# Patient Record
Sex: FEMALE | Race: WHITE | NOT HISPANIC OR LATINO | Employment: OTHER | URBAN - METROPOLITAN AREA
[De-identification: names, ages, dates, MRNs, and addresses within clinical notes are randomized per-mention and may not be internally consistent; named-entity substitution may affect disease eponyms.]

---

## 2017-05-03 ENCOUNTER — RECORDS - HEALTHEAST (OUTPATIENT)
Dept: LAB | Facility: CLINIC | Age: 72
End: 2017-05-03

## 2017-05-03 ENCOUNTER — RECORDS - HEALTHEAST (OUTPATIENT)
Dept: ADMINISTRATIVE | Facility: OTHER | Age: 72
End: 2017-05-03

## 2017-05-03 LAB
CHOLEST SERPL-MCNC: 283 MG/DL
FASTING STATUS PATIENT QL REPORTED: YES
HDLC SERPL-MCNC: 53 MG/DL
LDLC SERPL CALC-MCNC: 201 MG/DL
TRIGL SERPL-MCNC: 146 MG/DL

## 2017-05-30 ENCOUNTER — RECORDS - HEALTHEAST (OUTPATIENT)
Dept: ADMINISTRATIVE | Facility: OTHER | Age: 72
End: 2017-05-30

## 2017-06-01 ENCOUNTER — OFFICE VISIT - HEALTHEAST (OUTPATIENT)
Dept: ONCOLOGY | Facility: HOSPITAL | Age: 72
End: 2017-06-01

## 2017-06-01 ENCOUNTER — AMBULATORY - HEALTHEAST (OUTPATIENT)
Dept: INFUSION THERAPY | Facility: HOSPITAL | Age: 72
End: 2017-06-01

## 2017-06-01 DIAGNOSIS — M81.0 AGE-RELATED OSTEOPOROSIS WITHOUT CURRENT PATHOLOGICAL FRACTURE: ICD-10-CM

## 2017-06-01 DIAGNOSIS — C50.911 BREAST CANCER, STAGE 1, RIGHT (H): ICD-10-CM

## 2017-10-16 ENCOUNTER — RECORDS - HEALTHEAST (OUTPATIENT)
Dept: ADMINISTRATIVE | Facility: OTHER | Age: 72
End: 2017-10-16

## 2017-10-16 ENCOUNTER — RECORDS - HEALTHEAST (OUTPATIENT)
Dept: LAB | Facility: CLINIC | Age: 72
End: 2017-10-16

## 2017-10-16 LAB
CHOLEST SERPL-MCNC: 281 MG/DL
FASTING STATUS PATIENT QL REPORTED: ABNORMAL
HDLC SERPL-MCNC: 49 MG/DL
LDLC SERPL CALC-MCNC: 183 MG/DL
TRIGL SERPL-MCNC: 246 MG/DL

## 2017-11-13 ENCOUNTER — RECORDS - HEALTHEAST (OUTPATIENT)
Dept: ADMINISTRATIVE | Facility: OTHER | Age: 72
End: 2017-11-13

## 2017-11-16 ENCOUNTER — HOSPITAL ENCOUNTER (OUTPATIENT)
Dept: MAMMOGRAPHY | Facility: HOSPITAL | Age: 72
Discharge: HOME | End: 2017-11-16
Attending: FAMILY MEDICINE

## 2017-11-16 DIAGNOSIS — Z12.31 VISIT FOR SCREENING MAMMOGRAM: ICD-10-CM

## 2017-12-14 ENCOUNTER — AMBULATORY - HEALTHEAST (OUTPATIENT)
Dept: SCHEDULING | Facility: CLINIC | Age: 72
End: 2017-12-14

## 2017-12-14 DIAGNOSIS — C50.911 BREAST CANCER, STAGE 1, RIGHT (H): ICD-10-CM

## 2017-12-14 DIAGNOSIS — M81.0 AGE-RELATED OSTEOPOROSIS WITHOUT CURRENT PATHOLOGICAL FRACTURE: ICD-10-CM

## 2018-06-04 ENCOUNTER — AMBULATORY - HEALTHEAST (OUTPATIENT)
Dept: INFUSION THERAPY | Facility: HOSPITAL | Age: 73
End: 2018-06-04

## 2018-06-04 ENCOUNTER — AMBULATORY - HEALTHEAST (OUTPATIENT)
Dept: ONCOLOGY | Facility: HOSPITAL | Age: 73
End: 2018-06-04

## 2018-06-04 ENCOUNTER — OFFICE VISIT - HEALTHEAST (OUTPATIENT)
Dept: ONCOLOGY | Facility: HOSPITAL | Age: 73
End: 2018-06-04

## 2018-06-04 DIAGNOSIS — C50.911 BREAST CANCER, STAGE 1, RIGHT (H): ICD-10-CM

## 2018-06-04 DIAGNOSIS — M85.80 OSTEOPENIA DETERMINED BY X-RAY: ICD-10-CM

## 2018-06-04 LAB
ALBUMIN SERPL-MCNC: 3.6 G/DL (ref 3.5–5)
ALP SERPL-CCNC: 48 U/L (ref 45–120)
ALT SERPL W P-5'-P-CCNC: 16 U/L (ref 0–45)
ANION GAP SERPL CALCULATED.3IONS-SCNC: 9 MMOL/L (ref 5–18)
AST SERPL W P-5'-P-CCNC: 19 U/L (ref 0–40)
BILIRUB SERPL-MCNC: 0.4 MG/DL (ref 0–1)
BUN SERPL-MCNC: 16 MG/DL (ref 8–28)
CALCIUM SERPL-MCNC: 9.7 MG/DL (ref 8.5–10.5)
CHLORIDE BLD-SCNC: 106 MMOL/L (ref 98–107)
CO2 SERPL-SCNC: 24 MMOL/L (ref 22–31)
CREAT SERPL-MCNC: 0.94 MG/DL (ref 0.6–1.1)
GFR SERPL CREATININE-BSD FRML MDRD: 58 ML/MIN/1.73M2
GLUCOSE BLD-MCNC: 97 MG/DL (ref 70–125)
POTASSIUM BLD-SCNC: 4.2 MMOL/L (ref 3.5–5)
PROT SERPL-MCNC: 7.2 G/DL (ref 6–8)
SODIUM SERPL-SCNC: 139 MMOL/L (ref 136–145)

## 2018-06-04 RX ORDER — VALACYCLOVIR HYDROCHLORIDE 1 G/1
1000 TABLET, FILM COATED ORAL AT BEDTIME
Status: SHIPPED | COMMUNITY
Start: 2018-06-04

## 2018-06-06 ENCOUNTER — RECORDS - HEALTHEAST (OUTPATIENT)
Dept: ADMINISTRATIVE | Facility: OTHER | Age: 73
End: 2018-06-06

## 2018-06-06 ENCOUNTER — RECORDS - HEALTHEAST (OUTPATIENT)
Dept: LAB | Facility: CLINIC | Age: 73
End: 2018-06-06

## 2018-06-06 LAB
CHOLEST SERPL-MCNC: 306 MG/DL
FASTING STATUS PATIENT QL REPORTED: YES
FASTING STATUS PATIENT QL REPORTED: YES
GLUCOSE BLD-MCNC: 91 MG/DL (ref 70–125)
HDLC SERPL-MCNC: 55 MG/DL
LDLC SERPL CALC-MCNC: 217 MG/DL
TRIGL SERPL-MCNC: 169 MG/DL
VIT B12 SERPL-MCNC: 1769 PG/ML (ref 213–816)

## 2018-07-10 ENCOUNTER — RECORDS - HEALTHEAST (OUTPATIENT)
Dept: ADMINISTRATIVE | Facility: OTHER | Age: 73
End: 2018-07-10

## 2018-07-11 ENCOUNTER — RECORDS - HEALTHEAST (OUTPATIENT)
Dept: ADMINISTRATIVE | Facility: OTHER | Age: 73
End: 2018-07-11

## 2018-07-12 ENCOUNTER — COMMUNICATION - HEALTHEAST (OUTPATIENT)
Dept: ONCOLOGY | Facility: CLINIC | Age: 73
End: 2018-07-12

## 2018-07-18 ENCOUNTER — RECORDS - HEALTHEAST (OUTPATIENT)
Dept: ADMINISTRATIVE | Facility: OTHER | Age: 73
End: 2018-07-18

## 2018-07-31 ENCOUNTER — HOSPITAL ENCOUNTER (OUTPATIENT)
Dept: MAMMOGRAPHY | Facility: CLINIC | Age: 73
Discharge: HOME | End: 2018-07-31
Attending: FAMILY MEDICINE

## 2018-07-31 ENCOUNTER — HOSPITAL ENCOUNTER (OUTPATIENT)
Dept: MAMMOGRAPHY | Facility: CLINIC | Age: 73
Discharge: HOME | End: 2018-07-31
Attending: SPECIALIST

## 2018-07-31 ENCOUNTER — HOSPITAL ENCOUNTER (OUTPATIENT)
Dept: MAMMOGRAPHY | Facility: CLINIC | Age: 73
Discharge: HOME | End: 2018-07-31
Attending: FAMILY MEDICINE | Admitting: RADIOLOGY

## 2018-07-31 DIAGNOSIS — Z85.3 HX OF BREAST CANCER: ICD-10-CM

## 2018-07-31 DIAGNOSIS — R93.5 ABNORMAL FINDINGS ON DIAGNOSTIC IMAGING OF OTHER ABDOMINAL REGIONS, INCLUDING RETROPERITONEUM: ICD-10-CM

## 2018-07-31 DIAGNOSIS — R92.8 OTHER ABNORMAL AND INCONCLUSIVE FINDINGS ON DIAGNOSTIC IMAGING OF BREAST: ICD-10-CM

## 2018-07-31 DIAGNOSIS — N63.10 BREAST MASS, RIGHT: ICD-10-CM

## 2018-08-02 ENCOUNTER — COMMUNICATION - HEALTHEAST (OUTPATIENT)
Dept: MAMMOGRAPHY | Facility: CLINIC | Age: 73
End: 2018-08-02

## 2018-08-02 LAB
LAB AP CHARGES (HE HISTORICAL CONVERSION): NORMAL
PATH REPORT.COMMENTS IMP SPEC: NORMAL
PATH REPORT.COMMENTS IMP SPEC: NORMAL
PATH REPORT.FINAL DX SPEC: NORMAL
PATH REPORT.GROSS SPEC: NORMAL
PATH REPORT.MICROSCOPIC SPEC OTHER STN: NORMAL
PATH REPORT.RELEVANT HX SPEC: NORMAL
RESULT FLAG (HE HISTORICAL CONVERSION): NORMAL

## 2018-11-23 ENCOUNTER — HOSPITAL ENCOUNTER (OUTPATIENT)
Dept: MAMMOGRAPHY | Facility: CLINIC | Age: 73
Discharge: HOME | End: 2018-11-23
Attending: INTERNAL MEDICINE

## 2018-11-23 DIAGNOSIS — Z12.31 VISIT FOR SCREENING MAMMOGRAM: ICD-10-CM

## 2018-11-30 ENCOUNTER — HOSPITAL ENCOUNTER (OUTPATIENT)
Dept: MAMMOGRAPHY | Facility: CLINIC | Age: 73
Discharge: HOME | End: 2018-11-30
Attending: INTERNAL MEDICINE

## 2018-11-30 DIAGNOSIS — N64.89 BREAST ASYMMETRY: ICD-10-CM

## 2019-06-10 ENCOUNTER — AMBULATORY - HEALTHEAST (OUTPATIENT)
Dept: INFUSION THERAPY | Facility: HOSPITAL | Age: 74
End: 2019-06-10

## 2019-06-10 ENCOUNTER — RECORDS - HEALTHEAST (OUTPATIENT)
Dept: LAB | Facility: CLINIC | Age: 74
End: 2019-06-10

## 2019-06-10 ENCOUNTER — OFFICE VISIT - HEALTHEAST (OUTPATIENT)
Dept: ONCOLOGY | Facility: HOSPITAL | Age: 74
End: 2019-06-10

## 2019-06-10 DIAGNOSIS — M85.80 OSTEOPENIA DETERMINED BY X-RAY: ICD-10-CM

## 2019-06-10 DIAGNOSIS — C50.911 BREAST CANCER, STAGE 1, RIGHT (H): ICD-10-CM

## 2019-06-10 LAB
ALBUMIN SERPL-MCNC: 3.8 G/DL (ref 3.5–5)
ALP SERPL-CCNC: 56 U/L (ref 45–120)
ALT SERPL W P-5'-P-CCNC: 19 U/L (ref 0–45)
ANION GAP SERPL CALCULATED.3IONS-SCNC: 11 MMOL/L (ref 5–18)
ANION GAP SERPL CALCULATED.3IONS-SCNC: 6 MMOL/L (ref 5–18)
AST SERPL W P-5'-P-CCNC: 22 U/L (ref 0–40)
BASOPHILS # BLD AUTO: 0 THOU/UL (ref 0–0.2)
BASOPHILS NFR BLD AUTO: 1 % (ref 0–2)
BILIRUB SERPL-MCNC: 0.3 MG/DL (ref 0–1)
BUN SERPL-MCNC: 12 MG/DL (ref 8–28)
BUN SERPL-MCNC: 12 MG/DL (ref 8–28)
CALCIUM SERPL-MCNC: 10.2 MG/DL (ref 8.5–10.5)
CALCIUM SERPL-MCNC: 9.8 MG/DL (ref 8.5–10.5)
CHLORIDE BLD-SCNC: 106 MMOL/L (ref 98–107)
CHLORIDE BLD-SCNC: 106 MMOL/L (ref 98–107)
CHOLEST SERPL-MCNC: 293 MG/DL
CO2 SERPL-SCNC: 24 MMOL/L (ref 22–31)
CO2 SERPL-SCNC: 28 MMOL/L (ref 22–31)
CREAT SERPL-MCNC: 0.75 MG/DL (ref 0.6–1.1)
CREAT SERPL-MCNC: 0.78 MG/DL (ref 0.6–1.1)
EOSINOPHIL # BLD AUTO: 0.2 THOU/UL (ref 0–0.4)
EOSINOPHIL NFR BLD AUTO: 3 % (ref 0–6)
ERYTHROCYTE [DISTWIDTH] IN BLOOD BY AUTOMATED COUNT: 13.1 % (ref 11–14.5)
FASTING STATUS PATIENT QL REPORTED: ABNORMAL
GFR SERPL CREATININE-BSD FRML MDRD: >60 ML/MIN/1.73M2
GFR SERPL CREATININE-BSD FRML MDRD: >60 ML/MIN/1.73M2
GLUCOSE BLD-MCNC: 104 MG/DL (ref 70–125)
GLUCOSE BLD-MCNC: 86 MG/DL (ref 70–125)
HCT VFR BLD AUTO: 38.9 % (ref 35–47)
HDLC SERPL-MCNC: 46 MG/DL
HGB BLD-MCNC: 13.4 G/DL (ref 12–16)
LDLC SERPL CALC-MCNC: 180 MG/DL
LYMPHOCYTES # BLD AUTO: 1.2 THOU/UL (ref 0.8–4.4)
LYMPHOCYTES NFR BLD AUTO: 18 % (ref 20–40)
MCH RBC QN AUTO: 34.1 PG (ref 27–34)
MCHC RBC AUTO-ENTMCNC: 34.4 G/DL (ref 32–36)
MCV RBC AUTO: 99 FL (ref 80–100)
MONOCYTES # BLD AUTO: 0.6 THOU/UL (ref 0–0.9)
MONOCYTES NFR BLD AUTO: 9 % (ref 2–10)
NEUTROPHILS # BLD AUTO: 4.6 THOU/UL (ref 2–7.7)
NEUTROPHILS NFR BLD AUTO: 69 % (ref 50–70)
PLATELET # BLD AUTO: 238 THOU/UL (ref 140–440)
PMV BLD AUTO: 8.6 FL (ref 8.5–12.5)
POTASSIUM BLD-SCNC: 4.3 MMOL/L (ref 3.5–5)
POTASSIUM BLD-SCNC: 4.6 MMOL/L (ref 3.5–5)
PROT SERPL-MCNC: 7 G/DL (ref 6–8)
RBC # BLD AUTO: 3.93 MILL/UL (ref 3.8–5.4)
SODIUM SERPL-SCNC: 140 MMOL/L (ref 136–145)
SODIUM SERPL-SCNC: 141 MMOL/L (ref 136–145)
TRIGL SERPL-MCNC: 337 MG/DL
TSH SERPL DL<=0.005 MIU/L-ACNC: 3.25 UIU/ML (ref 0.3–5)
VIT B12 SERPL-MCNC: 1631 PG/ML (ref 213–816)
WBC: 6.6 THOU/UL (ref 4–11)

## 2019-07-24 ENCOUNTER — RECORDS - HEALTHEAST (OUTPATIENT)
Dept: LAB | Facility: CLINIC | Age: 74
End: 2019-07-24

## 2019-07-24 LAB — VIT B12 SERPL-MCNC: 1077 PG/ML (ref 213–816)

## 2019-12-23 ENCOUNTER — HOSPITAL ENCOUNTER (OUTPATIENT)
Dept: MAMMOGRAPHY | Facility: CLINIC | Age: 74
Discharge: HOME | End: 2019-12-23

## 2019-12-23 DIAGNOSIS — Z12.31 VISIT FOR SCREENING MAMMOGRAM: ICD-10-CM

## 2020-04-24 ENCOUNTER — RECORDS - HEALTHEAST (OUTPATIENT)
Dept: LAB | Facility: CLINIC | Age: 75
End: 2020-04-24

## 2020-04-24 LAB
AST SERPL W P-5'-P-CCNC: 19 U/L (ref 0–40)
CHOLEST SERPL-MCNC: 269 MG/DL
FASTING STATUS PATIENT QL REPORTED: ABNORMAL
FASTING STATUS PATIENT QL REPORTED: ABNORMAL
GLUCOSE BLD-MCNC: 66 MG/DL (ref 70–125)
HDLC SERPL-MCNC: 49 MG/DL
LDLC SERPL CALC-MCNC: 173 MG/DL
TRIGL SERPL-MCNC: 234 MG/DL

## 2020-06-08 ENCOUNTER — RECORDS - HEALTHEAST (OUTPATIENT)
Dept: LAB | Facility: CLINIC | Age: 75
End: 2020-06-08

## 2020-06-08 LAB
AST SERPL W P-5'-P-CCNC: 24 U/L (ref 0–40)
CHOLEST SERPL-MCNC: 283 MG/DL
FASTING STATUS PATIENT QL REPORTED: ABNORMAL
HDLC SERPL-MCNC: 46 MG/DL
LDLC SERPL CALC-MCNC: 174 MG/DL
TRIGL SERPL-MCNC: 314 MG/DL

## 2020-06-22 ENCOUNTER — OFFICE VISIT - HEALTHEAST (OUTPATIENT)
Dept: ONCOLOGY | Facility: HOSPITAL | Age: 75
End: 2020-06-22

## 2020-06-22 ENCOUNTER — AMBULATORY - HEALTHEAST (OUTPATIENT)
Dept: ONCOLOGY | Facility: HOSPITAL | Age: 75
End: 2020-06-22

## 2020-06-22 ENCOUNTER — AMBULATORY - HEALTHEAST (OUTPATIENT)
Dept: INFUSION THERAPY | Facility: HOSPITAL | Age: 75
End: 2020-06-22

## 2020-06-22 DIAGNOSIS — C50.911 BREAST CANCER, STAGE 1, RIGHT (H): ICD-10-CM

## 2020-06-22 DIAGNOSIS — Z12.31 ENCOUNTER FOR SCREENING MAMMOGRAM FOR MALIGNANT NEOPLASM OF BREAST: ICD-10-CM

## 2020-06-22 DIAGNOSIS — M85.89 OSTEOPENIA OF MULTIPLE SITES: ICD-10-CM

## 2020-06-22 LAB
ALBUMIN SERPL-MCNC: 3.8 G/DL (ref 3.5–5)
ALP SERPL-CCNC: 51 U/L (ref 45–120)
ALT SERPL W P-5'-P-CCNC: 27 U/L (ref 0–45)
ANION GAP SERPL CALCULATED.3IONS-SCNC: 8 MMOL/L (ref 5–18)
AST SERPL W P-5'-P-CCNC: 25 U/L (ref 0–40)
BILIRUB SERPL-MCNC: 0.4 MG/DL (ref 0–1)
BUN SERPL-MCNC: 11 MG/DL (ref 8–28)
CALCIUM SERPL-MCNC: 9 MG/DL (ref 8.5–10.5)
CHLORIDE BLD-SCNC: 109 MMOL/L (ref 98–107)
CO2 SERPL-SCNC: 25 MMOL/L (ref 22–31)
CREAT SERPL-MCNC: 0.82 MG/DL (ref 0.6–1.1)
ERYTHROCYTE [DISTWIDTH] IN BLOOD BY AUTOMATED COUNT: 12.3 % (ref 11–14.5)
GFR SERPL CREATININE-BSD FRML MDRD: >60 ML/MIN/1.73M2
GLUCOSE BLD-MCNC: 87 MG/DL (ref 70–125)
HCT VFR BLD AUTO: 39.6 % (ref 35–47)
HGB BLD-MCNC: 13.2 G/DL (ref 12–16)
MCH RBC QN AUTO: 33.5 PG (ref 27–34)
MCHC RBC AUTO-ENTMCNC: 33.3 G/DL (ref 32–36)
MCV RBC AUTO: 101 FL (ref 80–100)
PLATELET # BLD AUTO: 194 THOU/UL (ref 140–440)
PMV BLD AUTO: 9.2 FL (ref 8.5–12.5)
POTASSIUM BLD-SCNC: 4.1 MMOL/L (ref 3.5–5)
PROT SERPL-MCNC: 6.7 G/DL (ref 6–8)
RBC # BLD AUTO: 3.94 MILL/UL (ref 3.8–5.4)
SODIUM SERPL-SCNC: 142 MMOL/L (ref 136–145)
WBC: 4.9 THOU/UL (ref 4–11)

## 2020-07-08 ENCOUNTER — COMMUNICATION - HEALTHEAST (OUTPATIENT)
Dept: ONCOLOGY | Facility: HOSPITAL | Age: 75
End: 2020-07-08

## 2020-12-09 ENCOUNTER — RECORDS - HEALTHEAST (OUTPATIENT)
Dept: LAB | Facility: CLINIC | Age: 75
End: 2020-12-09

## 2020-12-09 LAB
CHOLEST SERPL-MCNC: 255 MG/DL
FASTING STATUS PATIENT QL REPORTED: ABNORMAL
HDLC SERPL-MCNC: 49 MG/DL
LDLC SERPL CALC-MCNC: 164 MG/DL
MAGNESIUM SERPL-MCNC: 1.8 MG/DL (ref 1.8–2.6)
TRIGL SERPL-MCNC: 211 MG/DL

## 2021-01-18 ENCOUNTER — HOSPITAL ENCOUNTER (OUTPATIENT)
Dept: MAMMOGRAPHY | Facility: CLINIC | Age: 76
Discharge: HOME | End: 2021-01-18

## 2021-01-18 DIAGNOSIS — Z12.31 ENCOUNTER FOR SCREENING MAMMOGRAM FOR MALIGNANT NEOPLASM OF BREAST: ICD-10-CM

## 2021-01-18 DIAGNOSIS — C50.911 BREAST CANCER, STAGE 1, RIGHT (H): ICD-10-CM

## 2021-05-04 ENCOUNTER — RECORDS - HEALTHEAST (OUTPATIENT)
Dept: LAB | Facility: CLINIC | Age: 76
End: 2021-05-04

## 2021-05-04 LAB
ALBUMIN SERPL-MCNC: 3.7 G/DL (ref 3.5–5)
ALP SERPL-CCNC: 52 U/L (ref 45–120)
ALT SERPL W P-5'-P-CCNC: 19 U/L (ref 0–45)
ANION GAP SERPL CALCULATED.3IONS-SCNC: 11 MMOL/L (ref 5–18)
AST SERPL W P-5'-P-CCNC: 18 U/L (ref 0–40)
BILIRUB SERPL-MCNC: 0.4 MG/DL (ref 0–1)
BUN SERPL-MCNC: 19 MG/DL (ref 8–28)
CALCIUM SERPL-MCNC: 9.3 MG/DL (ref 8.5–10.5)
CHLORIDE BLD-SCNC: 103 MMOL/L (ref 98–107)
CO2 SERPL-SCNC: 25 MMOL/L (ref 22–31)
CREAT SERPL-MCNC: 0.8 MG/DL (ref 0.6–1.1)
GFR SERPL CREATININE-BSD FRML MDRD: >60 ML/MIN/1.73M2
GLUCOSE BLD-MCNC: 104 MG/DL (ref 70–125)
POTASSIUM BLD-SCNC: 4.3 MMOL/L (ref 3.5–5)
PROT SERPL-MCNC: 6.9 G/DL (ref 6–8)
SODIUM SERPL-SCNC: 139 MMOL/L (ref 136–145)
T4 FREE SERPL-MCNC: 0.8 NG/DL (ref 0.7–1.8)
TSH SERPL DL<=0.005 MIU/L-ACNC: 1.38 UIU/ML (ref 0.3–5)
VIT B12 SERPL-MCNC: 964 PG/ML (ref 213–816)

## 2021-05-05 LAB — 25(OH)D3 SERPL-MCNC: 57.1 NG/ML (ref 30–80)

## 2021-05-29 NOTE — PROGRESS NOTES
[facility] Cancer Care Progress Note    Patient: Salome Maravilla  MRN: [id number]  Date of Service: 6/10/2019        Reason for visit      1. Breast cancer, stage 1, right (H)        Clinical stage      T1 N0 M0 ER/TN positive HER-2/sd negative    Assessment     1.  A 73 y.o. woman postmenopausal with CA breast right side, T1c N0 M0, ER/TN positive, HER-2 negative, grade 1, status post lumpectomy, radiation and Arimidex for 2 months and tamoxifen since 03/2011.  She stopped her tamoxifen in January 2015 because of questionable double vision.  Now it is clear that that is from ocular muscle problem and not a problem from tamoxifen.  At any rate she has not resumed tamoxifen since.  Overall feels well. Recent biopsy of right breast in July 2018 was negative for malignancy.  2.  Mild hot flashes improved after Effexor and magnesium.  3.  Vaginal dryness.  4.  Minimal arthritis.    Plan     1.  At this time patient will continue to be on observation.  Yearly mammograms.  2.  Followup with me in a years' time.  3.  Continue Effexor 75 mg b.i.d.  4.  She needs to be on high calcium and Vit D diet.  Next DEXA scan in 2020.  5.  Follow up with Dr. Sanderson for other medical issues.  6.  Regular exercise.    History     Salome Maravilla is a very pleasant 73 y.o. old female postmenopausal with a history of CA breast diagnosed in 06/2010 located on the right side measuring 1 cm in size, presenting on a mammogram, completely asymptomatic and was in the background of prior Premarin use.  Subsequent to that, she had lumpectomy and sentinel lymph node biopsy and was a grade 1, ER/TN positive, HER-2 negative type of tumor.  Subsequent to that she got radiation therapy in [place] and started on Arimidex which she only took for a few weeks and noted that she was not tolerant of it and was put on tamoxifen.  she took tamoxifen up until I believe January 2015.  She had some trouble with her vision and this was while she was out of  [place].  She actually stopped taking tamoxifen.        Today she feels well.  Denies any other new problems. Was found to have osteopenia on her Dexa scan. Was told she had a lump in her right breast when she had CT done for abdominal pain.  Underwent ultrasound and mammogram.  Eventually had a biopsy done the biopsy showed dense fibrosis.  No evidence of any malignancy.    On the positive side Felicity has decreased the amount of omeprazole that she is taking and discontinued Celebrex.  As result the aches and pains a little bit more noticeable.    Past Medical History     Past Medical History:   Diagnosis Date     Breast cancer (H) 2010    hx of right lumpectomy for breast      Depression      GERD (gastroesophageal reflux disease)      Hx of radiation therapy right 2010    hx of right lumpectomy for breast cancer     Osteoarthritis        Review of Systems   Constitutional  Constitutional (WDL): Exceptions to WDL  Weight Loss: to <10% from baseline, intervention not indicated(down 6 lbs)  Neurosensory  Neurosensory (WDL): All neurosensory elements are within defined limits  Cardiovascular  Cardiovascular (WDL): All cardiovascular elements are within defined limits  Pulmonary  Respiratory (WDL): Within Defined Limits  Gastrointestinal  Gastrointestinal (WDL): Exceptions to WDL  Anorexia: Loss of appetite without alteration in eating habits  Genitourinary  Genitourinary (WDL): All genitourinary elements are within defined limits(some incontinence)  Integumentary  Integumentary (WDL): All integumentary elements are within defined limits  Patient Coping  Patient Coping: Accepting  ECOG Performance   1  Pain Status  Currently in Pain: Yes  Accompanied by  Accompanied by: Alone      Vital Signs     Vitals:    06/10/19 1432   BP: 119/56   Pulse: 68   Temp: 98  F (36.7  C)   SpO2: 99%       Physical Exam     GENERAL: No acute distress. Cooperative in conversation.   HEENT: Pupils are equal, round and reactive. Oral mucosa is  clean and intact. No ulcerations or mucositis noted. No bleeding noted.  RESP:Chest symmetric lungs are clear bilaterally per auscultation. Regular respiratory rate. No wheezes or rhonchi.  CV: Normal S1 S2 Regular, rate and rhythm. No murmurs.  ABD: Nondistended, soft, nontender. Positive bowel sounds. No organomegaly.   EXTREMITIES: No lower extremity edema.   NEURO: Non- focal. Alert and oriented x3.  Cranial nerves appear intact.  PSYCH: Within normal limits. No depression or anxiety.  SKIN: Warm dry intact.    LYMPH NODES: Bilateral cervical, supraclavicular, axillary lymph node examination was done.  Negative for any palpable adenopathy.  BREAST: S/P right lumpectomy. Barely visible scar. Slightly prominent nipple and areola. Minimal skin changes. No lump. No nipple discharge. Left breast normal. Axilla normal b/l.      Lab Results     Results for orders placed or performed in visit on 06/10/19   Comprehensive Metabolic Panel   Result Value Ref Range    Sodium 140 136 - 145 mmol/L    Potassium 4.3 3.5 - 5.0 mmol/L    Chloride 106 98 - 107 mmol/L    CO2 28 22 - 31 mmol/L    Anion Gap, Calculation 6 5 - 18 mmol/L    Glucose 104 70 - 125 mg/dL    BUN 12 8 - 28 mg/dL    Creatinine 0.78 0.60 - 1.10 mg/dL    GFR MDRD Af Amer >60 >60 mL/min/1.73m2    GFR MDRD Non Af Amer >60 >60 mL/min/1.73m2    Bilirubin, Total 0.3 0.0 - 1.0 mg/dL    Calcium 9.8 8.5 - 10.5 mg/dL    Protein, Total 7.0 6.0 - 8.0 g/dL    Albumin 3.8 3.5 - 5.0 g/dL    Alkaline Phosphatase 56 45 - 120 U/L    AST 22 0 - 40 U/L    ALT 19 0 - 45 U/L   HM1 (CBC with Diff)   Result Value Ref Range    WBC 6.6 4.0 - 11.0 thou/uL    RBC 3.93 3.80 - 5.40 mill/uL    Hemoglobin 13.4 12.0 - 16.0 g/dL    Hematocrit 38.9 35.0 - 47.0 %    MCV 99 80 - 100 fL    MCH 34.1 (H) 27.0 - 34.0 pg    MCHC 34.4 32.0 - 36.0 g/dL    RDW 13.1 11.0 - 14.5 %    Platelets 238 140 - 440 thou/uL    MPV 8.6 8.5 - 12.5 fL    Neutrophils % 69 50 - 70 %    Lymphocytes % 18 (L) 20 - 40 %     Monocytes % 9 2 - 10 %    Eosinophils % 3 0 - 6 %    Basophils % 1 0 - 2 %    Neutrophils Absolute 4.6 2.0 - 7.7 thou/uL    Lymphocytes Absolute 1.2 0.8 - 4.4 thou/uL    Monocytes Absolute 0.6 0.0 - 0.9 thou/uL    Eosinophils Absolute 0.2 0.0 - 0.4 thou/uL    Basophils Absolute 0.0 0.0 - 0.2 thou/uL         Imaging Results     No results found.      Tl Robertson MD

## 2021-05-31 VITALS — WEIGHT: 147.9 LBS | BODY MASS INDEX: 24.8 KG/M2

## 2021-06-01 VITALS — WEIGHT: 145.9 LBS | BODY MASS INDEX: 24.47 KG/M2

## 2021-06-02 ENCOUNTER — RECORDS - HEALTHEAST (OUTPATIENT)
Dept: ADMINISTRATIVE | Facility: CLINIC | Age: 76
End: 2021-06-02

## 2021-06-03 VITALS — WEIGHT: 139.2 LBS | BODY MASS INDEX: 23.34 KG/M2

## 2021-06-09 NOTE — TELEPHONE ENCOUNTER
Called patient and left message for return call to discuss bone density results. Requested patient call back. Mandy Tripathi, CMA

## 2021-06-09 NOTE — TELEPHONE ENCOUNTER
----- Message from Jenny Velázquez CNP sent at 7/8/2020  7:59 AM CDT -----  DEXA scan shows worsening osteopenia. Please encourage pt is take Calcium/Vitamin D, at least 600mg/400IU, twice a day. She is currently not taking it. Repeat in 2 years.

## 2021-06-09 NOTE — PROGRESS NOTES
[facility] Hematology and Oncology Progress Note    Patient: Salome Maravilla  MRN: [id number]  Date of Service: 06/22/2020        Reason for Visit    Chief Complaint   Patient presents with     HE Cancer     Breast cancer, stage 1, right       Assessment and Plan  Cancer Staging  Breast cancer, stage 1, right (H)  Staging form: Breast, AJCC 7th Edition  - Clinical: ER+, FL+, Her2 sd -ve (T1c, N0, cM0) - Unsigned  - Pathologic: No stage assigned - Unsigned    1. Breast cancer, right side, T1c N0 M0, ER/FL positive, HER-2 negative, grade 1, status post lumpectomy, radiation and Arimidex for 2 months and tamoxifen from 03/2011 until January 2015 when she stopped because of questionable double vision.  Now it is clear that that is from ocular muscle problem and not a problem from tamoxifen.  She did not resume. Has been fine since. She will continue to come annually. Encourage her to call with any changes to her health.     2. Osteopenia: She is not taking calcium, but gets a lot in her diet. She is taking vitamin D daily. We will repeat her DEXA scan this year.  Encourage also to participate in weightbearing exercises.         ECOG Performance   ECOG Performance Status: 0     Distress Assessment  Distress Assessment Score: 5('s health- dementia, stroke, COPD): no further intervention by us. Has support.     Pain  Currently in Pain: Yes  Pain Score (Initial OR Reassessment): 3  Location: toes        Problem List    1. Osteopenia of multiple sites  DXA Bone Density Scan    DXA Bone Density Scan   2. Breast cancer, stage 1, right (H)  Mammo Screening Bilateral   3. Encounter for screening mammogram for malignant neoplasm of breast   Mammo Screening Bilateral      ______________________________________________________________________________    History of Present Illness    Salome Maravilla is a very pleasant 74 y.o.female who is postmenopausal with a history of CA breast diagnosed in 06/2010 located on the right  side measuring 1 cm in size, presenting on a mammogram, completely asymptomatic and was in the background of prior Premarin use.  Subsequent to that, she had lumpectomy and sentinel lymph node biopsy and was a grade 1, ER/PA positive, HER-2 negative type of tumor.  Subsequent to that she got radiation therapy in [place] and started on Arimidex which she only took for a few weeks and noted that she was not tolerant of it and was put on tamoxifen.  she took tamoxifen up until I believe January 2015.  She had some trouble with her vision and this was while she was out of state.  She actually stopped taking tamoxifen.  Never resumed. She did have osteopenia on her Dexa scan.   Was told she had a lump in her right breast when she had CT done for abdominal pain in 2018.  Underwent ultrasound and mammogram.  Eventually had a biopsy done the biopsy showed dense fibrosis.  No evidence of any malignancy.  Pt has been doing fine without major complaints    Pain Status  Currently in Pain: Yes    Review of Systems    Oncology Nurse Assessment/CMA Intake: Constitutional  Constitutional (WDL): Exceptions to WDL  Fatigue: Concerns(always feel tired)  Hot flashes/Night Sweats: Concerns  Neurosensory  Neurosensory (WDL): All neurosensory elements are within defined limits  Eye   Eye Disorder (WDL): Exceptions to WDL  Blurred Vision: Concerns(double vision)  Ear  Ear Disorder (WDL): All ear disorder elements are within defined limits  Cardiovascular  Cardiovascular (WDL): All cardiovascular elements are within defined limits  Pulmonary  Respiratory (WDL): Within Defined Limits  Gastrointestinal  Gastrointestinal (WDL): All gastrointestinal elements are within defined limits  Genitourinary  Genitourinary (WDL): All genitourinary elements are within defined limits  Lymphatic  Lymph (WDL): All lymph disorder elements are within defined limits  Musculoskeletal and Connective Tissue  Musculoskeletal and Connetive Tissue Disorders (WDL):  Exceptions to WDL  Arthralgia: Concerns(osteoarthritis)  Integumentary  Integumentary (WDL): All integumentary elements are within defined limits  Patient Coping  Patient Coping: Accepting;Open/discussion  Accompanied by  Accompanied by: Alone  Oral Chemo Adherence         Past History  Past Medical History:   Diagnosis Date     Breast cancer (H) 2010    hx of right lumpectomy for breast      Depression      GERD (gastroesophageal reflux disease)      Hx of radiation therapy right 2010    hx of right lumpectomy for breast cancer     Osteoarthritis        PHYSICAL EXAM  There were no vitals taken for this visit.    GENERAL: no acute distress. Cooperative in conversation. Alone on vieo  RESP: Regular respiratory rate. No expiratory wheezes   NEURO: non focal. Alert and oriented x3.   PSYCH: within normal limits. No depression or anxiety.  SKIN: exposed skin is dry intact.     Lab Results    Recent Results (from the past 168 hour(s))   HM2 (CBC W/O DIFF)   Result Value Ref Range    WBC 4.9 4.0 - 11.0 thou/uL    RBC 3.94 3.80 - 5.40 mill/uL    Hemoglobin 13.2 12.0 - 16.0 g/dL    Hematocrit 39.6 35.0 - 47.0 %     (H) 80 - 100 fL    MCH 33.5 27.0 - 34.0 pg    MCHC 33.3 32.0 - 36.0 g/dL    RDW 12.3 11.0 - 14.5 %    Platelets 194 140 - 440 thou/uL    MPV 9.2 8.5 - 12.5 fL   Comprehensive Metabolic Panel   Result Value Ref Range    Sodium 142 136 - 145 mmol/L    Potassium 4.1 3.5 - 5.0 mmol/L    Chloride 109 (H) 98 - 107 mmol/L    CO2 25 22 - 31 mmol/L    Anion Gap, Calculation 8 5 - 18 mmol/L    Glucose 87 70 - 125 mg/dL    BUN 11 8 - 28 mg/dL    Creatinine 0.82 0.60 - 1.10 mg/dL    GFR MDRD Af Amer >60 >60 mL/min/1.73m2    GFR MDRD Non Af Amer >60 >60 mL/min/1.73m2    Bilirubin, Total 0.4 0.0 - 1.0 mg/dL    Calcium 9.0 8.5 - 10.5 mg/dL    Protein, Total 6.7 6.0 - 8.0 g/dL    Albumin 3.8 3.5 - 5.0 g/dL    Alkaline Phosphatase 51 45 - 120 U/L    AST 25 0 - 40 U/L    ALT 27 0 - 45 U/L       Imaging    No results  "found.    mammo from December was reviewed and normal    Salome Maravilla is a 74 y.o. female who is being evaluated via a billable video visit.      The patient has been notified of following:     \"This video visit will be conducted via a call between you and your physician/provider. We have found that certain health care needs can be provided without the need for an in-person physical exam.  This service lets us provide the care you need with a video conversation.  If a prescription is necessary we can send it directly to your pharmacy.  If lab work is needed we can place an order for that and you can then stop by our lab to have the test done at a later time.    Video visits are billed at different rates depending on your insurance coverage. Please reach out to your insurance provider with any questions.    If during the course of the call the physician/provider feels a video visit is not appropriate, you will not be charged for this service.\"    Patient has given verbal consent to a Video visit? Yes    Will anyone else be joining your video visit? No        Video Start Time: 1:51 PM    Additional provider notes: see abovev      Video-Visit Details    Type of service:  Video Visit    Video End Time (time video stopped): 1:57 PM  Originating Location (pt. Location): Home    Distant Location (provider location):  [facility] CANCER CARE AND H[facility]ATOLOGY     Platform used for Video Visit: Alex Velázquez CNP      Signed by: Jenny Velázquez CNP  "

## 2021-06-09 NOTE — PROGRESS NOTES
"Salome Maravilla is a 74 y.o. female who is being evaluated via a billable video visit regarding Breast cancer, stage 1, right.     The patient has been notified of following:     \"This video visit will be conducted via a call between you and your physician/provider. We have found that certain health care needs can be provided without the need for an in-person physical exam.  This service lets us provide the care you need with a video conversation.  If a prescription is necessary we can send it directly to your pharmacy.  If lab work is needed we can place an order for that and you can then stop by our lab to have the test done at a later time.    Video visits are billed at different rates depending on your insurance coverage. Please reach out to your insurance provider with any questions.    If during the course of the call the physician/provider feels a video visit is not appropriate, you will not be charged for this service.\"    Patient has given verbal consent to a Video visit? Yes    Will anyone else be joining your video visit? No            Mandy Tripathi CMA  "

## 2021-06-09 NOTE — TELEPHONE ENCOUNTER
Patient returned call to discuss bone density results. Encouraged patient to start taking Calcium/Vit D 600mg/400IU twice a day. Will plan to repeat scan in 2 years. Understanding and appreciation of call was verbalized.  Mandy Tripathi, CMA

## 2021-06-11 NOTE — PROGRESS NOTES
[facility] Cancer Care Progress Note    Patient: Salome Maravilla  MRN: [id number]  Date of Service: 6/1/2017        Reason for visit      1. Breast cancer, stage 1, right    2. Age-related osteoporosis without current pathological fracture         Clinical stage      T1 N0 M0 ER/ND positive HER-2/sd negative    Assessment     1.  A 71 y.o. woman postmenopausal with CA breast right side, T1c N0 M0, ER/ND positive, HER-2 negative, grade 1, status post lumpectomy, radiation and Arimidex for 2 months and tamoxifen since 03/2011.  She stopped her tamoxifen in January 2015 because of questionable double vision.  Now it is clear that that is from ocular muscle problem and not a problem from tamoxifen.  At any rate she has not resumed tamoxifen since.  Overall feels well.  2.  Mild hot flashes improved after Effexor and magnesium.  3.  Vaginal dryness.  4.  minimal arthritis.      Plan     1.  At this time patient will continue to be on observation.  Yearly mammograms.  2.  Followup with me in a years' time.  3.  Continue Effexor 75 mg b.i.d.  4.  She needs a DEXA scan.  5.  Follow up with Dr. Sanderson for other medical issues.      History     Salome Maravilla is a very pleasant 71 y.o. old female postmenopausal with a history of CA breast diagnosed in 06/2010 located on the right side measuring 1 cm in size, presenting on a mammogram, completely asymptomatic and was in the background of prior Premarin use.  Subsequent to that, she had lumpectomy and sentinel lymph node biopsy and was a grade 1, ER/ND positive, HER-2 negative type of tumor.  Subsequent to that she got radiation therapy in [place] and started on Arimidex which she only took for a few weeks and noted that she was not tolerant of it and was put on tamoxifen.  she took tamoxifen up until I believe January 2015.  She had some trouble with her vision and this was while she was out of state.  She actually stopped taking tamoxifen.      After that she was seen by  her eye doctor and was told that the problem lies in her lenses.  He did not think that this was a drug effect.  On the positive side her hot flashes have gotten better since she has stopped taking tamoxifen.  They were quite frequent and quite annoying back when she stopped it.  Today she feels well.  Denies any other new problems.  She had seen her primary care doctor and had a breast exam done earlier this month.    Past Medical History     Past Medical History:   Diagnosis Date     Breast cancer 2010    hx of right lumpectomy for breast      Depression      GERD (gastroesophageal reflux disease)      Hx of radiation therapy right 2010    hx of right lumpectomy for breast cancer     Osteoarthritis        Review of Systems   Constitutional  Constitutional (WDL): Exceptions to WDL  Neurosensory  Neurosensory (WDL): All neurosensory elements are within defined limits  Cardiovascular  Cardiovascular (WDL): All cardiovascular elements are within defined limits  Pulmonary  Respiratory (WDL): Within Defined Limits  Gastrointestinal  Gastrointestinal (WDL): Exceptions to WDL  Anorexia: Loss of appetite without alteration in eating habits  Genitourinary  Genitourinary (WDL): All genitourinary elements are within defined limits  Integumentary  Integumentary (WDL): All integumentary elements are within defined limits  Patient Coping  Patient Coping: Accepting  ECOG Performance   1  Pain Status  Currently in Pain: No/denies  Accompanied by  Accompanied by: Alone      Vital Signs     Vitals:    06/01/17 1404   BP: 152/66   Pulse: 77   Temp: 98.2  F (36.8  C)   SpO2: 99%       Physical Exam     GENERAL: No acute distress. Cooperative in conversation.   HEENT: Pupils are equal, round and reactive. Oral mucosa is clean and intact. No ulcerations or mucositis noted. No bleeding noted.  RESP:Chest symmetric lungs are clear bilaterally per auscultation. Regular respiratory rate. No wheezes or rhonchi.  CV: Normal S1 S2 Regular,  rate and rhythm. No murmurs.  ABD: Nondistended, soft, nontender. Positive bowel sounds. No organomegaly.   EXTREMITIES: No lower extremity edema.   NEURO: Non- focal. Alert and oriented x3.  Cranial nerves appear intact.  PSYCH: Within normal limits. No depression or anxiety.  SKIN: Warm dry intact.    LYMPH NODES: Bilateral cervical, supraclavicular, axillary lymph node examination was done.  Negative for any palpable adenopathy.      Lab Results     Results for orders placed or performed in visit on 06/01/17   Comprehensive Metabolic Panel   Result Value Ref Range    Sodium 142 136 - 145 mmol/L    Potassium 4.3 3.5 - 5.0 mmol/L    Chloride 106 98 - 107 mmol/L    CO2 27 22 - 31 mmol/L    Anion Gap, Calculation 9 5 - 18 mmol/L    Glucose 94 70 - 125 mg/dL    BUN 16 8 - 28 mg/dL    Creatinine 0.91 0.60 - 1.10 mg/dL    GFR MDRD Af Amer >60 >60 mL/min/1.73m2    GFR MDRD Non Af Amer >60 >60 mL/min/1.73m2    Bilirubin, Total 0.4 0.0 - 1.0 mg/dL    Calcium 9.9 8.5 - 10.5 mg/dL    Protein, Total 7.8 6.0 - 8.0 g/dL    Albumin 3.9 3.5 - 5.0 g/dL    Alkaline Phosphatase 55 45 - 120 U/L    AST 18 0 - 40 U/L    ALT 18 0 - 45 U/L         Imaging Results     No results found.      Tl Robertson MD

## 2021-06-16 PROBLEM — M85.80 OSTEOPENIA DETERMINED BY X-RAY: Status: ACTIVE | Noted: 2018-06-04

## 2021-06-18 NOTE — PROGRESS NOTES
[facility] Progress Note    Patient: Salome Maravilla  MRN: [id number]  Date of Service: 6/4/2018        Reason for visit      1. Breast cancer, stage 1, right    2. Osteopenia determined by x-ray        Clinical stage      T1 N0 M0 ER/MT positive HER-2/sd negative    Assessment     1.  A 72 y.o. woman postmenopausal with CA breast right side, T1c N0 M0, ER/MT positive, HER-2 negative, grade 1, status post lumpectomy, radiation and Arimidex for 2 months and tamoxifen since 03/2011.  She stopped her tamoxifen in January 2015 because of questionable double vision.  Now it is clear that that is from ocular muscle problem and not a problem from tamoxifen.  At any rate she has not resumed tamoxifen since.  Overall feels well.  2.  Mild hot flashes improved after Effexor and magnesium.  3.  Vaginal dryness.  4.  Minimal arthritis.    Plan     1.  At this time patient will continue to be on observation.  Yearly mammograms.  2.  Followup with me in a years' time.  3.  Continue Effexor 75 mg b.i.d.  4.  She needs to be on high calcium and Vit D diet.  5.  Follow up with Dr. Sanderson for other medical issues.  6.  Regular exercise.    History     Salome Maravilla is a very pleasant 72 y.o. old female postmenopausal with a history of CA breast diagnosed in 06/2010 located on the right side measuring 1 cm in size, presenting on a mammogram, completely asymptomatic and was in the background of prior Premarin use.  Subsequent to that, she had lumpectomy and sentinel lymph node biopsy and was a grade 1, ER/MT positive, HER-2 negative type of tumor.  Subsequent to that she got radiation therapy in [place] and started on Arimidex which she only took for a few weeks and noted that she was not tolerant of it and was put on tamoxifen.  she took tamoxifen up until I believe January 2015.  She had some trouble with her vision and this was while [place] was out of [place].  She actually stopped taking tamoxifen.        Today she  feels well.  Denies any other new problems. Was found to have osteopenia on her Dexa scan.    Past Medical History     Past Medical History:   Diagnosis Date     Breast cancer 2010    hx of right lumpectomy for breast      Depression      GERD (gastroesophageal reflux disease)      Hx of radiation therapy right 2010    hx of right lumpectomy for breast cancer     Osteoarthritis        Review of Systems   Constitutional  Constitutional (WDL): Exceptions to WDL  Fatigue: Fatigue not relieved by rest - Limiting instrumental ADL  Neurosensory  Neurosensory (WDL): All neurosensory elements are within defined limits  Cardiovascular  Cardiovascular (WDL): All cardiovascular elements are within defined limits  Pulmonary  Respiratory (WDL): Within Defined Limits  Gastrointestinal  Gastrointestinal (WDL): Exceptions to WDL  Esophagitis: Asymptomatic, clinical or diagnostic observations only, intervention not indicated (acid reflex)  Genitourinary  Genitourinary (WDL): Exceptions to WDL  Urinary Retention: Urinary, suprapubic or intermittent catheter placement not indicated, able to void with some residual  Integumentary  Integumentary (WDL): All integumentary elements are within defined limits  Patient Coping  Patient Coping: Accepting  ECOG Performance   1  Pain Status  Currently in Pain: Yes  Accompanied by  Accompanied by: Alone      Vital Signs     Vitals:    06/04/18 1439   BP: 129/59   Pulse: 77   Temp: 98.6  F (37  C)   SpO2: 97%       Physical Exam     GENERAL: No acute distress. Cooperative in conversation.   HEENT: Pupils are equal, round and reactive. Oral mucosa is clean and intact. No ulcerations or mucositis noted. No bleeding noted.  RESP:Chest symmetric lungs are clear bilaterally per auscultation. Regular respiratory rate. No wheezes or rhonchi.  CV: Normal S1 S2 Regular, rate and rhythm. No murmurs.  ABD: Nondistended, soft, nontender. Positive bowel sounds. No organomegaly.   EXTREMITIES: No lower extremity  edema.   NEURO: Non- focal. Alert and oriented x3.  Cranial nerves appear intact.  PSYCH: Within normal limits. No depression or anxiety.  SKIN: Warm dry intact.    LYMPH NODES: Bilateral cervical, supraclavicular, axillary lymph node examination was done.  Negative for any palpable adenopathy.  BREAST: S/P right lumpectomy. Barely visible scar. Minimal skin changes. No lump. No nipple discharge. Left breast normal. Axilla normal b/l.      Lab Results     Results for orders placed or performed in visit on 06/04/18   Comprehensive Metabolic Panel   Result Value Ref Range    Sodium 139 136 - 145 mmol/L    Potassium 4.2 3.5 - 5.0 mmol/L    Chloride 106 98 - 107 mmol/L    CO2 24 22 - 31 mmol/L    Anion Gap, Calculation 9 5 - 18 mmol/L    Glucose 97 70 - 125 mg/dL    BUN 16 8 - 28 mg/dL    Creatinine 0.94 0.60 - 1.10 mg/dL    GFR MDRD Af Amer >60 >60 mL/min/1.73m2    GFR MDRD Non Af Amer 58 (L) >60 mL/min/1.73m2    Bilirubin, Total 0.4 0.0 - 1.0 mg/dL    Calcium 9.7 8.5 - 10.5 mg/dL    Protein, Total 7.2 6.0 - 8.0 g/dL    Albumin 3.6 3.5 - 5.0 g/dL    Alkaline Phosphatase 48 45 - 120 U/L    AST 19 0 - 40 U/L    ALT 16 0 - 45 U/L         Imaging Results     Dxa Bone Density Scan    Result Date: 5/30/2018 5/14/2018 RE: Salome Maravilla YOB: 1945 Dear Tl Robertson, Patient Profile: 72 y.o. female, postmenopausal, is here for the follow up bone density test. History of fractures - None. Family history of osteoporosis - None.  Family history of hip fracture: None. Smoking history - Past. Osteoporosis treatment past -  Yes;  HRT. Osteoporosis treatment current - No.  Chronic medical problems - Breast cancer, Hysterectomy, Oophorectomy, Premature menopause, Radiation treatment, Scoliosis and Spine surgery. High risk medications -  Aromatase Inhibitor;  Yes, in the Past. Assessment: 1. The spine bone density L1-L4(L2,L3) with T-score -0.4. 2. Femoral bone densities show left total hip T- score -1.7 and right  femoral neck T-score -1.7. 3. Trabecular bone score indicates good trabecular bone architecture. 4.  Left one third radius T score -1.0. 72 y.o. female with LOW BONE DENSITY (OSTEOPENIA) and MODERATE fracture risk, adjusted for the TBS, with major osteoporotic fracture risk 11.4 % and hip fracture risk 2.7 %. Since the previous bone density dated  April 18, 2014, there has been no statistically significant  change in the bone density of the spine.  Additionally there has been a 4.7 % decline in the left total hip and a 4.8 % decline in the right total hip. Recommendations: Appropriate calcium, vitamin D supplements, along with balance and weight bearing exercise is recommended with follow up bone density scan in 2 years. Bone densitometry was performed on your patient using our TaxJar densitometer. The results are summarized and a copy of the actual scans are included for your review. In conformity with the International Society of Clinical Densitometry's most recent position statement for DXA interpretation (2015), the diagnosis will be made on the lowest measured T-score of the lumbar spine, femoral neck, total proximal femur or 33% radius. Note the change in terminology for diagnostic classification from OSTEOPENIA to LOW BONE MASS. All trending for sequential exams will be done using multiple vertebrae or the total proximal femur. Fracture risk is based on the WHO Fracture Risk Assessment Tool (FRAX). If additional information is needed or if you would like to discuss the results, please do not hesitate to call me. Thank you for referring this patient to [facility]. We are happy to be of service in support of you and your practice. If you have any questions or suggestions to improve our service, please call me at [phone]. Sincerely, Ashley Felton M.D. C.C.D. Osteoporosis Services, [facility]         Tl Robertson MD

## 2021-06-21 ENCOUNTER — RECORDS - HEALTHEAST (OUTPATIENT)
Dept: LAB | Facility: CLINIC | Age: 76
End: 2021-06-21

## 2021-06-21 LAB
CHOLEST SERPL-MCNC: 284 MG/DL
FASTING STATUS PATIENT QL REPORTED: ABNORMAL
HDLC SERPL-MCNC: 51 MG/DL
LDLC SERPL CALC-MCNC: 194 MG/DL
MAGNESIUM SERPL-MCNC: 2 MG/DL (ref 1.8–2.6)
TRIGL SERPL-MCNC: 197 MG/DL

## 2021-07-04 ENCOUNTER — HEALTH MAINTENANCE LETTER (OUTPATIENT)
Age: 76
End: 2021-07-04

## 2021-07-12 ENCOUNTER — ONCOLOGY VISIT (OUTPATIENT)
Dept: ONCOLOGY | Facility: HOSPITAL | Age: 76
End: 2021-07-12
Attending: NURSE PRACTITIONER
Payer: COMMERCIAL

## 2021-07-12 VITALS
BODY MASS INDEX: 23.01 KG/M2 | SYSTOLIC BLOOD PRESSURE: 114 MMHG | HEART RATE: 74 BPM | TEMPERATURE: 98.7 F | WEIGHT: 138.1 LBS | DIASTOLIC BLOOD PRESSURE: 61 MMHG | HEIGHT: 65 IN | OXYGEN SATURATION: 99 %

## 2021-07-12 DIAGNOSIS — M85.89 OSTEOPENIA OF MULTIPLE SITES: ICD-10-CM

## 2021-07-12 DIAGNOSIS — Z12.31 ENCOUNTER FOR SCREENING MAMMOGRAM FOR MALIGNANT NEOPLASM OF BREAST: ICD-10-CM

## 2021-07-12 DIAGNOSIS — C50.911 BREAST CANCER, STAGE 1, RIGHT (H): Primary | ICD-10-CM

## 2021-07-12 PROCEDURE — G0463 HOSPITAL OUTPT CLINIC VISIT: HCPCS

## 2021-07-12 PROCEDURE — 99213 OFFICE O/P EST LOW 20 MIN: CPT | Performed by: NURSE PRACTITIONER

## 2021-07-12 RX ORDER — GEMFIBROZIL 600 MG/1
600 TABLET, FILM COATED ORAL 2 TIMES DAILY
COMMUNITY
Start: 2021-07-07 | End: 2021-07-12

## 2021-07-12 ASSESSMENT — MIFFLIN-ST. JEOR: SCORE: 1112.3

## 2021-07-12 ASSESSMENT — PAIN SCALES - GENERAL: PAINLEVEL: NO PAIN (0)

## 2021-07-12 NOTE — LETTER
"    7/12/2021         RE: Salome Maravilla  [address]  [place] [facility] [address]        Dear Colleague,    Thank you for referring your patient, Salome Maravilla, to the [facility] [place]. Please see a copy of my visit note below.    ..  Oncology Rooming Note    July 12, 2021 11:02 AM   Salome Maravilla is a 75 year old female who presents for:    Chief Complaint   Patient presents with     Oncology Clinic Visit     Breast cancer, stage 1, right (H)     Initial Vitals: /61 (BP Location: Right arm, Patient Position: Sitting, Cuff Size: Adult Regular)   Pulse 74   Temp 98.7  F (37.1  C) (Oral)   Ht 1.651 m (5' 5\")   Wt 62.6 kg (138 lb 1.6 oz)   SpO2 99%   BMI 22.98 kg/m   Estimated body mass index is 22.98 kg/m  as calculated from the following:    Height as of this encounter: 1.651 m (5' 5\").    Weight as of this encounter: 62.6 kg (138 lb 1.6 oz). Body surface area is 1.69 meters squared.  No Pain (0) Comment: Data Unavailable   No LMP recorded.  Allergies reviewed: Yes  Medications reviewed: Yes    Medications: Medication refills not needed today.  Pharmacy name entered into EPIC: [facility].    Clinical concerns:  none was notified.     RN provided patient with information on [facility] resource for support. Pt's  has early Alzheimer's, possibly Lewy Body, and she has significant care giving stress, contributing to her chronic depression. [email or web address]      Leena Saavedra RN                [facility] Hematology and Oncology Progress Note    Patient: Salome Maravilla  MRN: [id number]  Date of Service: 07/12/2021        Reason for Visit    Chief Complaint   Patient presents with     Oncology Clinic Visit     Breast cancer, stage 1, right (H)       Assessment and Plan    Cancer Staging  Breast cancer, stage 1, right (H)  Staging form: Breast, AJCC 7th Edition  - Pathologic: Stage IA (T1c, N0, cM0) " - Signed by Jenny Velázquez APRN CNP on 7/12/2021  ER Status: Positive  IL Status: Positive  HER2 Status: Negative    1. Breast cancer, right side, T1c N0 M0, ER/IL positive, HER-2 negative, grade 1, status post lumpectomy, radiation and Arimidex for 2 months and tamoxifen from 03/2011 until January 2015 when she stopped because of questionable double vision.  Now it is clear that that is from ocular muscle problem and not a problem from tamoxifen.  She did not resume. Has been fine since. She will continue to come annually. Due for mammogram in January. Encourage her to call with any changes to her health.      2. Osteopenia: She is now on calcium and vit d. We will repeat DEXA scan in July 2022. Encourage weight bearing exercises.       ECOG Performance    0 - Independent    Distress Screening (within last 30 days)    1. How concerned are you about your ability to eat? : 0  2. How concerned are you about unintended weight loss or your current weight? : 0  3. How concerned are you about feeling depressed or very sad? : (!) 8 (chronic depression. takes meds. Combo of Covid and 's health condition.)  4. How concerned are you about feeling anxious or very scared? : 3  5. Do you struggle with the loss of meaning and magalie in your life? : Somewhat  6. How concerned are you about work and home life issues that may be affected by your cancer? : 0  7. How concerned are you about knowing what resources are available to help you? : 0  8. Do you currently have what you would describe as Islam or spiritual struggles?            : Somewhat       Pain  Pain Score: No Pain (0)    Problem List    Patient Active Problem List   Diagnosis     Breast cancer, stage 1, right (H)     Osteopenia determined by x-ray        ______________________________________________________________________________    History of Present Illness    Salome Maravilla is a very pleasant 75 year old female who is postmenopausal with a history  "of CA breast diagnosed in 06/2010 located on the right side measuring 1 cm in size, presenting on a mammogram, completely asymptomatic and was in the background of prior Premarin use.  Subsequent to that, she had lumpectomy and sentinel lymph node biopsy and was a grade 1, ER/MI positive, HER-2 negative type of tumor.  Subsequent to that she got radiation therapy in [place] and started on Arimidex which she only took for a few weeks and noted that she was not tolerant of it and was put on tamoxifen.  she took tamoxifen up until I believe January 2015.  She had some trouble with her vision and this was while she was out of state.  She actually stopped taking tamoxifen.  Never resumed. She did have osteopenia on her Dexa scan.   Was told she had a lump in her right breast when she had CT done for abdominal pain in 2018.  Underwent ultrasound and mammogram.  Eventually had a biopsy done the biopsy showed dense fibrosis.  No evidence of any malignancy.  Pt has been doing fine without major complaints    Review of Systems      ROS: As above in the history, otherwise the remainder of the 10point ROS is negative and not contributory to current reason for visit.       Past History    Past Medical History:   Diagnosis Date     Breast cancer (H) 2010    hx of right lumpectomy for breast      Depression      GERD (gastroesophageal reflux disease)      Hx of radiation therapy right 2010    hx of right lumpectomy for breast cancer     Osteoarthritis        PHYSICAL EXAM:  /61 (BP Location: Right arm, Patient Position: Sitting, Cuff Size: Adult Regular)   Pulse 74   Temp 98.7  F (37.1  C) (Oral)   Ht 1.651 m (5' 5\")   Wt 62.6 kg (138 lb 1.6 oz)   SpO2 99%   BMI 22.98 kg/m    GENERAL: no acute distress. Cooperative in conversation. Here alone. Mask on  RESP: lungs are clear bilaterally per auscultation. Regular respiratory rate. No wheezes or rhonchi.  CV: Regular, rate and rhythm. No murmurs.  MUSCULOSKELETAL: No " lower extremity swelling.   NEURO: non focal. Alert and oriented x3.   PSYCH: within normal limits. No depression or anxiety.  SKIN: warm dry intact   LYMPH: no cervical, supraclavicular or axillary lymphadenopathy  BREAST: Bilateral exam done.  Lumpectomy incision is well-healed.  Very slight scarring.  No abnormal lesions or rashes noted.  No evidence for local recurrence bilaterally.          Lab Results    No results found for this or any previous visit (from the past 168 hour(s)).    Imaging    No results found.      Signed by: DONOVAN Saavedra CNP      Again, thank you for allowing me to participate in the care of your patient.        Sincerely,        DONOVAN Saavedra CNP

## 2021-07-12 NOTE — PROGRESS NOTES
[facility] Hematology and Oncology Progress Note    Patient: Salome Maravilla  MRN: [id number]  Date of Service: 07/12/2021        Reason for Visit    Chief Complaint   Patient presents with     Oncology Clinic Visit     Breast cancer, stage 1, right (H)       Assessment and Plan    Cancer Staging  Breast cancer, stage 1, right (H)  Staging form: Breast, AJCC 7th Edition  - Pathologic: Stage IA (T1c, N0, cM0) - Signed by Jenny Velázquez APRN CNP on 7/12/2021  ER Status: Positive  VA Status: Positive  HER2 Status: Negative    1. Breast cancer, right side, T1c N0 M0, ER/VA positive, HER-2 negative, grade 1, status post lumpectomy, radiation and Arimidex for 2 months and tamoxifen from 03/2011 until January 2015 when she stopped because of questionable double vision.  Now it is clear that that is from ocular muscle problem and not a problem from tamoxifen.  She did not resume. Has been fine since. She will continue to come annually. Due for mammogram in January. Encourage her to call with any changes to her health.      2. Osteopenia: She is now on calcium and vit d. We will repeat DEXA scan in July 2022. Encourage weight bearing exercises.       ECOG Performance    0 - Independent    Distress Screening (within last 30 days)    1. How concerned are you about your ability to eat? : 0  2. How concerned are you about unintended weight loss or your current weight? : 0  3. How concerned are you about feeling depressed or very sad? : (!) 8 (chronic depression. takes meds. Combo of Covid and 's health condition.)  4. How concerned are you about feeling anxious or very scared? : 3  5. Do you struggle with the loss of meaning and magalie in your life? : Somewhat  6. How concerned are you about work and home life issues that may be affected by your cancer? : 0  7. How concerned are you about knowing what resources are available to help you? : 0  8. Do you currently have what you would describe as Caodaism or  spiritual struggles?            : Somewhat       Pain  Pain Score: No Pain (0)    Problem List    Patient Active Problem List   Diagnosis     Breast cancer, stage 1, right (H)     Osteopenia determined by x-ray        ______________________________________________________________________________    History of Present Illness    Salome Maravilla is a very pleasant 75 year old female who is postmenopausal with a history of CA breast diagnosed in 06/2010 located on the right side measuring 1 cm in size, presenting on a mammogram, completely asymptomatic and was in the background of prior Premarin use.  Subsequent to that, she had lumpectomy and sentinel lymph node biopsy and was a grade 1, ER/OR positive, HER-2 negative type of tumor.  Subsequent to that she got radiation therapy in [place] and started on Arimidex which she only took for a few weeks and noted that she was not tolerant of it and was put on tamoxifen.  she took tamoxifen up until I believe January 2015.  She had some trouble with her vision and this was while she was out of state.  She actually stopped taking tamoxifen.  Never resumed. She did have osteopenia on her Dexa scan.   Was told she had a lump in her right breast when she had CT done for abdominal pain in 2018.  Underwent ultrasound and mammogram.  Eventually had a biopsy done the biopsy showed dense fibrosis.  No evidence of any malignancy.  Pt has been doing fine without major complaints    Review of Systems      ROS: As above in the history, otherwise the remainder of the 10point ROS is negative and not contributory to current reason for visit.       Past History    Past Medical History:   Diagnosis Date     Breast cancer (H) 2010    hx of right lumpectomy for breast      Depression      GERD (gastroesophageal reflux disease)      Hx of radiation therapy right 2010    hx of right lumpectomy for breast cancer     Osteoarthritis        PHYSICAL EXAM:  /61 (BP Location: Right arm, Patient  "Position: Sitting, Cuff Size: Adult Regular)   Pulse 74   Temp 98.7  F (37.1  C) (Oral)   Ht 1.651 m (5' 5\")   Wt 62.6 kg (138 lb 1.6 oz)   SpO2 99%   BMI 22.98 kg/m    GENERAL: no acute distress. Cooperative in conversation. Here alone. Mask on  RESP: lungs are clear bilaterally per auscultation. Regular respiratory rate. No wheezes or rhonchi.  CV: Regular, rate and rhythm. No murmurs.  MUSCULOSKELETAL: No lower extremity swelling.   NEURO: non focal. Alert and oriented x3.   PSYCH: within normal limits. No depression or anxiety.  SKIN: warm dry intact   LYMPH: no cervical, supraclavicular or axillary lymphadenopathy  BREAST: Bilateral exam done.  Lumpectomy incision is well-healed.  Very slight scarring.  No abnormal lesions or rashes noted.  No evidence for local recurrence bilaterally.          Lab Results    No results found for this or any previous visit (from the past 168 hour(s)).    Imaging    No results found.      Signed by: DONOVAN Saavedra CNP  "

## 2021-07-12 NOTE — PROGRESS NOTES
"..  Oncology Rooming Note    July 12, 2021 11:02 AM   Salome Maravilla is a 75 year old female who presents for:    Chief Complaint   Patient presents with     Oncology Clinic Visit     Breast cancer, stage 1, right (H)     Initial Vitals: /61 (BP Location: Right arm, Patient Position: Sitting, Cuff Size: Adult Regular)   Pulse 74   Temp 98.7  F (37.1  C) (Oral)   Ht 1.651 m (5' 5\")   Wt 62.6 kg (138 lb 1.6 oz)   SpO2 99%   BMI 22.98 kg/m   Estimated body mass index is 22.98 kg/m  as calculated from the following:    Height as of this encounter: 1.651 m (5' 5\").    Weight as of this encounter: 62.6 kg (138 lb 1.6 oz). Body surface area is 1.69 meters squared.  No Pain (0) Comment: Data Unavailable   No LMP recorded.  Allergies reviewed: Yes  Medications reviewed: Yes    Medications: Medication refills not needed today.  Pharmacy name entered into EPIC: [facility] Unavailable [facility].    Clinical concerns:  none was notified.     RN provided patient with information on [facility] resource for support. Pt's  has early Alzheimer's, possibly Lewy Body, and she has significant care giving stress, contributing to her chronic depression. [email or web address]      Leena Saavedra RN              "

## 2021-07-13 ENCOUNTER — RECORDS - HEALTHEAST (OUTPATIENT)
Dept: ADMINISTRATIVE | Facility: CLINIC | Age: 76
End: 2021-07-13

## 2021-07-13 ENCOUNTER — PATIENT OUTREACH (OUTPATIENT)
Dept: CARE COORDINATION | Facility: CLINIC | Age: 76
End: 2021-07-13

## 2021-07-13 NOTE — PROGRESS NOTES
Oncology Distress Screening Follow-up  Clinical Social Work  [facility]    Identified Concern and Score From Distress Screening:   3. How concerned are you about feeling depressed or very sad?   8Abnormal   chronic depression. takes meds. Combo of Covid and 's health condition.          Date of Distress Screenin21    Data: Felicity is a 75 year old woman diagnosed with breast cancer. Felicity had a provider visit on  and expressed concern around feeling sad/depressed.     Intervention: SW attempted to reach out to Felicity today but received no message. SW left a message with SW contact information and encouraged a call back.      Education Provided: SW contact information via voicemail    Follow-up Required: SW will await a call back. SW will remain available for ongoing resource/support needs.     NIKO Eisenberg, LGSW  [facility]  Adult Oncology Clinic  Phone: [phone]

## 2021-07-22 ENCOUNTER — RECORDS - HEALTHEAST (OUTPATIENT)
Dept: ONCOLOGY | Facility: HOSPITAL | Age: 76
End: 2021-07-22

## 2021-07-22 DIAGNOSIS — Z12.31 OTHER SCREENING MAMMOGRAM: ICD-10-CM

## 2021-10-24 ENCOUNTER — HEALTH MAINTENANCE LETTER (OUTPATIENT)
Age: 76
End: 2021-10-24

## 2021-10-28 ENCOUNTER — LAB REQUISITION (OUTPATIENT)
Dept: LAB | Facility: CLINIC | Age: 76
End: 2021-10-28
Payer: COMMERCIAL

## 2021-10-28 DIAGNOSIS — J20.9 ACUTE BRONCHITIS, UNSPECIFIED: ICD-10-CM

## 2021-10-28 PROCEDURE — U0005 INFEC AGEN DETEC AMPLI PROBE: HCPCS | Mod: ORL | Performed by: FAMILY MEDICINE

## 2021-10-29 LAB — SARS-COV-2 RNA RESP QL NAA+PROBE: POSITIVE

## 2021-11-10 ENCOUNTER — APPOINTMENT (OUTPATIENT)
Dept: CT IMAGING | Facility: HOSPITAL | Age: 76
DRG: 163 | End: 2021-11-10
Attending: EMERGENCY MEDICINE
Payer: COMMERCIAL

## 2021-11-10 ENCOUNTER — HOSPITAL ENCOUNTER (INPATIENT)
Facility: HOSPITAL | Age: 76
LOS: 22 days | Discharge: NURSING, REHAB OR LONG-TERM CARE | DRG: 163 | End: 2021-12-02
Attending: EMERGENCY MEDICINE | Admitting: HOSPITALIST
Payer: COMMERCIAL

## 2021-11-10 ENCOUNTER — APPOINTMENT (OUTPATIENT)
Dept: RADIOLOGY | Facility: HOSPITAL | Age: 76
DRG: 163 | End: 2021-11-10
Attending: INTERNAL MEDICINE
Payer: COMMERCIAL

## 2021-11-10 DIAGNOSIS — D72.829 LEUKOCYTOSIS, UNSPECIFIED TYPE: ICD-10-CM

## 2021-11-10 DIAGNOSIS — J85.1 ABSCESS OF MIDDLE LOBE OF RIGHT LUNG WITH PNEUMONIA (H): Primary | ICD-10-CM

## 2021-11-10 DIAGNOSIS — J18.9 PNEUMONIA DUE TO INFECTIOUS ORGANISM, UNSPECIFIED LATERALITY, UNSPECIFIED PART OF LUNG: ICD-10-CM

## 2021-11-10 DIAGNOSIS — J96.01 ACUTE RESPIRATORY FAILURE WITH HYPOXIA (H): ICD-10-CM

## 2021-11-10 PROBLEM — U07.1 INFECTION DUE TO 2019 NOVEL CORONAVIRUS: Status: ACTIVE | Noted: 2021-11-10

## 2021-11-10 LAB
ALBUMIN SERPL-MCNC: 2.5 G/DL (ref 3.5–5)
ALP SERPL-CCNC: 80 U/L (ref 45–120)
ALT SERPL W P-5'-P-CCNC: <9 U/L (ref 0–45)
ANION GAP SERPL CALCULATED.3IONS-SCNC: 9 MMOL/L (ref 5–18)
APPEARANCE FLD: ABNORMAL
APTT PPP: 42 SECONDS (ref 22–38)
AST SERPL W P-5'-P-CCNC: 13 U/L (ref 0–40)
ATRIAL RATE - MUSE: 91 BPM
BASOPHILS # BLD AUTO: 0 10E3/UL (ref 0–0.2)
BASOPHILS NFR BLD AUTO: 0 %
BILIRUB DIRECT SERPL-MCNC: 0.2 MG/DL
BILIRUB SERPL-MCNC: 0.4 MG/DL (ref 0–1)
BUN SERPL-MCNC: 20 MG/DL (ref 8–28)
C REACTIVE PROTEIN LHE: 15.9 MG/DL (ref 0–0.8)
CALCIUM SERPL-MCNC: 10 MG/DL (ref 8.5–10.5)
CHLORIDE BLD-SCNC: 104 MMOL/L (ref 98–107)
CO2 SERPL-SCNC: 23 MMOL/L (ref 22–31)
COLOR FLD: YELLOW
CREAT SERPL-MCNC: 0.8 MG/DL (ref 0.6–1.1)
D DIMER PPP FEU-MCNC: 1.29 UG/ML FEU (ref 0–0.5)
DIASTOLIC BLOOD PRESSURE - MUSE: NORMAL MMHG
EOSINOPHIL # BLD AUTO: 0 10E3/UL (ref 0–0.7)
EOSINOPHIL NFR BLD AUTO: 0 %
ERYTHROCYTE [DISTWIDTH] IN BLOOD BY AUTOMATED COUNT: 12 % (ref 10–15)
FIBRINOGEN PPP-MCNC: 736 MG/DL (ref 170–490)
GFR SERPL CREATININE-BSD FRML MDRD: 71 ML/MIN/1.73M2
GLUCOSE BLD-MCNC: 141 MG/DL (ref 70–125)
GLUCOSE FLD-MCNC: 48 MG/DL
HCT VFR BLD AUTO: 32.9 % (ref 35–47)
HGB BLD-MCNC: 11 G/DL (ref 11.7–15.7)
HOLD SPECIMEN: NORMAL
IMM GRANULOCYTES # BLD: 0.2 10E3/UL
IMM GRANULOCYTES NFR BLD: 1 %
INR PPP: 1.44 (ref 0.85–1.15)
INTERPRETATION ECG - MUSE: NORMAL
LACTATE SERPL-SCNC: 0.6 MMOL/L (ref 0.7–2)
LDH SERPL L TO P-CCNC: 226 U/L (ref 125–220)
LYMPHOCYTES # BLD AUTO: 0.4 10E3/UL (ref 0.8–5.3)
LYMPHOCYTES NFR BLD AUTO: 2 %
LYMPHOCYTES NFR FLD MANUAL: NORMAL %
MAGNESIUM SERPL-MCNC: 1.7 MG/DL (ref 1.8–2.6)
MCH RBC QN AUTO: 31.7 PG (ref 26.5–33)
MCHC RBC AUTO-ENTMCNC: 33.4 G/DL (ref 31.5–36.5)
MCV RBC AUTO: 95 FL (ref 78–100)
MONOCYTES # BLD AUTO: 0.7 10E3/UL (ref 0–1.3)
MONOCYTES NFR BLD AUTO: 3 %
MONOS+MACROS NFR FLD MANUAL: 3 %
NEUTROPHILS # BLD AUTO: 22.9 10E3/UL (ref 1.6–8.3)
NEUTROPHILS NFR BLD AUTO: 94 %
NEUTS BAND NFR FLD MANUAL: 97 %
NRBC # BLD AUTO: 0 10E3/UL
NRBC BLD AUTO-RTO: 0 /100
P AXIS - MUSE: 70 DEGREES
PH FLD: 7.5 PH
PLATELET # BLD AUTO: 343 10E3/UL (ref 150–450)
POTASSIUM BLD-SCNC: 4.1 MMOL/L (ref 3.5–5)
PR INTERVAL - MUSE: 154 MS
PROT SERPL-MCNC: 7.2 G/DL (ref 6–8)
PROT SERPL-MCNC: 7.2 G/DL (ref 6–8)
QRS DURATION - MUSE: 76 MS
QT - MUSE: 338 MS
QTC - MUSE: 415 MS
R AXIS - MUSE: 55 DEGREES
RBC # BLD AUTO: 3.47 10E6/UL (ref 3.8–5.2)
RBC # FLD: 4000 /UL
SODIUM SERPL-SCNC: 136 MMOL/L (ref 136–145)
SYSTOLIC BLOOD PRESSURE - MUSE: NORMAL MMHG
T AXIS - MUSE: 61 DEGREES
TROPONIN I SERPL-MCNC: 0.01 NG/ML (ref 0–0.29)
VENTRICULAR RATE- MUSE: 91 BPM
WBC # BLD AUTO: 24 10E3/UL (ref 4–11)
WBC # FLD AUTO: ABNORMAL /UL

## 2021-11-10 PROCEDURE — 87186 SC STD MICRODIL/AGAR DIL: CPT | Performed by: INTERNAL MEDICINE

## 2021-11-10 PROCEDURE — 96365 THER/PROPH/DIAG IV INF INIT: CPT

## 2021-11-10 PROCEDURE — 258N000003 HC RX IP 258 OP 636: Performed by: EMERGENCY MEDICINE

## 2021-11-10 PROCEDURE — 84484 ASSAY OF TROPONIN QUANT: CPT | Performed by: EMERGENCY MEDICINE

## 2021-11-10 PROCEDURE — 85610 PROTHROMBIN TIME: CPT | Performed by: HOSPITALIST

## 2021-11-10 PROCEDURE — 250N000013 HC RX MED GY IP 250 OP 250 PS 637: Performed by: HOSPITALIST

## 2021-11-10 PROCEDURE — 96375 TX/PRO/DX INJ NEW DRUG ADDON: CPT

## 2021-11-10 PROCEDURE — 80048 BASIC METABOLIC PNL TOTAL CA: CPT | Performed by: EMERGENCY MEDICINE

## 2021-11-10 PROCEDURE — 71275 CT ANGIOGRAPHY CHEST: CPT

## 2021-11-10 PROCEDURE — 82248 BILIRUBIN DIRECT: CPT | Performed by: HOSPITALIST

## 2021-11-10 PROCEDURE — 82465 ASSAY BLD/SERUM CHOLESTEROL: CPT | Performed by: INTERNAL MEDICINE

## 2021-11-10 PROCEDURE — 83605 ASSAY OF LACTIC ACID: CPT | Performed by: EMERGENCY MEDICINE

## 2021-11-10 PROCEDURE — 85730 THROMBOPLASTIN TIME PARTIAL: CPT | Performed by: HOSPITALIST

## 2021-11-10 PROCEDURE — 120N000001 HC R&B MED SURG/OB

## 2021-11-10 PROCEDURE — 250N000011 HC RX IP 250 OP 636: Performed by: EMERGENCY MEDICINE

## 2021-11-10 PROCEDURE — 32555 ASPIRATE PLEURA W/ IMAGING: CPT | Mod: RT | Performed by: INTERNAL MEDICINE

## 2021-11-10 PROCEDURE — 87102 FUNGUS ISOLATION CULTURE: CPT | Performed by: INTERNAL MEDICINE

## 2021-11-10 PROCEDURE — 87205 SMEAR GRAM STAIN: CPT | Performed by: INTERNAL MEDICINE

## 2021-11-10 PROCEDURE — 85379 FIBRIN DEGRADATION QUANT: CPT | Performed by: HOSPITALIST

## 2021-11-10 PROCEDURE — 258N000003 HC RX IP 258 OP 636: Performed by: HOSPITALIST

## 2021-11-10 PROCEDURE — 87149 DNA/RNA DIRECT PROBE: CPT | Performed by: EMERGENCY MEDICINE

## 2021-11-10 PROCEDURE — 93005 ELECTROCARDIOGRAM TRACING: CPT | Performed by: EMERGENCY MEDICINE

## 2021-11-10 PROCEDURE — 83986 ASSAY PH BODY FLUID NOS: CPT | Performed by: INTERNAL MEDICINE

## 2021-11-10 PROCEDURE — 999N000065 XR CHEST PORT 1 VIEW

## 2021-11-10 PROCEDURE — 87186 SC STD MICRODIL/AGAR DIL: CPT | Performed by: EMERGENCY MEDICINE

## 2021-11-10 PROCEDURE — 86141 C-REACTIVE PROTEIN HS: CPT | Performed by: HOSPITALIST

## 2021-11-10 PROCEDURE — 89050 BODY FLUID CELL COUNT: CPT | Performed by: INTERNAL MEDICINE

## 2021-11-10 PROCEDURE — 88305 TISSUE EXAM BY PATHOLOGIST: CPT | Mod: TC | Performed by: INTERNAL MEDICINE

## 2021-11-10 PROCEDURE — 0W993ZZ DRAINAGE OF RIGHT PLEURAL CAVITY, PERCUTANEOUS APPROACH: ICD-10-PCS | Performed by: INTERNAL MEDICINE

## 2021-11-10 PROCEDURE — 36415 COLL VENOUS BLD VENIPUNCTURE: CPT | Performed by: EMERGENCY MEDICINE

## 2021-11-10 PROCEDURE — 36415 COLL VENOUS BLD VENIPUNCTURE: CPT | Performed by: HOSPITALIST

## 2021-11-10 PROCEDURE — 89051 BODY FLUID CELL COUNT: CPT | Performed by: INTERNAL MEDICINE

## 2021-11-10 PROCEDURE — 85025 COMPLETE CBC W/AUTO DIFF WBC: CPT | Performed by: EMERGENCY MEDICINE

## 2021-11-10 PROCEDURE — 250N000011 HC RX IP 250 OP 636: Performed by: HOSPITALIST

## 2021-11-10 PROCEDURE — 87206 SMEAR FLUORESCENT/ACID STAI: CPT | Performed by: INTERNAL MEDICINE

## 2021-11-10 PROCEDURE — 250N000009 HC RX 250: Performed by: HOSPITALIST

## 2021-11-10 PROCEDURE — 82945 GLUCOSE OTHER FLUID: CPT | Performed by: INTERNAL MEDICINE

## 2021-11-10 PROCEDURE — 85384 FIBRINOGEN ACTIVITY: CPT | Performed by: HOSPITALIST

## 2021-11-10 PROCEDURE — 36415 COLL VENOUS BLD VENIPUNCTURE: CPT | Performed by: INTERNAL MEDICINE

## 2021-11-10 PROCEDURE — 83615 LACTATE (LD) (LDH) ENZYME: CPT | Performed by: INTERNAL MEDICINE

## 2021-11-10 PROCEDURE — 84478 ASSAY OF TRIGLYCERIDES: CPT | Performed by: INTERNAL MEDICINE

## 2021-11-10 PROCEDURE — 84157 ASSAY OF PROTEIN OTHER: CPT | Performed by: INTERNAL MEDICINE

## 2021-11-10 PROCEDURE — 99207 PR CONSULT E&M CHANGED TO INITIAL LEVEL: CPT | Performed by: INTERNAL MEDICINE

## 2021-11-10 PROCEDURE — 83735 ASSAY OF MAGNESIUM: CPT | Performed by: HOSPITALIST

## 2021-11-10 PROCEDURE — 99223 1ST HOSP IP/OBS HIGH 75: CPT | Mod: 25 | Performed by: INTERNAL MEDICINE

## 2021-11-10 PROCEDURE — 87075 CULTR BACTERIA EXCEPT BLOOD: CPT | Performed by: INTERNAL MEDICINE

## 2021-11-10 PROCEDURE — 87210 SMEAR WET MOUNT SALINE/INK: CPT | Performed by: INTERNAL MEDICINE

## 2021-11-10 PROCEDURE — 96376 TX/PRO/DX INJ SAME DRUG ADON: CPT

## 2021-11-10 PROCEDURE — 99223 1ST HOSP IP/OBS HIGH 75: CPT | Mod: AI | Performed by: HOSPITALIST

## 2021-11-10 PROCEDURE — XW033E5 INTRODUCTION OF REMDESIVIR ANTI-INFECTIVE INTO PERIPHERAL VEIN, PERCUTANEOUS APPROACH, NEW TECHNOLOGY GROUP 5: ICD-10-PCS | Performed by: HOSPITALIST

## 2021-11-10 PROCEDURE — 99285 EMERGENCY DEPT VISIT HI MDM: CPT | Mod: 25

## 2021-11-10 RX ORDER — NALOXONE HYDROCHLORIDE 0.4 MG/ML
0.4 INJECTION, SOLUTION INTRAMUSCULAR; INTRAVENOUS; SUBCUTANEOUS
Status: DISCONTINUED | OUTPATIENT
Start: 2021-11-10 | End: 2021-12-02 | Stop reason: HOSPADM

## 2021-11-10 RX ORDER — ACETAMINOPHEN 325 MG/1
650 TABLET ORAL EVERY 6 HOURS PRN
Status: DISCONTINUED | OUTPATIENT
Start: 2021-11-10 | End: 2021-11-16

## 2021-11-10 RX ORDER — ACETAMINOPHEN 650 MG/1
650 SUPPOSITORY RECTAL EVERY 6 HOURS PRN
Status: DISCONTINUED | OUTPATIENT
Start: 2021-11-10 | End: 2021-11-16

## 2021-11-10 RX ORDER — MEROPENEM 1 G/1
1 INJECTION, POWDER, FOR SOLUTION INTRAVENOUS ONCE
Status: COMPLETED | OUTPATIENT
Start: 2021-11-10 | End: 2021-11-10

## 2021-11-10 RX ORDER — PROCHLORPERAZINE MALEATE 5 MG
5 TABLET ORAL EVERY 6 HOURS PRN
Status: DISCONTINUED | OUTPATIENT
Start: 2021-11-10 | End: 2021-12-02 | Stop reason: HOSPADM

## 2021-11-10 RX ORDER — VANCOMYCIN HYDROCHLORIDE 1 G/200ML
1000 INJECTION, SOLUTION INTRAVENOUS ONCE
Status: COMPLETED | OUTPATIENT
Start: 2021-11-10 | End: 2021-11-10

## 2021-11-10 RX ORDER — HYDROMORPHONE HYDROCHLORIDE 1 MG/ML
0.5 INJECTION, SOLUTION INTRAMUSCULAR; INTRAVENOUS; SUBCUTANEOUS
Status: DISCONTINUED | OUTPATIENT
Start: 2021-11-10 | End: 2021-11-16

## 2021-11-10 RX ORDER — MORPHINE SULFATE 4 MG/ML
4 INJECTION, SOLUTION INTRAMUSCULAR; INTRAVENOUS ONCE
Status: COMPLETED | OUTPATIENT
Start: 2021-11-10 | End: 2021-11-10

## 2021-11-10 RX ORDER — TRAZODONE HYDROCHLORIDE 50 MG/1
150 TABLET, FILM COATED ORAL AT BEDTIME
Status: DISCONTINUED | OUTPATIENT
Start: 2021-11-10 | End: 2021-12-02 | Stop reason: HOSPADM

## 2021-11-10 RX ORDER — BISACODYL 10 MG
10 SUPPOSITORY, RECTAL RECTAL DAILY PRN
Status: DISCONTINUED | OUTPATIENT
Start: 2021-11-10 | End: 2021-11-19

## 2021-11-10 RX ORDER — AMOXICILLIN 250 MG
1 CAPSULE ORAL 2 TIMES DAILY PRN
Status: DISCONTINUED | OUTPATIENT
Start: 2021-11-10 | End: 2021-11-16

## 2021-11-10 RX ORDER — VALACYCLOVIR HYDROCHLORIDE 500 MG/1
1000 TABLET, FILM COATED ORAL AT BEDTIME
Status: DISCONTINUED | OUTPATIENT
Start: 2021-11-10 | End: 2021-12-02 | Stop reason: HOSPADM

## 2021-11-10 RX ORDER — MAGNESIUM HYDROXIDE/ALUMINUM HYDROXICE/SIMETHICONE 120; 1200; 1200 MG/30ML; MG/30ML; MG/30ML
30 SUSPENSION ORAL EVERY 4 HOURS PRN
Status: DISCONTINUED | OUTPATIENT
Start: 2021-11-10 | End: 2021-12-02 | Stop reason: HOSPADM

## 2021-11-10 RX ORDER — TRAZODONE HYDROCHLORIDE 150 MG/1
150 TABLET ORAL AT BEDTIME
COMMUNITY
Start: 2021-09-02

## 2021-11-10 RX ORDER — ACETAMINOPHEN 325 MG/1
975 TABLET ORAL AT BEDTIME
Status: DISCONTINUED | OUTPATIENT
Start: 2021-11-10 | End: 2021-11-16

## 2021-11-10 RX ORDER — MEROPENEM 1 G/1
1 INJECTION, POWDER, FOR SOLUTION INTRAVENOUS EVERY 8 HOURS
Status: DISCONTINUED | OUTPATIENT
Start: 2021-11-11 | End: 2021-11-11

## 2021-11-10 RX ORDER — ONDANSETRON 2 MG/ML
4 INJECTION INTRAMUSCULAR; INTRAVENOUS EVERY 6 HOURS PRN
Status: DISCONTINUED | OUTPATIENT
Start: 2021-11-10 | End: 2021-12-02 | Stop reason: HOSPADM

## 2021-11-10 RX ORDER — LACTOBACILLUS RHAMNOSUS GG 10B CELL
1 CAPSULE ORAL DAILY
Status: DISCONTINUED | OUTPATIENT
Start: 2021-11-10 | End: 2021-12-02 | Stop reason: HOSPADM

## 2021-11-10 RX ORDER — PROCHLORPERAZINE 25 MG
12.5 SUPPOSITORY, RECTAL RECTAL EVERY 12 HOURS PRN
Status: DISCONTINUED | OUTPATIENT
Start: 2021-11-10 | End: 2021-12-02 | Stop reason: HOSPADM

## 2021-11-10 RX ORDER — ONDANSETRON 4 MG/1
4 TABLET, ORALLY DISINTEGRATING ORAL EVERY 6 HOURS PRN
Status: DISCONTINUED | OUTPATIENT
Start: 2021-11-10 | End: 2021-12-02 | Stop reason: HOSPADM

## 2021-11-10 RX ORDER — NABUMETONE 500 MG/1
500 TABLET, FILM COATED ORAL AT BEDTIME
COMMUNITY
Start: 2021-10-02 | End: 2023-08-11

## 2021-11-10 RX ORDER — CEFAZOLIN SODIUM 1 G/50ML
1250 SOLUTION INTRAVENOUS EVERY 24 HOURS
Status: DISCONTINUED | OUTPATIENT
Start: 2021-11-10 | End: 2021-11-11

## 2021-11-10 RX ORDER — PANTOPRAZOLE SODIUM 20 MG/1
40 TABLET, DELAYED RELEASE ORAL DAILY
Status: DISCONTINUED | OUTPATIENT
Start: 2021-11-11 | End: 2021-11-18

## 2021-11-10 RX ORDER — NALOXONE HYDROCHLORIDE 0.4 MG/ML
0.2 INJECTION, SOLUTION INTRAMUSCULAR; INTRAVENOUS; SUBCUTANEOUS
Status: DISCONTINUED | OUTPATIENT
Start: 2021-11-10 | End: 2021-12-02 | Stop reason: HOSPADM

## 2021-11-10 RX ORDER — LIDOCAINE 40 MG/G
CREAM TOPICAL
Status: DISCONTINUED | OUTPATIENT
Start: 2021-11-10 | End: 2021-12-02 | Stop reason: HOSPADM

## 2021-11-10 RX ORDER — IOPAMIDOL 755 MG/ML
100 INJECTION, SOLUTION INTRAVASCULAR ONCE
Status: COMPLETED | OUTPATIENT
Start: 2021-11-10 | End: 2021-11-10

## 2021-11-10 RX ORDER — AMOXICILLIN 250 MG
2 CAPSULE ORAL 2 TIMES DAILY PRN
Status: DISCONTINUED | OUTPATIENT
Start: 2021-11-10 | End: 2021-11-16

## 2021-11-10 RX ORDER — OXYCODONE HYDROCHLORIDE 5 MG/1
5 TABLET ORAL
Status: DISCONTINUED | OUTPATIENT
Start: 2021-11-10 | End: 2021-11-23

## 2021-11-10 RX ORDER — ACETAMINOPHEN 500 MG
1000 TABLET ORAL AT BEDTIME
COMMUNITY

## 2021-11-10 RX ADMIN — AZITHROMYCIN MONOHYDRATE 500 MG: 500 INJECTION, POWDER, LYOPHILIZED, FOR SOLUTION INTRAVENOUS at 22:22

## 2021-11-10 RX ADMIN — Medication 1 CAPSULE: at 23:39

## 2021-11-10 RX ADMIN — IOPAMIDOL 100 ML: 755 INJECTION, SOLUTION INTRAVENOUS at 13:43

## 2021-11-10 RX ADMIN — ENOXAPARIN SODIUM 40 MG: 40 INJECTION SUBCUTANEOUS at 18:42

## 2021-11-10 RX ADMIN — MEROPENEM 1 G: 1 INJECTION, POWDER, FOR SOLUTION INTRAVENOUS at 16:07

## 2021-11-10 RX ADMIN — VALACYCLOVIR 1000 MG: 500 TABLET, FILM COATED ORAL at 23:40

## 2021-11-10 RX ADMIN — Medication 1 TABLET: at 22:20

## 2021-11-10 RX ADMIN — MORPHINE SULFATE 4 MG: 4 INJECTION INTRAVENOUS at 11:50

## 2021-11-10 RX ADMIN — MORPHINE SULFATE 4 MG: 4 INJECTION INTRAVENOUS at 13:49

## 2021-11-10 RX ADMIN — TRAZODONE HYDROCHLORIDE 150 MG: 50 TABLET ORAL at 22:20

## 2021-11-10 RX ADMIN — ACETAMINOPHEN 975 MG: 325 TABLET ORAL at 22:19

## 2021-11-10 RX ADMIN — ACETAMINOPHEN 650 MG: 325 TABLET ORAL at 18:55

## 2021-11-10 RX ADMIN — HYDROMORPHONE HYDROCHLORIDE 0.5 MG: 1 INJECTION, SOLUTION INTRAMUSCULAR; INTRAVENOUS; SUBCUTANEOUS at 18:18

## 2021-11-10 RX ADMIN — VANCOMYCIN HYDROCHLORIDE 1000 MG: 1 INJECTION, SOLUTION INTRAVENOUS at 22:23

## 2021-11-10 RX ADMIN — MAGNESIUM OXIDE TAB 400 MG (241.3 MG ELEMENTAL MG) 200 MG: 400 (241.3 MG) TAB at 23:39

## 2021-11-10 RX ADMIN — REMDESIVIR 200 MG: 100 INJECTION, POWDER, LYOPHILIZED, FOR SOLUTION INTRAVENOUS at 23:43

## 2021-11-10 RX ADMIN — SODIUM CHLORIDE 1000 ML: 9 INJECTION, SOLUTION INTRAVENOUS at 16:01

## 2021-11-10 ASSESSMENT — ACTIVITIES OF DAILY LIVING (ADL)
ADLS_ACUITY_SCORE: 12
DEPENDENT_IADLS:: INDEPENDENT

## 2021-11-10 ASSESSMENT — MIFFLIN-ST. JEOR: SCORE: 1078.49

## 2021-11-10 NOTE — ED TRIAGE NOTES
Patient with covid + as of Oct 28, 2021. Has been having pain at right lateral ches, cough, and SOB.

## 2021-11-10 NOTE — ED NOTES
"[facility] ED Handoff Report    ED Chief Complaint: sob    ED Diagnosis:  (J18.9) Pneumonia due to infectious organism, unspecified laterality, unspecified part of lung  Comment:   Plan: abx       PMH:    Past Medical History:   Diagnosis Date     Breast cancer (H) 2010    hx of right lumpectomy for breast      Depression      GERD (gastroesophageal reflux disease)      Hx of radiation therapy right 2010    hx of right lumpectomy for breast cancer     Osteoarthritis         Code Status:  No Order     Falls Risk: Yes Band: Applied    Current Living Situation/Residence: lives with a significant other     Elimination Status: Continent: Yes     Activity Level: Independent    Patients Preferred Language:  English     Needed: No    Vital Signs:  /55   Pulse 85   Temp 99.9  F (37.7  C)   Resp 21   Ht 1.664 m (5' 5.5\")   Wt 59 kg (130 lb)   SpO2 97%   BMI 21.30 kg/m       Cardiac Rhythm: NSR    Pain Score: 8/10    Is the Patient Confused:  No    Last Food or Drink: 11/10/21 at 1600    Focused Assessment:  Pt a/o with c/o sob, pt lives with her spouse who she is his caregiver for. Pt has right sided rib pain with coughing.  Previous fx on left side that is healed.       Tests Performed: Done: Labs and Imaging    Treatments Provided:  Abx, 02    Family Dynamics/Concerns: No    Family Updated On Visitor Policy: Yes    Plan of Care Communicated to Family: Yes    Who Was Updated about Plan of Care: sister and dtr Camille Rivera Checklist Done and Signed by Patient: Yes    Covid: symptomatic, positive    Additional Information:     RN: Rosetta DOBBINS 11/10/2021 4:21 PM     "

## 2021-11-10 NOTE — H&P
Admission History and Physical   Salome Maravilla,  1945, MRN [id number]    [facility]    PCP: Charity Mann, [phone]          Extended Emergency Contact Information  Primary Emergency Contact: MARKUS MARAVILLA  Home Phone: [phone]  Relation: Daughter-in-Law  Secondary Emergency Contact: Nicanor Maravilla  Address: [address] [place] [address]  Mobile Phone: [phone]  Relation: Son       Assessment and Plan     76F vaccinated with COVID diagnosed 10/28 who presents with pleuritic R sided chest pain and found to have RML PNA with microabscess and small pleural effusion most c/w secondary bacterial infection.  No typical radiographic COVID-19 findings (diffuse ground glass) to suggest active COVID PNA.  Small oxygen requirement likely due to bacterial PNA with abscess.  I don't think steroids will be particularly helpful at this time in this setting, and would prefer to have source control before giving steroids.  Reconsider steroids if progressive PNA c/w COVID-19.    #Lung abscess with small pleural effusion -   merrem, vanco, azithro for now.    Pulm consult and consideration for drainage of small effusion and possible need for chest tube.    Sputum culture and culture from any drainage.  Legionella and S.PNA Ag.  Pain control.    #COVID-19 - remdesivir.  Not sure will be tremendously helpful at this point but little risk (pending return of LFTS).  Hold steroids for now (see discussion above)    #mood disorder - sertraline.      Clinically Significant Risk Factors Present on Admission                     Checklist:  Code Status:  full  Diet:  regular  Chung Catheter: Not present  Central Lines: None  DVT px:  Enoxaparin (Lovenox) SQ        Advanced Care Planning  I anticipate the patient will be admitted to the hospital for at least 2 midnights for the evaluation and treatment of the conditions discussed above.     Chief Complaint: Chest pain  "    HPI:    Salome Maravilla is a 76 year old female hx stage 1 breast CA s/p lumpectomy and XRT in 2015 who presents with progressive cough and chest pain.      Ms. Maravilla was diagnosed with covid + on Oct 28, 2021 after about 2 days of body aches.  She was been diligently isolating and several days ago developed cough productive of brownish and now green sputum.  Also developed R lateral pleuritic chest pain with coughing which was much worse starting last night.  Daughter encouraged her to come for evaluation.     In the ER: found to have R lung abscess and started on vanco and merrem.    History is provided by patient       Physical Exam:  Temp:  [99.9  F (37.7  C)] 99.9  F (37.7  C)  Pulse:  [] 90  Resp:  [16-39] 16  BP: ()/(50-59) 99/52  SpO2:  [90 %-99 %] 93 %  BP 99/52   Pulse 90   Temp 99.9  F (37.7  C)   Resp 16   Ht 1.664 m (5' 5.5\")   Wt 59 kg (130 lb)   SpO2 93%   BMI 21.30 kg/m       General:  R sided chest pain with movement and inspiration.  Non toxic appearing but clearly in pian.  Alert, cooperative  Neurologic:  oriented, facialsymmetry preserved, fluent speech. Moves all 4 spontaneously  Psych: calm, mood and affect appropriate to situation  HEENT:  Anicteric, MMM, unremarkable dentition  CV:  Limited ability to auscultate due to need for PPE. No edema  Lungs:  Limited ability to auscultate due to need for PPE.  No audible wheezing or rhonchi.   Easyrespirations but shallow due to pain  Abd: soft, NT, normoactive BS  Skin: no rashes noted on exposed skin. Color and turgor normal  Central Lines and Tubes: None (no berman, CVC, feeding tubes)         Pertinent Test Findings  Radiology Results (results reviewed):   Results for orders placed or performed during the hospital encounter of 11/10/21   CT Chest Pulmonary Embolism w Contrast    Impression    IMPRESSION:  1.  Above findings most suggestive of moderately severe bacterial pneumonia right middle lobe with possible associated " tiny microabscess within it.    2.  Small right pleural effusion and some focal pleural reaction immediately adjacent to the pneumonia anteriorly.       EKG (personally reviewed): normal sinus rhythm and no acute ischemic changes      Medical History  Past Medical History:   Diagnosis Date     Breast cancer (H) 2010    hx of right lumpectomy for breast      Depression      GERD (gastroesophageal reflux disease)      Hx of radiation therapy right 2010    hx of right lumpectomy for breast cancer     Osteoarthritis         Surgical History  Past Surgical History:   Procedure Laterality Date     APPENDECTOMY       ARTHROSCOPY SHOULDER ROTATOR CUFF REPAIR       BIOPSY BREAST Right 2010    hx of right lumpectomy     COLONOSCOPY       HYSTERECTOMY  1982     LUMPECTOMY BREAST Right 2010    right lumpectomy     OOPHORECTOMY       RELEASE CARPAL TUNNEL Right           Social History  Social History     Tobacco Use     Smoking status: Former Smoker     Quit date: 1988     Years since quittin.5     Smokeless tobacco: Never Used   Substance Use Topics     Alcohol use: Yes     Comment: Alcoholic Drinks/day: social     Drug use: No          Allergies  Allergies   Allergen Reactions     Arimidex [Anastrozole] Unknown     Hot flashes     Penicillins Hives     Statins-Hmg-Coa Reductase Inhibitors [Hmg-Coa-R Inhibitors] Muscle Pain (Myalgia)     Fenofibrate Rash    Family History  I have reviewed this patient's family history and updated it with pertinent information if needed.  Family History   Problem Relation Age of Onset     Chronic Obstructive Pulmonary Disease Father      Breast Cancer Sister 71.00     Cancer Sister      Atrial fibrillation Sister      Cancer Brother 19.00     Testicular cancer Brother      No Known Problems Son      No Known Problems Son                Prior to Admission Medications   Prior to Admission Medications   Prescriptions Last Dose Informant Patient Reported? Taking?   BLACK COHOSH ORAL  11/10/2021 at AM  Yes Yes   Sig: [BLACK COHOSH ORAL] Take 1 capsule by mouth 2 (two) times a day.    Lactobacillus rhamnosus GG (CULTURELLE) 10-15 Billion cell capsule 11/9/2021 at PM  Yes Yes   Sig: Take 1 capsule by mouth At Bedtime    Magnesium Glycinate 665 MG CAPS 11/10/2021 at AM  Yes Yes   Sig: Take 1 capsule by mouth 2 times daily   acetaminophen (TYLENOL) 500 MG tablet 11/9/2021 at PM  Yes Yes   Sig: Take 1,000 mg by mouth At Bedtime   ascorbic acid (VITAMIN C) 250 MG tablet 11/9/2021 at PM  Yes Yes   Sig: Take 250 mg by mouth At Bedtime    calcium carbonate 600 mg-vitamin D 400 units (CALTRATE) 600-400 MG-UNIT per tablet 11/10/2021 at AM  Yes Yes   Sig: Take 1 tablet by mouth 2 times daily   nabumetone (RELAFEN) 500 MG tablet 11/9/2021 at PM  Yes Yes   Sig: Take 500 mg by mouth At Bedtime    omeprazole (PRILOSEC) 20 MG capsule 11/10/2021 at AM  Yes Yes   Sig: [OMEPRAZOLE (PRILOSEC) 20 MG CAPSULE] Take 20 mg by mouth daily.   sertraline (ZOLOFT) 100 MG tablet 11/10/2021 at AM  Yes Yes   Sig: [SERTRALINE (ZOLOFT) 100 MG TABLET] Take 200 mg by mouth daily.          traZODone (DESYREL) 150 MG tablet 11/9/2021 at PM  Yes Yes   Sig: Take 150 mg by mouth At Bedtime   valACYclovir (VALTREX) 1000 MG tablet 11/9/2021 at PM  Yes Yes   Sig: Take 1,000 mg by mouth At Bedtime    vitamin E 400 UNIT capsule 11/10/2021 at AM  Yes Yes   Sig: [VITAMIN E 400 UNIT CAPSULE] Take 400 Units by mouth daily.      Facility-Administered Medications: None          Review of Systems:    10point review of systems negative except as listed in HPI      Pertinent Labs  Lab Results: personally reviewed.     Most Recent 3 CBC's:  Recent Labs   Lab Test 11/10/21  1145 06/22/20  1011 06/10/19  1410   WBC 24.0* 4.9 6.6   HGB 11.0* 13.2 13.4   MCV 95 101* 99    194 238     Most Recent 3 BMP's:  Recent Labs   Lab Test 11/10/21  1145 05/04/21  1350 06/22/20  1026    139 142   POTASSIUM 4.1 4.3 4.1   CHLORIDE 104 103 109*   CO2 23 25  25   BUN 20 19 11   CR 0.80 0.80 0.82   ANIONGAP 9 11 8   EMILIANO 10.0 9.3 9.0   * 104 87     Most Recent 2 LFT's:  Recent Labs   Lab Test 05/04/21  1350 06/22/20  1026   AST 18 25   ALT 19 27   ALKPHOS 52 51   BILITOTAL 0.4 0.4     Most Recent 3 INR's:No lab results found.  Most Recent 3 Troponin's:No lab results found.    Mike Cardoza MD  Internal Medicine Hospitalist  11/10/2021  3:31 PM

## 2021-11-10 NOTE — PHARMACY-VANCOMYCIN DOSING SERVICE
Pharmacy Vancomycin Initial Note  Date of Service November 10, 2021  Patient's  1945  76 year old, female    Indication: Community Acquired Pneumonia    Current estimated CrCl = Estimated Creatinine Clearance: 54 mL/min (based on SCr of 0.8 mg/dL).    Creatinine for last 3 days  11/10/2021: 11:45 AM Creatinine 0.80 mg/dL    Recent Vancomycin Level(s) for last 3 days  No results found for requested labs within last 72 hours.      Vancomycin IV Administrations (past 72 hours)      No vancomycin orders with administrations in past 72 hours.                Nephrotoxins and other renal medications (From now, onward)    None          Contrast Orders - past 72 hours (72h ago, onward)            Start     Dose/Rate Route Frequency Ordered Stop    11/10/21 1400  iopamidol (ISOVUE-370) solution 100 mL         100 mL Intravenous ONCE 11/10/21 1342 11/10/21 1343                Plan:  1. Vancomycin 1000 mg IV once in the ED (17 mg/kg actual body weight).  2. If the patient is admitted to the hospital and vancomycin therapy should continue, please re-consult pharmacy.        Karen Martínez [facility]

## 2021-11-10 NOTE — ED PROVIDER NOTES
EMERGENCY DEPARTMENT ENCOUNTER      NAME: Salome Maravilla  AGE: 76 year old female  YOB: 1945  MRN: [id number]  EVALUATION DATE & TIME: 11/10/2021 11:04 AM    PCP: Charity Mann    ED PROVIDER: Mason Ray D.O.      Chief Complaint   Patient presents with     Shortness of Breath     Cough       FINAL IMPRESSION:  1. Pneumonia due to infectious organism, unspecified laterality, unspecified part of lung          ED COURSE & MEDICAL DECISION MAKIN:20 AM I met with the patient to gather history and to perform my initial exam. I discussed the plan for care while in the Emergency Department. I saw the patient wearing an eye shield, N95 and surgical mask, gown, and gloves.   11:34 AM Spoke to patient's daughter over the phone.   3:01 PM I spoke with Dr. Cardoza with the hospitalist service who agrees to admit the patient.         Pertinent Labs & Imaging studies reviewed. (See chart for details)  76 year old female presents to the Emergency Department for evaluation of shortness of breath and cough.  Patient reported right-sided chest pain which was reproducible on exam.  She does have a recent diagnosis of Covid, symptoms starting 2 weeks ago.  Because of the patient's symptoms I was initially concerned for potential for PE versus fracture of the rib cage from coughing.  Unfortunately for this patient although he the PE or rib fractures were present, it does appear that she has a severe secondary bacterial pneumonia with likely microabscesses.  Because of this, and the patient's current medical condition, patient will require admission for IV antibiotics and further management.  Blood cultures are pending.  Patient was given meropenem and vancomycin.  Discussed the hospitalist agreed to the admission.    At the conclusion of the encounter I discussed the results of all of the tests and the disposition. The questions were answered. The patient or family acknowledged understanding and was agreeable  with the care plan.    HPI    Patient information was obtained from: Patient    Use of : N/A        Salome Maravilla is a 76 year old female who presents for evaluation of chest pain.     Patient tested positive for Covid on 10/28/21. She is fully vaccinated against covid. Patient has been sick for about 2 weeks and seemed to be getting better until she had sudden onset right sided chest pain. Pain has been worsening and patient states it is worse with deep breathing, movement, or talking. Patient is still coughing, has a low grade fever, and endorses slight diarrhea.     She denies smoking or alcohol use.       REVIEW OF SYSTEMS  Constitutional: Endorses low grade fever. Denies chills, weight loss or weakness  Eyes:  No pain, discharge, redness  HENT:  Denies sore throat, ear pain, congestion  Respiratory: Endorses cough. No shortness of breath or wheeze   Cardiovascular: Endorses right sided chest pain. No palpitations  GI: Endorses diarrhea. Denies abdominal pain, nausea, vomiting.  : Denies dysuria, hematuria  Musculoskeletal:  Denies any new muscle/joint pain, swelling or loss of function.  Skin:  Denies rash, pallor  Neurologic:  Denies headache, focal weakness or sensory changes  Lymph: Denies swollen nodes    All other systems negative unless noted in HPI.    PAST MEDICAL HISTORY:  Past Medical History:   Diagnosis Date     Breast cancer (H) 2010    hx of right lumpectomy for breast      Depression      GERD (gastroesophageal reflux disease)      Hx of radiation therapy right 2010    hx of right lumpectomy for breast cancer     Osteoarthritis        PAST SURGICAL HISTORY:  Past Surgical History:   Procedure Laterality Date     APPENDECTOMY       ARTHROSCOPY SHOULDER ROTATOR CUFF REPAIR       BIOPSY BREAST Right 2010    hx of right lumpectomy     COLONOSCOPY       HYSTERECTOMY  1982     LUMPECTOMY BREAST Right 2010    right lumpectomy     OOPHORECTOMY       RELEASE CARPAL TUNNEL Right           CURRENT MEDICATIONS:    Current Facility-Administered Medications   Medication     0.9% sodium chloride BOLUS     meropenem (MERREM) 1 g vial to attach to  mL bag     vancomycin (VANCOCIN) 1000 mg in dextrose 5% 200 mL PREMIX     Current Outpatient Medications   Medication     acetaminophen (TYLENOL) 500 MG tablet     ascorbic acid (VITAMIN C) 250 MG tablet     BLACK COHOSH ORAL     calcium carbonate 600 mg-vitamin D 400 units (CALTRATE) 600-400 MG-UNIT per tablet     Lactobacillus rhamnosus GG (CULTURELLE) 10-15 Billion cell capsule     Magnesium Glycinate 665 MG CAPS     nabumetone (RELAFEN) 500 MG tablet     omeprazole (PRILOSEC) 20 MG capsule     sertraline (ZOLOFT) 100 MG tablet     traZODone (DESYREL) 150 MG tablet     valACYclovir (VALTREX) 1000 MG tablet     vitamin E 400 UNIT capsule         ALLERGIES:  Allergies   Allergen Reactions     Arimidex [Anastrozole] Unknown     Hot flashes     Penicillins Hives     Statins-Hmg-Coa Reductase Inhibitors [Hmg-Coa-R Inhibitors] Muscle Pain (Myalgia)     Fenofibrate Rash       FAMILY HISTORY:  Family History   Problem Relation Age of Onset     Chronic Obstructive Pulmonary Disease Father      Breast Cancer Sister 71.00     Cancer Sister      Atrial fibrillation Sister      Cancer Brother 19.00     Testicular cancer Brother      No Known Problems Son      No Known Problems Son        SOCIAL HISTORY:  Social History     Socioeconomic History     Marital status:      Spouse name: Not on file     Number of children: Not on file     Years of education: Not on file     Highest education level: Not on file   Occupational History     Not on file   Tobacco Use     Smoking status: Former Smoker     Quit date: 1988     Years since quittin.5     Smokeless tobacco: Never Used   Substance and Sexual Activity     Alcohol use: Yes     Comment: Alcoholic Drinks/day: social     Drug use: No     Sexual activity: Never   Other Topics Concern     Not on file  "  Social History Narrative     Not on file     Social Determinants of Health     Financial Resource Strain: Not on file   Food Insecurity: Not on file   Transportation Needs: Not on file   Physical Activity: Not on file   Stress: Not on file   Social Connections: Not on file   Intimate Partner Violence: Not on file   Housing Stability: Not on file       VITALS:  Patient Vitals for the past 24 hrs:   BP Temp Pulse Resp SpO2 Height Weight   11/10/21 1445 107/53 -- 101 -- 95 % -- --   11/10/21 1430 108/54 -- 92 21 96 % -- --   11/10/21 1415 108/53 -- 92 21 95 % -- --   11/10/21 1400 115/50 -- 92 23 95 % -- --   11/10/21 1345 121/57 -- -- -- -- -- --   11/10/21 1330 118/59 -- 91 27 99 % -- --   11/10/21 1315 118/59 -- 88 27 99 % -- --   11/10/21 1311 -- -- 91 30 93 % -- --   11/10/21 1300 110/51 -- 91 30 91 % -- --   11/10/21 1245 108/54 -- 89 (!) 39 90 % -- --   11/10/21 1230 113/52 -- 90 (!) 32 90 % -- --   11/10/21 1215 110/56 -- 90 28 90 % -- --   11/10/21 1200 107/55 -- 88 30 92 % -- --   11/10/21 1145 105/52 -- 90 (!) 32 95 % -- --   11/10/21 1130 -- -- 90 -- 94 % -- --   11/10/21 1101 107/55 99.9  F (37.7  C) 102 20 94 % 1.664 m (5' 5.5\") 59 kg (130 lb)       PHYSICAL EXAM    VITAL SIGNS: /53   Pulse 101   Temp 99.9  F (37.7  C)   Resp 21   Ht 1.664 m (5' 5.5\")   Wt 59 kg (130 lb)   SpO2 95%   BMI 21.30 kg/m      General Appearance: Well-appearing, well-nourished, no acute distress   Head:  Normocephalic, without obvious abnormality, atraumatic  Eyes:  PERRL, conjunctiva/corneas clear, EOM's intact,  ENT:  Lips, mucosa, and tongue normal, membranes are moist without pallor  Neck:  Normal ROM, symmetrical, trachea midline    Chest: Severe tenderness to palpation of right lower chest wall laterally. No deformity, no crepitus  Cardio:  Regular rate and rhythm, no murmur, rub or gallop, 2+ pulses symmetric in all extremities  Pulm:  Clear to auscultation bilaterally, respirations unlabored  Abdomen:  " Soft, non-tender, no rebound or guarding.  Musculoskeletal: Full ROM, no edema, no cyanosis, good ROM of major joints  Integument:  Warm, Dry, No erythema, No rash.    Neurologic:  Alert & oriented.  No focal deficits appreciated.  Ambulatory.  Psychiatric:  Affect normal, Judgment normal, Mood normal.      LABS  Results for orders placed or performed during the hospital encounter of 11/10/21 (from the past 24 hour(s))   CBC with platelets + differential    Narrative    The following orders were created for panel order CBC with platelets + differential.  Procedure                               Abnormality         Status                     ---------                               -----------         ------                     CBC with platelets and d...[[id number]]  Abnormal            Final result                 Please view results for these tests on the individual orders.   Basic metabolic panel   Result Value Ref Range    Sodium 136 136 - 145 mmol/L    Potassium 4.1 3.5 - 5.0 mmol/L    Chloride 104 98 - 107 mmol/L    Carbon Dioxide (CO2) 23 22 - 31 mmol/L    Anion Gap 9 5 - 18 mmol/L    Urea Nitrogen 20 8 - 28 mg/dL    Creatinine 0.80 0.60 - 1.10 mg/dL    Calcium 10.0 8.5 - 10.5 mg/dL    Glucose 141 (H) 70 - 125 mg/dL    GFR Estimate 71 >60 mL/min/1.73m2   Troponin I (now)   Result Value Ref Range    Troponin I 0.01 0.00 - 0.29 ng/mL   CBC with platelets and differential   Result Value Ref Range    WBC Count 24.0 (H) 4.0 - 11.0 10e3/uL    RBC Count 3.47 (L) 3.80 - 5.20 10e6/uL    Hemoglobin 11.0 (L) 11.7 - 15.7 g/dL    Hematocrit 32.9 (L) 35.0 - 47.0 %    MCV 95 78 - 100 fL    MCH 31.7 26.5 - 33.0 pg    MCHC 33.4 31.5 - 36.5 g/dL    RDW 12.0 10.0 - 15.0 %    Platelet Count 343 150 - 450 10e3/uL    % Neutrophils 94 %    % Lymphocytes 2 %    % Monocytes 3 %    % Eosinophils 0 %    % Basophils 0 %    % Immature Granulocytes 1 %    NRBCs per 100 WBC 0 <1 /100    Absolute Neutrophils 22.9 (H) 1.6 - 8.3 10e3/uL     Absolute Lymphocytes 0.4 (L) 0.8 - 5.3 10e3/uL    Absolute Monocytes 0.7 0.0 - 1.3 10e3/uL    Absolute Eosinophils 0.0 0.0 - 0.7 10e3/uL    Absolute Basophils 0.0 0.0 - 0.2 10e3/uL    Absolute Immature Granulocytes 0.2 (H) <=0.0 10e3/uL    Absolute NRBCs 0.0 10e3/uL   ECG 12-LEAD WITH MUSE (LHE)   Result Value Ref Range    Systolic Blood Pressure  mmHg    Diastolic Blood Pressure  mmHg    Ventricular Rate 91 BPM    Atrial Rate 91 BPM    TX Interval 154 ms    QRS Duration 76 ms     ms    QTc 415 ms    P Axis 70 degrees    R AXIS 55 degrees    T Axis 61 degrees    Interpretation ECG       Sinus rhythm  Normal ECG  When compared with ECG of 02-FEB-1999 07:15,  ST no longer elevated in Inferior leads  Nonspecific T wave abnormality now evident in Lateral leads  Confirmed by SEE ED PROVIDER NOTE FOR, ECG INTERPRETATION ([id number]),  MIGNON JIMENEZ ([id number]) on 11/10/2021 2:30:53 PM     CT Chest Pulmonary Embolism w Contrast    Narrative    EXAM: CT CHEST PULMONARY EMBOLISM W CONTRAST  LOCATION: [facility]  DATE/TIME: 11/10/2021 1:42 PM    INDICATION: Chest pain. Covid positive 10/28/2021. PE suspected, high prob  COMPARISON: 09/20/2021 chest x-ray. CT chest 03/26/2019  TECHNIQUE: CT chest pulmonary angiogram during arterial phase injection of IV contrast. Multiplanar reformats and MIP reconstructions were performed. Dose reduction techniques were used.   CONTRAST: IsoVue 370 100mL    FINDINGS:  ANGIOGRAM CHEST: Pulmonary arteries are normal caliber and negative for pulmonary emboli. Thoracic aorta is negative for dissection. No CT evidence of right heart strain.    LUNGS AND PLEURA: Dense focal infiltrate right middle lobe medial segment and some of the adjacent anterior segment right upper lobe. Tiny less than 1 cm microabscess within this infiltrate. Small right pleural effusion including some focal pleural   reaction immediately adjacent to the pneumonia anteriorly. No other  "infiltrates.    Would favor a bacterial pneumonia rather than COVID pneumonia.    MEDIASTINUM/AXILLAE: Small pericardial effusion.    UPPER ABDOMEN: Normal.    MUSCULOSKELETAL: Normal.      Impression    IMPRESSION:  1.  Above findings most suggestive of moderately severe bacterial pneumonia right middle lobe with possible associated tiny microabscess within it.    2.  Small right pleural effusion and some focal pleural reaction immediately adjacent to the pneumonia anteriorly.   Social Work/ Care Management IP Consult    Narrative    Latoya Knight RN     11/10/2021  1:49 PM  Care Management Initial Consult    General Information  Assessment completed with: Spouse or significant other, Shannan    via phone  Type of CM/SW Visit: Initial Assessment    Primary Care Provider verified and updated as needed: Yes   Readmission within the last 30 days: no previous admission in   last 30 days      Reason for Consult: discharge planning  Advance Care Planning: Advance Care Planning Reviewed: other   (comment) (\"does not have one\")          Communication Assessment  Patient's communication style: spoken language (English or   Bilingual)             Cognitive  Cognitive/Neuro/Behavioral: WDL                      Living Environment:   People in home: spouse  Shannan  Current living Arrangements: house (\"1 level town house\")      Able to return to prior arrangements: yes       Family/Social Support:  Care provided by: self  Provides care for: no one  Marital Status:     Shannan       Description of Support System: Supportive,Involved    Support Assessment: Adequate family and caregiver   support,Adequate social supports,Patient communicates needs well   met    Current Resources:   Patient receiving home care services: No     Community Resources: None  Equipment currently used at home: none  Supplies currently used at home: None    Employment/Financial:  Employment Status: retired     Employment/ " "Comments: \"no  benefits\"  Financial Concerns:     Referral to Financial Counselor: No       Lifestyle & Psychosocial Needs:  Social Determinants of Health     Tobacco Use: Medium Risk     Smoking Tobacco Use: Former Smoker     Smokeless Tobacco Use: Never Used   Alcohol Use: Not on file   Financial Resource Strain: Not on file   Food Insecurity: Not on file   Transportation Needs: Not on file   Physical Activity: Not on file   Stress: Not on file   Social Connections: Not on file   Intimate Partner Violence: Not on file   Depression: Not on file   Housing Stability: Not on file       Functional Status:  Prior to admission patient needed assistance:   Dependent ADLs:: Ambulation-no assistive device,Independent  Dependent IADLs:: Independent (\" does not do any driving\")  Assesssment of Functional Status: Not at baseline with ADL   Functioning,Not at  functional baseline,Not at baseline with   mobility    Mental Health Status:          Chemical Dependency Status:                Values/Beliefs:  Spiritual, Cultural Beliefs, Gnosticism Practices, Values that   affect care:                 Additional Information:  Felicity lives in a 1 level town house with her . She is   independent with ADLs at baseline and still drives. Per ,   \"she is a very independent person and even when she has been sick   she likes to do things on her own\".    May need Home O2.    Son or daughter in law to transport at discharge.    Latoya Knight, RN             RADIOLOGY  CT Chest Pulmonary Embolism w Contrast   Final Result   IMPRESSION:   1.  Above findings most suggestive of moderately severe bacterial pneumonia right middle lobe with possible associated tiny microabscess within it.      2.  Small right pleural effusion and some focal pleural reaction immediately adjacent to the pneumonia anteriorly.             EKG:    Rate: 91bpm  Rhythm: Normal Sinus Rhythm  Axis: Normal  Interval: Normal  Conduction: Normal  QRS: " Normal  ST: Normal  T-wave: Normal  QT: Not prolonged  Comparison EKG: no significant change compared to 2 Feb 1999  Impression:  No acute ischemic change   I have independently reviewed and interpreted today's EKG, pending Cardiologist read        MEDICATIONS GIVEN IN THE EMERGENCY:  Medications   meropenem (MERREM) 1 g vial to attach to  mL bag (has no administration in time range)   0.9% sodium chloride BOLUS (has no administration in time range)   vancomycin (VANCOCIN) 1000 mg in dextrose 5% 200 mL PREMIX (has no administration in time range)   morphine (PF) injection 4 mg (4 mg Intravenous Given 11/10/21 1150)   morphine (PF) injection 4 mg (4 mg Intravenous Given 11/10/21 1349)   iopamidol (ISOVUE-370) solution 100 mL (100 mLs Intravenous Given 11/10/21 1343)       NEW PRESCRIPTIONS STARTED AT TODAY'S ER VISIT  New Prescriptions    No medications on file        I, Samantha Anderson, am serving as a scribe to document services personally performed by Dr. Flor DO based on my observations and the provider's personal statements to me. I, Dr. Flor DO, attest that Samantha Anderson is acting in a scribe capacity, has observed my performance of the service and has documented them in accordance with my directions.      Mason Ray D.O.  Emergency Medicine  [facility] EMERGENCY DEPARTMENT  [address] [place] [id number]  [phone]  Dept: [phone]       Mason Ray DO  11/10/21 3950

## 2021-11-10 NOTE — PHARMACY-ADMISSION MEDICATION HISTORY
Pharmacy Note - Admission Medication History    Pertinent Provider Information: N/A     ______________________________________________________________________    Prior To Admission (PTA) med list completed and updated in EMR.       PTA Med List   Medication Sig Last Dose     acetaminophen (TYLENOL) 500 MG tablet Take 1,000 mg by mouth At Bedtime 11/9/2021 at PM     ascorbic acid (VITAMIN C) 250 MG tablet Take 250 mg by mouth At Bedtime  11/9/2021 at PM     BLACK COHOSH ORAL [BLACK COHOSH ORAL] Take 1 capsule by mouth 2 (two) times a day.  11/10/2021 at AM     calcium carbonate 600 mg-vitamin D 400 units (CALTRATE) 600-400 MG-UNIT per tablet Take 1 tablet by mouth 2 times daily 11/10/2021 at AM     Lactobacillus rhamnosus GG (CULTURELLE) 10-15 Billion cell capsule Take 1 capsule by mouth At Bedtime  11/9/2021 at PM     Magnesium Glycinate 665 MG CAPS Take 1 capsule by mouth 2 times daily 11/10/2021 at AM     nabumetone (RELAFEN) 500 MG tablet Take 500 mg by mouth At Bedtime  11/9/2021 at PM     omeprazole (PRILOSEC) 20 MG capsule [OMEPRAZOLE (PRILOSEC) 20 MG CAPSULE] Take 20 mg by mouth daily. 11/10/2021 at AM     sertraline (ZOLOFT) 100 MG tablet [SERTRALINE (ZOLOFT) 100 MG TABLET] Take 200 mg by mouth daily.        11/10/2021 at AM     traZODone (DESYREL) 150 MG tablet Take 150 mg by mouth At Bedtime 11/9/2021 at PM     valACYclovir (VALTREX) 1000 MG tablet Take 1,000 mg by mouth At Bedtime  11/9/2021 at PM     vitamin E 400 UNIT capsule [VITAMIN E 400 UNIT CAPSULE] Take 400 Units by mouth daily. 11/10/2021 at AM       Information source(s): Patient and CareEverywhere/SureScripts  Method of interview communication: phone    Summary of Changes to PTA Med List  New: magnesium, nabumetone  Discontinued: N/A  Changed: vit c freq, D3+calcium, trazodone dose    Patient was asked about OTC/herbal products specifically.  PTA med list reflects this.    In the past week, patient estimated taking medication this percent of  the time:  greater than 90%.    Allergies were reviewed, assessed, and updated with the patient.      Patient does not use any multi-dose medications prior to admission.    The information provided in this note is only as accurate as the sources available at the time of the update(s).    Thank you for the opportunity to participate in the care of this patient.    Terrance Lindsey [facility]  11/10/2021 2:24 PM

## 2021-11-11 ENCOUNTER — APPOINTMENT (OUTPATIENT)
Dept: OCCUPATIONAL THERAPY | Facility: HOSPITAL | Age: 76
DRG: 163 | End: 2021-11-11
Attending: HOSPITALIST
Payer: COMMERCIAL

## 2021-11-11 LAB
ANION GAP SERPL CALCULATED.3IONS-SCNC: 9 MMOL/L (ref 5–18)
BUN SERPL-MCNC: 17 MG/DL (ref 8–28)
C REACTIVE PROTEIN LHE: 23.5 MG/DL (ref 0–0.8)
CALCIUM SERPL-MCNC: 8.2 MG/DL (ref 8.5–10.5)
CHLORIDE BLD-SCNC: 109 MMOL/L (ref 98–107)
CHOLEST FLD-MCNC: 93 MG/DL
CO2 SERPL-SCNC: 18 MMOL/L (ref 22–31)
CREAT SERPL-MCNC: 0.67 MG/DL (ref 0.6–1.1)
D DIMER PPP FEU-MCNC: 1.27 UG/ML FEU (ref 0–0.5)
ENTEROCOCCUS FAECALIS: NOT DETECTED
ENTEROCOCCUS FAECIUM: NOT DETECTED
ERYTHROCYTE [DISTWIDTH] IN BLOOD BY AUTOMATED COUNT: 12.1 % (ref 10–15)
FIBRINOGEN PPP-MCNC: 671 MG/DL (ref 170–490)
GFR SERPL CREATININE-BSD FRML MDRD: 84 ML/MIN/1.73M2
GLUCOSE BLD-MCNC: 115 MG/DL (ref 70–125)
HCT VFR BLD AUTO: 30.2 % (ref 35–47)
HGB BLD-MCNC: 9.5 G/DL (ref 11.7–15.7)
KOH PREPARATION: NORMAL
KOH PREPARATION: NORMAL
L PNEUMO1 AG UR QL IA: NEGATIVE
LACTATE SERPL-SCNC: 1 MMOL/L (ref 0.7–2)
LDH FLD L TO P-CCNC: 2321 U/L
LISTERIA SPECIES (DETECTED/NOT DETECTED): NOT DETECTED
MAGNESIUM SERPL-MCNC: 1.6 MG/DL (ref 1.8–2.6)
MCH RBC QN AUTO: 31.5 PG (ref 26.5–33)
MCHC RBC AUTO-ENTMCNC: 31.5 G/DL (ref 31.5–36.5)
MCV RBC AUTO: 100 FL (ref 78–100)
PLATELET # BLD AUTO: 280 10E3/UL (ref 150–450)
POTASSIUM BLD-SCNC: 3.7 MMOL/L (ref 3.5–5)
PROT FLD-MCNC: 4.6 G/DL
RBC # BLD AUTO: 3.02 10E6/UL (ref 3.8–5.2)
S PNEUM AG SPEC QL: POSITIVE
SODIUM SERPL-SCNC: 136 MMOL/L (ref 136–145)
STAPHYLOCOCCUS AUREUS: NOT DETECTED
STAPHYLOCOCCUS EPIDERMIDIS: NOT DETECTED
STAPHYLOCOCCUS LUGDUNENSIS: NOT DETECTED
STAPHYLOCOCCUS SPECIES: DETECTED
STREPTOCOCCUS AGALACTIAE: NOT DETECTED
STREPTOCOCCUS ANGINOSUS GROUP: NOT DETECTED
STREPTOCOCCUS PNEUMONIAE: NOT DETECTED
STREPTOCOCCUS PYOGENES: NOT DETECTED
STREPTOCOCCUS SPECIES: NOT DETECTED
TRIGL FLD-MCNC: 83 MG/DL
WBC # BLD AUTO: 24.5 10E3/UL (ref 4–11)

## 2021-11-11 PROCEDURE — 85027 COMPLETE CBC AUTOMATED: CPT | Performed by: HOSPITALIST

## 2021-11-11 PROCEDURE — 97166 OT EVAL MOD COMPLEX 45 MIN: CPT | Mod: GO

## 2021-11-11 PROCEDURE — 250N000013 HC RX MED GY IP 250 OP 250 PS 637: Performed by: INTERNAL MEDICINE

## 2021-11-11 PROCEDURE — 250N000009 HC RX 250: Performed by: HOSPITALIST

## 2021-11-11 PROCEDURE — 85379 FIBRIN DEGRADATION QUANT: CPT | Performed by: HOSPITALIST

## 2021-11-11 PROCEDURE — 99232 SBSQ HOSP IP/OBS MODERATE 35: CPT | Performed by: INTERNAL MEDICINE

## 2021-11-11 PROCEDURE — 80048 BASIC METABOLIC PNL TOTAL CA: CPT | Performed by: HOSPITALIST

## 2021-11-11 PROCEDURE — 99233 SBSQ HOSP IP/OBS HIGH 50: CPT | Performed by: INTERNAL MEDICINE

## 2021-11-11 PROCEDURE — 87899 AGENT NOS ASSAY W/OPTIC: CPT | Performed by: HOSPITALIST

## 2021-11-11 PROCEDURE — 250N000013 HC RX MED GY IP 250 OP 250 PS 637: Performed by: HOSPITALIST

## 2021-11-11 PROCEDURE — 36415 COLL VENOUS BLD VENIPUNCTURE: CPT | Performed by: HOSPITALIST

## 2021-11-11 PROCEDURE — 83735 ASSAY OF MAGNESIUM: CPT | Performed by: HOSPITALIST

## 2021-11-11 PROCEDURE — 250N000011 HC RX IP 250 OP 636: Performed by: INTERNAL MEDICINE

## 2021-11-11 PROCEDURE — 86141 C-REACTIVE PROTEIN HS: CPT | Performed by: HOSPITALIST

## 2021-11-11 PROCEDURE — 258N000003 HC RX IP 258 OP 636: Performed by: HOSPITALIST

## 2021-11-11 PROCEDURE — 120N000001 HC R&B MED SURG/OB

## 2021-11-11 PROCEDURE — 85384 FIBRINOGEN ACTIVITY: CPT | Performed by: HOSPITALIST

## 2021-11-11 PROCEDURE — 258N000003 HC RX IP 258 OP 636: Performed by: STUDENT IN AN ORGANIZED HEALTH CARE EDUCATION/TRAINING PROGRAM

## 2021-11-11 PROCEDURE — 83605 ASSAY OF LACTIC ACID: CPT | Performed by: INTERNAL MEDICINE

## 2021-11-11 PROCEDURE — 250N000011 HC RX IP 250 OP 636: Performed by: HOSPITALIST

## 2021-11-11 PROCEDURE — 97535 SELF CARE MNGMENT TRAINING: CPT | Mod: GO

## 2021-11-11 RX ORDER — CEFTRIAXONE 1 G/1
1 INJECTION, POWDER, FOR SOLUTION INTRAMUSCULAR; INTRAVENOUS EVERY 24 HOURS
Status: DISCONTINUED | OUTPATIENT
Start: 2021-11-11 | End: 2021-11-21

## 2021-11-11 RX ORDER — MIDODRINE HYDROCHLORIDE 5 MG/1
5 TABLET ORAL ONCE
Status: COMPLETED | OUTPATIENT
Start: 2021-11-11 | End: 2021-11-11

## 2021-11-11 RX ADMIN — OXYCODONE HYDROCHLORIDE 5 MG: 5 TABLET ORAL at 10:36

## 2021-11-11 RX ADMIN — VALACYCLOVIR 1000 MG: 500 TABLET, FILM COATED ORAL at 20:01

## 2021-11-11 RX ADMIN — SODIUM CHLORIDE 50 ML: 9 INJECTION, SOLUTION INTRAVENOUS at 19:00

## 2021-11-11 RX ADMIN — MAGNESIUM OXIDE TAB 400 MG (241.3 MG ELEMENTAL MG) 200 MG: 400 (241.3 MG) TAB at 20:02

## 2021-11-11 RX ADMIN — MIDODRINE HYDROCHLORIDE 5 MG: 5 TABLET ORAL at 03:11

## 2021-11-11 RX ADMIN — PANTOPRAZOLE SODIUM 40 MG: 20 TABLET, DELAYED RELEASE ORAL at 08:34

## 2021-11-11 RX ADMIN — HYDROMORPHONE HYDROCHLORIDE 0.5 MG: 1 INJECTION, SOLUTION INTRAMUSCULAR; INTRAVENOUS; SUBCUTANEOUS at 15:24

## 2021-11-11 RX ADMIN — OXYCODONE HYDROCHLORIDE 5 MG: 5 TABLET ORAL at 18:59

## 2021-11-11 RX ADMIN — SERTRALINE HYDROCHLORIDE 200 MG: 50 TABLET ORAL at 08:34

## 2021-11-11 RX ADMIN — MAGNESIUM OXIDE TAB 400 MG (241.3 MG ELEMENTAL MG) 200 MG: 400 (241.3 MG) TAB at 08:36

## 2021-11-11 RX ADMIN — REMDESIVIR 100 MG: 100 INJECTION, POWDER, LYOPHILIZED, FOR SOLUTION INTRAVENOUS at 17:00

## 2021-11-11 RX ADMIN — TRAZODONE HYDROCHLORIDE 150 MG: 50 TABLET ORAL at 20:02

## 2021-11-11 RX ADMIN — MEROPENEM 1 G: 1 INJECTION, POWDER, FOR SOLUTION INTRAVENOUS at 01:39

## 2021-11-11 RX ADMIN — Medication 1 CAPSULE: at 20:02

## 2021-11-11 RX ADMIN — ACETAMINOPHEN 650 MG: 325 TABLET ORAL at 15:24

## 2021-11-11 RX ADMIN — ACETAMINOPHEN 975 MG: 325 TABLET ORAL at 20:02

## 2021-11-11 RX ADMIN — SODIUM CHLORIDE 500 ML: 9 INJECTION, SOLUTION INTRAVENOUS at 00:35

## 2021-11-11 RX ADMIN — CEFTRIAXONE SODIUM 1 G: 1 INJECTION, POWDER, FOR SOLUTION INTRAMUSCULAR; INTRAVENOUS at 15:27

## 2021-11-11 RX ADMIN — Medication 1 TABLET: at 08:34

## 2021-11-11 RX ADMIN — ENOXAPARIN SODIUM 40 MG: 40 INJECTION SUBCUTANEOUS at 17:00

## 2021-11-11 RX ADMIN — OXYCODONE HYDROCHLORIDE 5 MG: 5 TABLET ORAL at 12:46

## 2021-11-11 RX ADMIN — SODIUM CHLORIDE 800 ML: 9 INJECTION, SOLUTION INTRAVENOUS at 02:18

## 2021-11-11 RX ADMIN — MEROPENEM 1 G: 1 INJECTION, POWDER, FOR SOLUTION INTRAVENOUS at 08:35

## 2021-11-11 RX ADMIN — SODIUM CHLORIDE 500 ML: 9 INJECTION, SOLUTION INTRAVENOUS at 01:38

## 2021-11-11 RX ADMIN — ACETAMINOPHEN 650 MG: 325 TABLET ORAL at 08:34

## 2021-11-11 RX ADMIN — Medication 1 TABLET: at 20:02

## 2021-11-11 ASSESSMENT — ACTIVITIES OF DAILY LIVING (ADL)
ADLS_ACUITY_SCORE: 12
ADLS_ACUITY_SCORE: 12
ADLS_ACUITY_SCORE: 16
ADLS_ACUITY_SCORE: 10
ADLS_ACUITY_SCORE: 12
ADLS_ACUITY_SCORE: 14
ADLS_ACUITY_SCORE: 12
ADLS_ACUITY_SCORE: 10
ADLS_ACUITY_SCORE: 16
ADLS_ACUITY_SCORE: 10
ADLS_ACUITY_SCORE: 16
ADLS_ACUITY_SCORE: 10
ADLS_ACUITY_SCORE: 16
ADLS_ACUITY_SCORE: 10
ADLS_ACUITY_SCORE: 16
ADLS_ACUITY_SCORE: 12
ADLS_ACUITY_SCORE: 16
ADLS_ACUITY_SCORE: 10
ADLS_ACUITY_SCORE: 10
ADLS_ACUITY_SCORE: 12

## 2021-11-11 NOTE — PROGRESS NOTES
Progress Note    76 year old lady with history of stage 1 breast cancer s/p lumpectomy and radiation in 2015, depression, GERD, OA, presented with right sided chest pain and dry cough. She was diagnosed with breakthrough covid-19 infection on 10/28 and presented on 11/10 with several days of worsening right-sided pleuritic chest pain, cough, low grade fevers and chills at home.    Assessment/Plan  Right parapneumonic effusion   pulm consulted and following   The right pleural effusion was drained and appeared milky/turbid, could be consistent with parapneumonic effusion.  CT chest showed RML consolidation with possible microabscesses as well as right sided pleural effusion.  C/w broad spectrum abx per pulm   follow pending cx and pleural fluid studies results     Acute resp failure due to above   Remains on supp o2  Not in distress      COVID-19 - on remdesivir but no steroids per pulm for now   covid daily labs and anticoagulaiton ordered        DVT PPX lovenox     Barriers to discharge resp failure     Anticipated Discharge date  Few days         Subjective  Still sob and feels weak today, no fever but has some mild cp with cough ,     Objective  Vital signs in last 24 hours  Temp:  [97.9  F (36.6  C)-98.5  F (36.9  C)] 98.4  F (36.9  C)  Pulse:  [] 86  Resp:  [16-33] 16  BP: ()/(40-57) 124/49  SpO2:  [92 %-98 %] 94 %    Input and Output in 24 hrs     Intake/Output Summary (Last 24 hours) at 11/11/2021 1346  Last data filed at 11/11/2021 0453  Gross per 24 hour   Intake --   Output 200 ml   Net -200 ml       GEN: Alert and oriented. Not in acute distress  HEENT: Atraumatic    Pupils- round and reactive to light bilaterally   Neck- supple, no JVP elevation, no lymphadenopathy or thyromegaly   Sclera- anicteric   Mucous membrane- moist and pink  CHEST: Clear to auscultation bilaterally  HEART: S1S2 regular. No murmurs, rubs or gallops  ABDOMEN: Soft. Non-tender, non-distended. No organomegaly. No  guarding or rigidity. Bowel sounds- active  Extremities: No pedal oedema  CNS: No focal neurological deficit. No involuntary movements  SKIN: No skin rash, no cyanosis or clubbing      Pertinent Labs       Recent Results (from the past 24 hour(s))   Blood Culture Arm, Right    Collection Time: 11/10/21  3:26 PM    Specimen: Arm, Right; Blood   Result Value Ref Range    Culture No growth after 12 hours    Lactic acid whole blood    Collection Time: 11/10/21  3:26 PM   Result Value Ref Range    Lactic Acid 0.6 (L) 0.7 - 2.0 mmol/L   Blood Culture Peripheral Blood    Collection Time: 11/10/21  3:51 PM    Specimen: Peripheral Blood   Result Value Ref Range    Culture No growth after 12 hours    Magnesium    Collection Time: 11/10/21  5:33 PM   Result Value Ref Range    Magnesium 1.7 (L) 1.8 - 2.6 mg/dL   INR    Collection Time: 11/10/21  5:33 PM   Result Value Ref Range    INR 1.44 (H) 0.85 - 1.15   Partial thromboplastin time    Collection Time: 11/10/21  5:33 PM   Result Value Ref Range    aPTT 42 (H) 22 - 38 Seconds   Fibrinogen activity    Collection Time: 11/10/21  5:33 PM   Result Value Ref Range    Fibrinogen Activity 736 (H) 170 - 490 mg/dL   D dimer quantitative    Collection Time: 11/10/21  5:33 PM   Result Value Ref Range    D-Dimer Quantitative 1.29 (H) 0.00 - 0.50 ug/mL FEU   Hepatic panel    Collection Time: 11/10/21  5:33 PM   Result Value Ref Range    Bilirubin Total 0.4 0.0 - 1.0 mg/dL    Bilirubin Direct 0.2 <=0.5 mg/dL    Protein Total 7.2 6.0 - 8.0 g/dL    Albumin 2.5 (L) 3.5 - 5.0 g/dL    Alkaline Phosphatase 80 45 - 120 U/L    AST 13 0 - 40 U/L    ALT <9 0 - 45 U/L   CRP inflammation    Collection Time: 11/10/21  5:33 PM   Result Value Ref Range    CRP 15.9 (H) 0.0-<0.8 mg/dL   Extra Purple Top Tube    Collection Time: 11/10/21  5:33 PM   Result Value Ref Range    Hold Specimen JIC    Protein total    Collection Time: 11/10/21  5:33 PM   Result Value Ref Range    Protein Total 7.2 6.0 - 8.0 g/dL    Pleural fluid Aerobic Bacterial Culture Routine with Gram Stain    Collection Time: 11/10/21  6:30 PM    Specimen: Pleural Cavity; Pleural fluid   Result Value Ref Range    Gram Stain Result No organisms seen     Gram Stain Result 3+ WBC seen    Glucose fluid    Collection Time: 11/10/21  6:30 PM   Result Value Ref Range    Glucose fluid 48 mg/dL   Lactate dehydrogenase fluid    Collection Time: 11/10/21  6:30 PM   Result Value Ref Range    Lactate dehydrogenase fluid 2,321 U/L   Protein fluid    Collection Time: 11/10/21  6:30 PM   Result Value Ref Range    Protein Total Fluid 4.6 g/dL   Cholesterol fluid    Collection Time: 11/10/21  6:30 PM   Result Value Ref Range    Cholesterol fluid 93 mg/dL   pH fluid    Collection Time: 11/10/21  6:30 PM   Result Value Ref Range    pH Fluid 7.5 pH   Triglyceride Fluid    Collection Time: 11/10/21  6:30 PM   Result Value Ref Range    Triglyceride Fluid 83 mg/dL   KOH Preparation    Collection Time: 11/10/21  6:30 PM    Specimen: Pleural Cavity; Pleural fluid   Result Value Ref Range    KOH Preparation No fungal elements seen     KOH Preparation Reference Range: No fungal elements seen.    Cell Count Body Fluid    Collection Time: 11/10/21  6:30 PM   Result Value Ref Range    Color Yellow Colorless, Yellow    Clarity Cloudy (A) Clear, Bloody    Total Nucleated Cells 36,108 /uL    RBC Count 4,000 /uL   Differential Body Fluid    Collection Time: 11/10/21  6:30 PM   Result Value Ref Range    % Neutrophils 97 %    % Lymphocytes      % Monocyte/Macrophages 3 %   Lactate Dehydrogenase    Collection Time: 11/10/21  7:18 PM   Result Value Ref Range    Lactate Dehydrogenase 226 (H) 125 - 220 U/L   Legionella pneumophila antigen urine    Collection Time: 11/11/21  3:05 AM    Specimen: Urine, Midstream   Result Value Ref Range    Legionella pneumophila serogroup 1 urinary antigen Negative Negative   Streptococcus pneumoniae antigen    Collection Time: 11/11/21  3:05 AM    Specimen:  Urine, Midstream   Result Value Ref Range    Streptococcus pneumoniae antigen Positive (A) Negative   Basic metabolic panel    Collection Time: 11/11/21  3:20 AM   Result Value Ref Range    Sodium 136 136 - 145 mmol/L    Potassium 3.7 3.5 - 5.0 mmol/L    Chloride 109 (H) 98 - 107 mmol/L    Carbon Dioxide (CO2) 18 (L) 22 - 31 mmol/L    Anion Gap 9 5 - 18 mmol/L    Urea Nitrogen 17 8 - 28 mg/dL    Creatinine 0.67 0.60 - 1.10 mg/dL    Calcium 8.2 (L) 8.5 - 10.5 mg/dL    Glucose 115 70 - 125 mg/dL    GFR Estimate 84 >60 mL/min/1.73m2   CBC with platelets    Collection Time: 11/11/21  3:20 AM   Result Value Ref Range    WBC Count 24.5 (H) 4.0 - 11.0 10e3/uL    RBC Count 3.02 (L) 3.80 - 5.20 10e6/uL    Hemoglobin 9.5 (L) 11.7 - 15.7 g/dL    Hematocrit 30.2 (L) 35.0 - 47.0 %     78 - 100 fL    MCH 31.5 26.5 - 33.0 pg    MCHC 31.5 31.5 - 36.5 g/dL    RDW 12.1 10.0 - 15.0 %    Platelet Count 280 150 - 450 10e3/uL   Fibrinogen activity    Collection Time: 11/11/21  3:20 AM   Result Value Ref Range    Fibrinogen Activity 671 (H) 170 - 490 mg/dL   CRP inflammation    Collection Time: 11/11/21  3:20 AM   Result Value Ref Range    CRP 23.5 (H) 0.0-<0.8 mg/dL   D dimer quantitative    Collection Time: 11/11/21  3:20 AM   Result Value Ref Range    D-Dimer Quantitative 1.27 (H) 0.00 - 0.50 ug/mL FEU   Magnesium    Collection Time: 11/11/21  3:20 AM   Result Value Ref Range    Magnesium 1.6 (L) 1.8 - 2.6 mg/dL   Lactic acid whole blood    Collection Time: 11/11/21  3:20 AM   Result Value Ref Range    Lactic Acid 1.0 0.7 - 2.0 mmol/L       EKG Results reviewed       Advanced Care Planning      Papa Avitia MD M.D

## 2021-11-11 NOTE — PLAN OF CARE
Problem: Fluid Imbalance (Pneumonia)  Goal: Fluid Balance  Outcome: No Change     Problem: Respiratory Compromise (Pneumonia)  Goal: Effective Oxygenation and Ventilation  Outcome: No Change  Intervention: Promote Airway Secretion Clearance     Pt on 2L O2 with sat 94-96%.  Pt has occasional nonproductive cough, intermittent chest pain associated with coughing, denies feeling short of breath.       BP down to 78/44 overnight, pt denied feeling lightheaded or dizzy.  A total of 1.8L NS boluses given and one time dose of midodrine with improvement.      Urine sample sent to lab.

## 2021-11-11 NOTE — PROGRESS NOTES
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 w/ all Self cares/IADL tasks.    Instrumental Activities of Daily Living (IADL)   Previous Responsibilities meal prep;laundry;shopping;yardwork;medication management;driving;finances;housekeeping  ( 1 every 3 weeks)   IADL Comments Pt stated her  has Dementia and they are getting more assistance for him every day.    Disability/Function   Hearing Difficulty or Deaf no   Wear Glasses or Blind yes   Vision Management Glasses    Difficulty Communicating no   Difficulty Eating/Swallowing no   Walking or Climbing Stairs Difficulty yes   Walking or Climbing Stairs ambulation difficulty, requires equipment   Dressing/Bathing Difficulty no   Toileting issues no   Doing Errands Independently Difficulty (such as shopping) no   Fall history within last six months yes   Number of times patient has fallen within last six months 1   General Information   Onset of Illness/Injury or Date of Surgery 11/10/21   Referring Physician Charity Mann, DONOVAN CNP    Patient/Family Therapy Goal Statement (OT) Pt goal is to get back home    Additional Occupational Profile Info/Pertinent History of Current Problem Hx of Stage I breast cancer s/p lumpectomy and radiation in 2015, depression, GERD, OA, R. side chest   Existing Precautions/Restrictions oxygen therapy device and L/min  (Pt on 3L via nasal cannula )   Left Upper Extremity (Weight-bearing Status)  weight-bearing as tolerated (WBAT)   Right Upper Extremity (Weight-bearing Status) weight-bearing as tolerated (WBAT)   Cognitive Status Examination   Orientation Status orientation to person, place and time   Range of Motion Comprehensive   General Range of Motion no range of motion deficits identified   Strength Comprehensive (MMT)   General Manual Muscle Testing (MMT) Assessment no strength deficits identified   Bed Mobility   Bed Mobility supine-sit;sit-supine   Supine-Sit Cole (Bed Mobility) verbal cues;supervision   Sit-Supine Cole (Bed Mobility) supervision;verbal cues   Assistive Device (Bed Mobility) bed rails   Comment (Bed Mobility) increased time/effort    Balance   Balance Assessment sitting balance: static;sitting balance: dynamic   Sitting Balance: Static WFL   Sitting Balance: Dynamic WFL   Activities of Daily Living   BADL Assessment bathing;upper body dressing;lower body dressing   Bathing Assessment   Cole Level (Bathing) set up;supervision   Position (Bathing) unsupported sitting   Upper Body Dressing Assessment   Cole Level (Upper Body Dressing) minimum assist (75% patient effort)   Position (Upper Body Dressing) unsupported sitting   Lower Body Dressing Assessment   Cole Level (Lower Body Dressing) supervision;verbal cues   Position (Lower Body Dressing) unsupported sitting   Clinical Impression   Criteria for Skilled Therapeutic Interventions Met (OT) yes;skilled treatment is necessary   OT Diagnosis Increased weakness, decreased endurance/activity tolerance impacting independence/safety with functional transfers/self care tasks.    OT Problem List-Impairments impacting ADL problems related to;activity tolerance impaired;balance;mobility;strength   Assessment of Occupational Performance 3-5 Performance Deficits   Identified Performance Deficits Transfer/mobility, endurance/activity, toileting, dressing   Planned Therapy Interventions (OT) ADL  retraining;IADL retraining;balance training;strengthening   Clinical Decision Making Complexity (OT) moderate complexity   Therapy Frequency (OT) Daily   Predicted Duration of Therapy 5   Risk & Benefits of therapy have been explained evaluation/treatment results reviewed;patient   OT Discharge Planning    OT Discharge Recommendation (DC Rec) Home with assist;home with home care occupational therapy;Transitional Care Facility   OT Rationale for DC Rec Pt currently demonstrating decreased endurance/activity tolerance impacting safety/ind. w/ self cares/mobility. If to d/c home pt would require assistx1 for transfers/self care tasks.    Total Evaluation Time (Minutes)   Total Evaluation Time (Minutes) 8

## 2021-11-11 NOTE — CONSULTS
PULMONARY MEDICINE CONSULT  11/10/2021      Admit Date: 11/10/2021  CODE: Full Code    Reason for Consult: right pleural effusion, lung abscess, bacterial pneumonia      Assessment/Plan:   76 year old lady with history of stage 1 breast cancer s/p lumpectomy and radiation in 2015, depression, GERD, OA, presented with right sided chest pain and dry cough. She was diagnosed with breakthrough covid-19 infection on 10/28 and preseted on 11/10 with several days of worsening right-sided pleuritic chest pain, cough, low grade fevers and chills at home.  CT chest showed RML consolidation with possible microabscesses as well as right sided pleural effusion.  The right pleural effusion was drained and appeared milky/turbid, could be consistent with parapneumonic effusion.  Vitals stable and requiring minimal supplemental oxygen.  CT chest appearance is not really consistent with active covid-19 pneumonia or pneumonitis. Elevated wbc count supports bacterial process.     Plan:  - titrate FiO2 for goal SpO2 94-96%, avoid hyperoxia  - agree with broad-spec abx, vanc + meropenem; add azithromycin to cover atypicals.   - check sputum cx, legionella urine Ag, strep pneumo urine Ag  - follow up pleural fluid studies (see procedure note for all the studies that were ordered).    - follow up post-thora CXR  - agree with holding off on steroids   - covid treatment with remdesivir per [facility]  - encourage OOB, PT/OT, push IS  - pain control per [facility]    Will continue to follow.     Tha (Mesfin) MD Guido  [facility]  Pager [phone]  Clinic [phone]  Fax [phone]                                                                                                                                                         HPI:   CCx: right pleural effusion, lung abscess, bacterial pneumonia    HPI: 76 year old lady with history of stage 1 breast cancer s/p lumpectomy and radiation in 2015,  depression, GERD, OA, presented with right sided chest pain and dry cough.  She is fully vaccinated against covid, as of last January/February.  She tested + for covid on 10/28 after 2 days of symptoms. The last 2 days she developed some chills, cough, right sided pleuritic chest pain.  No hemoptysis.   In the ER, CT chest showed RML consolidation with microabscesses as well as right sided pleural effusion.    Bedside right thoracentesis with near complete resolution of the right pleural fluid and no discernible loculations or septations, appeared somewhat turbid/milky.     She is requiring 2Lpm o2 at the time of admission with stable BP and HR.                                                                                                                                                         Past Medical History:  Past Medical History:   Diagnosis Date     Breast cancer (H) 2010    hx of right lumpectomy for breast      Depression      GERD (gastroesophageal reflux disease)      Hx of radiation therapy right 2010    hx of right lumpectomy for breast cancer     Osteoarthritis        Past Surgical History  Past Surgical History:   Procedure Laterality Date     APPENDECTOMY       ARTHROSCOPY SHOULDER ROTATOR CUFF REPAIR       BIOPSY BREAST Right 2010    hx of right lumpectomy     COLONOSCOPY       HYSTERECTOMY  1982     LUMPECTOMY BREAST Right 2010    right lumpectomy     OOPHORECTOMY       RELEASE CARPAL TUNNEL Right        Allergies:  Allergies   Allergen Reactions     Arimidex [Anastrozole] Unknown     Hot flashes     Penicillins Hives     Statins-Hmg-Coa Reductase Inhibitors [Hmg-Coa-R Inhibitors] Muscle Pain (Myalgia)     Fenofibrate Rash       PTA medications:  Medications Prior to Admission   Medication Sig Dispense Refill Last Dose     acetaminophen (TYLENOL) 500 MG tablet Take 1,000 mg by mouth At Bedtime   11/9/2021 at PM     ascorbic acid (VITAMIN C) 250 MG tablet Take 250 mg by mouth At Bedtime     2021 at PM     BLACK COHOSH ORAL [BLACK COHOSH ORAL] Take 1 capsule by mouth 2 (two) times a day.    11/10/2021 at AM     calcium carbonate 600 mg-vitamin D 400 units (CALTRATE) 600-400 MG-UNIT per tablet Take 1 tablet by mouth 2 times daily   11/10/2021 at AM     Lactobacillus rhamnosus GG (CULTURELLE) 10-15 Billion cell capsule Take 1 capsule by mouth At Bedtime    2021 at PM     Magnesium Glycinate 665 MG CAPS Take 1 capsule by mouth 2 times daily   11/10/2021 at AM     nabumetone (RELAFEN) 500 MG tablet Take 500 mg by mouth At Bedtime    2021 at PM     omeprazole (PRILOSEC) 20 MG capsule [OMEPRAZOLE (PRILOSEC) 20 MG CAPSULE] Take 20 mg by mouth daily.  0 11/10/2021 at AM     sertraline (ZOLOFT) 100 MG tablet [SERTRALINE (ZOLOFT) 100 MG TABLET] Take 200 mg by mouth daily.          11/10/2021 at AM     traZODone (DESYREL) 150 MG tablet Take 150 mg by mouth At Bedtime   2021 at PM     valACYclovir (VALTREX) 1000 MG tablet Take 1,000 mg by mouth At Bedtime    2021 at PM     vitamin E 400 UNIT capsule [VITAMIN E 400 UNIT CAPSULE] Take 400 Units by mouth daily.   11/10/2021 at AM       Family Hx:  Family History   Problem Relation Age of Onset     Chronic Obstructive Pulmonary Disease Father      Breast Cancer Sister 71.00     Cancer Sister      Atrial fibrillation Sister      Cancer Brother 19.00     Testicular cancer Brother      No Known Problems Son      No Known Problems Son        Social Hx:  Social History     Socioeconomic History     Marital status:      Spouse name: Not on file     Number of children: Not on file     Years of education: Not on file     Highest education level: Not on file   Occupational History     Not on file   Tobacco Use     Smoking status: Former Smoker     Quit date: 1988     Years since quittin.5     Smokeless tobacco: Never Used   Substance and Sexual Activity     Alcohol use: Yes     Comment: Alcoholic Drinks/day: social     Drug use: No      "Sexual activity: Never   Other Topics Concern     Not on file   Social History Narrative     Not on file     Social Determinants of Health     Financial Resource Strain: Not on file   Food Insecurity: Not on file   Transportation Needs: Not on file   Physical Activity: Not on file   Stress: Not on file   Social Connections: Not on file   Intimate Partner Violence: Not on file   Housing Stability: Not on file       Exam/Data:     Vitals  /54 (BP Location: Left arm)   Pulse 92   Temp 98.4  F (36.9  C) (Oral)   Resp 20   Ht 1.664 m (5' 5.5\")   Wt 59 kg (130 lb)   SpO2 97%   BMI 21.30 kg/m    BP - Mean:  [59-85] 78  No intake/output data recorded.  Weight change:   [unfilled]  EXAM:  /54 (BP Location: Left arm)   Pulse 92   Temp 98.4  F (36.9  C) (Oral)   Resp 20   Ht 1.664 m (5' 5.5\")   Wt 59 kg (130 lb)   SpO2 97%   BMI 21.30 kg/m      Intake/Output last 3 shifts:  No intake/output data recorded.  Intake/Output this shift:  No intake/output data recorded.    Physical Exam  Gen: awake, alert, oriented, no distress  HEENT: NT, no PHILLIP  CV: RRR, no m/g/r  Resp: diminished at right base. No wheezing or rhonchi.   Abd: soft, nontender, BS+  Skin: no rashes or lesions  Ext: no edema  Neuro: PERRL, nonfocal exam    ROS: A complete 10-system review of systems was obtained and is negative with the exception of what is noted in the history of present illness.    Medications:       - MEDICATION INSTRUCTIONS -         remdesivir  200 mg Intravenous Once    Followed by     sodium chloride 0.9%  50 mL Intravenous Once     [START ON 11/11/2021] remdesivir  100 mg Intravenous Q24H    And     [START ON 11/11/2021] sodium chloride 0.9%  50 mL Intravenous Q24H     azithromycin  500 mg Intravenous Q24H     enoxaparin ANTICOAGULANT  40 mg Subcutaneous Q24H     [START ON 11/11/2021] meropenem  1 g Intravenous Q8H     sodium chloride (PF)  3 mL Intracatheter Q8H     vancomycin  1,000 mg Intravenous Once "         DATA  All laboratory and radiology has been personally reviewed by myself today.  Recent Labs   Lab 11/10/21  1145   WBC 24.0*   HGB 11.0*   HCT 32.9*        Recent Labs   Lab 11/10/21  1145      CO2 23   BUN 20         PFT DATA   N/a    Micro  Covid+ on 10/28  Blood pending      IMAGING:   Personally reviewed

## 2021-11-11 NOTE — PROGRESS NOTES
Pulmonary Progress Note  11/11/2021      Admit Date: 11/10/2021  CODE: No CPR- Do NOT Intubate    Reason for Consult: right pleural effusion, lung abscess, bacterial pneumonia    Assessment/Plan:   76 year old lady with history of stage 1 breast cancer s/p lumpectomy and radiation in 2015, depression, GERD, OA, presented with right sided chest pain and dry cough. She was diagnosed with breakthrough covid-19 infection on 10/28 and presented on 11/10 with several days of worsening right-sided pleuritic chest pain, cough, low grade fevers and chills at home. Urine strep pneumo antigen is positive which supports pneumococcal pneumonia as the diagnosis.   CT chest showed RML consolidation with possible microabscesses as well as right sided pleural effusion.  The right pleural effusion was drained yesterday and is consistent with an exudative process, likely parapneumonic effusion. Clinically she appears stable with minimal oxygen requirement and non-toxic appering  CT chest appearance is not really consistent with active covid-19 pneumonia or pneumonitis.     Plan:  - titrate FiO2 for goal SpO2 94-96%, avoid hyperoxia  - Change abx to IV ceftriaxone 1g daily for strep pneumo  - follow up pleural fluid cytology, cultures  - given non-toxic appearance I would hold off on IV steroids for now. Could be considered if she is not improving  - covid treatment with remdesivir per [facility]  - encourage OOB, PT/OT, push IS  - pain control per [facility]  - spoke with her about potential bronchoscopy with BAL but I think we have a pretty clear diagnosis at this point. Can reconsider if she is not improving with treatment for pneumococcal pneumonia.    Will continue to follow.        Tha Lora MD (Avi)  [facility]  Pager [phone]  Clinic [phone]  Fax [phone]     Subjective/Interim Events:   Feels a little better.  On 2Lpm with SpO2 mid to high 90s  Pain better controlled  Not  "coughing any sputum up.         Medications:       - MEDICATION INSTRUCTIONS -         sodium chloride 0.9%  50 mL Intravenous Once     remdesivir  100 mg Intravenous Q24H    And     sodium chloride 0.9%  50 mL Intravenous Q24H     acetaminophen  975 mg Oral At Bedtime     azithromycin  500 mg Intravenous Q24H     calcium carbonate-vitamin D  1 tablet Oral BID     enoxaparin ANTICOAGULANT  40 mg Subcutaneous Q24H     lactobacillus rhamnosus (GG)  1 capsule Oral Daily     magnesium oxide  200 mg Oral BID     meropenem  1 g Intravenous Q8H     pantoprazole  40 mg Oral Daily     sertraline  200 mg Oral Daily     sodium chloride (PF)  3 mL Intracatheter Q8H     traZODone  150 mg Oral At Bedtime     valACYclovir  1,000 mg Oral At Bedtime     vancomycin  1,250 mg Intravenous Q24H     vitamin C  250 mg Oral At Bedtime         Exam/Data:   Vitals  /49 (BP Location: Right arm)   Pulse 86   Temp 98.4  F (36.9  C) (Oral)   Resp 16   Ht 1.664 m (5' 5.5\")   Wt 59 kg (130 lb)   SpO2 94%   BMI 21.30 kg/m    BP - Mean:  [59-85] 78  I/O last 3 completed shifts:  In: -   Out: 200 [Urine:200]  Weight change:   [unfilled]  Resp: 16      EXAM:  Physical Exam  Gen: awake, alert, oriented, no distress  HEENT: NT, no PHILLIP  CV: RRR, no m/g/r  Resp: diminished at right lateral aspect; no wheezing. Good air movement.   Abd: soft, nontender, BS+  Skin: no rashes or lesions  Ext: no edema  Neuro: PERRL, nonfocal exam    ROS:  A 10-system review was obtained and is negative with the exception of the symptoms noted above.    DATA:    PFT DATA   N/a    Micro  Urine Strep pneumo antigen positive  Legionella urine Ag neg  Blood NGTD    Pleural fluid 11/10  200cc milky/turbid fluid removed from right pleural space  Wbc >36K  97% PMNs, 3% mono/mac  Cholesterol 93  Glucose 48  PH 7.5  LDH 2321  TG 83  Protein 4.6  Cytology pending  Cultures pending, gram stain neg    IMAGING:   XR Chest Port 1 View    Result Date: 11/10/2021  EXAM: XR " CHEST PORT 1 VIEW LOCATION: [facility] DATE/TIME: 11/10/2021 6:22 PM INDICATION: s/p right thoracentesis, eval for PTX, eval for reduction in effusion. COMPARISON: CT earlier today.     IMPRESSION: Infiltrate right lung base by CT mostly in the right middle lobe. Small right pleural effusion. No pneumothorax. Left lung is clear. Scoliosis. Healing left rib fractures.    CT Chest Pulmonary Embolism w Contrast    Result Date: 11/10/2021  EXAM: CT CHEST PULMONARY EMBOLISM W CONTRAST LOCATION: [facility] DATE/TIME: 11/10/2021 1:42 PM INDICATION: Chest pain. Covid positive 10/28/2021. PE suspected, high prob COMPARISON: 09/20/2021 chest x-ray. CT chest 03/26/2019 TECHNIQUE: CT chest pulmonary angiogram during arterial phase injection of IV contrast. Multiplanar reformats and MIP reconstructions were performed. Dose reduction techniques were used. CONTRAST: IsoVue 370 100mL FINDINGS: ANGIOGRAM CHEST: Pulmonary arteries are normal caliber and negative for pulmonary emboli. Thoracic aorta is negative for dissection. No CT evidence of right heart strain. LUNGS AND PLEURA: Dense focal infiltrate right middle lobe medial segment and some of the adjacent anterior segment right upper lobe. Tiny less than 1 cm microabscess within this infiltrate. Small right pleural effusion including some focal pleural reaction immediately adjacent to the pneumonia anteriorly. No other infiltrates. Would favor a bacterial pneumonia rather than COVID pneumonia. MEDIASTINUM/AXILLAE: Small pericardial effusion. UPPER ABDOMEN: Normal. MUSCULOSKELETAL: Normal.     IMPRESSION: 1.  Above findings most suggestive of moderately severe bacterial pneumonia right middle lobe with possible associated tiny microabscess within it. 2.  Small right pleural effusion and some focal pleural reaction immediately adjacent to the pneumonia anteriorly.

## 2021-11-11 NOTE — SIGNIFICANT EVENT
Significant Event Note    Time of event: 1:18 AM November 11, 2021    Description of event:  Notified of hypotension BP 78/44. Patient asymptomatic. Gave 500 mL bolus with little improvement, now 80/40. Will give another 500 mL, counseled to monitor BP at q 15-30 minute intervals. Will check cuff to ensure proper measurements as well as compare in b/l upper extremities.      2:16 AM  Continues to be hypotensive. Discussed case with Dr. Girard. Plan to give another 800 mL NS.    2:39 AM  Felicity is resting comfortably in bed and conversing appropriately.  No symptoms with hypotension.  She is interested in being transferred to the ICU for pressor support should she need it, but is not interested in intubation or chest compressions if her heart were to stop or she were to need intubation.  She is alert and oriented x4.  She is oriented to her situation.  Vital signs remained stable other than blood pressure which remains hypotensive.    Nicolasa Jo MD

## 2021-11-11 NOTE — PHARMACY-VANCOMYCIN DOSING SERVICE
Pharmacy Vancomycin Initial Note  Date of Service November 10, 2021  Patient's  1945  76 year old, female    Indication: Abscess    Current estimated CrCl = Estimated Creatinine Clearance: 54 mL/min (based on SCr of 0.8 mg/dL).    Creatinine for last 3 days  11/10/2021: 11:45 AM Creatinine 0.80 mg/dL    Recent Vancomycin Level(s) for last 3 days  No results found for requested labs within last 72 hours.      Vancomycin IV Administrations (past 72 hours)      No vancomycin orders with administrations in past 72 hours.                Nephrotoxins and other renal medications (From now, onward)    Start     Dose/Rate Route Frequency Ordered Stop    11/10/21 1530  vancomycin (VANCOCIN) 1000 mg in dextrose 5% 200 mL PREMIX         1,000 mg  200 mL/hr over 1 Hours Intravenous ONCE 11/10/21 1507            Contrast Orders - past 72 hours (72h ago, onward)            Start     Dose/Rate Route Frequency Ordered Stop    11/10/21 1400  iopamidol (ISOVUE-370) solution 100 mL         100 mL Intravenous ONCE 11/10/21 1342 11/10/21 1343          [employer] Prediction of Planned Initial Vancomycin Regimen  Regimen: 1250 mg IV every 24 hours.  Exposure target: AUC24 (range)400-600 mg/L.hr   AUC24,ss: 499 mg/L.hr  Probability of AUC24 > 400: 75 %  Ctrough,ss: 14.2 mg/L  Probability of Ctrough,ss > 20: 20 %  Probability of nephrotoxicity (Lodise ISABELLA ): 9 %        Plan:  1. Start vancomycin 1250 mg IV q24h.   2. Vancomycin monitoring method: AUC  3. Vancomycin therapeutic monitoring goal: 400-600 mg*h/L  4. Pharmacy will check vancomycin levels as appropriate in 3-5 Days.    5. Serum creatinine levels will be ordered daily for the first week of therapy and at least twice weekly for subsequent weeks.      Karen Martínez RP

## 2021-11-11 NOTE — PROGRESS NOTES
Patient alert, oriented. Up to bathroom with one, stand by assist. Taking fluids, small amount for supper. Medicated with tylenol for general comfort. O2 on per NC, 2-3 liters.

## 2021-11-11 NOTE — PROCEDURES
THORACENTESIS PROCEDURE NOTE  (NON-OR)    PATIENT NAME:    Salome Maravilla,  1945,  MRN# [id number]      Procedure Date: 11/10/2021   Performing Physician: Tha Lora MD    Procedure: ultrasound-guided RIGHT thoracentesis  Pre-Procedure Diagnosis: RIGHT pleural effusion  Post-Procedure Diagnosis: same as pre-procedure diagnosis    Indications: right pleural effusion    Estimated Blood Loss: minimal    Complications: none immediate  Specimen: 200 mL turbid yellow pleural fluid    Procedure Details/Findings:   The risks, benefits, potential complications, treatment options, and expected outcomes were discussed with the patient. Discussed that the risks and potential complications include but are not limited to infection, bleeding, pain, pneumothorax, and death.  The patient concurred with the proposed plan, giving informed consent. The site of the procedure was properly noted/marked. The patient was identified as Salome Maravilla with date of birth 1945 and the procedure verified as RIGHT thoracentesis. A time out was held and the above information confirmed.    The patient was properly positioned. The procedure was performed using sterile precautions, including chlorhexidine at the procedure site, cap, mask with face shield, sterile gown, and sterile gloves, a sterile drape, and a sterile ultrasound probe cover. Under ultrasound guidance, 10 mL of 1% lidocaine was infiltrated into the subcutaneous tissue and over the rib at the right posterior axillary line. A small skin nick was made with a scalpel. A thoracentesis catheter was introduced over a thoracentesis needle with slightly milky/turbid pleural fluid return. The needle was removed and manual suction device used to obtain 200 mL of above mentioned fluid. The catheter was then removed and a dressing was applied to the wound. The procedure then concluded.    Condition: stable    Note: pre-procedural US showed complex pleural fluid collection with  multiple septations and loculations. The fluid was free flowing in the largest pocket.     Post-procedure US showed near complete resolution of the fluid without any significant loculations or septations remaining.    The fluid will be sent for cell count/diff, gram stain, bacterial cultures, glucose, LDH, protein, TG, cholesterol, PH, cytology.    CXR ordered and is pending.  ________________________________________________________________________  Tha Lora MD  11/10/58779:11 PM

## 2021-11-11 NOTE — H&P (VIEW-ONLY)
PULMONARY MEDICINE CONSULT  11/10/2021      Admit Date: 11/10/2021  CODE: Full Code    Reason for Consult: right pleural effusion, lung abscess, bacterial pneumonia      Assessment/Plan:   76 year old lady with history of stage 1 breast cancer s/p lumpectomy and radiation in 2015, depression, GERD, OA, presented with right sided chest pain and dry cough. She was diagnosed with breakthrough covid-19 infection on 10/28 and preseted on 11/10 with several days of worsening right-sided pleuritic chest pain, cough, low grade fevers and chills at home.  CT chest showed RML consolidation with possible microabscesses as well as right sided pleural effusion.  The right pleural effusion was drained and appeared milky/turbid, could be consistent with parapneumonic effusion.  Vitals stable and requiring minimal supplemental oxygen.  CT chest appearance is not really consistent with active covid-19 pneumonia or pneumonitis. Elevated wbc count supports bacterial process.     Plan:  - titrate FiO2 for goal SpO2 94-96%, avoid hyperoxia  - agree with broad-spec abx, vanc + meropenem; add azithromycin to cover atypicals.   - check sputum cx, legionella urine Ag, strep pneumo urine Ag  - follow up pleural fluid studies (see procedure note for all the studies that were ordered).    - follow up post-thora CXR  - agree with holding off on steroids   - covid treatment with remdesivir per [facility]  - encourage OOB, PT/OT, push IS  - pain control per [facility]    Will continue to follow.     Tha (Mesfin) MD Guido  [facility] Pulmonary & Critical Care  Pager [phone]  Clinic [phone]  Fax [phone]                                                                                                                                                         HPI:   CCx: right pleural effusion, lung abscess, bacterial pneumonia    HPI: 76 year old lady with history of stage 1 breast cancer s/p lumpectomy and radiation in 2015,  depression, GERD, OA, presented with right sided chest pain and dry cough.  She is fully vaccinated against covid, as of last January/February.  She tested + for covid on 10/28 after 2 days of symptoms. The last 2 days she developed some chills, cough, right sided pleuritic chest pain.  No hemoptysis.   In the ER, CT chest showed RML consolidation with microabscesses as well as right sided pleural effusion.    Bedside right thoracentesis with near complete resolution of the right pleural fluid and no discernible loculations or septations, appeared somewhat turbid/milky.     She is requiring 2Lpm o2 at the time of admission with stable BP and HR.                                                                                                                                                         Past Medical History:  Past Medical History:   Diagnosis Date     Breast cancer (H) 2010    hx of right lumpectomy for breast      Depression      GERD (gastroesophageal reflux disease)      Hx of radiation therapy right 2010    hx of right lumpectomy for breast cancer     Osteoarthritis        Past Surgical History  Past Surgical History:   Procedure Laterality Date     APPENDECTOMY       ARTHROSCOPY SHOULDER ROTATOR CUFF REPAIR       BIOPSY BREAST Right 2010    hx of right lumpectomy     COLONOSCOPY       HYSTERECTOMY  1982     LUMPECTOMY BREAST Right 2010    right lumpectomy     OOPHORECTOMY       RELEASE CARPAL TUNNEL Right        Allergies:  Allergies   Allergen Reactions     Arimidex [Anastrozole] Unknown     Hot flashes     Penicillins Hives     Statins-Hmg-Coa Reductase Inhibitors [Hmg-Coa-R Inhibitors] Muscle Pain (Myalgia)     Fenofibrate Rash       PTA medications:  Medications Prior to Admission   Medication Sig Dispense Refill Last Dose     acetaminophen (TYLENOL) 500 MG tablet Take 1,000 mg by mouth At Bedtime   11/9/2021 at PM     ascorbic acid (VITAMIN C) 250 MG tablet Take 250 mg by mouth At Bedtime     2021 at PM     BLACK COHOSH ORAL [BLACK COHOSH ORAL] Take 1 capsule by mouth 2 (two) times a day.    11/10/2021 at AM     calcium carbonate 600 mg-vitamin D 400 units (CALTRATE) 600-400 MG-UNIT per tablet Take 1 tablet by mouth 2 times daily   11/10/2021 at AM     Lactobacillus rhamnosus GG (CULTURELLE) 10-15 Billion cell capsule Take 1 capsule by mouth At Bedtime    2021 at PM     Magnesium Glycinate 665 MG CAPS Take 1 capsule by mouth 2 times daily   11/10/2021 at AM     nabumetone (RELAFEN) 500 MG tablet Take 500 mg by mouth At Bedtime    2021 at PM     omeprazole (PRILOSEC) 20 MG capsule [OMEPRAZOLE (PRILOSEC) 20 MG CAPSULE] Take 20 mg by mouth daily.  0 11/10/2021 at AM     sertraline (ZOLOFT) 100 MG tablet [SERTRALINE (ZOLOFT) 100 MG TABLET] Take 200 mg by mouth daily.          11/10/2021 at AM     traZODone (DESYREL) 150 MG tablet Take 150 mg by mouth At Bedtime   2021 at PM     valACYclovir (VALTREX) 1000 MG tablet Take 1,000 mg by mouth At Bedtime    2021 at PM     vitamin E 400 UNIT capsule [VITAMIN E 400 UNIT CAPSULE] Take 400 Units by mouth daily.   11/10/2021 at AM       Family Hx:  Family History   Problem Relation Age of Onset     Chronic Obstructive Pulmonary Disease Father      Breast Cancer Sister 71.00     Cancer Sister      Atrial fibrillation Sister      Cancer Brother 19.00     Testicular cancer Brother      No Known Problems Son      No Known Problems Son        Social Hx:  Social History     Socioeconomic History     Marital status:      Spouse name: Not on file     Number of children: Not on file     Years of education: Not on file     Highest education level: Not on file   Occupational History     Not on file   Tobacco Use     Smoking status: Former Smoker     Quit date: 1988     Years since quittin.5     Smokeless tobacco: Never Used   Substance and Sexual Activity     Alcohol use: Yes     Comment: Alcoholic Drinks/day: social     Drug use: No      "Sexual activity: Never   Other Topics Concern     Not on file   Social History Narrative     Not on file     Social Determinants of Health     Financial Resource Strain: Not on file   Food Insecurity: Not on file   Transportation Needs: Not on file   Physical Activity: Not on file   Stress: Not on file   Social Connections: Not on file   Intimate Partner Violence: Not on file   Housing Stability: Not on file       Exam/Data:     Vitals  /54 (BP Location: Left arm)   Pulse 92   Temp 98.4  F (36.9  C) (Oral)   Resp 20   Ht 1.664 m (5' 5.5\")   Wt 59 kg (130 lb)   SpO2 97%   BMI 21.30 kg/m    BP - Mean:  [59-85] 78  No intake/output data recorded.  Weight change:   [unfilled]  EXAM:  /54 (BP Location: Left arm)   Pulse 92   Temp 98.4  F (36.9  C) (Oral)   Resp 20   Ht 1.664 m (5' 5.5\")   Wt 59 kg (130 lb)   SpO2 97%   BMI 21.30 kg/m      Intake/Output last 3 shifts:  No intake/output data recorded.  Intake/Output this shift:  No intake/output data recorded.    Physical Exam  Gen: awake, alert, oriented, no distress  HEENT: NT, no PHILLIP  CV: RRR, no m/g/r  Resp: diminished at right base. No wheezing or rhonchi.   Abd: soft, nontender, BS+  Skin: no rashes or lesions  Ext: no edema  Neuro: PERRL, nonfocal exam    ROS: A complete 10-system review of systems was obtained and is negative with the exception of what is noted in the history of present illness.    Medications:       - MEDICATION INSTRUCTIONS -         remdesivir  200 mg Intravenous Once    Followed by     sodium chloride 0.9%  50 mL Intravenous Once     [START ON 11/11/2021] remdesivir  100 mg Intravenous Q24H    And     [START ON 11/11/2021] sodium chloride 0.9%  50 mL Intravenous Q24H     azithromycin  500 mg Intravenous Q24H     enoxaparin ANTICOAGULANT  40 mg Subcutaneous Q24H     [START ON 11/11/2021] meropenem  1 g Intravenous Q8H     sodium chloride (PF)  3 mL Intracatheter Q8H     vancomycin  1,000 mg Intravenous Once "         DATA  All laboratory and radiology has been personally reviewed by myself today.  Recent Labs   Lab 11/10/21  1145   WBC 24.0*   HGB 11.0*   HCT 32.9*        Recent Labs   Lab 11/10/21  1145      CO2 23   BUN 20         PFT DATA   N/a    Micro  Covid+ on 10/28  Blood pending      IMAGING:   Personally reviewed

## 2021-11-11 NOTE — PROGRESS NOTES
Care Management Follow Up    Length of Stay (days): 1    Expected Discharge Date: 11/16/2021       Concerns to be Addressed:   Lung abscess with small pleural effusion requiring thoracentesis on 11/10/21: IV Azithromycin, IV Meropenem, IV Vancomycin. Low BP noted. Alteration in respiratory status, secondary to COVID-19, requiring IV Remdesivir and supplemental oxygen at 2 liters.     Patient plan of care discussed at interdisciplinary rounds: Yes    Anticipated Discharge Disposition:  Discharge goal is home pending response to treatment, medical needs and mobility closer to discharge.      Anticipated Discharge Services:  To be determined by destination, patient/family preferences, medical needs and mobility status closer to the time of discharge.  Anticipated Discharge DME:  To be determined.     Patient/family educated on Medicare website which has current facility and service quality ratings:  NA  Education Provided on the Discharge Plan:  Per team  Patient/Family in Agreement with the Plan:  Yes    Referrals Placed by CM/SW:  None at this time.   Private pay costs discussed: Not applicable     Additional Information:  Patient lives in a one level town house with her . She is independent with activities of daily living at baseline and still drives. Per , patient is a very independent person and even when she has been sick she likes to do things on her own. CM will continue to monitor progression of care, review team recommendations and provide discharge planning assist as needed.      Diana Monsivais RN

## 2021-11-11 NOTE — PROGRESS NOTES
0630:  Spoke with Dr. Jo regarding pt's BP trending down with last BP 96/49.  Dr. Jo stated it is ok for now, keep checking the pressures, and to let day team know when they get here.

## 2021-11-12 ENCOUNTER — APPOINTMENT (OUTPATIENT)
Dept: PHYSICAL THERAPY | Facility: HOSPITAL | Age: 76
DRG: 163 | End: 2021-11-12
Attending: HOSPITALIST
Payer: COMMERCIAL

## 2021-11-12 ENCOUNTER — APPOINTMENT (OUTPATIENT)
Dept: OCCUPATIONAL THERAPY | Facility: HOSPITAL | Age: 76
DRG: 163 | End: 2021-11-12
Payer: COMMERCIAL

## 2021-11-12 ENCOUNTER — APPOINTMENT (OUTPATIENT)
Dept: RADIOLOGY | Facility: HOSPITAL | Age: 76
DRG: 163 | End: 2021-11-12
Attending: INTERNAL MEDICINE
Payer: COMMERCIAL

## 2021-11-12 LAB
ANION GAP SERPL CALCULATED.3IONS-SCNC: 6 MMOL/L (ref 5–18)
BUN SERPL-MCNC: 15 MG/DL (ref 8–28)
C REACTIVE PROTEIN LHE: 29.5 MG/DL (ref 0–0.8)
CALCIUM SERPL-MCNC: 8.5 MG/DL (ref 8.5–10.5)
CHLORIDE BLD-SCNC: 109 MMOL/L (ref 98–107)
CO2 SERPL-SCNC: 21 MMOL/L (ref 22–31)
CREAT SERPL-MCNC: 0.61 MG/DL (ref 0.6–1.1)
D DIMER PPP FEU-MCNC: 1.73 UG/ML FEU (ref 0–0.5)
ERYTHROCYTE [DISTWIDTH] IN BLOOD BY AUTOMATED COUNT: 12.6 % (ref 10–15)
FIBRINOGEN PPP-MCNC: 658 MG/DL (ref 170–490)
GFR SERPL CREATININE-BSD FRML MDRD: 87 ML/MIN/1.73M2
GLUCOSE BLD-MCNC: 96 MG/DL (ref 70–125)
HCT VFR BLD AUTO: 29.4 % (ref 35–47)
HGB BLD-MCNC: 9.4 G/DL (ref 11.7–15.7)
LACTATE SERPL-SCNC: 0.5 MMOL/L (ref 0.7–2)
MAGNESIUM SERPL-MCNC: 1.7 MG/DL (ref 1.8–2.6)
MCH RBC QN AUTO: 31.3 PG (ref 26.5–33)
MCHC RBC AUTO-ENTMCNC: 32 G/DL (ref 31.5–36.5)
MCV RBC AUTO: 98 FL (ref 78–100)
PATH REPORT.COMMENTS IMP SPEC: NORMAL
PATH REPORT.FINAL DX SPEC: NORMAL
PATH REPORT.GROSS SPEC: NORMAL
PATH REPORT.MICROSCOPIC SPEC OTHER STN: NORMAL
PATH REPORT.RELEVANT HX SPEC: NORMAL
PLATELET # BLD AUTO: 303 10E3/UL (ref 150–450)
POTASSIUM BLD-SCNC: 3.6 MMOL/L (ref 3.5–5)
RBC # BLD AUTO: 3 10E6/UL (ref 3.8–5.2)
SODIUM SERPL-SCNC: 136 MMOL/L (ref 136–145)
WBC # BLD AUTO: 26.6 10E3/UL (ref 4–11)

## 2021-11-12 PROCEDURE — 250N000009 HC RX 250: Performed by: HOSPITALIST

## 2021-11-12 PROCEDURE — 71045 X-RAY EXAM CHEST 1 VIEW: CPT

## 2021-11-12 PROCEDURE — 88112 CYTOPATH CELL ENHANCE TECH: CPT | Mod: 26 | Performed by: PATHOLOGY

## 2021-11-12 PROCEDURE — 86141 C-REACTIVE PROTEIN HS: CPT | Performed by: HOSPITALIST

## 2021-11-12 PROCEDURE — 120N000001 HC R&B MED SURG/OB

## 2021-11-12 PROCEDURE — 99231 SBSQ HOSP IP/OBS SF/LOW 25: CPT | Performed by: INTERNAL MEDICINE

## 2021-11-12 PROCEDURE — 85384 FIBRINOGEN ACTIVITY: CPT | Performed by: HOSPITALIST

## 2021-11-12 PROCEDURE — 36415 COLL VENOUS BLD VENIPUNCTURE: CPT | Performed by: HOSPITALIST

## 2021-11-12 PROCEDURE — 87040 BLOOD CULTURE FOR BACTERIA: CPT | Performed by: INTERNAL MEDICINE

## 2021-11-12 PROCEDURE — 99233 SBSQ HOSP IP/OBS HIGH 50: CPT | Performed by: INTERNAL MEDICINE

## 2021-11-12 PROCEDURE — 85027 COMPLETE CBC AUTOMATED: CPT | Performed by: HOSPITALIST

## 2021-11-12 PROCEDURE — 250N000011 HC RX IP 250 OP 636: Performed by: HOSPITALIST

## 2021-11-12 PROCEDURE — 258N000003 HC RX IP 258 OP 636: Performed by: HOSPITALIST

## 2021-11-12 PROCEDURE — 83735 ASSAY OF MAGNESIUM: CPT | Performed by: INTERNAL MEDICINE

## 2021-11-12 PROCEDURE — 36415 COLL VENOUS BLD VENIPUNCTURE: CPT | Performed by: INTERNAL MEDICINE

## 2021-11-12 PROCEDURE — 85379 FIBRIN DEGRADATION QUANT: CPT | Performed by: HOSPITALIST

## 2021-11-12 PROCEDURE — 80048 BASIC METABOLIC PNL TOTAL CA: CPT | Performed by: HOSPITALIST

## 2021-11-12 PROCEDURE — 88305 TISSUE EXAM BY PATHOLOGIST: CPT | Mod: 26 | Performed by: PATHOLOGY

## 2021-11-12 PROCEDURE — 97530 THERAPEUTIC ACTIVITIES: CPT | Mod: GP | Performed by: PHYSICAL THERAPIST

## 2021-11-12 PROCEDURE — 250N000013 HC RX MED GY IP 250 OP 250 PS 637: Performed by: HOSPITALIST

## 2021-11-12 PROCEDURE — 250N000011 HC RX IP 250 OP 636: Performed by: INTERNAL MEDICINE

## 2021-11-12 PROCEDURE — 97535 SELF CARE MNGMENT TRAINING: CPT | Mod: GO

## 2021-11-12 PROCEDURE — 97162 PT EVAL MOD COMPLEX 30 MIN: CPT | Mod: GP | Performed by: PHYSICAL THERAPIST

## 2021-11-12 PROCEDURE — 83605 ASSAY OF LACTIC ACID: CPT | Performed by: INTERNAL MEDICINE

## 2021-11-12 RX ADMIN — TRAZODONE HYDROCHLORIDE 150 MG: 50 TABLET ORAL at 23:02

## 2021-11-12 RX ADMIN — OXYCODONE HYDROCHLORIDE 5 MG: 5 TABLET ORAL at 12:07

## 2021-11-12 RX ADMIN — OXYCODONE HYDROCHLORIDE 5 MG: 5 TABLET ORAL at 04:46

## 2021-11-12 RX ADMIN — ACETAMINOPHEN 975 MG: 325 TABLET ORAL at 23:02

## 2021-11-12 RX ADMIN — CEFTRIAXONE SODIUM 1 G: 1 INJECTION, POWDER, FOR SOLUTION INTRAMUSCULAR; INTRAVENOUS at 14:43

## 2021-11-12 RX ADMIN — VALACYCLOVIR 1000 MG: 500 TABLET, FILM COATED ORAL at 23:02

## 2021-11-12 RX ADMIN — PANTOPRAZOLE SODIUM 40 MG: 20 TABLET, DELAYED RELEASE ORAL at 09:01

## 2021-11-12 RX ADMIN — Medication 1 TABLET: at 23:03

## 2021-11-12 RX ADMIN — OXYCODONE HYDROCHLORIDE 5 MG: 5 TABLET ORAL at 17:32

## 2021-11-12 RX ADMIN — Medication 1 CAPSULE: at 23:03

## 2021-11-12 RX ADMIN — OXYCODONE HYDROCHLORIDE 5 MG: 5 TABLET ORAL at 23:03

## 2021-11-12 RX ADMIN — REMDESIVIR 100 MG: 100 INJECTION, POWDER, LYOPHILIZED, FOR SOLUTION INTRAVENOUS at 17:41

## 2021-11-12 RX ADMIN — Medication 1 TABLET: at 09:01

## 2021-11-12 RX ADMIN — ENOXAPARIN SODIUM 40 MG: 40 INJECTION SUBCUTANEOUS at 17:42

## 2021-11-12 RX ADMIN — SODIUM CHLORIDE 50 ML: 9 INJECTION, SOLUTION INTRAVENOUS at 17:41

## 2021-11-12 RX ADMIN — MAGNESIUM OXIDE TAB 400 MG (241.3 MG ELEMENTAL MG) 200 MG: 400 (241.3 MG) TAB at 23:03

## 2021-11-12 RX ADMIN — SERTRALINE HYDROCHLORIDE 200 MG: 50 TABLET ORAL at 09:02

## 2021-11-12 RX ADMIN — MAGNESIUM OXIDE TAB 400 MG (241.3 MG ELEMENTAL MG) 200 MG: 400 (241.3 MG) TAB at 09:01

## 2021-11-12 ASSESSMENT — ACTIVITIES OF DAILY LIVING (ADL)
ADLS_ACUITY_SCORE: 10
CONCENTRATING,_REMEMBERING_OR_MAKING_DECISIONS_DIFFICULTY: NO
ADLS_ACUITY_SCORE: 7
ADLS_ACUITY_SCORE: 7
ADLS_ACUITY_SCORE: 8
ADLS_ACUITY_SCORE: 7
ADLS_ACUITY_SCORE: 8
ADLS_ACUITY_SCORE: 10
ADLS_ACUITY_SCORE: 10
ADLS_ACUITY_SCORE: 6
ADLS_ACUITY_SCORE: 10
ADLS_ACUITY_SCORE: 8
ADLS_ACUITY_SCORE: 7
ADLS_ACUITY_SCORE: 7
ADLS_ACUITY_SCORE: 8
ADLS_ACUITY_SCORE: 6
ADLS_ACUITY_SCORE: 10
ADLS_ACUITY_SCORE: 8
ADLS_ACUITY_SCORE: 6
ADLS_ACUITY_SCORE: 8
ADLS_ACUITY_SCORE: 8

## 2021-11-12 NOTE — PROGRESS NOTES
Pulmonary Progress Note  11/11/2021      Admit Date: 11/10/2021  CODE: No CPR- Do NOT Intubate    Reason for Consult: right pleural effusion, lung abscess, bacterial pneumonia    Assessment/Plan:   76 year old lady with history of stage 1 breast cancer s/p lumpectomy and radiation in 2015, depression, GERD, OA, presented with right sided chest pain and dry cough. She was diagnosed with breakthrough covid-19 infection on 10/28 and presented on 11/10 with several days of worsening right-sided pleuritic chest pain, cough, low grade fevers and chills at home.  Overall looks like post viral Streptococcus pneumonia complicated by complex parapneumonic effusion.  Met some criteria for drainage but after thoracentesis had been drained dry, monitoring clinically.    Blood culture results seem more likely to be contaminant.  Would recommend considering repeating the blood cultures.     Plan:  - titrate FiO2 for goal SpO2 94-96%, avoid hyperoxia  -Continue ceftriaxone  - follow up pleural fluid cytology, culture  - covid treatment   - encourage OOB, PT/OT, push IS  - pain control per [facility]  -Chest x-ray    Will continue to follow.        Subjective/Interim Events:   Felicity is feeling a little bit better.  She continues to have right-sided pleuritic pain that is worse with coughing and deep breathing, better if these issues are avoided.  The pain does not refer.  Is not bringing up any sputum, blood or having any fevers.  She is looking forward to make progress towards going home but has concerns regarding to care of her  as well.  She is his primary caregiver.        Medications:       - MEDICATION INSTRUCTIONS -         sodium chloride 0.9%  50 mL Intravenous Once     remdesivir  100 mg Intravenous Q24H    And     sodium chloride 0.9%  50 mL Intravenous Q24H     acetaminophen  975 mg Oral At Bedtime     calcium carbonate-vitamin D  1 tablet Oral BID     cefTRIAXone  1 g Intravenous Q24H     enoxaparin ANTICOAGULANT  40 mg  "Subcutaneous Q24H     lactobacillus rhamnosus (GG)  1 capsule Oral Daily     magnesium oxide  200 mg Oral BID     pantoprazole  40 mg Oral Daily     sertraline  200 mg Oral Daily     sodium chloride (PF)  3 mL Intracatheter Q8H     traZODone  150 mg Oral At Bedtime     valACYclovir  1,000 mg Oral At Bedtime     vitamin C  250 mg Oral At Bedtime         Exam/Data:   Vitals  /43 (BP Location: Left arm, Patient Position: Supine)   Pulse 115   Temp 99.3  F (37.4  C) (Oral)   Resp 24   Ht 1.664 m (5' 5.5\")   Wt 59 kg (130 lb)   SpO2 95%   BMI 21.30 kg/m       No intake/output data recorded.  Weight change:   [unfilled]  Resp: 24      EXAM:  Physical Exam  Gen: awake, alert, oriented, no distress  HEENT: NT, no PHILLIP  CV: RRR, no m/g/r  Resp: diminished at right lateral aspect; no wheezing. Good air movement.   Abd: soft, nontender, BS+  Skin: no rashes or lesions  Ext: no edema  Neuro: PERRL, nonfocal exam    ROS:  A 10-system review was obtained and is negative with the exception of the symptoms noted above.    DATA:    PFT DATA   N/a    Micro  Urine Strep pneumo antigen positive  Legionella urine Ag neg  Blood with staph hominis    Pleural fluid 11/10  Studies reviewed.  Pleural culture negative.    IMAGING:   Wrist x-ray reviewed interpreted by me: Right middle lobe infiltrate  "

## 2021-11-12 NOTE — PLAN OF CARE
Problem: Adult Inpatient Plan of Care  Goal: Patient-Specific Goal (Individualized)  Outcome: Improving     Problem: Infection (Pneumonia)  Goal: Resolution of Infection Signs and Symptoms  Outcome: Improving     Problem: Respiratory Compromise (Pneumonia)  Goal: Effective Oxygenation and Ventilation  Outcome: Improving  Intervention: Promote Airway Secretion Clearance  Recent Flowsheet Documentation  Taken 11/12/2021 0059 by Sulma Tillman RN  Cough And Deep Breathing: done with encouragement   Pt. Alert and oriented x 3,  Able to make needs known. Give prn oxycodone for c/o right side pain, was effective. O2 3L/NC O2 sat >93%. Slept most of the night. SOB with activity. Was up to use bedside commode. Has continues pulse oxymetry.

## 2021-11-12 NOTE — PROGRESS NOTES
Progress Note    76 year old lady with history of stage 1 breast cancer s/p lumpectomy and radiation in 2015, depression, GERD, OA, presented with right sided chest pain and dry cough. She was diagnosed with breakthrough covid-19 infection on 10/28 and presented on 11/10 with several days of worsening right-sided pleuritic chest pain, cough, low grade fevers and chills at home.    Assessment/Plan  Post viral strep PNA with Right  parapneumonic effusion   The right pleural effusion was drained and appeared milky/turbid, could be consistent with parapneumonic effusion.  CT chest showed RML consolidation with possible microabscesses as well as right sided pleural effusion.  C/w broad spectrum abx per pulm   follow pending cx and pleural fluid studies results     Acute resp failure due to above   Remains on supp o2  Not in distress      COVID-19 - on remdesivir but no steroids per pulm for now   covid daily labs and anticoagulaiton ordered      DVT PPX lovenox     Barriers to discharge resp failure     Anticipated Discharge date  Few days     Subjective  Still sob and has a cough, no fever or chest pain no chills     Objective  Vital signs in last 24 hours  Temp:  [98.7  F (37.1  C)-99.3  F (37.4  C)] 99.3  F (37.4  C)  Pulse:  [] 115  Resp:  [16-24] 24  BP: (100-140)/(43-57) 116/43  SpO2:  [92 %-97 %] 95 %    Input and Output in 24 hrs     Intake/Output Summary (Last 24 hours) at 11/11/2021 1346  Last data filed at 11/11/2021 0453  Gross per 24 hour   Intake --   Output 200 ml   Net -200 ml       GEN: Alert and oriented. Not in acute distress  HEENT: Atraumatic    Pupils- round and reactive to light bilaterally   Neck- supple, no JVP elevation, no lymphadenopathy or thyromegaly   Sclera- anicteric   Mucous membrane- moist and pink  CHEST: Clear to auscultation bilaterally  HEART: S1S2 regular. No murmurs, rubs or gallops  ABDOMEN: Soft. Non-tender, non-distended. No organomegaly. No guarding or rigidity. Bowel  sounds- active  Extremities: No pedal oedema  CNS: No focal neurological deficit. No involuntary movements  SKIN: No skin rash, no cyanosis or clubbing      Pertinent Labs       Recent Results (from the past 24 hour(s))   Basic metabolic panel    Collection Time: 11/12/21  7:10 AM   Result Value Ref Range    Sodium 136 136 - 145 mmol/L    Potassium 3.6 3.5 - 5.0 mmol/L    Chloride 109 (H) 98 - 107 mmol/L    Carbon Dioxide (CO2) 21 (L) 22 - 31 mmol/L    Anion Gap 6 5 - 18 mmol/L    Urea Nitrogen 15 8 - 28 mg/dL    Creatinine 0.61 0.60 - 1.10 mg/dL    Calcium 8.5 8.5 - 10.5 mg/dL    Glucose 96 70 - 125 mg/dL    GFR Estimate 87 >60 mL/min/1.73m2   CBC with platelets    Collection Time: 11/12/21  7:10 AM   Result Value Ref Range    WBC Count 26.6 (H) 4.0 - 11.0 10e3/uL    RBC Count 3.00 (L) 3.80 - 5.20 10e6/uL    Hemoglobin 9.4 (L) 11.7 - 15.7 g/dL    Hematocrit 29.4 (L) 35.0 - 47.0 %    MCV 98 78 - 100 fL    MCH 31.3 26.5 - 33.0 pg    MCHC 32.0 31.5 - 36.5 g/dL    RDW 12.6 10.0 - 15.0 %    Platelet Count 303 150 - 450 10e3/uL   Fibrinogen activity    Collection Time: 11/12/21  7:10 AM   Result Value Ref Range    Fibrinogen Activity 658 (H) 170 - 490 mg/dL   CRP inflammation    Collection Time: 11/12/21  7:10 AM   Result Value Ref Range    CRP 29.5 (H) 0.0-<0.8 mg/dL   D dimer quantitative    Collection Time: 11/12/21  7:10 AM   Result Value Ref Range    D-Dimer Quantitative 1.73 (H) 0.00 - 0.50 ug/mL FEU   Magnesium    Collection Time: 11/12/21  7:10 AM   Result Value Ref Range    Magnesium 1.7 (L) 1.8 - 2.6 mg/dL   Lactic Acid STAT    Collection Time: 11/12/21  9:00 AM   Result Value Ref Range    Lactic Acid 0.5 (L) 0.7 - 2.0 mmol/L       EKG Results reviewed       Advanced Care Planning      MD TRUPTI CaD

## 2021-11-12 NOTE — PLAN OF CARE
Patient states she is feeling better today, up to bathroom with stand by assist. Medicated for pain x3 this shift. Taking fluids and eating fairly well. 02 on 2-3 liters depending on activity, sats 94%.

## 2021-11-12 NOTE — PROGRESS NOTES
Care Management Follow Up    Length of Stay (days): 2    Expected Discharge Date: 11/16/2021     Concerns to be Addressed:  COVID-19 positive, IV meds, respiratory status, supplemental oxygen, weakness     Patient plan of care discussed at interdisciplinary rounds: Yes    Anticipated Discharge Disposition:  TCU vs Home Care vs home with home oxygen     Anticipated Discharge Services:  To be determined  Anticipated Discharge DME:      Patient/family educated on Medicare website which has current facility and service quality ratings:    Education Provided on the Discharge Plan:    Patient/Family in Agreement with the Plan:      Referrals Placed by CM/SW:  None at this time  Private pay costs discussed: Not applicable    Additional Information:  Chart reviewed and updates with care team done.  Patient medically not ready for discharge, anticipate several more days pending care progression.  CM following and will assist with discharge planning per team recommendations.  If TCU needed and patient agreeable the only accepting facility for COVID-19 positive is [facility].  No beds currently available.      Lydia Pearl RN

## 2021-11-12 NOTE — PROGRESS NOTES
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   Pertinent History of Current Problem (include personal factors and/or comorbidities that impact the POC) Pt admitted with COVID pneumonia.   Existing Precautions/Restrictions fall  (special precautions)   Pain Assessment   Patient Currently in Pain Yes, see Vital Sign flowsheet   Range of Motion (ROM)   ROM Quick Adds ROM deficits secondary to pain   Strength   Manual Muscle Testing Quick Adds Deficits observed during functional mobility   Bed Mobility   Bed Mobility supine-sit;sit-supine   Supine-Sit [place] (Bed Mobility) supervision   Sit-Supine [place] (Bed Mobility) supervision   Bed Mobility Limitations decreased ability to use arms for pushing/pulling   Impairments Contributing to Impaired Bed Mobility pain;decreased strength   Assistive Device (Bed Mobility) bed rails   Transfers   Transfers sit-stand transfer   Impairments Contributing to Impaired Transfers pain;decreased strength   Sit-Stand Transfer   Sit-Stand [place] (Transfers) supervision;verbal cues   Clinical Impression   Criteria for Skilled Therapeutic Intervention yes, treatment indicated   PT Diagnosis (PT) impaired functional mobility   Influenced by  the following impairments decreased strength, impaired balance, decreased endurance   Functional limitations due to impairments difficulty with transfers, ambulation   Clinical Presentation Evolving/Changing   Clinical Presentation Rationale Pt presents as medically diagnosed.   Clinical Decision Making (Complexity) moderate complexity   Therapy Frequency (PT) Daily   Predicted Duration of Therapy Intervention (days/wks) 7 days   Planned Therapy Interventions (PT) balance training;bed mobility training;gait training;home exercise program;neuromuscular re-education;patient/family education;strengthening;transfer training   Risk & Benefits of therapy have been explained evaluation/treatment results reviewed;participants voiced agreement with care plan;participants included;patient   PT Discharge Planning    PT Discharge Recommendation (DC Rec) Transitional Care Facility;home with assist;home with home care physical therapy   PT Rationale for DC Rec Pt able to transfer with stand-by to contact guard assist of 1. Pt unable to ambulate this session due to oxygen desaturation and lightheadedness. Recommend TCU to improve endurance and strength. If pt discharges home, will need daily checks.   Total Evaluation Time   Total Evaluation Time (Minutes) 15     Karen Avila, PT  11/12/2021

## 2021-11-13 ENCOUNTER — APPOINTMENT (OUTPATIENT)
Dept: INTERVENTIONAL RADIOLOGY/VASCULAR | Facility: HOSPITAL | Age: 76
DRG: 163 | End: 2021-11-13
Attending: INTERNAL MEDICINE
Payer: COMMERCIAL

## 2021-11-13 LAB
ANION GAP SERPL CALCULATED.3IONS-SCNC: 6 MMOL/L (ref 5–18)
BACTERIA BLD CULT: ABNORMAL
BACTERIA BLD CULT: ABNORMAL
BUN SERPL-MCNC: 15 MG/DL (ref 8–28)
C REACTIVE PROTEIN LHE: 35.4 MG/DL (ref 0–0.8)
CALCIUM SERPL-MCNC: 9.4 MG/DL (ref 8.5–10.5)
CHLORIDE BLD-SCNC: 106 MMOL/L (ref 98–107)
CO2 SERPL-SCNC: 26 MMOL/L (ref 22–31)
CREAT SERPL-MCNC: 0.64 MG/DL (ref 0.6–1.1)
D DIMER PPP FEU-MCNC: 2 UG/ML FEU (ref 0–0.5)
ERYTHROCYTE [DISTWIDTH] IN BLOOD BY AUTOMATED COUNT: 13 % (ref 10–15)
FIBRINOGEN PPP-MCNC: 778 MG/DL (ref 170–490)
GFR SERPL CREATININE-BSD FRML MDRD: 86 ML/MIN/1.73M2
GLUCOSE BLD-MCNC: 94 MG/DL (ref 70–125)
HCT VFR BLD AUTO: 32.9 % (ref 35–47)
HGB BLD-MCNC: 10.7 G/DL (ref 11.7–15.7)
LACTATE SERPL-SCNC: 0.6 MMOL/L (ref 0.7–2)
MAGNESIUM SERPL-MCNC: 1.9 MG/DL (ref 1.8–2.6)
MCH RBC QN AUTO: 31.6 PG (ref 26.5–33)
MCHC RBC AUTO-ENTMCNC: 32.5 G/DL (ref 31.5–36.5)
MCV RBC AUTO: 97 FL (ref 78–100)
PLATELET # BLD AUTO: 415 10E3/UL (ref 150–450)
POTASSIUM BLD-SCNC: 3.4 MMOL/L (ref 3.5–5)
POTASSIUM BLD-SCNC: 3.5 MMOL/L (ref 3.5–5)
RBC # BLD AUTO: 3.39 10E6/UL (ref 3.8–5.2)
SODIUM SERPL-SCNC: 138 MMOL/L (ref 136–145)
WBC # BLD AUTO: 27.6 10E3/UL (ref 4–11)

## 2021-11-13 PROCEDURE — 86141 C-REACTIVE PROTEIN HS: CPT | Performed by: HOSPITALIST

## 2021-11-13 PROCEDURE — 83605 ASSAY OF LACTIC ACID: CPT | Performed by: INTERNAL MEDICINE

## 2021-11-13 PROCEDURE — 32557 INSERT CATH PLEURA W/ IMAGE: CPT

## 2021-11-13 PROCEDURE — 99231 SBSQ HOSP IP/OBS SF/LOW 25: CPT | Performed by: INTERNAL MEDICINE

## 2021-11-13 PROCEDURE — 85027 COMPLETE CBC AUTOMATED: CPT | Performed by: HOSPITALIST

## 2021-11-13 PROCEDURE — C1769 GUIDE WIRE: HCPCS

## 2021-11-13 PROCEDURE — C1729 CATH, DRAINAGE: HCPCS

## 2021-11-13 PROCEDURE — 84132 ASSAY OF SERUM POTASSIUM: CPT | Performed by: INTERNAL MEDICINE

## 2021-11-13 PROCEDURE — 258N000003 HC RX IP 258 OP 636: Performed by: RADIOLOGY

## 2021-11-13 PROCEDURE — 250N000013 HC RX MED GY IP 250 OP 250 PS 637: Performed by: RADIOLOGY

## 2021-11-13 PROCEDURE — 83735 ASSAY OF MAGNESIUM: CPT | Performed by: INTERNAL MEDICINE

## 2021-11-13 PROCEDURE — 36415 COLL VENOUS BLD VENIPUNCTURE: CPT | Performed by: INTERNAL MEDICINE

## 2021-11-13 PROCEDURE — 99233 SBSQ HOSP IP/OBS HIGH 50: CPT | Performed by: INTERNAL MEDICINE

## 2021-11-13 PROCEDURE — 85384 FIBRINOGEN ACTIVITY: CPT | Performed by: HOSPITALIST

## 2021-11-13 PROCEDURE — 85379 FIBRIN DEGRADATION QUANT: CPT | Performed by: HOSPITALIST

## 2021-11-13 PROCEDURE — 36415 COLL VENOUS BLD VENIPUNCTURE: CPT | Performed by: HOSPITALIST

## 2021-11-13 PROCEDURE — 250N000013 HC RX MED GY IP 250 OP 250 PS 637: Performed by: INTERNAL MEDICINE

## 2021-11-13 PROCEDURE — 250N000013 HC RX MED GY IP 250 OP 250 PS 637: Performed by: HOSPITALIST

## 2021-11-13 PROCEDURE — 250N000011 HC RX IP 250 OP 636: Performed by: RADIOLOGY

## 2021-11-13 PROCEDURE — 80048 BASIC METABOLIC PNL TOTAL CA: CPT | Performed by: HOSPITALIST

## 2021-11-13 PROCEDURE — 0W9930Z DRAINAGE OF RIGHT PLEURAL CAVITY WITH DRAINAGE DEVICE, PERCUTANEOUS APPROACH: ICD-10-PCS | Performed by: RADIOLOGY

## 2021-11-13 PROCEDURE — 250N000009 HC RX 250: Performed by: RADIOLOGY

## 2021-11-13 PROCEDURE — 120N000001 HC R&B MED SURG/OB

## 2021-11-13 PROCEDURE — 250N000011 HC RX IP 250 OP 636: Performed by: INTERNAL MEDICINE

## 2021-11-13 PROCEDURE — 272N000500 HC NEEDLE CR2

## 2021-11-13 RX ORDER — NALOXONE HYDROCHLORIDE 0.4 MG/ML
0.4 INJECTION, SOLUTION INTRAMUSCULAR; INTRAVENOUS; SUBCUTANEOUS
Status: DISCONTINUED | OUTPATIENT
Start: 2021-11-13 | End: 2021-11-13

## 2021-11-13 RX ORDER — NALOXONE HYDROCHLORIDE 0.4 MG/ML
0.2 INJECTION, SOLUTION INTRAMUSCULAR; INTRAVENOUS; SUBCUTANEOUS
Status: DISCONTINUED | OUTPATIENT
Start: 2021-11-13 | End: 2021-11-13

## 2021-11-13 RX ORDER — FENTANYL CITRATE 50 UG/ML
25 INJECTION, SOLUTION INTRAMUSCULAR; INTRAVENOUS ONCE
Status: COMPLETED | OUTPATIENT
Start: 2021-11-13 | End: 2021-11-13

## 2021-11-13 RX ORDER — POTASSIUM CHLORIDE 1500 MG/1
20 TABLET, EXTENDED RELEASE ORAL ONCE
Status: COMPLETED | OUTPATIENT
Start: 2021-11-13 | End: 2021-11-13

## 2021-11-13 RX ORDER — FENTANYL CITRATE 50 UG/ML
25-50 INJECTION, SOLUTION INTRAMUSCULAR; INTRAVENOUS EVERY 5 MIN PRN
Status: DISCONTINUED | OUTPATIENT
Start: 2021-11-13 | End: 2021-11-13

## 2021-11-13 RX ORDER — FLUMAZENIL 0.1 MG/ML
0.2 INJECTION, SOLUTION INTRAVENOUS
Status: DISCONTINUED | OUTPATIENT
Start: 2021-11-13 | End: 2021-11-13

## 2021-11-13 RX ORDER — HEPARIN SODIUM 200 [USP'U]/100ML
1 INJECTION, SOLUTION INTRAVENOUS CONTINUOUS PRN
Status: DISCONTINUED | OUTPATIENT
Start: 2021-11-13 | End: 2021-11-13

## 2021-11-13 RX ADMIN — MAGNESIUM OXIDE TAB 400 MG (241.3 MG ELEMENTAL MG) 200 MG: 400 (241.3 MG) TAB at 20:56

## 2021-11-13 RX ADMIN — FENTANYL CITRATE 25 MCG: 50 INJECTION, SOLUTION INTRAMUSCULAR; INTRAVENOUS at 16:21

## 2021-11-13 RX ADMIN — SERTRALINE HYDROCHLORIDE 200 MG: 50 TABLET ORAL at 08:19

## 2021-11-13 RX ADMIN — OXYCODONE HYDROCHLORIDE 5 MG: 5 TABLET ORAL at 20:59

## 2021-11-13 RX ADMIN — Medication 1 TABLET: at 20:58

## 2021-11-13 RX ADMIN — POTASSIUM CHLORIDE 20 MEQ: 1500 TABLET, EXTENDED RELEASE ORAL at 08:19

## 2021-11-13 RX ADMIN — ACETAMINOPHEN 650 MG: 325 TABLET ORAL at 17:41

## 2021-11-13 RX ADMIN — MAGNESIUM OXIDE TAB 400 MG (241.3 MG ELEMENTAL MG) 200 MG: 400 (241.3 MG) TAB at 08:19

## 2021-11-13 RX ADMIN — VALACYCLOVIR 1000 MG: 500 TABLET, FILM COATED ORAL at 20:57

## 2021-11-13 RX ADMIN — REMDESIVIR 100 MG: 100 INJECTION, POWDER, LYOPHILIZED, FOR SOLUTION INTRAVENOUS at 17:20

## 2021-11-13 RX ADMIN — SODIUM CHLORIDE 50 ML: 9 INJECTION, SOLUTION INTRAVENOUS at 17:32

## 2021-11-13 RX ADMIN — SODIUM CHLORIDE 50 ML: 9 INJECTION, SOLUTION INTRAVENOUS at 17:27

## 2021-11-13 RX ADMIN — Medication 1 CAPSULE: at 20:58

## 2021-11-13 RX ADMIN — Medication 1 TABLET: at 08:19

## 2021-11-13 RX ADMIN — CEFTRIAXONE SODIUM 1 G: 1 INJECTION, POWDER, FOR SOLUTION INTRAMUSCULAR; INTRAVENOUS at 14:12

## 2021-11-13 RX ADMIN — TRAZODONE HYDROCHLORIDE 150 MG: 50 TABLET ORAL at 20:58

## 2021-11-13 RX ADMIN — Medication 250 MG: at 20:57

## 2021-11-13 RX ADMIN — PANTOPRAZOLE SODIUM 40 MG: 20 TABLET, DELAYED RELEASE ORAL at 08:19

## 2021-11-13 ASSESSMENT — ACTIVITIES OF DAILY LIVING (ADL)
ADLS_ACUITY_SCORE: 6
ADLS_ACUITY_SCORE: 8
ADLS_ACUITY_SCORE: 6
ADLS_ACUITY_SCORE: 6
ADLS_ACUITY_SCORE: 8
ADLS_ACUITY_SCORE: 6
ADLS_ACUITY_SCORE: 8
ADLS_ACUITY_SCORE: 6
ADLS_ACUITY_SCORE: 6
ADLS_ACUITY_SCORE: 8
ADLS_ACUITY_SCORE: 6
ADLS_ACUITY_SCORE: 8
ADLS_ACUITY_SCORE: 8
ADLS_ACUITY_SCORE: 6
ADLS_ACUITY_SCORE: 6
ADLS_ACUITY_SCORE: 8

## 2021-11-13 NOTE — PRE-PROCEDURE
GENERAL PRE-PROCEDURE:   Procedure:  Chest tube placement  Date/Time:  11/13/2021 12:46 PM    Verbal consent obtained?: Yes    Written consent obtained?: Yes    Risks and benefits: Risks, benefits and alternatives were discussed    Consent given by:  Patient  Patient states understanding of procedure being performed: Yes    Patient's understanding of procedure matches consent: Yes    Procedure consent matches procedure scheduled: Yes    Expected level of sedation:  Moderate  Appropriately NPO:  Yes  ASA Class:  2  Mallampati  :  Grade 2- soft palate, base of uvula, tonsillar pillars, and portion of posterior pharyngeal wall visible  Lungs:  Crackles left base and crackles right base  Heart:  Normal heart sounds and rate  History & Physical reviewed:  History and physical reviewed and no updates needed  Statement of review:  I have reviewed the lab findings, diagnostic data, medications, and the plan for sedation

## 2021-11-13 NOTE — PLAN OF CARE
Problem: Respiratory Compromise (Pneumonia)  Goal: Effective Oxygenation and Ventilation  Outcome: No Change  Problem: Adult Inpatient Plan of Care  Goal: Plan of Care Review  Outcome: No Change     O2 stable on 3L via NC. Congested cough. No change in work of breathing.  Chest tube placement planned this afternoon, patient aware and agrees with plan at this time.    Poonam Goldberg RN

## 2021-11-13 NOTE — PROGRESS NOTES
Pulmonary Progress Note  11/11/2021      Admit Date: 11/10/2021  CODE: No CPR- Do NOT Intubate    Reason for Consult: right pleural effusion, lung abscess, bacterial pneumonia    Assessment/Plan:   76 year old lady with history of stage 1 breast cancer s/p lumpectomy and radiation in 2015.  Breakthrough Covid infection diagnosed 10/28.  Presented to the hospital on 11/10 and found to have pleural effusion.  Drained dry with thoracentesis.  Studies found to be consistent with complicated parapneumonic effusion, likely due to Streptococcus pneumonia.    Unfortunately pleural effusion has recurred.  This was suggested on chest x-ray and confirmed by bedside ultrasound today.  Strep pneumonia has strong correlation with empyema and complicated pleural effusion.  Discussed with patient.  I have requested radiology chest tube placement.     Plan:  - titrate FiO2 for goal SpO2 94-96%, avoid hyperoxia  -Continue ceftriaxone  -Continue to follow cultures  - covid treatment   - encourage OOB, PT/OT, push IS  - pain control per [facility]  -Chest x-ray daily  -Stop continuous pulse oximetry.         Subjective/Interim Events:   Felicity continues to feel little bit better.  Less phlegm, possibly less pain.  Breathing still feels very shallow and still requiring oxygen.  Pain continues to localize to the right lower chest and is worse with deep breath or cough.  No fevers overnight but still has a high white count.        Medications:       - MEDICATION INSTRUCTIONS -         sodium chloride 0.9%  50 mL Intravenous Once     remdesivir  100 mg Intravenous Q24H    And     sodium chloride 0.9%  50 mL Intravenous Q24H     acetaminophen  975 mg Oral At Bedtime     calcium carbonate-vitamin D  1 tablet Oral BID     cefTRIAXone  1 g Intravenous Q24H     enoxaparin ANTICOAGULANT  40 mg Subcutaneous Q24H     lactobacillus rhamnosus (GG)  1 capsule Oral Daily     magnesium oxide  200 mg Oral BID     pantoprazole  40 mg Oral Daily     potassium  "chloride  20 mEq Oral Once     sertraline  200 mg Oral Daily     sodium chloride (PF)  3 mL Intracatheter Q8H     traZODone  150 mg Oral At Bedtime     valACYclovir  1,000 mg Oral At Bedtime     vitamin C  250 mg Oral At Bedtime         Exam/Data:   Vitals  /62 (BP Location: Left arm)   Pulse 102   Temp 98.1  F (36.7  C) (Oral)   Resp 20   Ht 1.664 m (5' 5.5\")   Wt 59 kg (130 lb)   SpO2 91%   BMI 21.30 kg/m       I/O last 3 completed shifts:  In: 300 [P.O.:300]  Out: -   Weight change:   [unfilled]  Resp: 20      EXAM:  Physical Exam  Gen: awake, alert, oriented, no distress  HEENT: NT, no PHILLIP  CV: RRR, no m/g/r  Resp: diminished at right lateral aspect; no wheezing.  Ultrasound with moderate size effusion with some loculations.  Abd: soft, nontender, BS+  Skin: no rashes or lesions  Ext: no edema  Neuro: PERRL, nonfocal exam    ROS:  A 10-system review was obtained and is negative with the exception of the symptoms noted above.    DATA:    PFT DATA   N/a    Micro  Urine Strep pneumo antigen positive  Legionella urine Ag neg  Blood with staph hominis    Pleural fluid 11/10  Studies reviewed.  Pleural culture negative.    IMAGING:   Chest x-ray reviewed by me: Increased right-sided pleural effusion.  "

## 2021-11-13 NOTE — PROCEDURES
[facility]    Procedure: Right chest tube placement.     Date/Time: 11/13/2021 4:40 PM  Performed by: Citlaly Rosales DO  Authorized by: Citlaly Rosales DO     UNIVERSAL PROTOCOL   Site Marked: Yes  Prior Images Obtained and Reviewed:  Yes  Required items: Required blood products, implants, devices and special equipment available    Patient identity confirmed:  Verbally with patient, arm band, provided demographic data and hospital-assigned identification number  Patient was reevaluated immediately before administering moderate or deep sedation or anesthesia  Confirmation Checklist:  Patient's identity using two indicators, relevant allergies, procedure was appropriate and matched the consent or emergent situation and correct equipment/implants were available  Time out: Immediately prior to the procedure a time out was called    Universal Protocol: the Joint Commission Universal Protocol was followed    Preparation: Patient was prepped and draped in usual sterile fashion           ANESTHESIA    Anesthesia: Local infiltration  Local Anesthetic:  Lidocaine 1% without epinephrine      SEDATION    Patient Sedated: No    See dictated procedure note for full details.  Findings: Right [address] [place] chest tube placement.     Specimens: none    Complications: None    Condition: Stable    PROCEDURE   Patient Tolerance:  Patient tolerated the procedure well with no immediate complications    Length of time physician/provider present for 1:1 monitoring during sedation: 0

## 2021-11-13 NOTE — PLAN OF CARE
Cardiology Progress Note - Tor Willoughby  [de-identified] y o  male MRN: [id number]    Unit/Bed#: -01 Encounter: [id number]      Assessment/Recommendations:  1  Acute on chronic diastolic heart failure:  Continues on bumex gtt - with good response with boost of metolazone - now will assess with increased doses of both  May benefit from sildenafil once euvolemic with pulm HTN  2   Paroxysmal atrial fibrillation/flutter:  Currently maintaining AFib  Heart rates are well controlled  Family has refused anticoagulation  3   AK I would CKD 3:  Creatinine stagnant in high 2 range - with increasing BUN  4   CAD status post remote CABG:  Currently stable, continue on maintenance medications  5   Aortic valve disease:  Status post bioprosthetic AVR  Stable on echocardiogram   6   Hypertension:  Well controlled, continue current regimen  7   Hyperlipidemia:  Continued on statin  8   Non ST-elevation myocardial infarction, type 2:  Due to acute exacerbation of heart failure  9   Severe pulmonary hypertension:  Continue on diuresis  As above may recommend sildenafil once euvolemic with RHC  10   Severe TR and dilated right ventricle with RV dysfunction  Subjective:   Patient seen and examined  No significant events overnight  Improving dyspnea ; pertinent negatives - chest pain, chest pressure/discomfort, palpitations and syncope  Objective:     Vitals: Blood pressure 120/62, pulse 82, temperature 98 4 °F (36 9 °C), temperature source Oral, resp  rate 16, height 5' 7" (1 702 m), weight 96 5 kg (212 lb 11 9 oz), SpO2 98 %  , Body mass index is 33 32 kg/m² ,   Orthostatic Blood Pressures    Flowsheet Row Most Recent Value   Blood Pressure  120/62 filed at 04/18/2018 0759   Patient Position - Orthostatic VS  Lying filed at 04/18/2018 075            Intake/Output Summary (Last 24 hours) at 04/18/18 1203  Last data filed at 04/18/18 1001   Gross per 24 hour   Intake              715 ml   Output             2320 ml Problem: Gas Exchange Impaired (Pulmonary Impairment)  Goal: Optimal Gas Exchange  Outcome: No Change  SpO2 stable on 3L via nasal cannula. Patient's O2 does drop with any activity, but recovers quickly.  Reports work of breathing unchanged from previous day.  Repeat chest x-ray completed, blood cultures pending.  Pulmonology following.    Problem: Adult Inpatient Plan of Care  Goal: Optimal Comfort and Wellbeing  Outcome: No Change  Right-sided chest pain improved after PRN Oxycodone.    Poonam Goldberg RN     Net            -1605 ml       TELE: Afib rates controlled  Physical Exam:    GEN: Mihir Sober   appears well, alert and oriented x 3, pleasant and cooperative   HEENT: pupils equal, round, and reactive to light; extraocular muscles intact  NECK: supple, no carotid bruits   HEART: regular rhythm, normal S1 and S2, no murmurs, clicks, gallops or rubs   LUNGS: diminished at bases  ABDOMEN: normal bowel sounds, soft, no tenderness, no distention  EXTREMITIES: peripheral pulses normal; no clubbing, cyanosis, + edema  NEURO: no focal findings   SKIN: normal without suspicious lesions on exposed skin        Medications:      Current Facility-Administered Medications:     acetaminophen (TYLENOL) tablet 325 mg, 325 mg, Oral, Q8H PRN, Shelbie Ji MD    aspirin (ECOTRIN LOW STRENGTH) EC tablet 81 mg, 81 mg, Oral, Daily, Chase Alberto MD, 81 mg at 04/18/18 0841    atorvastatin (LIPITOR) tablet 40 mg, 40 mg, Oral, Daily With Jennie Alberto MD, 40 mg at 04/17/18 1736    bumetanide (BUMEX) 12 5 mg infusion 50 mL, 2 mg/hr, Intravenous, Continuous, Shelbie Ji MD, Last Rate: 8 mL/hr at 04/18/18 0950, 2 mg/hr at 04/18/18 0950    calcium carbonate (TUMS) chewable tablet 1,000 mg, 1,000 mg, Oral, Daily PRN, Chase Alberto MD    docusate sodium (COLACE) capsule 100 mg, 100 mg, Oral, BID PRN, Chase Alberto MD, 100 mg at 04/10/18 1005    heparin (porcine) subcutaneous injection 5,000 Units, 5,000 Units, Subcutaneous, Q8H [address], Lisseth Seals PA-C, 5,000 Units at 04/18/18 0441    insulin glargine (LANTUS) subcutaneous injection 25 Units, 25 Units, Subcutaneous, HS, Shashi Liao MD, 25 Units at 04/17/18 2125    insulin lispro (HumaLOG) 100 units/mL subcutaneous injection 1-5 Units, 1-5 Units, Subcutaneous, TID AC, 1 Units at 04/18/18 0846 **AND** [CANCELED] Fingerstick Glucose (POCT), , , TID AC, Chase Alberto MD    insulin lispro (HumaLOG) 100 units/mL subcutaneous injection 1-5 Units, 1-5 Units, Subcutaneous, HS, Chase Alberto MD, 2 Units at 04/17/18 2130    insulin lispro (HumaLOG) 100 units/mL subcutaneous injection 12 Units, 12 Units, Subcutaneous, TID With Meals, Sixto Guadarrama MD, 12 Units at 04/15/18 1739    melatonin tablet 3 mg, 3 mg, Oral, HS, Lisseth Seals PA-C, 3 mg at 04/17/18 2125    metolazone (ZAROXOLYN) tablet 10 mg, 10 mg, Oral, 4x Daily, Juaquin Macias MD    metoprolol succinate (TOPROL-XL) 24 hr tablet 50 mg, 50 mg, Oral, Q12H, Aranza Chakraborty MD, 50 mg at 04/18/18 0842    ondansetron (ZOFRAN) injection 4 mg, 4 mg, Intravenous, Q6H PRN, Chase Alberto MD    pantoprazole (PROTONIX) EC tablet 40 mg, 40 mg, Oral, Early Morning, Chase Alberto MD, 40 mg at 04/18/18 0441    potassium chloride (K-DUR,KLOR-CON) CR tablet 30 mEq, 30 mEq, Oral, Q6H, Juaquin Macias MD, 30 mEq at 04/18/18 1100    sevelamer (RENAGEL) tablet 800 mg, 800 mg, Oral, TID With Meals, Juaquin Macias MD    spironolactone (ALDACTONE) tablet 25 mg, 25 mg, Oral, Daily, Juaquin Macias MD, 25 mg at 04/18/18 1100    tamsulosin (FLOMAX) capsule 0 4 mg, 0 4 mg, Oral, Daily With Yvonne Seals PA-C, 0 4 mg at 04/17/18 1736     Labs & Results:          Results from last 7 days  Lab Units 04/18/18  0444 04/16/18  0501 04/12/18  0505   WBC Thousand/uL 6 63 7 44 7 13   HEMOGLOBIN g/dL 9 5* 9 9* 9 9*   HEMATOCRIT % 30 4* 32 5* 32 3*   PLATELETS Thousands/uL 170 170 173           Results from last 7 days  Lab Units 04/18/18  0444 04/17/18  0611 04/16/18  0501   SODIUM mmol/L 141 140 141   POTASSIUM mmol/L 3 1* 3 5 3 7   CHLORIDE mmol/L 94* 95* 96*   CO2 mmol/L 37* 34* 34*   BUN mg/dL 137* 136* 135*   CREATININE mg/dL 2 70* 2 72* 2 92*   CALCIUM mg/dL 9 9 9 5 10 0   TOTAL PROTEIN g/dL  --   --  6 9   BILIRUBIN TOTAL mg/dL  --   --  1 20*   ALK PHOS U/L  --   --  67   ALT U/L  --   --  20   AST U/L  --   --  18   GLUCOSE RANDOM mg/dL 137 140 103           Results from last 7 days  Lab Units 04/18/18  0444   MAGNESIUM mg/dL 2 7*     Echo:personally reviewed - EF 45%, diffuse HK with mod HK of apical septum, mild to mod RV dilation and reduced function, mod to sev MR, bio AVR    EKG personally reviewed by Justo Navarro MD

## 2021-11-13 NOTE — PROGRESS NOTES
Progress Note    76 year old lady with history of stage 1 breast cancer s/p lumpectomy and radiation in 2015, depression, GERD, OA, presented with right sided chest pain and dry cough. She was diagnosed with breakthrough covid-19 infection on 10/28 and presented on 11/10 with several days of worsening right-sided pleuritic chest pain, cough, low grade fevers and chills at home.    Assessment/Plan  Post viral strep PNA with Right  parapneumonic effusion   The right pleural effusion was drained but recurred today   Chest tube placement planned per pulm today    C/w broad spectrum abx per pulm   follow pending cx and pleural fluid studies results     Acute resp failure due to above   Remains on supp o2  Not in distress      COVID-19 - on remdesivir but no steroids per pulm for now   covid daily labs and anticoagulaiton ordered      DVT PPX lovenox     Barriers to discharge resp failure     Anticipated Discharge date  Few days     Subjective  Sob and cough persists, no fever or chest pain     Objective  Vital signs in last 24 hours  Temp:  [98  F (36.7  C)-99.4  F (37.4  C)] 98.1  F (36.7  C)  Pulse:  [] 102  Resp:  [18-20] 20  BP: (100-137)/(50-62) 137/62  SpO2:  [91 %-97 %] 91 %    Input and Output in 24 hrs     Intake/Output Summary (Last 24 hours) at 11/11/2021 1346  Last data filed at 11/11/2021 0453  Gross per 24 hour   Intake --   Output 200 ml   Net -200 ml       GEN: Alert and oriented. Not in acute distress  HEENT: Atraumatic    Pupils- round and reactive to light bilaterally   Neck- supple, no JVP elevation, no lymphadenopathy or thyromegaly   Sclera- anicteric   Mucous membrane- moist and pink  CHEST: Clear to auscultation bilaterally  HEART: S1S2 regular. No murmurs, rubs or gallops  ABDOMEN: Soft. Non-tender, non-distended. No organomegaly. No guarding or rigidity. Bowel sounds- active  Extremities: No pedal oedema  CNS: No focal neurological deficit. No involuntary movements  SKIN: No skin rash, no  cyanosis or clubbing      Pertinent Labs       Recent Results (from the past 24 hour(s))   Blood Culture Peripheral Blood    Collection Time: 11/12/21  2:37 PM    Specimen: Peripheral Blood   Result Value Ref Range    Culture No growth after 12 hours    Blood Culture Peripheral Blood    Collection Time: 11/12/21  2:37 PM    Specimen: Peripheral Blood   Result Value Ref Range    Culture No growth after 12 hours    Basic metabolic panel    Collection Time: 11/13/21  6:53 AM   Result Value Ref Range    Sodium 138 136 - 145 mmol/L    Potassium 3.4 (L) 3.5 - 5.0 mmol/L    Chloride 106 98 - 107 mmol/L    Carbon Dioxide (CO2) 26 22 - 31 mmol/L    Anion Gap 6 5 - 18 mmol/L    Urea Nitrogen 15 8 - 28 mg/dL    Creatinine 0.64 0.60 - 1.10 mg/dL    Calcium 9.4 8.5 - 10.5 mg/dL    Glucose 94 70 - 125 mg/dL    GFR Estimate 86 >60 mL/min/1.73m2   CBC with platelets    Collection Time: 11/13/21  6:53 AM   Result Value Ref Range    WBC Count 27.6 (H) 4.0 - 11.0 10e3/uL    RBC Count 3.39 (L) 3.80 - 5.20 10e6/uL    Hemoglobin 10.7 (L) 11.7 - 15.7 g/dL    Hematocrit 32.9 (L) 35.0 - 47.0 %    MCV 97 78 - 100 fL    MCH 31.6 26.5 - 33.0 pg    MCHC 32.5 31.5 - 36.5 g/dL    RDW 13.0 10.0 - 15.0 %    Platelet Count 415 150 - 450 10e3/uL   Fibrinogen activity    Collection Time: 11/13/21  6:53 AM   Result Value Ref Range    Fibrinogen Activity 778 (H) 170 - 490 mg/dL   CRP inflammation    Collection Time: 11/13/21  6:53 AM   Result Value Ref Range    CRP 35.4 (H) 0.0-<0.8 mg/dL   D dimer quantitative    Collection Time: 11/13/21  6:53 AM   Result Value Ref Range    D-Dimer Quantitative 2.00 (H) 0.00 - 0.50 ug/mL FEU   Magnesium    Collection Time: 11/13/21  6:53 AM   Result Value Ref Range    Magnesium 1.9 1.8 - 2.6 mg/dL       EKG Results reviewed       Advanced Care Planning      MD TRUPTI CaD

## 2021-11-14 ENCOUNTER — APPOINTMENT (OUTPATIENT)
Dept: RADIOLOGY | Facility: HOSPITAL | Age: 76
DRG: 163 | End: 2021-11-14
Attending: INTERNAL MEDICINE
Payer: COMMERCIAL

## 2021-11-14 ENCOUNTER — APPOINTMENT (OUTPATIENT)
Dept: PHYSICAL THERAPY | Facility: HOSPITAL | Age: 76
DRG: 163 | End: 2021-11-14
Payer: COMMERCIAL

## 2021-11-14 LAB
ANION GAP SERPL CALCULATED.3IONS-SCNC: 4 MMOL/L (ref 5–18)
BUN SERPL-MCNC: 13 MG/DL (ref 8–28)
C REACTIVE PROTEIN LHE: 27.1 MG/DL (ref 0–0.8)
CALCIUM SERPL-MCNC: 8.4 MG/DL (ref 8.5–10.5)
CHLORIDE BLD-SCNC: 107 MMOL/L (ref 98–107)
CO2 SERPL-SCNC: 26 MMOL/L (ref 22–31)
CREAT SERPL-MCNC: 0.52 MG/DL (ref 0.6–1.1)
D DIMER PPP FEU-MCNC: 2.39 UG/ML FEU (ref 0–0.5)
ERYTHROCYTE [DISTWIDTH] IN BLOOD BY AUTOMATED COUNT: 13.1 % (ref 10–15)
FIBRINOGEN PPP-MCNC: 655 MG/DL (ref 170–490)
GFR SERPL CREATININE-BSD FRML MDRD: >90 ML/MIN/1.73M2
GLUCOSE BLD-MCNC: 104 MG/DL (ref 70–125)
HCT VFR BLD AUTO: 27.8 % (ref 35–47)
HGB BLD-MCNC: 9 G/DL (ref 11.7–15.7)
MAGNESIUM SERPL-MCNC: 1.7 MG/DL (ref 1.8–2.6)
MCH RBC QN AUTO: 31 PG (ref 26.5–33)
MCHC RBC AUTO-ENTMCNC: 32.4 G/DL (ref 31.5–36.5)
MCV RBC AUTO: 96 FL (ref 78–100)
PLATELET # BLD AUTO: 327 10E3/UL (ref 150–450)
POTASSIUM BLD-SCNC: 3.4 MMOL/L (ref 3.5–5)
POTASSIUM BLD-SCNC: 3.6 MMOL/L (ref 3.5–5)
RBC # BLD AUTO: 2.9 10E6/UL (ref 3.8–5.2)
SODIUM SERPL-SCNC: 137 MMOL/L (ref 136–145)
WBC # BLD AUTO: 16.6 10E3/UL (ref 4–11)

## 2021-11-14 PROCEDURE — 258N000003 HC RX IP 258 OP 636: Performed by: INTERNAL MEDICINE

## 2021-11-14 PROCEDURE — 250N000009 HC RX 250: Performed by: RADIOLOGY

## 2021-11-14 PROCEDURE — 250N000013 HC RX MED GY IP 250 OP 250 PS 637: Performed by: INTERNAL MEDICINE

## 2021-11-14 PROCEDURE — 83735 ASSAY OF MAGNESIUM: CPT | Performed by: RADIOLOGY

## 2021-11-14 PROCEDURE — 120N000001 HC R&B MED SURG/OB

## 2021-11-14 PROCEDURE — 85027 COMPLETE CBC AUTOMATED: CPT | Performed by: RADIOLOGY

## 2021-11-14 PROCEDURE — 250N000013 HC RX MED GY IP 250 OP 250 PS 637: Performed by: RADIOLOGY

## 2021-11-14 PROCEDURE — 85384 FIBRINOGEN ACTIVITY: CPT | Performed by: RADIOLOGY

## 2021-11-14 PROCEDURE — 258N000003 HC RX IP 258 OP 636: Performed by: RADIOLOGY

## 2021-11-14 PROCEDURE — 71045 X-RAY EXAM CHEST 1 VIEW: CPT

## 2021-11-14 PROCEDURE — 80048 BASIC METABOLIC PNL TOTAL CA: CPT | Performed by: RADIOLOGY

## 2021-11-14 PROCEDURE — 250N000011 HC RX IP 250 OP 636: Performed by: RADIOLOGY

## 2021-11-14 PROCEDURE — 250N000011 HC RX IP 250 OP 636: Performed by: INTERNAL MEDICINE

## 2021-11-14 PROCEDURE — 99232 SBSQ HOSP IP/OBS MODERATE 35: CPT | Performed by: INTERNAL MEDICINE

## 2021-11-14 PROCEDURE — 36415 COLL VENOUS BLD VENIPUNCTURE: CPT | Performed by: RADIOLOGY

## 2021-11-14 PROCEDURE — 99233 SBSQ HOSP IP/OBS HIGH 50: CPT | Performed by: INTERNAL MEDICINE

## 2021-11-14 PROCEDURE — 86141 C-REACTIVE PROTEIN HS: CPT | Performed by: RADIOLOGY

## 2021-11-14 PROCEDURE — 250N000009 HC RX 250: Performed by: INTERNAL MEDICINE

## 2021-11-14 PROCEDURE — 85379 FIBRIN DEGRADATION QUANT: CPT | Performed by: RADIOLOGY

## 2021-11-14 PROCEDURE — 84132 ASSAY OF SERUM POTASSIUM: CPT | Performed by: INTERNAL MEDICINE

## 2021-11-14 PROCEDURE — 36415 COLL VENOUS BLD VENIPUNCTURE: CPT | Performed by: INTERNAL MEDICINE

## 2021-11-14 PROCEDURE — 97530 THERAPEUTIC ACTIVITIES: CPT | Mod: GP | Performed by: PHYSICAL THERAPIST

## 2021-11-14 RX ORDER — POTASSIUM CHLORIDE 1500 MG/1
20 TABLET, EXTENDED RELEASE ORAL ONCE
Status: COMPLETED | OUTPATIENT
Start: 2021-11-14 | End: 2021-11-14

## 2021-11-14 RX ADMIN — MAGNESIUM OXIDE TAB 400 MG (241.3 MG ELEMENTAL MG) 200 MG: 400 (241.3 MG) TAB at 21:52

## 2021-11-14 RX ADMIN — Medication 250 MG: at 21:53

## 2021-11-14 RX ADMIN — Medication 1 CAPSULE: at 21:52

## 2021-11-14 RX ADMIN — MAGNESIUM OXIDE TAB 400 MG (241.3 MG ELEMENTAL MG) 200 MG: 400 (241.3 MG) TAB at 08:27

## 2021-11-14 RX ADMIN — TRAZODONE HYDROCHLORIDE 150 MG: 50 TABLET ORAL at 23:35

## 2021-11-14 RX ADMIN — ACETAMINOPHEN 650 MG: 325 TABLET ORAL at 21:53

## 2021-11-14 RX ADMIN — OXYCODONE HYDROCHLORIDE 5 MG: 5 TABLET ORAL at 02:23

## 2021-11-14 RX ADMIN — VALACYCLOVIR 1000 MG: 500 TABLET, FILM COATED ORAL at 21:52

## 2021-11-14 RX ADMIN — OXYCODONE HYDROCHLORIDE 5 MG: 5 TABLET ORAL at 23:34

## 2021-11-14 RX ADMIN — Medication 1 TABLET: at 08:27

## 2021-11-14 RX ADMIN — POTASSIUM CHLORIDE 20 MEQ: 1500 TABLET, EXTENDED RELEASE ORAL at 08:28

## 2021-11-14 RX ADMIN — OXYCODONE HYDROCHLORIDE 5 MG: 5 TABLET ORAL at 10:28

## 2021-11-14 RX ADMIN — CEFTRIAXONE SODIUM 1 G: 1 INJECTION, POWDER, FOR SOLUTION INTRAMUSCULAR; INTRAVENOUS at 13:45

## 2021-11-14 RX ADMIN — Medication 1 TABLET: at 21:54

## 2021-11-14 RX ADMIN — SODIUM CHLORIDE 50 ML: 9 INJECTION, SOLUTION INTRAVENOUS at 21:50

## 2021-11-14 RX ADMIN — PANTOPRAZOLE SODIUM 40 MG: 20 TABLET, DELAYED RELEASE ORAL at 08:27

## 2021-11-14 RX ADMIN — DORNASE ALFA 50 ML: 1 SOLUTION RESPIRATORY (INHALATION) at 21:27

## 2021-11-14 RX ADMIN — ACETAMINOPHEN 650 MG: 325 TABLET ORAL at 16:50

## 2021-11-14 RX ADMIN — REMDESIVIR 100 MG: 100 INJECTION, POWDER, LYOPHILIZED, FOR SOLUTION INTRAVENOUS at 17:28

## 2021-11-14 RX ADMIN — SERTRALINE HYDROCHLORIDE 200 MG: 50 TABLET ORAL at 08:26

## 2021-11-14 ASSESSMENT — ACTIVITIES OF DAILY LIVING (ADL)
ADLS_ACUITY_SCORE: 6
ADLS_ACUITY_SCORE: 6
ADLS_ACUITY_SCORE: 8
ADLS_ACUITY_SCORE: 6
ADLS_ACUITY_SCORE: 8
ADLS_ACUITY_SCORE: 6
ADLS_ACUITY_SCORE: 8
ADLS_ACUITY_SCORE: 6
ADLS_ACUITY_SCORE: 6

## 2021-11-14 NOTE — PLAN OF CARE
Problem: Gas Exchange Impaired (Pulmonary Impairment)  Goal: Optimal Gas Exchange  Outcome: Improving  Problem: Airway Clearance Ineffective (Pulmonary Impairment)  Goal: Effective Airway Clearance  Outcome: No Change     Patient reports overall improvement in work of breathing since placement of chest tube yesterday. 40cc drainage total output during this shift, irrigated without difficulty this AM.    Remains on 3L, crackles to right lung. Encouraged use of IS. Congested cough - right chest pain exacerbated with coughing. Pain manageable with PRN Oxycodone.    Poonam Goldberg RN

## 2021-11-14 NOTE — PROGRESS NOTES
Progress Note    76 year old lady with history of stage 1 breast cancer s/p lumpectomy and radiation in 2015, depression, GERD, OA, presented with right sided chest pain and dry cough. She was diagnosed with breakthrough covid-19 infection on 10/28 and presented on 11/10 with several days of worsening right-sided pleuritic chest pain, cough, low grade fevers and chills at home.    Assessment/Plan  Post viral strep PNA with Right  parapneumonic effusion   The right pleural effusion was drained 11/10  but recurred   Chest tube placement 11/13 , pulm managing   C/w broad spectrum abx per pulm   follow pending cx and pleural fluid studies results     Acute resp failure due to above   Remains on supp o2  Not in distress      COVID-19 - since 10/28  on remdesivir but no steroids per pulm for now   covid daily labs and anticoagulaiton ordered      DVT PPX lovenox     Barriers to discharge resp failure     Anticipated Discharge date  Few days     Subjective  feels better and less sob , no fever but mild chest pain , feels weak      Objective  Vital signs in last 24 hours  Temp:  [98.1  F (36.7  C)-100.8  F (38.2  C)] 98.7  F (37.1  C)  Pulse:  [] 99  Resp:  [16-26] 22  BP: (104-151)/(53-67) 127/58  SpO2:  [93 %-98 %] 94 %    Input and Output in 24 hrs     Intake/Output Summary (Last 24 hours) at 11/11/2021 1346  Last data filed at 11/11/2021 0453  Gross per 24 hour   Intake --   Output 200 ml   Net -200 ml       GEN: Alert and oriented. Not in acute distress  HEENT: Atraumatic    Pupils- round and reactive to light bilaterally   Neck- supple, no JVP elevation, no lymphadenopathy or thyromegaly   Sclera- anicteric   Mucous membrane- moist and pink  CHEST: Clear to auscultation bilaterally, R tube noted   HEART: S1S2 regular. No murmurs, rubs or gallops  ABDOMEN: Soft. Non-tender, non-distended. No organomegaly. No guarding or rigidity. Bowel sounds- active  Extremities: No pedal oedema  CNS: No focal neurological  deficit. No involuntary movements  SKIN: No skin rash, no cyanosis or clubbing      Pertinent Labs       Recent Results (from the past 24 hour(s))   Potassium    Collection Time: 11/13/21 11:57 AM   Result Value Ref Range    Potassium 3.5 3.5 - 5.0 mmol/L   Lactic Acid STAT    Collection Time: 11/13/21  1:15 PM   Result Value Ref Range    Lactic Acid 0.6 (L) 0.7 - 2.0 mmol/L   CBC with platelets    Collection Time: 11/14/21  6:18 AM   Result Value Ref Range    WBC Count 16.6 (H) 4.0 - 11.0 10e3/uL    RBC Count 2.90 (L) 3.80 - 5.20 10e6/uL    Hemoglobin 9.0 (L) 11.7 - 15.7 g/dL    Hematocrit 27.8 (L) 35.0 - 47.0 %    MCV 96 78 - 100 fL    MCH 31.0 26.5 - 33.0 pg    MCHC 32.4 31.5 - 36.5 g/dL    RDW 13.1 10.0 - 15.0 %    Platelet Count 327 150 - 450 10e3/uL   Basic metabolic panel    Collection Time: 11/14/21  6:18 AM   Result Value Ref Range    Sodium 137 136 - 145 mmol/L    Potassium 3.4 (L) 3.5 - 5.0 mmol/L    Chloride 107 98 - 107 mmol/L    Carbon Dioxide (CO2) 26 22 - 31 mmol/L    Anion Gap 4 (L) 5 - 18 mmol/L    Urea Nitrogen 13 8 - 28 mg/dL    Creatinine 0.52 (L) 0.60 - 1.10 mg/dL    Calcium 8.4 (L) 8.5 - 10.5 mg/dL    Glucose 104 70 - 125 mg/dL    GFR Estimate >90 >60 mL/min/1.73m2   Fibrinogen activity    Collection Time: 11/14/21  6:18 AM   Result Value Ref Range    Fibrinogen Activity 655 (H) 170 - 490 mg/dL   CRP inflammation    Collection Time: 11/14/21  6:18 AM   Result Value Ref Range    CRP 27.1 (H) 0.0-<0.8 mg/dL   D dimer quantitative    Collection Time: 11/14/21  6:18 AM   Result Value Ref Range    D-Dimer Quantitative 2.39 (H) 0.00 - 0.50 ug/mL FEU   Magnesium    Collection Time: 11/14/21  6:18 AM   Result Value Ref Range    Magnesium 1.7 (L) 1.8 - 2.6 mg/dL       EKG Results reviewed       Advanced Care Planning      MD TRUPTI CaD

## 2021-11-14 NOTE — PROGRESS NOTES
Pulmonary Progress Note  11/11/2021      Admit Date: 11/10/2021  CODE: No CPR- Do NOT Intubate    Reason for Consult: right pleural effusion, lung abscess, bacterial pneumonia    Assessment/Plan:   76 year old lady with history of stage 1 breast cancer s/p lumpectomy and radiation in 2015.  Breakthrough Covid infection diagnosed 10/28.  Presented to the hospital on 11/10 and found to have pleural effusion.  Drained dry with thoracentesis.  Studies found to be consistent with complicated parapneumonic effusion, likely due to Streptococcus pneumonia.    Feeling much better after chest tube drainage.  Not a lot of output, likely still some fluid.  Will start lytic protocol.     Hopefully can get chest tube out in a day or 2.  Depending on how chest x-ray looks, may need CT scan.     Plan:  - titrate FiO2 for goal SpO2 92%, avoid hyperoxia  -Continue ceftriaxone  -Continue to follow cultures  - covid treatment   - encourage OOB, PT/OT, push IS  - pain control per [facility]  -Chest x-ray daily  -Stop continuous pulse oximetry.         Subjective/Interim Events:   Felicity feels much better today.  Felt improved quickly after chest tube placed.  Not just due to breathing but in general just felt less sick.  Doing okay today.  In good spirits all things considered.  Minimal amount of pain at chest tube site.  She is having some pain with coughing and has as needed's for that.  No other provocative, palliative or localizing factors.        Medications:       - MEDICATION INSTRUCTIONS -         remdesivir  100 mg Intravenous Q24H    And     sodium chloride 0.9%  50 mL Intravenous Q24H     acetaminophen  975 mg Oral At Bedtime     alteplase (ACTIVASE) 10 mg and dornase 5 mg in NS syringe for chest tube instillation  50 mL Chest Tube BID     calcium carbonate-vitamin D  1 tablet Oral BID     cefTRIAXone  1 g Intravenous Q24H     lactobacillus rhamnosus (GG)  1 capsule Oral Daily     magnesium oxide  200 mg Oral BID     pantoprazole  40  The patient is a 6y Female complaining of difficulty breathing. "mg Oral Daily     sertraline  200 mg Oral Daily     sodium chloride (PF)  3 mL Irrigation Q8H     sodium chloride (PF)  3 mL Intracatheter Q8H     traZODone  150 mg Oral At Bedtime     valACYclovir  1,000 mg Oral At Bedtime     vitamin C  250 mg Oral At Bedtime         Exam/Data:   Vitals  /54 (BP Location: Left arm)   Pulse 102   Temp 98.9  F (37.2  C) (Oral)   Resp 18   Ht 1.664 m (5' 5.5\")   Wt 59 kg (130 lb)   SpO2 93%   BMI 21.30 kg/m       I/O last 3 completed shifts:  In: 390 [P.O.:360; Other:30]  Out: 390 [Chest Tube:390]  Weight change:   [unfilled]  Resp: 18      EXAM:  Physical Exam  Gen: awake, alert, oriented, no distress  HEENT: NT, no PHILLIP  CV: RRR, no m/g/r  Resp: diminished at right lateral aspect; no wheezing.  Right chest tube patent, respiratory variation, no kink.  Flushed well.  Abd: soft, nontender, BS+  Skin: no rashes or lesions  Ext: no edema  Neuro: PERRL, nonfocal exam    ROS:  A 10-system review was obtained and is negative with the exception of the symptoms noted above.    DATA:    PFT DATA   N/a    Micro  Urine Strep pneumo antigen positive  Legionella urine Ag neg  Blood with staph hominis    Pleural fluid 11/10  Studies reviewed.  Pleural culture negative.    IMAGING:   Chest x-ray reviewed by me: Decreased right-sided effusion with chest tube in place  "

## 2021-11-14 NOTE — PLAN OF CARE
Problem: Respiratory Compromise (Pneumonia)  Goal: Effective Oxygenation and Ventilation  Outcome: No Change  Intervention: Promote Airway Secretion Clearance  Recent Flowsheet Documentation  Taken 11/14/2021 0130 by Angelica Raza RN  Cough And Deep Breathing: done independently per patient  Intervention: Optimize Oxygenation and Ventilation  Recent Flowsheet Documentation  Taken 11/14/2021 0130 by Angelica Raza RN  Head of Bed (HOB) Positioning: HOB at 20-30 degrees     Problem: Respiratory Compromise (Pneumonia)  Goal: Effective Oxygenation and Ventilation  Intervention: Promote Airway Secretion Clearance  Recent Flowsheet Documentation  Taken 11/14/2021 0130 by Angelica Raza RN  Cough And Deep Breathing: done independently per patient  Patient alert, oriented x 3. Complained of shortness of breath with movement. 3 Liters nasal canula, saturation 92-95%. Chest tube patent, drained 100 ml. Noted with a dry cough. Will continue to monitor the patient at this time.

## 2021-11-14 NOTE — PLAN OF CARE
Problem: Infection (Pneumonia)  Goal: Resolution of Infection Signs and Symptoms  11/13/2021 1910 by Poonam Goldberg, RN  Outcome: No Change    Patient returned to unit after right chest tube placement at approximately 1700. Tolerated procedure well, minimal pain at site of tube. Vital signs stable post-procedure. (Tylenol given for low-grade fever).    Chest tube to suction, no air leak identified. Chest tube irrigated with 10cc NS as ordered. Serosanguinous drainage in canister.    Poonam Goldberg, RN

## 2021-11-15 ENCOUNTER — APPOINTMENT (OUTPATIENT)
Dept: RADIOLOGY | Facility: HOSPITAL | Age: 76
DRG: 163 | End: 2021-11-15
Attending: INTERNAL MEDICINE
Payer: COMMERCIAL

## 2021-11-15 ENCOUNTER — APPOINTMENT (OUTPATIENT)
Dept: PHYSICAL THERAPY | Facility: HOSPITAL | Age: 76
DRG: 163 | End: 2021-11-15
Payer: COMMERCIAL

## 2021-11-15 LAB
ANION GAP SERPL CALCULATED.3IONS-SCNC: 4 MMOL/L (ref 5–18)
BACTERIA BLD CULT: NO GROWTH
BASOPHILS # BLD AUTO: 0 10E3/UL (ref 0–0.2)
BASOPHILS NFR BLD AUTO: 0 %
BUN SERPL-MCNC: 11 MG/DL (ref 8–28)
CALCIUM SERPL-MCNC: 8.5 MG/DL (ref 8.5–10.5)
CHLORIDE BLD-SCNC: 104 MMOL/L (ref 98–107)
CO2 SERPL-SCNC: 29 MMOL/L (ref 22–31)
CREAT SERPL-MCNC: 0.56 MG/DL (ref 0.6–1.1)
EOSINOPHIL # BLD AUTO: 0.2 10E3/UL (ref 0–0.7)
EOSINOPHIL NFR BLD AUTO: 1 %
ERYTHROCYTE [DISTWIDTH] IN BLOOD BY AUTOMATED COUNT: 13 % (ref 10–15)
GFR SERPL CREATININE-BSD FRML MDRD: 90 ML/MIN/1.73M2
GLUCOSE BLD-MCNC: 116 MG/DL (ref 70–125)
HCT VFR BLD AUTO: 32.5 % (ref 35–47)
HGB BLD-MCNC: 10.3 G/DL (ref 11.7–15.7)
HGB BLD-MCNC: 10.4 G/DL (ref 11.7–15.7)
HOLD SPECIMEN: NORMAL
IMM GRANULOCYTES # BLD: 0.2 10E3/UL
IMM GRANULOCYTES NFR BLD: 1 %
LACTATE SERPL-SCNC: 0.6 MMOL/L (ref 0.7–2)
LYMPHOCYTES # BLD AUTO: 0.7 10E3/UL (ref 0.8–5.3)
LYMPHOCYTES NFR BLD AUTO: 5 %
MAGNESIUM SERPL-MCNC: 1.7 MG/DL (ref 1.8–2.6)
MCH RBC QN AUTO: 30.6 PG (ref 26.5–33)
MCHC RBC AUTO-ENTMCNC: 31.7 G/DL (ref 31.5–36.5)
MCV RBC AUTO: 96 FL (ref 78–100)
MONOCYTES # BLD AUTO: 0.9 10E3/UL (ref 0–1.3)
MONOCYTES NFR BLD AUTO: 7 %
NEUTROPHILS # BLD AUTO: 11.5 10E3/UL (ref 1.6–8.3)
NEUTROPHILS NFR BLD AUTO: 86 %
NRBC # BLD AUTO: 0 10E3/UL
NRBC BLD AUTO-RTO: 0 /100
PLATELET # BLD AUTO: 381 10E3/UL (ref 150–450)
POTASSIUM BLD-SCNC: 3.7 MMOL/L (ref 3.5–5)
RBC # BLD AUTO: 3.37 10E6/UL (ref 3.8–5.2)
SODIUM SERPL-SCNC: 137 MMOL/L (ref 136–145)
WBC # BLD AUTO: 13.3 10E3/UL (ref 4–11)

## 2021-11-15 PROCEDURE — 250N000011 HC RX IP 250 OP 636: Performed by: INTERNAL MEDICINE

## 2021-11-15 PROCEDURE — 85004 AUTOMATED DIFF WBC COUNT: CPT | Performed by: INTERNAL MEDICINE

## 2021-11-15 PROCEDURE — 250N000011 HC RX IP 250 OP 636: Performed by: RADIOLOGY

## 2021-11-15 PROCEDURE — 36415 COLL VENOUS BLD VENIPUNCTURE: CPT | Performed by: INTERNAL MEDICINE

## 2021-11-15 PROCEDURE — 93010 ELECTROCARDIOGRAM REPORT: CPT | Performed by: INTERNAL MEDICINE

## 2021-11-15 PROCEDURE — 250N000013 HC RX MED GY IP 250 OP 250 PS 637: Performed by: RADIOLOGY

## 2021-11-15 PROCEDURE — 83605 ASSAY OF LACTIC ACID: CPT | Performed by: INTERNAL MEDICINE

## 2021-11-15 PROCEDURE — 93005 ELECTROCARDIOGRAM TRACING: CPT

## 2021-11-15 PROCEDURE — 120N000001 HC R&B MED SURG/OB

## 2021-11-15 PROCEDURE — 85018 HEMOGLOBIN: CPT | Performed by: INTERNAL MEDICINE

## 2021-11-15 PROCEDURE — 83735 ASSAY OF MAGNESIUM: CPT | Performed by: INTERNAL MEDICINE

## 2021-11-15 PROCEDURE — 80048 BASIC METABOLIC PNL TOTAL CA: CPT | Performed by: INTERNAL MEDICINE

## 2021-11-15 PROCEDURE — 97110 THERAPEUTIC EXERCISES: CPT | Mod: GP | Performed by: PHYSICAL THERAPIST

## 2021-11-15 PROCEDURE — 99232 SBSQ HOSP IP/OBS MODERATE 35: CPT | Performed by: INTERNAL MEDICINE

## 2021-11-15 PROCEDURE — 71045 X-RAY EXAM CHEST 1 VIEW: CPT

## 2021-11-15 RX ADMIN — OXYCODONE HYDROCHLORIDE 5 MG: 5 TABLET ORAL at 20:59

## 2021-11-15 RX ADMIN — MAGNESIUM OXIDE TAB 400 MG (241.3 MG ELEMENTAL MG) 200 MG: 400 (241.3 MG) TAB at 20:58

## 2021-11-15 RX ADMIN — OXYCODONE HYDROCHLORIDE 5 MG: 5 TABLET ORAL at 17:32

## 2021-11-15 RX ADMIN — Medication 250 MG: at 20:58

## 2021-11-15 RX ADMIN — VALACYCLOVIR 1000 MG: 500 TABLET, FILM COATED ORAL at 20:58

## 2021-11-15 RX ADMIN — ENOXAPARIN SODIUM 40 MG: 40 INJECTION SUBCUTANEOUS at 09:08

## 2021-11-15 RX ADMIN — HYDROMORPHONE HYDROCHLORIDE 0.5 MG: 1 INJECTION, SOLUTION INTRAMUSCULAR; INTRAVENOUS; SUBCUTANEOUS at 09:09

## 2021-11-15 RX ADMIN — SERTRALINE HYDROCHLORIDE 200 MG: 50 TABLET ORAL at 09:09

## 2021-11-15 RX ADMIN — OXYCODONE HYDROCHLORIDE 5 MG: 5 TABLET ORAL at 06:11

## 2021-11-15 RX ADMIN — TRAZODONE HYDROCHLORIDE 150 MG: 50 TABLET ORAL at 20:58

## 2021-11-15 RX ADMIN — OXYCODONE HYDROCHLORIDE 5 MG: 5 TABLET ORAL at 13:59

## 2021-11-15 RX ADMIN — PANTOPRAZOLE SODIUM 40 MG: 20 TABLET, DELAYED RELEASE ORAL at 09:09

## 2021-11-15 RX ADMIN — CEFTRIAXONE SODIUM 1 G: 1 INJECTION, POWDER, FOR SOLUTION INTRAMUSCULAR; INTRAVENOUS at 13:58

## 2021-11-15 RX ADMIN — Medication 1 TABLET: at 20:58

## 2021-11-15 RX ADMIN — Medication 1 TABLET: at 09:09

## 2021-11-15 RX ADMIN — MAGNESIUM OXIDE TAB 400 MG (241.3 MG ELEMENTAL MG) 200 MG: 400 (241.3 MG) TAB at 09:10

## 2021-11-15 RX ADMIN — Medication 1 CAPSULE: at 20:58

## 2021-11-15 ASSESSMENT — ACTIVITIES OF DAILY LIVING (ADL)
ADLS_ACUITY_SCORE: 8
ADLS_ACUITY_SCORE: 6
ADLS_ACUITY_SCORE: 8
ADLS_ACUITY_SCORE: 6
ADLS_ACUITY_SCORE: 8
ADLS_ACUITY_SCORE: 6
ADLS_ACUITY_SCORE: 8
ADLS_ACUITY_SCORE: 6
ADLS_ACUITY_SCORE: 8
ADLS_ACUITY_SCORE: 8
ADLS_ACUITY_SCORE: 6
ADLS_ACUITY_SCORE: 8
ADLS_ACUITY_SCORE: 8
ADLS_ACUITY_SCORE: 6
ADLS_ACUITY_SCORE: 8

## 2021-11-15 NOTE — SIGNIFICANT EVENT
Opened stop cock and returned chest tube to suction, obtained vitals gave medications and check chest tube output and noted 575cc of serous fluid.  Will follow MD orders when to call regarding output.  Patient offered no complaints of pain or discomfort.

## 2021-11-15 NOTE — PLAN OF CARE
Problem: Adult Inpatient Plan of Care  Goal: Optimal Comfort and Wellbeing  Outcome: Declining   Patient experiencing 10/10 pain right chest (chest tube). 5mg oxycodone given at 0600 which did not help. House officer advised clamping chest tube to see if this would help. No relief of pain after 1/2 hour, so unclamped tube and resumed wall suction. Dr. Saez and pulmonary notified. Up date given to oncoming nurse.     Chest x-ray taken about 0630. Waiting results. 485mL serosanginous drainage from 2330pm until about 0550.    Problem: Gas Exchange Impaired (Pulmonary Impairment)  Goal: Optimal Gas Exchange  Outcome: No Change  VSS on 3L oxygen nasal canula

## 2021-11-15 NOTE — PROGRESS NOTES
Progress Note    76 year old lady with history of stage 1 breast cancer s/p lumpectomy and radiation in 2015, depression, GERD, OA, presented with right sided chest pain and dry cough. She was diagnosed with breakthrough covid-19 infection on 10/28 and presented on 11/10 with several days of worsening right-sided pleuritic chest pain, cough, low grade fevers and chills at home.    Assessment/Plan  Post viral strep PNA with Right  parapneumonic effusion   The right pleural effusion was drained 11/10  but recurred   Chest tube placement 11/13 , pulm managing   C/w broad spectrum abx per pulm   follow pending cx and pleural fluid studies results     chest pain this am  S/p lytic therapy yesterday   Prn analgesia   Await pulm fup today     Acute resp failure due to above   Remains on supp o2  cxr today with no large ptx per rads     COVID-19 - since 10/28  Completed remdesivir but no steroids per pulm for now   covid daily labs and anticoagulation ordered      DVT PPX lovenox     Barriers to discharge resp failure     Anticipated Discharge date  Few days     Subjective  More chest pain today , more sob and cp , no fever     Objective  Vital signs in last 24 hours  Temp:  [98.2  F (36.8  C)-99.3  F (37.4  C)] 99.3  F (37.4  C)  Pulse:  [] 112  Resp:  [18-20] 18  BP: (108-133)/(54-66) 114/58  SpO2:  [93 %-98 %] 96 %    Input and Output in 24 hrs     Intake/Output Summary (Last 24 hours) at 11/11/2021 1346  Last data filed at 11/11/2021 0453  Gross per 24 hour   Intake --   Output 200 ml   Net -200 ml       GEN: Alert and oriented. Not in acute distress  HEENT: Atraumatic    Pupils- round and reactive to light bilaterally   Neck- supple, no JVP elevation, no lymphadenopathy or thyromegaly   Sclera- anicteric   Mucous membrane- moist and pink  CHEST: shallow breathing,, R tube noted   HEART: S1S2 regular. No murmurs, rubs or gallops  ABDOMEN: Soft. Non-tender, non-distended. No organomegaly. No guarding or rigidity.  Bowel sounds- active  Extremities: No pedal oedema  CNS: No focal neurological deficit. No involuntary movements  SKIN: No skin rash, no cyanosis or clubbing      Pertinent Labs       Recent Results (from the past 24 hour(s))   Potassium    Collection Time: 11/14/21 12:09 PM   Result Value Ref Range    Potassium 3.6 3.5 - 5.0 mmol/L   Magnesium    Collection Time: 11/15/21  5:58 AM   Result Value Ref Range    Magnesium 1.7 (L) 1.8 - 2.6 mg/dL   Basic metabolic panel    Collection Time: 11/15/21  5:58 AM   Result Value Ref Range    Sodium 137 136 - 145 mmol/L    Potassium 3.7 3.5 - 5.0 mmol/L    Chloride 104 98 - 107 mmol/L    Carbon Dioxide (CO2) 29 22 - 31 mmol/L    Anion Gap 4 (L) 5 - 18 mmol/L    Urea Nitrogen 11 8 - 28 mg/dL    Creatinine 0.56 (L) 0.60 - 1.10 mg/dL    Calcium 8.5 8.5 - 10.5 mg/dL    Glucose 116 70 - 125 mg/dL    GFR Estimate 90 >60 mL/min/1.73m2   CBC with platelets and differential    Collection Time: 11/15/21  5:58 AM   Result Value Ref Range    WBC Count 13.3 (H) 4.0 - 11.0 10e3/uL    RBC Count 3.37 (L) 3.80 - 5.20 10e6/uL    Hemoglobin 10.3 (L) 11.7 - 15.7 g/dL    Hematocrit 32.5 (L) 35.0 - 47.0 %    MCV 96 78 - 100 fL    MCH 30.6 26.5 - 33.0 pg    MCHC 31.7 31.5 - 36.5 g/dL    RDW 13.0 10.0 - 15.0 %    Platelet Count 381 150 - 450 10e3/uL    % Neutrophils 86 %    % Lymphocytes 5 %    % Monocytes 7 %    % Eosinophils 1 %    % Basophils 0 %    % Immature Granulocytes 1 %    NRBCs per 100 WBC 0 <1 /100    Absolute Neutrophils 11.5 (H) 1.6 - 8.3 10e3/uL    Absolute Lymphocytes 0.7 (L) 0.8 - 5.3 10e3/uL    Absolute Monocytes 0.9 0.0 - 1.3 10e3/uL    Absolute Eosinophils 0.2 0.0 - 0.7 10e3/uL    Absolute Basophils 0.0 0.0 - 0.2 10e3/uL    Absolute Immature Granulocytes 0.2 (H) <=0.0 10e3/uL    Absolute NRBCs 0.0 10e3/uL   Extra Blue Top Tube    Collection Time: 11/15/21  5:58 AM   Result Value Ref Range    Hold Specimen [facility]    Extra Red Top Tube    Collection Time: 11/15/21  5:58 AM   Result  Value Ref Range    Hold Specimen [facility]    Lactic Acid STAT    Collection Time: 11/15/21  9:10 AM   Result Value Ref Range    Lactic Acid 0.6 (L) 0.7 - 2.0 mmol/L       EKG Results reviewed       Advanced Care Planning      Papa Avitia MD [id number]

## 2021-11-15 NOTE — PROGRESS NOTES
Instilled Alteplase in chest tube, went through detailed associated orders with the primary RN, She will follow up on the next steps.    Derek Wilder RN on 11/14/2021 at 9:37 PM

## 2021-11-15 NOTE — PROGRESS NOTES
"6:29 AM      S: House staff was called by the patient's nurse due to increasing pain since unclamping chest tube last night. Briefly, the patient was admitted for breakthrough covid and pleural effusion. Per RN chest tube was unclamped at 1130pm, since that time she's gotten 485ml out. But has had increasing pain, now rating 10/10.      Exam: /56 (BP Location: Right arm)   Pulse 94   Temp 98.5  F (36.9  C) (Oral)   Resp 20   Ht 1.664 m (5' 5.5\")   Wt 59 kg (130 lb)   SpO2 94%   BMI 21.30 kg/m        A/P:   OK to temporarily clamp chest tube to see if that improves her pain.   RN will see if her scheduled CXR for this morning can be done stat  As her primary team should be here shortly I will defer further chest tube management to them  Has scheduled and PRN pain meds ordered already that she can use       Jon Gupta MD  [facility]- PGY1  Pager#[phone]      "

## 2021-11-15 NOTE — PROGRESS NOTES
Pulmonary Progress Note  11/11/2021      Admit Date: 11/10/2021  CODE: No CPR- Do NOT Intubate    Reason for Consult: right pleural effusion, lung abscess, bacterial pneumonia    Assessment/Plan:   76 year old lady with history of stage 1 breast cancer s/p lumpectomy and radiation in 2015.  Breakthrough Covid infection diagnosed 10/28.  Presented to the hospital on 11/10 and found to have right sided pleural effusion.  Drained dry with thoracentesis.  Studies found to be consistent with complicated parapneumonic effusion, likely due to Streptococcus pneumonia.    Feeling much better after chest tube drainage.  Not a lot of output, likely still some fluid and started intrapleural lytics on 11/14 overnight with significant increase in output (> 1 L, fairly bloody) and a great deal of pain.     Plan:  - Declines any additional lytic doses due to severe pain with first one. Had 1 L out and ongoing  - will monitor output tomorrow, and consider CT at that time if she is not willing to try any further lytic doses  - Check Hgb this afternoon  - titrate FiO2 for goal SpO2 92%, avoid hyperoxia  -Continue ceftriaxone  -Continue to follow cultures - neg to date  - encourage OOB, PT/OT, push IS  - Chest x-ray daily      We will continue to follow along.    Nadege Harris MD  Pulmonary and Critical Care Medicine  [facility]  Office: [phone]    Subjective/Interim Events:     On 3 L oxygen    600 ml chest tube output after first dose of lytics, over 1 L out since then, fairly sanguinous   Significant pain since first lytic dose, CXR showed stable tube placement and no other acute findings. Pain is improving now, she does not want to repeat any further doses now.     Medications:       - MEDICATION INSTRUCTIONS -         acetaminophen  975 mg Oral At Bedtime     alteplase (ACTIVASE) 10 mg and dornase 5 mg in NS syringe for chest tube instillation  50 mL Chest Tube BID     calcium carbonate-vitamin D  1 tablet Oral BID  "    cefTRIAXone  1 g Intravenous Q24H     enoxaparin ANTICOAGULANT  40 mg Subcutaneous Q24H     lactobacillus rhamnosus (GG)  1 capsule Oral Daily     magnesium oxide  200 mg Oral BID     pantoprazole  40 mg Oral Daily     sertraline  200 mg Oral Daily     sodium chloride (PF)  3 mL Irrigation Q8H     sodium chloride (PF)  3 mL Intracatheter Q8H     traZODone  150 mg Oral At Bedtime     valACYclovir  1,000 mg Oral At Bedtime     vitamin C  250 mg Oral At Bedtime     Exam/Data:   Vitals  /56 (BP Location: Right arm)   Pulse 100   Temp 98.9  F (37.2  C) (Oral)   Resp 18   Ht 1.664 m (5' 5.5\")   Wt 59 kg (130 lb)   SpO2 93%   BMI 21.30 kg/m       I/O last 3 completed shifts:  In: 500 [P.O.:480; Other:20]  Out: 1200 [Chest Tube:1200]  Weight change:   Resp: 18    EXAM:  Gen: awake, alert, oriented, no distress  HEENT: NT, no PHILLIP  CV: RRR, no m/g/r  Resp: diminished at right lateral aspect; no wheezing.  Right chest tube patent, respiratory variation, sanguinous output.   Abd: soft, nontender, BS+  Skin: no rashes or lesions  Ext: no edema  Neuro: PERRL, nonfocal exam    ROS:  A 10-system review was obtained and is negative with the exception of the symptoms noted above.    Micro  Urine Strep pneumo antigen positive  Legionella urine Ag neg  Blood with staph hominis    Pleural fluid 11/10  Studies reviewed.  Pleural culture negative.    RIGHT PLEURAL FLUID:    LARGE NUMBERS OF ACUTE INFLAMMATORY CELLS, POSSIBLE EARLY EMPYEMA    NEGATIVE FOR ATYPICAL OR MALIGNANT CELLS    IMAGIN/15/21 CXR:  Right pigtail pleural drain with trace pneumothorax near tube insertion site but no new significant pneumothorax. Further decrease in the small right pleural effusion and improved aeration of the right mid and lower lung. Decreasing retrocardiac left lower lung atelectasis or infiltrate. Stable borderline enlarged cardiac silhouette. Normal pulmonary vascularity. Healing fractures left ribs 3-6.   "

## 2021-11-15 NOTE — PLAN OF CARE
Problem: Respiratory Compromise (Pneumonia)  Goal: Effective Oxygenation and Ventilation  Intervention: Optimize Oxygenation and Ventilation  Recent Flowsheet Documentation  Taken 11/14/2021 2000 by Nara Patel, RN  Head of Bed (HOB) Positioning: HOB at 30 degrees   Patient was given ateplace at 2130.  Instructions given to turn every 15 minutes and patient understands.  Checked on patient frequently and has been compliant with turning.  Held trazadone till done with the turning orders and patient also requested and oxycodone be given at that time also.  Will unclamp chest tube at 2330 and turn back on to suction per orders.  Will pass this information onto the on coming RN.

## 2021-11-15 NOTE — SIGNIFICANT EVENT
Significant Event Note    Time of event: 7:30 AM November 15, 2021    Description of event:  Informed by RN that pt has worsening chest pain this am- has christelle tube for effusion and s/p  lytic therapy last night-   Cr this am - no new changes or ptx per rads  Pulmonary updated this am and they will review      Plan:  Above   Prn analgesia for now     Discussed with: bedside nurse    Papa Avitia MD

## 2021-11-15 NOTE — PLAN OF CARE
Problem: Infection (Pneumonia)  Goal: Resolution of Infection Signs and Symptoms  Intervention: Prevent Infection Progression  Recent Flowsheet Documentation  Taken 11/15/2021 0900 by Conchis Randhawa RN  Isolation Precautions:   droplet precautions maintained   protective environment maintained   Pt triggered sepsis protocol. Awaiting Lactic acid results. Afebrile.  Problem: Respiratory Compromise (Pneumonia)  Goal: Effective Oxygenation and Ventilation  Intervention: Promote Airway Secretion Clearance  Recent Flowsheet Documentation  Taken 11/15/2021 0900 by Conchis Randhawa RN  Cough And Deep Breathing: done independently per patient   Oxygen sat's maintained on 3 liters oxygen. Right Chest tube patent and draining serous fluid. Pt refused chest tube alteplase at present time and Dr Vizcarra notified.  will assess pt later. No c/o SOB.

## 2021-11-16 ENCOUNTER — APPOINTMENT (OUTPATIENT)
Dept: CT IMAGING | Facility: HOSPITAL | Age: 76
DRG: 163 | End: 2021-11-16
Attending: INTERNAL MEDICINE
Payer: COMMERCIAL

## 2021-11-16 ENCOUNTER — APPOINTMENT (OUTPATIENT)
Dept: OCCUPATIONAL THERAPY | Facility: HOSPITAL | Age: 76
DRG: 163 | End: 2021-11-16
Payer: COMMERCIAL

## 2021-11-16 ENCOUNTER — APPOINTMENT (OUTPATIENT)
Dept: RADIOLOGY | Facility: HOSPITAL | Age: 76
DRG: 163 | End: 2021-11-16
Attending: INTERNAL MEDICINE
Payer: COMMERCIAL

## 2021-11-16 LAB
ANION GAP SERPL CALCULATED.3IONS-SCNC: 2 MMOL/L (ref 5–18)
ATRIAL RATE - MUSE: 100 BPM
BACTERIA PLR CULT: NO GROWTH
BASOPHILS # BLD AUTO: 0 10E3/UL (ref 0–0.2)
BASOPHILS NFR BLD AUTO: 0 %
BUN SERPL-MCNC: 14 MG/DL (ref 8–28)
CALCIUM SERPL-MCNC: 8.6 MG/DL (ref 8.5–10.5)
CHLORIDE BLD-SCNC: 102 MMOL/L (ref 98–107)
CO2 SERPL-SCNC: 31 MMOL/L (ref 22–31)
CREAT SERPL-MCNC: 0.64 MG/DL (ref 0.6–1.1)
DIASTOLIC BLOOD PRESSURE - MUSE: NORMAL MMHG
EOSINOPHIL # BLD AUTO: 0.2 10E3/UL (ref 0–0.7)
EOSINOPHIL NFR BLD AUTO: 1 %
ERYTHROCYTE [DISTWIDTH] IN BLOOD BY AUTOMATED COUNT: 13.1 % (ref 10–15)
GFR SERPL CREATININE-BSD FRML MDRD: 86 ML/MIN/1.73M2
GLUCOSE BLD-MCNC: 115 MG/DL (ref 70–125)
GRAM STAIN RESULT: NORMAL
GRAM STAIN RESULT: NORMAL
HCT VFR BLD AUTO: 29.5 % (ref 35–47)
HGB BLD-MCNC: 9.8 G/DL (ref 11.7–15.7)
HOLD SPECIMEN: NORMAL
HOLD SPECIMEN: NORMAL
IMM GRANULOCYTES # BLD: 0.2 10E3/UL
IMM GRANULOCYTES NFR BLD: 2 %
INTERPRETATION ECG - MUSE: NORMAL
LYMPHOCYTES # BLD AUTO: 0.8 10E3/UL (ref 0.8–5.3)
LYMPHOCYTES NFR BLD AUTO: 6 %
MAGNESIUM SERPL-MCNC: 1.9 MG/DL (ref 1.8–2.6)
MCH RBC QN AUTO: 31.7 PG (ref 26.5–33)
MCHC RBC AUTO-ENTMCNC: 33.2 G/DL (ref 31.5–36.5)
MCV RBC AUTO: 96 FL (ref 78–100)
MONOCYTES # BLD AUTO: 1 10E3/UL (ref 0–1.3)
MONOCYTES NFR BLD AUTO: 8 %
NEUTROPHILS # BLD AUTO: 10.8 10E3/UL (ref 1.6–8.3)
NEUTROPHILS NFR BLD AUTO: 83 %
NRBC # BLD AUTO: 0 10E3/UL
NRBC BLD AUTO-RTO: 0 /100
P AXIS - MUSE: 56 DEGREES
PLATELET # BLD AUTO: 385 10E3/UL (ref 150–450)
POTASSIUM BLD-SCNC: 3.6 MMOL/L (ref 3.5–5)
PR INTERVAL - MUSE: 150 MS
QRS DURATION - MUSE: 82 MS
QT - MUSE: 330 MS
QTC - MUSE: 425 MS
R AXIS - MUSE: 66 DEGREES
RBC # BLD AUTO: 3.09 10E6/UL (ref 3.8–5.2)
SODIUM SERPL-SCNC: 135 MMOL/L (ref 136–145)
SYSTOLIC BLOOD PRESSURE - MUSE: NORMAL MMHG
T AXIS - MUSE: 39 DEGREES
VENTRICULAR RATE- MUSE: 100 BPM
WBC # BLD AUTO: 12.8 10E3/UL (ref 4–11)

## 2021-11-16 PROCEDURE — 250N000009 HC RX 250: Performed by: INTERNAL MEDICINE

## 2021-11-16 PROCEDURE — 83735 ASSAY OF MAGNESIUM: CPT | Performed by: INTERNAL MEDICINE

## 2021-11-16 PROCEDURE — 71250 CT THORAX DX C-: CPT

## 2021-11-16 PROCEDURE — 99232 SBSQ HOSP IP/OBS MODERATE 35: CPT | Performed by: INTERNAL MEDICINE

## 2021-11-16 PROCEDURE — 250N000013 HC RX MED GY IP 250 OP 250 PS 637: Performed by: PAIN MEDICINE

## 2021-11-16 PROCEDURE — 258N000003 HC RX IP 258 OP 636: Performed by: INTERNAL MEDICINE

## 2021-11-16 PROCEDURE — 85025 COMPLETE CBC W/AUTO DIFF WBC: CPT | Performed by: INTERNAL MEDICINE

## 2021-11-16 PROCEDURE — 36415 COLL VENOUS BLD VENIPUNCTURE: CPT | Performed by: INTERNAL MEDICINE

## 2021-11-16 PROCEDURE — 250N000011 HC RX IP 250 OP 636: Performed by: RADIOLOGY

## 2021-11-16 PROCEDURE — 250N000013 HC RX MED GY IP 250 OP 250 PS 637: Performed by: INTERNAL MEDICINE

## 2021-11-16 PROCEDURE — 99423 OL DIG E/M SVC 21+ MIN: CPT | Mod: 95 | Performed by: PAIN MEDICINE

## 2021-11-16 PROCEDURE — 250N000013 HC RX MED GY IP 250 OP 250 PS 637: Performed by: RADIOLOGY

## 2021-11-16 PROCEDURE — 71045 X-RAY EXAM CHEST 1 VIEW: CPT

## 2021-11-16 PROCEDURE — 97535 SELF CARE MNGMENT TRAINING: CPT | Mod: GO

## 2021-11-16 PROCEDURE — 120N000001 HC R&B MED SURG/OB

## 2021-11-16 PROCEDURE — 250N000011 HC RX IP 250 OP 636: Performed by: INTERNAL MEDICINE

## 2021-11-16 PROCEDURE — 80048 BASIC METABOLIC PNL TOTAL CA: CPT | Performed by: INTERNAL MEDICINE

## 2021-11-16 PROCEDURE — 99356 PR PROLONGED SERV,INPATIENT,1ST HR: CPT | Performed by: PAIN MEDICINE

## 2021-11-16 RX ORDER — ACETAMINOPHEN 325 MG/1
975 TABLET ORAL EVERY 6 HOURS
Status: DISCONTINUED | OUTPATIENT
Start: 2021-11-16 | End: 2021-11-18

## 2021-11-16 RX ORDER — OXYCODONE HYDROCHLORIDE 5 MG/1
10 TABLET ORAL DAILY PRN
Status: DISCONTINUED | OUTPATIENT
Start: 2021-11-16 | End: 2021-11-17

## 2021-11-16 RX ORDER — GABAPENTIN 100 MG/1
100 CAPSULE ORAL DAILY PRN
Status: DISCONTINUED | OUTPATIENT
Start: 2021-11-16 | End: 2021-11-17

## 2021-11-16 RX ORDER — AMOXICILLIN 250 MG
2 CAPSULE ORAL 2 TIMES DAILY
Status: DISCONTINUED | OUTPATIENT
Start: 2021-11-16 | End: 2021-11-28 | Stop reason: ALTCHOICE

## 2021-11-16 RX ORDER — LIDOCAINE 4 G/G
3 PATCH TOPICAL
Status: DISCONTINUED | OUTPATIENT
Start: 2021-11-16 | End: 2021-11-29

## 2021-11-16 RX ORDER — POLYETHYLENE GLYCOL 3350 17 G/17G
17 POWDER, FOR SOLUTION ORAL DAILY
Status: DISCONTINUED | OUTPATIENT
Start: 2021-11-16 | End: 2021-11-18

## 2021-11-16 RX ORDER — MULTIVITAMIN,THERAPEUTIC
1 TABLET ORAL DAILY
Status: DISCONTINUED | OUTPATIENT
Start: 2021-11-16 | End: 2021-12-02 | Stop reason: HOSPADM

## 2021-11-16 RX ADMIN — SERTRALINE HYDROCHLORIDE 200 MG: 50 TABLET ORAL at 09:20

## 2021-11-16 RX ADMIN — CEFTRIAXONE SODIUM 1 G: 1 INJECTION, POWDER, FOR SOLUTION INTRAMUSCULAR; INTRAVENOUS at 13:39

## 2021-11-16 RX ADMIN — Medication 1 TABLET: at 22:05

## 2021-11-16 RX ADMIN — ENOXAPARIN SODIUM 40 MG: 40 INJECTION SUBCUTANEOUS at 09:18

## 2021-11-16 RX ADMIN — THERA TABS 1 TABLET: TAB at 16:15

## 2021-11-16 RX ADMIN — MAGNESIUM OXIDE TAB 400 MG (241.3 MG ELEMENTAL MG) 200 MG: 400 (241.3 MG) TAB at 09:20

## 2021-11-16 RX ADMIN — ACETAMINOPHEN 975 MG: 325 TABLET ORAL at 12:39

## 2021-11-16 RX ADMIN — OXYCODONE HYDROCHLORIDE 10 MG: 5 TABLET ORAL at 12:40

## 2021-11-16 RX ADMIN — Medication 1 CAPSULE: at 22:05

## 2021-11-16 RX ADMIN — SENNOSIDES, DOCUSATE SODIUM 2 TABLET: 8.6; 5 TABLET ORAL at 12:40

## 2021-11-16 RX ADMIN — SENNOSIDES, DOCUSATE SODIUM 2 TABLET: 8.6; 5 TABLET ORAL at 22:05

## 2021-11-16 RX ADMIN — ACETAMINOPHEN 975 MG: 325 TABLET ORAL at 18:30

## 2021-11-16 RX ADMIN — OXYCODONE HYDROCHLORIDE 5 MG: 5 TABLET ORAL at 09:27

## 2021-11-16 RX ADMIN — OXYCODONE HYDROCHLORIDE 5 MG: 5 TABLET ORAL at 05:09

## 2021-11-16 RX ADMIN — POLYETHYLENE GLYCOL 3350 17 G: 17 POWDER, FOR SOLUTION ORAL at 12:40

## 2021-11-16 RX ADMIN — VALACYCLOVIR 1000 MG: 500 TABLET, FILM COATED ORAL at 22:05

## 2021-11-16 RX ADMIN — PANTOPRAZOLE SODIUM 40 MG: 20 TABLET, DELAYED RELEASE ORAL at 09:20

## 2021-11-16 RX ADMIN — Medication 1 TABLET: at 09:20

## 2021-11-16 RX ADMIN — GABAPENTIN 100 MG: 100 CAPSULE ORAL at 12:40

## 2021-11-16 RX ADMIN — DORNASE ALFA 50 ML: 1 SOLUTION RESPIRATORY (INHALATION) at 13:40

## 2021-11-16 RX ADMIN — MAGNESIUM OXIDE TAB 400 MG (241.3 MG ELEMENTAL MG) 200 MG: 400 (241.3 MG) TAB at 22:05

## 2021-11-16 RX ADMIN — TRAZODONE HYDROCHLORIDE 150 MG: 50 TABLET ORAL at 22:05

## 2021-11-16 RX ADMIN — LIDOCAINE 2 PATCH: 246 PATCH TOPICAL at 12:44

## 2021-11-16 RX ADMIN — Medication 250 MG: at 22:05

## 2021-11-16 ASSESSMENT — ACTIVITIES OF DAILY LIVING (ADL)
ADLS_ACUITY_SCORE: 8

## 2021-11-16 ASSESSMENT — MIFFLIN-ST. JEOR: SCORE: 1123.39

## 2021-11-16 NOTE — PLAN OF CARE
Problem: Adult Inpatient Plan of Care  Goal: Plan of Care Review  Outcome: Improving    Alert and oriented. Resting quietly and comfortably on bed with 3L of oxygen per nasal cannula with oxygen sats in the mid 90s.   Denies pain at the beginning of the shift. Later complained of 7/10 pain to the right chest @ the chest tube insertion site. Oxycodone administered with good relief. Dressing clean, dry and intact. Chest tube to wall suction, no signs of leaking or kinks.   Tele is NSR.   Uses the call light appropriately.  Utilizes the bedside commode to void.   Needs attended to.

## 2021-11-16 NOTE — PROGRESS NOTES
"CLINICAL NUTRITION SERVICES - ASSESSMENT NOTE     Nutrition Prescription    RECOMMENDATIONS FOR MDs/PROVIDERS TO ORDER:  None    Malnutrition Status:    Severe    Recommendations already ordered by Registered Dietitian (RD):  Multivitamin/mineral supplement therapy   Order new weight  Offered prune juice, stool softener    Future/Additional Recommendations:  Encourage supplements if continues to not meet estimated needs     REASON FOR ASSESSMENT  Salome Maravilla is a/an 76 year old female assessed by the dietitian for Admission Nutrition Risk Screen for positive with eating poorly d/t decreased appetite and 2-13 lb weight loss    Pt presents with breakthrough covid and lung abcess  Hx breast CA, depression, GERD    NUTRITION HISTORY  Pt with covid sx x 3 weeks total    Pt reports eating 50% of baseline at home x 3 weeks. Not taking any extra supplements    CURRENT NUTRITION ORDERS  Diet: Regular    Intake/Tolerance: Poor to fair 25-75% of meals  Estimate intake yesterday 1045 kcal, 39 g protein meeting 70% of estimated kcal and 55% of estimated protein needs  Ate 25% of breakfast today    Pain is a barrier to adequate intake. Pt reports no improvement in appetite/intake from home, still eating about 50% of baseline. She declined supplements: \" I am a pretty fussy eater\".     LABS  Labs reviewed  Na 135, decreased  Mag 1.9 improved    MEDICATIONS  Medications reviewed  Oscal, iv abx, culturelle, magox, protonix, miralax daily, pericolace bid, vit C 250 mg daily    ANTHROPOMETRICS  Height: 166.4 cm (5' 5.5\")  Most Recent Weight: 63.4 kg (139 lb 14.4 oz) 11/16- likely fluid up  IBW: 56.8 kg  BMI: Normal BMI  Weight History:   Wt Readings from Last 10 Encounters:   11/10/21 59 kg (130 lb) - admit   07/12/21 62.6 kg (138 lb 1.6 oz)   06/10/19 63.1 kg (139 lb 3.2 oz)   06/04/18 66.2 kg (145 lb 14.4 oz)   5.7% weight loss x 2 weeks    Dosing Weight: 59 kg    ASSESSED NUTRITION NEEDS  Estimated Energy Needs: 7080-1567 " kcals/day (25 - 30 kcals/kg)  Justification: Maintenance  Estimated Protein Needs: 71-88 grams protein/day (1.2 - 1.5 grams of pro/kg)  Justification: Hypercatabolism with acute illness  Estimated Fluid Needs: 2207-2135 mL/day (1 mL/kcal)   Justification: Maintenance    PHYSICAL FINDINGS  See malnutrition section below.    GI  Last BM 11/11  Pt reports not feeling constipated    MALNUTRITION: Pt in covid isolation  % Weight Loss:  > 2% in 1 week (severe malnutrition)  % Intake:  </= 50% for >/= 5 days (severe malnutrition)  Subcutaneous Fat Loss:  Unable to assess  Muscle Loss:  Unable to assess  Fluid Retention: None - pleural effusion    NUTRITION DIAGNOSIS  Malnutrition related to covid as evidenced by intake 50% x 3 weeks, 5.7% weight loss x 2 weeks      INTERVENTIONS  Implementation  Multivitamin/mineral supplement therapy   Order new weight  Offered prune juice, stool softener    Goals  Intake > 75%of estimated needs  Maintain weight    Monitoring/Evaluation  Progress toward goals will be monitored and evaluated per protocol.

## 2021-11-16 NOTE — PROGRESS NOTES
Progress Note    76 year old lady with history of stage 1 breast cancer s/p lumpectomy and radiation in 2015, depression, GERD, OA, presented with right sided chest pain and dry cough. She was diagnosed with breakthrough covid-19 infection on 10/28 and presented on 11/10 with several days of worsening right-sided pleuritic chest pain, cough, low grade fevers and chills at home.    Assessment/Plan  Post viral strep PNA with Right  parapneumonic effusion   The right pleural effusion was drained at bedside 11/10  but effusion recurred   Chest tube placement 11/13 , pulm managing   C/w broad spectrum abx per pulm   follow pending cx and pleural fluid studies results     chest pain noted yesterday resolved-right side  Occurred after lytic therapy given for chest tube blockage  Declined further lytic therapy  Consult pain team, pulmonary following  EKG unremarkable for ischemia    Acute resp failure due to above   Remains on supp o2  cxr with no large ptx per rads     COVID-19 - since 10/28, breakthrough case  Completed remdesivir but no steroids per pulm for now   covid daily labs and anticoagulation ordered      DVT PPX lovenox     Barriers to discharge resp failure     Anticipated Discharge date  Few days     Subjective  Pain worse with movement today rated 7/10.  Still has breathing difficulty.    Objective  Vital signs in last 24 hours  Temp:  [98  F (36.7  C)-98.9  F (37.2  C)] 98.6  F (37  C)  Pulse:  [] 98  Resp:  [18-24] 20  BP: (110-125)/(52-76) 110/52  SpO2:  [93 %-96 %] 93 %    Input and Output in 24 hrs     Intake/Output Summary (Last 24 hours) at 11/11/2021 1346  Last data filed at 11/11/2021 0453  Gross per 24 hour   Intake --   Output 200 ml   Net -200 ml       GEN: Alert and oriented. Not in acute distress  HEENT: Atraumatic    Pupils- round and reactive to light bilaterally   Neck- supple, no JVP elevation, no lymphadenopathy or thyromegaly   Sclera- anicteric   Mucous membrane- moist and  pink  CHEST: shallow breathing,, R tube noted   HEART: S1S2 regular. No murmurs, rubs or gallops  ABDOMEN: Soft. Non-tender, non-distended. No organomegaly. No guarding or rigidity. Bowel sounds- active  Extremities: No pedal oedema  CNS: No focal neurological deficit. No involuntary movements  SKIN: No skin rash, no cyanosis or clubbing      Pertinent Labs       Recent Results (from the past 24 hour(s))   ECG 12-LEAD WITH MUSE (L[facility])    Collection Time: 11/15/21  2:14 PM   Result Value Ref Range    Systolic Blood Pressure  mmHg    Diastolic Blood Pressure  mmHg    Ventricular Rate 100 BPM    Atrial Rate 100 BPM    PA Interval 150 ms    QRS Duration 82 ms     ms    QTc 425 ms    P Axis 56 degrees    R AXIS 66 degrees    T Axis 39 degrees    Interpretation ECG       Sinus rhythm with occasional Premature ventricular complexes  Otherwise normal ECG  When compared with ECG of 10-NOV-2021 11:46,  Premature ventricular complexes are now Present     Hemoglobin    Collection Time: 11/15/21  3:20 PM   Result Value Ref Range    Hemoglobin 10.4 (L) 11.7 - 15.7 g/dL   Extra Red Top Tube    Collection Time: 11/15/21  3:20 PM   Result Value Ref Range    Hold Specimen [facility]    Basic metabolic panel    Collection Time: 11/16/21  6:21 AM   Result Value Ref Range    Sodium 135 (L) 136 - 145 mmol/L    Potassium 3.6 3.5 - 5.0 mmol/L    Chloride 102 98 - 107 mmol/L    Carbon Dioxide (CO2) 31 22 - 31 mmol/L    Anion Gap 2 (L) 5 - 18 mmol/L    Urea Nitrogen 14 8 - 28 mg/dL    Creatinine 0.64 0.60 - 1.10 mg/dL    Calcium 8.6 8.5 - 10.5 mg/dL    Glucose 115 70 - 125 mg/dL    GFR Estimate 86 >60 mL/min/1.73m2   Magnesium    Collection Time: 11/16/21  6:21 AM   Result Value Ref Range    Magnesium 1.9 1.8 - 2.6 mg/dL   CBC with platelets and differential    Collection Time: 11/16/21  6:21 AM   Result Value Ref Range    WBC Count 12.8 (H) 4.0 - 11.0 10e3/uL    RBC Count 3.09 (L) 3.80 - 5.20 10e6/uL    Hemoglobin 9.8 (L) 11.7 - 15.7 g/dL     Hematocrit 29.5 (L) 35.0 - 47.0 %    MCV 96 78 - 100 fL    MCH 31.7 26.5 - 33.0 pg    MCHC 33.2 31.5 - 36.5 g/dL    RDW 13.1 10.0 - 15.0 %    Platelet Count 385 150 - 450 10e3/uL    % Neutrophils 83 %    % Lymphocytes 6 %    % Monocytes 8 %    % Eosinophils 1 %    % Basophils 0 %    % Immature Granulocytes 2 %    NRBCs per 100 WBC 0 <1 /100    Absolute Neutrophils 10.8 (H) 1.6 - 8.3 10e3/uL    Absolute Lymphocytes 0.8 0.8 - 5.3 10e3/uL    Absolute Monocytes 1.0 0.0 - 1.3 10e3/uL    Absolute Eosinophils 0.2 0.0 - 0.7 10e3/uL    Absolute Basophils 0.0 0.0 - 0.2 10e3/uL    Absolute Immature Granulocytes 0.2 (H) <=0.0 10e3/uL    Absolute NRBCs 0.0 10e3/uL   Extra Blue Top Tube    Collection Time: 11/16/21  6:21 AM   Result Value Ref Range    Hold Specimen [facility]    Extra Red Top Tube    Collection Time: 11/16/21  6:21 AM   Result Value Ref Range    Hold Specimen [facility]        EKG Results reviewed       Advanced Care Planning      MD TRUPTI CaD

## 2021-11-16 NOTE — PLAN OF CARE
Problem: Adult Inpatient Plan of Care  Goal: Optimal Comfort and Wellbeing  Outcome: Improving   Patient has pain in chest tube site at rest and with movement.  At rest she usually rates it 3-4/10 and with movement she rates pain between 7 and 10.  She has taken Oxycodone for pain twice this shift and she did not want to take her HS Tylenol.

## 2021-11-16 NOTE — CONSULTS
[facility] ACUTE PAIN SERVICE CONSULTATION ([facility], [facility], [facility])   Telemedicine Consult     Date of Admission:  11/10/2021  Date of Consult (When I saw the patient): 11/16/21  Physician requesting consult: Dr. Saez  Reason for consult: acute pain s/p C-Tube placement     Assessment/Plan:     Slaome Maravilla is a 76 year old female who was admitted on 11/10/2021. I was asked to see the patient for acute pain s/p chest tube placement. History of stage 1 breast CA s/p lumpectomy and XRT in 2015 who presents with progressive cough and chest pain. Pt vaccinated with COVID diagnosed 10/28 who presents with pleuritic R sided chest pain and found to have RML PNA with microabscess and small pleural effusion most c/w secondary bacterial infection. Studies found to be consistent with complicated parapneumonic effusion, likely due to Streptococcus pneumonia. Chest tube placement 11/13 pulmonary managing. Pain began 2 weeks ago and is worse with inspiration and severe in the 4 hours post alteplase installation. Describes pain as 1-2/10 at rest and severe with moving side to side. The patient has not had a BM in 6 days. The patient does not smoke and denies chemical dependency history.  Pt is high risk for opioid induced respiratory depression d/t the following risk factors: On 3 L of O2 per note, acute respiratory failure d/t PNA, opioid naive, age > 60. Pt has used x 5 doses of oxy 5 mg in 24 hours = 37.5 MME.     PLAN:   1) Chest pain with chest tube and worse with installation of alteplase yesterday. She has been immobilized on bedrest.  Treatment is limited by hypoxia and O2 needs.  Would continue on with 5 mg oxycodone dose.  However would add 10 mg oxycodone dose prior to instillation of alteplase if she agrees to try this again.  Additionally would add 100 mg gabapentin dose prior to instillation.  We will also add lidocaine patches today.  Increase Tylenol from 1 time per day to 4  times per day.  Avoid NSAIDs given Lovenox dosing.  2)Multimodal Medication Therapy  Topical:  lidocaine patches  NSAID'S: crcl = 67.5 ml/min, hgb 9.8 ml/min  Muscle Relaxants:none  Adjuvants: APAP QID. And trial gabapentin 100 mg po before alteplase for chest pain  Antidepressants/anxiolytics: sertraline 200 mg po daily (home med), trazodone 150 mg po at bedtime (home med)  Opioids: oxycodone 5 mg po q3h prn - has been effective  Add 10mg oxycodone PRN prior to alteplase   IV Pain medication: dilaudid 0.5 mg IV q3h prn - discontinue   3)Non-medication interventions- Ice, Heat, physical therapy,    4)Constipation Prophylaxis- prn ordered only  will add scheduled bowel meds   5) Follow up   -PCP is Charity Mann  -Discharge Recommendations - We recommend prescribing the following at the time of discharge: TBD    [place] -pulled from system on 11/16/21. This indicated no opioid use or controlled medication use in th past 12 months.        History of Present Illness (HPI):       Salome Maravilla is a 76 year old old female with past medical history as above.  The patient reports that the acute pain is in the chest and mostly with coughing. The alteplase was very painful and she was begun. Asked if she understands how (not performing alteplase instillation) will impact her recovery. She said she is not sure she knows how that would adversely impact her recovery.   Per [place]  review, the patient is opioid naive. She has 2 children. Her  is a former teacher/. He now has dementia and a wonderful home care agency that visits him. Family stay with him every night. 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 Discussed with attending physician.     Home pain/psych meds include: APAP 1000 mg po at bedtime, sertraline 200 mg po daily, trazodone 150 mg po qhs     Discussed multimodal interventions, interventions other than systemic  pharmacologic treatments for chronic pain including: cough pillow and PT       Tele-Visit Details    Type of service:  Video Visit    Time Service Began (time 1st connected with pt): 1030    Time Service Ended (time completely finished with pt): 1140    Video Start Time (time video started): 1045    Video End Time (time video stopped): 1115    Originating Location (pt. Location): Patient Hospital Room     Distant Location (provider location): [place]      Reason for Televisit: Pain Consult     Mode of Communication:  Video Conference via [email or web address]    Physician has received verbal consent for a video visit from the patient? Yes      DONOVAN Payne CNP         Medical History   has a past medical history of Breast cancer (H) (2010), Depression, GERD (gastroesophageal reflux disease), radiation therapy (right 2010), and Osteoarthritis.       Surgical History   has a past surgical history that includes Lumpectomy breast (Right, 2010); Hysterectomy (1982); Oophorectomy; Arthroscopy shoulder rotator cuff repair; appendectomy; Release carpal tunnel (Right); Colonoscopy; Biopsy breast (Right, 2010); and IR Chest Tube Place Non Tunneled Right (11/13/2021).     Allergies     Allergies   Allergen Reactions     Arimidex [Anastrozole] Unknown     Hot flashes     Penicillins Hives     Statins-Hmg-Coa Reductase Inhibitors [Hmg-Coa-R Inhibitors] Muscle Pain (Myalgia)     Fenofibrate Rash        Current Home Medications   Prior to Admission medications    Medication Sig Start Date End Date Taking? Authorizing Provider   acetaminophen (TYLENOL) 500 MG tablet Take 1,000 mg by mouth At Bedtime   Yes Unknown, Entered By History   ascorbic acid (VITAMIN C) 250 MG tablet Take 250 mg by mouth At Bedtime  8/13/14  Yes Provider, Historical   BLACK COHOSH ORAL [BLACK COHOSH ORAL] Take 1 capsule by mouth 2 (two) times a day.  8/13/14  Yes Provider, Historical   calcium carbonate 600 mg-vitamin D 400 units (CALTRATE) 600-400  MG-UNIT per tablet Take 1 tablet by mouth 2 times daily   Yes Unknown, Entered By History   Lactobacillus rhamnosus GG (CULTURELLE) 10-15 Billion cell capsule Take 1 capsule by mouth At Bedtime  6/1/16  Yes Provider, Historical   Magnesium Glycinate 665 MG CAPS Take 1 capsule by mouth 2 times daily   Yes Unknown, Entered By History   nabumetone (RELAFEN) 500 MG tablet Take 500 mg by mouth At Bedtime  10/2/21  Yes Unknown, Entered By History   omeprazole (PRILOSEC) 20 MG capsule [OMEPRAZOLE (PRILOSEC) 20 MG CAPSULE] Take 20 mg by mouth daily. 5/6/19  Yes Provider, Historical   sertraline (ZOLOFT) 100 MG tablet [SERTRALINE (ZOLOFT) 100 MG TABLET] Take 200 mg by mouth daily.        11/17/15  Yes Provider, Historical   traZODone (DESYREL) 150 MG tablet Take 150 mg by mouth At Bedtime 9/2/21  Yes Unknown, Entered By History   valACYclovir (VALTREX) 1000 MG tablet Take 1,000 mg by mouth At Bedtime  6/4/18  Yes Provider, Historical   vitamin E 400 UNIT capsule [VITAMIN E 400 UNIT CAPSULE] Take 400 Units by mouth daily. 8/13/14  Yes Provider, Historical          Social History  Reviewed, and she  reports that she quit smoking about 33 years ago. She has never used smokeless tobacco. She reports current alcohol use. She reports that she does not use drugs.      Family History- Reviewed care everywhere to find family history  Nothing relevant to pain consult    Reviewed, and family history includes Atrial fibrillation in her sister; Breast Cancer (age of onset: 71.00) in her sister; Cancer in her sister; Cancer (age of onset: 19.00) in her brother; Chronic Obstructive Pulmonary Disease in her father; No Known Problems in her son and son; Testicular cancer in her brother.    Review of Systems  Complete ROS reviewed, unless noted  , all other systems reviewed (with patient) and all others found to be negative.         Objective:     Vitals:  B/P: 110/52, T: 98.6, P: 98, R: 20     Weight:   130 lbs 0 oz  Body mass index is 21.3  kg/m .      Physical Exam:     As per Dr. Avitia today   Appears alert- laying in bed during video visit       Labs Reviewed Personally By Myself    Sodium   Date Value Ref Range Status   11/16/2021 135 (L) 136 - 145 mmol/L Final     Potassium   Date Value Ref Range Status   11/16/2021 3.6 3.5 - 5.0 mmol/L Final     Chloride   Date Value Ref Range Status   11/16/2021 102 98 - 107 mmol/L Final     Carbon Dioxide (CO2)   Date Value Ref Range Status   11/16/2021 31 22 - 31 mmol/L Final     Anion Gap   Date Value Ref Range Status   11/16/2021 2 (L) 5 - 18 mmol/L Final     Glucose   Date Value Ref Range Status   11/16/2021 115 70 - 125 mg/dL Final     Urea Nitrogen   Date Value Ref Range Status   11/16/2021 14 8 - 28 mg/dL Final     Creatinine   Date Value Ref Range Status   11/16/2021 0.64 0.60 - 1.10 mg/dL Final     GFR Estimate   Date Value Ref Range Status   11/16/2021 86 >60 mL/min/1.73m2 Final     Comment:     As of July 11, 2021, eGFR is calculated by the CKD-EPI creatinine equation, without race adjustment. eGFR can be influenced by muscle mass, exercise, and diet. The reported eGFR is an estimation only and is only applicable if the renal function is stable.   05/04/2021 >60 >60 mL/min/1.73m2 Final     Calcium   Date Value Ref Range Status   11/16/2021 8.6 8.5 - 10.5 mg/dL Final             Total time spent 60 minutes with greater than 50% in consultation, education and coordination of care.     Also discussed with MD. He will talk with Felicity about risks/benefits of alteplase.     Thank you for this consultation.          Margo Dozier APRN, CNS-BC, CNP, ACHPN  Acute Care Pain Management Program Hour 7a-1700  [facility] ([place], [place], [place])   Page via [employer]- Click HERE to page Margo or call [phone]

## 2021-11-16 NOTE — PLAN OF CARE
Problem: Infection (Pneumonia)  Goal: Resolution of Infection Signs and Symptoms  Outcome: Improving   Pt afebrile. Pt had productive cough of slightly blood tinged sputum. IV antibiotic's continue.  Problem: Respiratory Compromise (Pneumonia)  Goal: Effective Oxygenation and Ventilation  Outcome: Improving   Oxygen at 3liters. Pt sating 93%.

## 2021-11-17 ENCOUNTER — APPOINTMENT (OUTPATIENT)
Dept: RADIOLOGY | Facility: HOSPITAL | Age: 76
DRG: 163 | End: 2021-11-17
Attending: INTERNAL MEDICINE
Payer: COMMERCIAL

## 2021-11-17 ENCOUNTER — APPOINTMENT (OUTPATIENT)
Dept: PHYSICAL THERAPY | Facility: HOSPITAL | Age: 76
DRG: 163 | End: 2021-11-17
Payer: COMMERCIAL

## 2021-11-17 LAB
ANION GAP SERPL CALCULATED.3IONS-SCNC: 5 MMOL/L (ref 5–18)
BACTERIA BLD CULT: NO GROWTH
BACTERIA BLD CULT: NO GROWTH
BASOPHILS # BLD AUTO: 0 10E3/UL (ref 0–0.2)
BASOPHILS NFR BLD AUTO: 0 %
BUN SERPL-MCNC: 10 MG/DL (ref 8–28)
CALCIUM SERPL-MCNC: 8.6 MG/DL (ref 8.5–10.5)
CHLORIDE BLD-SCNC: 104 MMOL/L (ref 98–107)
CO2 SERPL-SCNC: 29 MMOL/L (ref 22–31)
CREAT SERPL-MCNC: 0.6 MG/DL (ref 0.6–1.1)
EOSINOPHIL # BLD AUTO: 0.3 10E3/UL (ref 0–0.7)
EOSINOPHIL NFR BLD AUTO: 2 %
ERYTHROCYTE [DISTWIDTH] IN BLOOD BY AUTOMATED COUNT: 12.9 % (ref 10–15)
GFR SERPL CREATININE-BSD FRML MDRD: 88 ML/MIN/1.73M2
GLUCOSE BLD-MCNC: 109 MG/DL (ref 70–125)
HCT VFR BLD AUTO: 27.3 % (ref 35–47)
HGB BLD-MCNC: 8.5 G/DL (ref 11.7–15.7)
IMM GRANULOCYTES # BLD: 0.3 10E3/UL
IMM GRANULOCYTES NFR BLD: 2 %
LYMPHOCYTES # BLD AUTO: 0.8 10E3/UL (ref 0.8–5.3)
LYMPHOCYTES NFR BLD AUTO: 6 %
MAGNESIUM SERPL-MCNC: 2 MG/DL (ref 1.8–2.6)
MCH RBC QN AUTO: 30.9 PG (ref 26.5–33)
MCHC RBC AUTO-ENTMCNC: 31.1 G/DL (ref 31.5–36.5)
MCV RBC AUTO: 99 FL (ref 78–100)
MONOCYTES # BLD AUTO: 0.7 10E3/UL (ref 0–1.3)
MONOCYTES NFR BLD AUTO: 6 %
NEUTROPHILS # BLD AUTO: 11.1 10E3/UL (ref 1.6–8.3)
NEUTROPHILS NFR BLD AUTO: 84 %
NRBC # BLD AUTO: 0 10E3/UL
NRBC BLD AUTO-RTO: 0 /100
PLATELET # BLD AUTO: 355 10E3/UL (ref 150–450)
POTASSIUM BLD-SCNC: 4 MMOL/L (ref 3.5–5)
RBC # BLD AUTO: 2.75 10E6/UL (ref 3.8–5.2)
SODIUM SERPL-SCNC: 138 MMOL/L (ref 136–145)
WBC # BLD AUTO: 12.8 10E3/UL (ref 4–11)

## 2021-11-17 PROCEDURE — 258N000003 HC RX IP 258 OP 636: Performed by: INTERNAL MEDICINE

## 2021-11-17 PROCEDURE — 83735 ASSAY OF MAGNESIUM: CPT | Performed by: INTERNAL MEDICINE

## 2021-11-17 PROCEDURE — 250N000013 HC RX MED GY IP 250 OP 250 PS 637: Performed by: INTERNAL MEDICINE

## 2021-11-17 PROCEDURE — 99232 SBSQ HOSP IP/OBS MODERATE 35: CPT | Performed by: INTERNAL MEDICINE

## 2021-11-17 PROCEDURE — 71045 X-RAY EXAM CHEST 1 VIEW: CPT

## 2021-11-17 PROCEDURE — 80048 BASIC METABOLIC PNL TOTAL CA: CPT | Performed by: INTERNAL MEDICINE

## 2021-11-17 PROCEDURE — 97110 THERAPEUTIC EXERCISES: CPT | Mod: GP

## 2021-11-17 PROCEDURE — 99231 SBSQ HOSP IP/OBS SF/LOW 25: CPT | Performed by: PAIN MEDICINE

## 2021-11-17 PROCEDURE — 85025 COMPLETE CBC W/AUTO DIFF WBC: CPT | Performed by: INTERNAL MEDICINE

## 2021-11-17 PROCEDURE — 36415 COLL VENOUS BLD VENIPUNCTURE: CPT | Performed by: INTERNAL MEDICINE

## 2021-11-17 PROCEDURE — 250N000011 HC RX IP 250 OP 636: Performed by: NURSE PRACTITIONER

## 2021-11-17 PROCEDURE — 250N000013 HC RX MED GY IP 250 OP 250 PS 637: Performed by: RADIOLOGY

## 2021-11-17 PROCEDURE — 97116 GAIT TRAINING THERAPY: CPT | Mod: GP

## 2021-11-17 PROCEDURE — 120N000001 HC R&B MED SURG/OB

## 2021-11-17 PROCEDURE — 250N000009 HC RX 250: Performed by: INTERNAL MEDICINE

## 2021-11-17 PROCEDURE — 250N000011 HC RX IP 250 OP 636: Performed by: INTERNAL MEDICINE

## 2021-11-17 PROCEDURE — 250N000011 HC RX IP 250 OP 636: Performed by: RADIOLOGY

## 2021-11-17 PROCEDURE — 99232 SBSQ HOSP IP/OBS MODERATE 35: CPT | Performed by: NURSE PRACTITIONER

## 2021-11-17 PROCEDURE — 250N000009 HC RX 250: Performed by: NURSE PRACTITIONER

## 2021-11-17 PROCEDURE — 250N000013 HC RX MED GY IP 250 OP 250 PS 637: Performed by: PAIN MEDICINE

## 2021-11-17 PROCEDURE — 258N000003 HC RX IP 258 OP 636: Performed by: NURSE PRACTITIONER

## 2021-11-17 RX ORDER — GABAPENTIN 100 MG/1
100 CAPSULE ORAL 2 TIMES DAILY PRN
Status: DISCONTINUED | OUTPATIENT
Start: 2021-11-17 | End: 2021-12-02 | Stop reason: HOSPADM

## 2021-11-17 RX ORDER — OXYCODONE HYDROCHLORIDE 5 MG/1
10 TABLET ORAL 2 TIMES DAILY PRN
Status: DISCONTINUED | OUTPATIENT
Start: 2021-11-17 | End: 2021-11-18

## 2021-11-17 RX ADMIN — ENOXAPARIN SODIUM 40 MG: 40 INJECTION SUBCUTANEOUS at 09:07

## 2021-11-17 RX ADMIN — SENNOSIDES, DOCUSATE SODIUM 2 TABLET: 8.6; 5 TABLET ORAL at 09:05

## 2021-11-17 RX ADMIN — PANTOPRAZOLE SODIUM 40 MG: 20 TABLET, DELAYED RELEASE ORAL at 09:03

## 2021-11-17 RX ADMIN — ACETAMINOPHEN 975 MG: 325 TABLET ORAL at 23:47

## 2021-11-17 RX ADMIN — ACETAMINOPHEN 975 MG: 325 TABLET ORAL at 12:44

## 2021-11-17 RX ADMIN — ACETAMINOPHEN 975 MG: 325 TABLET ORAL at 06:18

## 2021-11-17 RX ADMIN — OXYCODONE HYDROCHLORIDE 5 MG: 5 TABLET ORAL at 09:03

## 2021-11-17 RX ADMIN — Medication 1 CAPSULE: at 21:41

## 2021-11-17 RX ADMIN — OXYCODONE HYDROCHLORIDE 5 MG: 5 TABLET ORAL at 18:02

## 2021-11-17 RX ADMIN — MAGNESIUM OXIDE TAB 400 MG (241.3 MG ELEMENTAL MG) 200 MG: 400 (241.3 MG) TAB at 09:03

## 2021-11-17 RX ADMIN — OXYCODONE HYDROCHLORIDE 5 MG: 5 TABLET ORAL at 17:43

## 2021-11-17 RX ADMIN — DORNASE ALFA 50 ML: 1 SOLUTION RESPIRATORY (INHALATION) at 18:24

## 2021-11-17 RX ADMIN — OXYCODONE HYDROCHLORIDE 10 MG: 5 TABLET ORAL at 01:26

## 2021-11-17 RX ADMIN — OXYCODONE HYDROCHLORIDE 5 MG: 5 TABLET ORAL at 14:10

## 2021-11-17 RX ADMIN — TRAZODONE HYDROCHLORIDE 150 MG: 50 TABLET ORAL at 21:41

## 2021-11-17 RX ADMIN — SENNOSIDES, DOCUSATE SODIUM 2 TABLET: 8.6; 5 TABLET ORAL at 21:42

## 2021-11-17 RX ADMIN — LIDOCAINE 3 PATCH: 246 PATCH TOPICAL at 09:11

## 2021-11-17 RX ADMIN — GABAPENTIN 100 MG: 100 CAPSULE ORAL at 18:01

## 2021-11-17 RX ADMIN — THERA TABS 1 TABLET: TAB at 09:05

## 2021-11-17 RX ADMIN — CEFTRIAXONE SODIUM 1 G: 1 INJECTION, POWDER, FOR SOLUTION INTRAMUSCULAR; INTRAVENOUS at 14:11

## 2021-11-17 RX ADMIN — OXYCODONE HYDROCHLORIDE 5 MG: 5 TABLET ORAL at 21:42

## 2021-11-17 RX ADMIN — Medication 250 MG: at 21:41

## 2021-11-17 RX ADMIN — Medication 1 TABLET: at 09:03

## 2021-11-17 RX ADMIN — MAGNESIUM OXIDE TAB 400 MG (241.3 MG ELEMENTAL MG) 200 MG: 400 (241.3 MG) TAB at 21:41

## 2021-11-17 RX ADMIN — ACETAMINOPHEN 975 MG: 325 TABLET ORAL at 01:21

## 2021-11-17 RX ADMIN — VALACYCLOVIR 1000 MG: 500 TABLET, FILM COATED ORAL at 21:42

## 2021-11-17 RX ADMIN — DORNASE ALFA 50 ML: 1 SOLUTION RESPIRATORY (INHALATION) at 02:06

## 2021-11-17 RX ADMIN — Medication 1 TABLET: at 21:41

## 2021-11-17 RX ADMIN — GABAPENTIN 100 MG: 100 CAPSULE ORAL at 01:26

## 2021-11-17 RX ADMIN — ACETAMINOPHEN 975 MG: 325 TABLET ORAL at 18:08

## 2021-11-17 RX ADMIN — SERTRALINE HYDROCHLORIDE 200 MG: 50 TABLET ORAL at 09:05

## 2021-11-17 RX ADMIN — POLYETHYLENE GLYCOL 3350 17 G: 17 POWDER, FOR SOLUTION ORAL at 09:01

## 2021-11-17 ASSESSMENT — ACTIVITIES OF DAILY LIVING (ADL)
ADLS_ACUITY_SCORE: 6
ADLS_ACUITY_SCORE: 8
ADLS_ACUITY_SCORE: 6
ADLS_ACUITY_SCORE: 8
ADLS_ACUITY_SCORE: 6
ADLS_ACUITY_SCORE: 8
ADLS_ACUITY_SCORE: 6
ADLS_ACUITY_SCORE: 8
ADLS_ACUITY_SCORE: 6
ADLS_ACUITY_SCORE: 8
ADLS_ACUITY_SCORE: 6
ADLS_ACUITY_SCORE: 8

## 2021-11-17 NOTE — PROGRESS NOTES
"Pulmonary Progress Note    Admit Date: 11/10/2021  CODE: No CPR- Do NOT Intubate    Reason for Consult: right pleural effusion, lung abscess, bacterial pneumonia    Assessment/Plan:   76 year old lady with history of stage 1 breast cancer s/p lumpectomy and radiation in 2015.  Breakthrough Covid infection diagnosed 10/28.  Presented to the hospital on 11/10 and found to have right sided pleural effusion.  Drained dry with thoracentesis.  Studies found to be consistent with complicated parapneumonic effusion, likely due to Streptococcus pneumonia.    Started intrapleural lytics on 11/14 overnight with significant increase in output (> 1 L). Further doses were held at her request due to significant pain from this.     CT continues to show a fairly significant posterior fluid pocket, that the current chest tube does not access. After discussion with Dr. Harris yesterday, patient agreed to finish course of IP Lytics. Pain team was consulted to help control her pain.      Plan:  - IP lytics reordered to complete 6-total doses.   - I have asked nursing to please document output from chest tubes in the I/Os  - continue to water seal  - titrate FiO2 for goal SpO2 92%, avoid hyperoxia  -Continue ceftriaxone  -Continue to follow cultures - neg to date  - encourage OOB, PT/OT, push IS  - Chest x-ray daily      Discussed with bedside nursing. We will continue to follow along.    Emilia Posadas, CNP  [facility] Pulmonary/Critical Care     Subjective/Interim Events:   340mL out overnight according to narrative note - nothing recorded in I/O.   Patient states she has some \"soreness\" in her anterior chest and at the site of the chest tube. Currently tolerable; not happy to hear she has 3 more doses of lytics.      Medications:       - MEDICATION INSTRUCTIONS -         acetaminophen  975 mg Oral Q6H     alteplase (ACTIVASE) 10 mg and dornase 5 mg in NS syringe for chest tube instillation  50 mL Chest Tube Q12H     calcium " "carbonate-vitamin D  1 tablet Oral BID     cefTRIAXone  1 g Intravenous Q24H     enoxaparin ANTICOAGULANT  40 mg Subcutaneous Q24H     lactobacillus rhamnosus (GG)  1 capsule Oral Daily     lidocaine  3 patch Transdermal Q24H     lidocaine   Transdermal Q8H     magnesium oxide  200 mg Oral BID     multivitamin, therapeutic  1 tablet Oral Daily     pantoprazole  40 mg Oral Daily     polyethylene glycol  17 g Oral Daily     senna-docusate  2 tablet Oral BID     sertraline  200 mg Oral Daily     sodium chloride (PF)  3 mL Irrigation Q8H     sodium chloride (PF)  3 mL Intracatheter Q8H     traZODone  150 mg Oral At Bedtime     valACYclovir  1,000 mg Oral At Bedtime     vitamin C  250 mg Oral At Bedtime     Exam/Data:   Vitals  /52 (BP Location: Left arm)   Pulse 80   Temp 98.3  F (36.8  C) (Oral)   Resp 16   Ht 1.664 m (5' 5.5\")   Wt 63.5 kg (139 lb 14.4 oz)   SpO2 98%   BMI 22.93 kg/m       I/O last 3 completed shifts:  In: 250 [P.O.:240; Other:10]  Out: 305 [Chest Tube:305]  Weight change:   Resp: 16    EXAM:  Gen: awake, alert, oriented, no distress  HEENT: AT/NC  CV: RRR, no m/g/r  Resp: unlabored on NC; lungs are clear on left; course and diminished on right. Right sided chest tube in place - serosanguinous output.   Abd: soft, nontender, BS+  Skin: no rashes or lesions  Ext: no edema  Neuro: PERRL, nonfocal exam    ROS:  A 10-system review was obtained and is negative with the exception of the symptoms noted above.    Micro  Urine Strep pneumo antigen positive  Legionella urine Ag neg  Blood with staph hominis    Pleural fluid 11/10  Studies reviewed.  Pleural culture negative.    RIGHT PLEURAL FLUID:    LARGE NUMBERS OF ACUTE INFLAMMATORY CELLS, POSSIBLE EARLY EMPYEMA    NEGATIVE FOR ATYPICAL OR MALIGNANT CELLS    IMAGIN/17 CXR - A right pleural drain is similarly positioned with pigtail in the upper lateral aspect of the pleural space. There is a crimp in the tube at the level of the " insertion projecting at the level of the posterolateral seventh intercostal space. Opacity with   indistinct borders in the lower third of the right chest likely reflects a combination of residual complex pleural fluid and atelectasis. Mild but diffuse thickening of the pleura including the apex and along the mediastinal pleura. Focal lucency lucency   in the medial basal right chest adjacent to the right atrial heart border corresponds to loculated anterior pneumothorax on the prior CT.     There are interstitial opacities in the right mid and upper lung consistent with mild interstitial edema. No progressive airspace opacity.     Pleural fluid and atelectasis atelectasis in the medial left lower lobe obscures the medial 20% of the left hemidiaphragm, not increased.     Unchanged mild enlargement of the cardiac silhouette.    11/15/21 CXR:  Right pigtail pleural drain with trace pneumothorax near tube insertion site but no new significant pneumothorax. Further decrease in the small right pleural effusion and improved aeration of the right mid and lower lung. Decreasing retrocardiac left lower lung atelectasis or infiltrate. Stable borderline enlarged cardiac silhouette. Normal pulmonary vascularity. Healing fractures left ribs 3-6.

## 2021-11-17 NOTE — PLAN OF CARE
Problem: Adult Inpatient Plan of Care  Goal: Absence of Hospital-Acquired Illness or Injury     Chest tube remains to water seal. Dressing intact. Chest tube put out 260cc bloody drainage. SAO2 96-98% on 2L via nc. Becomes short of breath with activity. Lung sounds diminished. Antibiotics ordered per MD. Pulmonary following. Decrease appetite-ate a good breakfast but refused lunch. Drinking adequate amounts of fluids. RD consulted. PT/OT scheduled.   Right chest-sided pain is managed with scheduled tylenol and prn oxycodone. Patient is able to express her needs.  Delia Toney RN

## 2021-11-17 NOTE — PROGRESS NOTES
[facility] ACUTE PAIN SERVICE    ([facility], [facility], [facility])  Daily PAIN Progress Note    Assessment/Plan:    Salome Maravilla is a 76 year old female who was admitted on 11/10/2021. I was asked to see the patient for acute pain s/p chest tube placement. History of stage 1 breast CA s/p lumpectomy and XRT in 2015 who presents with progressive cough and chest pain. Pt vaccinated with COVID diagnosed 10/28 who presents with pleuritic R sided chest pain and found to have RML PNA with microabscess and small pleural effusion most c/w secondary bacterial infection. Studies found to be consistent with complicated parapneumonic effusion, likely due to Streptococcus pneumonia. Chest tube placement 11/13 pulmonary managing.  Pain controlled with 5mg PRN oxycodone. 10mg oxycodone dose and gabapentin prior to installation of alteplase. Doing well today.      PLAN:   1) Chest pain with chest tube and now premedicating prior to alteplase.  Of note she is opioid naïve, and seems to be tolerating 5 mg dose of oxycodone as needed.  Gabapentin was added prior to alteplase for chest pain prevention.  Monitor for sedation closely given advanced age.  Immobility as well as opioids seem to be contributing to constipation.  Question if she can increase her activity level?  2)Multimodal Medication Therapy  Topical:  lidocaine patches  NSAID'S: crcl = 67.5 ml/min, hgb 9.8 ml/min  Muscle Relaxants:none  Adjuvants: APAP QID. And trial gabapentin 100 mg po before alteplase for chest pain  Antidepressants/anxiolytics: sertraline 200 mg po daily (home med), trazodone 150 mg po at bedtime (home med)  Opioids: oxycodone 5 mg po q3h prn - has been effective  Add 10mg oxycodone PRN prior to alteplase   IV Pain medication: dilaudid 0.5 mg IV q3h prn - discontinue   3)Non-medication interventions- Ice, Heat, physical therapy,    4)Constipation Prophylaxis- prn ordered only  will add scheduled bowel meds   5) Follow up   -PCP  is Charity Mann  -Discharge Recommendations - We recommend prescribing the following at the time of discharge: TBD     [place] -pulled from system on 21. This indicated no opioid use or controlled medication use in th past 12 months.        Subjective:  Instillation of alteplase began at 2a.m.  The patient was premedicated with 10 mg of oxycodone and gabapentin.  She denies pain currently and is seen in person lying flat in bed and does appear comfortable.  She is pleased with her comfort level and plan of care.  Has not yet had a bowel movement and we talked about optimizing bowels.           Abscess of middle lobe of right lung with pneumonia (H)   Patient Active Problem List   Diagnosis     Breast cancer, stage 1, right (H)     Osteopenia determined by x-ray     Pneumonia due to infectious organism, unspecified laterality, unspecified part of lung     Abscess of middle lobe of right lung with pneumonia (H)     Infection due to 2019 novel coronavirus        History   Drug Use No         Tobacco Use      Smoking status: Former Smoker        Quit date: 1988        Years since quittin.5      Smokeless tobacco: Never Used          acetaminophen  975 mg Oral Q6H     calcium carbonate-vitamin D  1 tablet Oral BID     cefTRIAXone  1 g Intravenous Q24H     enoxaparin ANTICOAGULANT  40 mg Subcutaneous Q24H     lactobacillus rhamnosus (GG)  1 capsule Oral Daily     lidocaine  3 patch Transdermal Q24H     lidocaine   Transdermal Q8H     magnesium oxide  200 mg Oral BID     multivitamin, therapeutic  1 tablet Oral Daily     pantoprazole  40 mg Oral Daily     polyethylene glycol  17 g Oral Daily     senna-docusate  2 tablet Oral BID     sertraline  200 mg Oral Daily     sodium chloride (PF)  3 mL Irrigation Q8H     sodium chloride (PF)  3 mL Intracatheter Q8H     traZODone  150 mg Oral At Bedtime     valACYclovir  1,000 mg Oral At Bedtime     vitamin C  250 mg Oral At Bedtime       Objective:  Vital signs in  "last 24 hours:  B/P: 108/52, T: 98.3, P: 80, R: 16   Blood pressure 108/52, pulse 80, temperature 98.3  F (36.8  C), temperature source Oral, resp. rate 16, height 1.664 m (5' 5.5\"), weight 63.5 kg (139 lb 14.4 oz), SpO2 98 %.      Weight:   Wt Readings from Last 2 Encounters:   11/16/21 63.5 kg (139 lb 14.4 oz)   07/12/21 62.6 kg (138 lb 1.6 oz)           Intake/Output:    Intake/Output Summary (Last 24 hours) at 11/17/2021 0829  Last data filed at 11/17/2021 0128  Gross per 24 hour   Intake 10 ml   Output 175 ml   Net -165 ml        Review of Systems:   As per subjective, all others negative.    Physical Exam:  Constitutional: healthy, alert and no distress  Head: negative  Neck: negative findings: no asymmetry, masses, or scars  Resp- no dyspnea  GI- NO BM   Psych- calm and pleasant          Lab Results:  Personally Reviewed.   Last Comprehensive Metabolic Panel:  Sodium   Date Value Ref Range Status   11/17/2021 138 136 - 145 mmol/L Final     Potassium   Date Value Ref Range Status   11/17/2021 4.0 3.5 - 5.0 mmol/L Final     Chloride   Date Value Ref Range Status   11/17/2021 104 98 - 107 mmol/L Final     Carbon Dioxide (CO2)   Date Value Ref Range Status   11/17/2021 29 22 - 31 mmol/L Final     Anion Gap   Date Value Ref Range Status   11/17/2021 5 5 - 18 mmol/L Final     Glucose   Date Value Ref Range Status   11/17/2021 109 70 - 125 mg/dL Final     Urea Nitrogen   Date Value Ref Range Status   11/17/2021 10 8 - 28 mg/dL Final     Creatinine   Date Value Ref Range Status   11/17/2021 0.60 0.60 - 1.10 mg/dL Final     GFR Estimate   Date Value Ref Range Status   11/17/2021 88 >60 mL/min/1.73m2 Final     Comment:     As of July 11, 2021, eGFR is calculated by the CKD-EPI creatinine equation, without race adjustment. eGFR can be influenced by muscle mass, exercise, and diet. The reported eGFR is an estimation only and is only applicable if the renal function is stable.   05/04/2021 >60 >60 mL/min/1.73m2 Final "     Calcium   Date Value Ref Range Status   11/17/2021 8.6 8.5 - 10.5 mg/dL Final        UA: No results found for: UAMP, UBARB, BENZODIAZEUR, UCANN, UCOC, OPIT, UPCP          Total unit/floor time 15  minutes, time consisted of the following, examination of patient, reviewing the record and lab results, and completing documentation. Coordination of care time with nurse.      Margo Dozier APRN, CNS-BC, CNP, ACHPN  Acute Care Pain Management Program Hour 7a-1700  [facility] ([place], [place], [place])   Page via [facility]- Click HERE to page Margo or call [phone]

## 2021-11-17 NOTE — PROGRESS NOTES
Daily Progress Note        CODE STATUS:  No CPR- Do NOT Intubate    11/17/21  Assessment/Plan:  76 year old lady with history of stage 1 breast cancer s/p lumpectomy and radiation in 2015, depression, GERD, OA, presented with right sided chest pain and dry cough. She was diagnosed with breakthrough covid-19 infection on 10/28 and presented on 11/10 with several days of worsening right-sided pleuritic chest pain, cough, low grade fevers and chills at home.     Pneumonia  Complex parapneumonic effusion  -- Likely due to post viral strep PNA. The right pleural effusion was drained at bedside 11/10  but effusion recurred   -- Chest tube placement 11/13 , pulm managing   -- C/w broad spectrum abx per pulm   -- Follow pending cx and pleural fluid studies results      Chest pain   -- Clinically better. Occurred after lytic therapy given for chest tube  -- Declined further lytic therapy yesterday, but later agreed to take it.  -- Appreciate pain team, pulmonary consult  -- EKG unremarkable for ischemia     Acute resp failure due to above   -- Remains on supp o2  -- Cxr with no large ptx per rads      COVID-19 - since 10/28, breakthrough case  -- Completed remdesivir but no steroids per pulm for now. Covid daily labs and anticoagulation ordered      Anemia:  -- Drop in hgb from 9.8 to 8.5 noted. No e/o GI bleeding.   -- Will monitor hgb    DVT PPX lovenox      Barriers to discharge resp failure      Anticipated Discharge date  Few days        LOS: 7 days     Subjective:  Interval History: Patient seen and examined this morning. Notes, labs, imaging reports personally reviewed. Patient is new to me. Discussed with nursing staff. Patient reported feeling better today than yesterday. Denies significant chest pain at rest. It hurts only with movement and coughing.     Review of Systems:   As mentioned in subjective.    Patient Active Problem List   Diagnosis     Breast cancer, stage 1, right (H)     Osteopenia determined by  x-ray     Pneumonia due to infectious organism, unspecified laterality, unspecified part of lung     Abscess of middle lobe of right lung with pneumonia (H)     Infection due to 2019 novel coronavirus       Scheduled Meds:    acetaminophen  975 mg Oral Q6H     alteplase (ACTIVASE) 10 mg and dornase 5 mg in NS syringe for chest tube instillation  50 mL Chest Tube Q12H     calcium carbonate-vitamin D  1 tablet Oral BID     cefTRIAXone  1 g Intravenous Q24H     enoxaparin ANTICOAGULANT  40 mg Subcutaneous Q24H     lactobacillus rhamnosus (GG)  1 capsule Oral Daily     lidocaine  3 patch Transdermal Q24H     lidocaine   Transdermal Q8H     magnesium oxide  200 mg Oral BID     multivitamin, therapeutic  1 tablet Oral Daily     pantoprazole  40 mg Oral Daily     polyethylene glycol  17 g Oral Daily     senna-docusate  2 tablet Oral BID     sertraline  200 mg Oral Daily     sodium chloride (PF)  3 mL Irrigation Q8H     sodium chloride (PF)  3 mL Intracatheter Q8H     traZODone  150 mg Oral At Bedtime     valACYclovir  1,000 mg Oral At Bedtime     vitamin C  250 mg Oral At Bedtime     Continuous Infusions:    - MEDICATION INSTRUCTIONS -       PRN Meds:.alum & mag hydroxide-simethicone, bisacodyl, gabapentin, lidocaine 4%, lidocaine (buffered or not buffered), - MEDICATION INSTRUCTIONS -, melatonin, naloxone **OR** naloxone **OR** naloxone **OR** naloxone, ondansetron **OR** ondansetron, oxyCODONE, oxyCODONE, prochlorperazine **OR** prochlorperazine **OR** prochlorperazine, sodium chloride (PF)    Objective:  Vital signs in last 24 hours:  Temp:  [97.7  F (36.5  C)-98.3  F (36.8  C)] 98.3  F (36.8  C)  Pulse:  [76-96] 80  Resp:  [16-20] 16  BP: (101-129)/(43-62) 108/52  SpO2:  [95 %-98 %] 98 %        Intake/Output Summary (Last 24 hours) at 11/17/2021 1517  Last data filed at 11/17/2021 1415  Gross per 24 hour   Intake 250 ml   Output 285 ml   Net -35 ml       Physical Exam:    General: Not in obvious distress.  HEENT: NC,  AT   Chest: Shallow breathing, right chest tube in place.  Heart: S1S2 normal, regular. No M/R/G  Abdomen: Soft. NT, ND. Bowel sounds- active.  Extremities: No legs swelling  Neuro: alert and awake, grossly non-focal      Lab Results:(I have personally reviewed the results)    Recent Results (from the past 24 hour(s))   Magnesium    Collection Time: 11/17/21  6:20 AM   Result Value Ref Range    Magnesium 2.0 1.8 - 2.6 mg/dL   Basic metabolic panel    Collection Time: 11/17/21  6:20 AM   Result Value Ref Range    Sodium 138 136 - 145 mmol/L    Potassium 4.0 3.5 - 5.0 mmol/L    Chloride 104 98 - 107 mmol/L    Carbon Dioxide (CO2) 29 22 - 31 mmol/L    Anion Gap 5 5 - 18 mmol/L    Urea Nitrogen 10 8 - 28 mg/dL    Creatinine 0.60 0.60 - 1.10 mg/dL    Calcium 8.6 8.5 - 10.5 mg/dL    Glucose 109 70 - 125 mg/dL    GFR Estimate 88 >60 mL/min/1.73m2   CBC with platelets and differential    Collection Time: 11/17/21  6:20 AM   Result Value Ref Range    WBC Count 12.8 (H) 4.0 - 11.0 10e3/uL    RBC Count 2.75 (L) 3.80 - 5.20 10e6/uL    Hemoglobin 8.5 (L) 11.7 - 15.7 g/dL    Hematocrit 27.3 (L) 35.0 - 47.0 %    MCV 99 78 - 100 fL    MCH 30.9 26.5 - 33.0 pg    MCHC 31.1 (L) 31.5 - 36.5 g/dL    RDW 12.9 10.0 - 15.0 %    Platelet Count 355 150 - 450 10e3/uL    % Neutrophils 84 %    % Lymphocytes 6 %    % Monocytes 6 %    % Eosinophils 2 %    % Basophils 0 %    % Immature Granulocytes 2 %    NRBCs per 100 WBC 0 <1 /100    Absolute Neutrophils 11.1 (H) 1.6 - 8.3 10e3/uL    Absolute Lymphocytes 0.8 0.8 - 5.3 10e3/uL    Absolute Monocytes 0.7 0.0 - 1.3 10e3/uL    Absolute Eosinophils 0.3 0.0 - 0.7 10e3/uL    Absolute Basophils 0.0 0.0 - 0.2 10e3/uL    Absolute Immature Granulocytes 0.3 (H) <=0.0 10e3/uL    Absolute NRBCs 0.0 10e3/uL       All laboratory and imaging data in the past 24 hours reviewed  Serum Glucose range:   Recent Labs   Lab 11/17/21  0620 11/16/21  0621 11/15/21  0558 11/14/21  0618    115 116 104     ABG: No lab  results found in last 7 days.  CBC:   Recent Labs   Lab 11/17/21  0620 11/16/21  0621 11/15/21  1520 11/15/21  0558   WBC 12.8* 12.8*  --  13.3*   HGB 8.5* 9.8* 10.4* 10.3*   HCT 27.3* 29.5*  --  32.5*   MCV 99 96  --  96    385  --  381   NEUTROPHIL 84 83  --  86   LYMPH 6 6  --  5   MONOCYTE 6 8  --  7   EOSINOPHIL 2 1  --  1     Chemistry:   Recent Labs   Lab 11/17/21  0620 11/16/21  0621 11/15/21  0558 11/11/21  0320 11/10/21  1733    135* 137   < >  --    POTASSIUM 4.0 3.6 3.7   < >  --    CHLORIDE 104 102 104   < >  --    CO2 29 31 29   < >  --    BUN 10 14 11   < >  --    CR 0.60 0.64 0.56*   < >  --    GFRESTIMATED 88 86 90   < >  --    EMILIANO 8.6 8.6 8.5   < >  --    MAG 2.0 1.9 1.7*   < > 1.7*   PROTTOTAL  --   --   --   --  7.2  7.2   ALBUMIN  --   --   --   --  2.5*   AST  --   --   --   --  13   ALT  --   --   --   --  <9   ALKPHOS  --   --   --   --  80   BILITOTAL  --   --   --   --  0.4    < > = values in this interval not displayed.     Coags:  Recent Labs   Lab 11/10/21  1733   INR 1.44*   PTT 42*     Cardiac Markers:  No results for input(s): CKTOTAL, TROPONINI in the last 168 hours.       XR Chest Port 1 View    Result Date: 11/17/2021  EXAM: XR CHEST PORT 1 VIEW LOCATION: [facility] DATE/TIME: 11/17/2021 6:28 AM INDICATION: chest tube in place. COMPARISON: Portable AP view the chest 11/16/2021 at 0606 hours; chest CT 11/16/2021 at 0858 hours     IMPRESSION: A right pleural drain is similarly positioned with pigtail in the upper lateral aspect of the pleural space. There is a crimp in the tube at the level of the insertion projecting at the level of the posterolateral seventh intercostal space. Opacity with indistinct borders in the lower third of the right chest likely reflects a combination of residual complex pleural fluid and atelectasis. Mild but diffuse thickening of the pleura including the apex and along the mediastinal pleura. Focal lucency lucency  in  the medial basal right chest adjacent to the right atrial heart border corresponds to loculated anterior pneumothorax on the prior CT. There are interstitial opacities in the right mid and upper lung consistent with mild interstitial edema. No progressive airspace opacity. Pleural fluid and atelectasis atelectasis in the medial left lower lobe obscures the medial 20% of the left hemidiaphragm, not increased. Unchanged mild enlargement of the cardiac silhouette.    XR Chest Port 1 View    Result Date: 11/16/2021  EXAM: XR CHEST PORT 1 VIEW LOCATION: [facility] DATE/TIME: 11/16/2021 5:39 AM INDICATION: Follow-up chest tube COMPARISON: 11/15/2021     IMPRESSION: Right chest tube in stable position. No pneumothorax. Tiny right pleural effusion remains. Small left pleural effusion is stable. Atelectasis left lower lobe behind the heart is stable. Minimal diffuse interstitial infiltrates stable. No new findings.    XR Chest Port 1 View    Result Date: 11/15/2021  EXAM: XR CHEST PORT 1 VIEW LOCATION: [facility] DATE/TIME: 11/15/2021 6:30 AM INDICATION: chest tube in place. COMPARISON: 11/14/2021     IMPRESSION: Right pigtail pleural drain with trace pneumothorax near tube insertion site but no new significant pneumothorax. Further decrease in the small right pleural effusion and improved aeration of the right mid and lower lung. Decreasing retrocardiac left lower lung atelectasis or infiltrate. Stable borderline enlarged cardiac silhouette. Normal pulmonary vascularity. Healing fractures left ribs 3-6. Findings discussed with Dr Avitia.    XR Chest Port 1 View    Result Date: 11/14/2021  EXAM: XR CHEST PORT 1 VIEW LOCATION: [facility] DATE/TIME: 11/14/2021 5:59 AM INDICATION: chest tube in place. COMPARISON: 11/12/2021.     IMPRESSION: Interval placement of right pigtail pleural drain. Decreased small to moderate right pleural effusion. Improved  aeration of the right mid and lower lung. Decreasing retrocardiac left lower lung atelectasis or infiltrate. Stable borderline enlarged cardiac silhouette. Normal pulmonary vascularity. No pneumothorax.    XR Chest Port 1 View    Result Date: 11/12/2021  EXAM: XR CHEST PORT 1 VIEW LOCATION: [facility] DATE/TIME: 11/12/2021 1:51 PM INDICATION: complicated pleural effusion COMPARISON: 11/10/2021     IMPRESSION: The right pleural effusion is increasing. Stable heart size. No pulmonary edema. Increasing left medial basilar opacity could represent atelectasis or pneumonia.    XR Chest Port 1 View    Result Date: 11/10/2021  EXAM: XR CHEST PORT 1 VIEW LOCATION: [facility] DATE/TIME: 11/10/2021 6:22 PM INDICATION: s/p right thoracentesis, eval for PTX, eval for reduction in effusion. COMPARISON: CT earlier today.     IMPRESSION: Infiltrate right lung base by CT mostly in the right middle lobe. Small right pleural effusion. No pneumothorax. Left lung is clear. Scoliosis. Healing left rib fractures.    IR Chest Tube Place Non Tunneled Right    [facility]: [facility]13/2021  [facility] DATE: 11/13/2021 PROCEDURE: RIGHT ULTRASOUND GUIDED 14 [place] Islander CHEST TUBE PLACEMENT INTERVENTIONAL RADIOLOGIST: Citlaly Rosales DO INDICATION: Recurrent pleural effusion, COVID. Effusion concerning for empyema CONSENT: The risks, benefits and alternatives of right chest tube were discussed with the patient  in detail. All questions were answered. Informed consent was given to proceed with the procedure. MEDICATIONS: 1% lidocaine and 25 mcg fentanyl IV CONTRAST: None ANTIBIOTICS: None. FLUOROSCOPIC TIME: 0 minutes. RADIATION DOSE: Air Kerma: 0 mGy. COMPLICATIONS: No immediate complications. STERILE BARRIER TECHNIQUE: Maximum sterile barrier technique was used. Cutaneous antisepsis was performed at the operative site with application of 2%  "chlorhexidine and large sterile drape. Prior to the procedure, the  and assistant performed hand hygiene and wore hat, mask, sterile gown, and sterile gloves during the entire procedure. PROCEDURE/FINDINGS:  Following a discussion of the risks, benefits, indications and alternatives to treatment, appropriate informed consent was obtained.  The patient was brought to the interventional radiology suite and placed upright on the table. The right hemithorax was prepped and draped in the usual sterile fashion. A timeout was performed per universal protocol policy to confirm the correct patient, site and procedure to be performed. A preliminary ultrasound was performed to access for the appropriate site to access the pleural effusion. These images reveal a large right pleural effusion and an ultrasound image was archived.  Once an appropriate site for chest tube placement was localized, the overlying skin was anesthetized with 1% Lidocaine. Under direct ultrasound guidance, a 5 Serbian Yueh needle was advanced into the pleural space via an intercostal approach. The catheter  was advanced off of the needle. A 0.035\" guidewire was advanced through the micropuncture sheath and the tract was serially dilated.  A 14 Serbian nonlocking loop chest tube was placed with the tip coiled in the pleural space. The catheter was sutured to  the skin using 2-0 suture. A sterile dressing was applied. Chest tube was connected to a Pleuravac device in the interventional suite. Throughout the procedure, the patient was monitored by a radiology nurse for cardiac rhythm and oxygen saturation which remained stable. The patient tolerated the procedure well and left interventional radiology in stable condition.     IMPRESSION: Successful right sided 14 Serbian chest tube placement under ultrasound guidance.     CT Chest Pulmonary Embolism w Contrast    Result Date: 11/10/2021  EXAM: CT CHEST PULMONARY EMBOLISM W CONTRAST LOCATION: [facility] DATE/TIME: 11/10/2021 1:42 PM INDICATION: Chest pain. Covid positive 10/28/2021. PE suspected, high prob COMPARISON: 09/20/2021 chest x-ray. CT chest 03/26/2019 TECHNIQUE: CT chest pulmonary angiogram during arterial phase injection of IV contrast. Multiplanar reformats and MIP reconstructions were performed. Dose reduction techniques were used. CONTRAST: IsoVue 370 100mL FINDINGS: ANGIOGRAM CHEST: Pulmonary arteries are normal caliber and negative for pulmonary emboli. Thoracic aorta is negative for dissection. No CT evidence of right heart strain. LUNGS AND PLEURA: Dense focal infiltrate right middle lobe medial segment and some of the adjacent anterior segment right upper lobe. Tiny less than 1 cm microabscess within this infiltrate. Small right pleural effusion including some focal pleural reaction immediately adjacent to the pneumonia anteriorly. No other infiltrates. Would favor a bacterial pneumonia rather than COVID pneumonia. MEDIASTINUM/AXILLAE: Small pericardial effusion. UPPER ABDOMEN: Normal. MUSCULOSKELETAL: Normal.     IMPRESSION: 1.  Above findings most suggestive of moderately severe bacterial pneumonia right middle lobe with possible associated tiny microabscess within it. 2.  Small right pleural effusion and some focal pleural reaction immediately adjacent to the pneumonia anteriorly.    CT Chest w/o Contrast    Result Date: 11/16/2021  EXAM: CT CHEST W/O CONTRAST LOCATION: [facility] DATE/TIME: 11/16/2021 8:52 AM INDICATION: Pneumonia, effusion or abscess suspected, x-ray done. COMPARISON: Chest CTA dated 11/10/2021. TECHNIQUE: CT chest without IV contrast. Multiplanar reformats were obtained. Dose reduction techniques were used. CONTRAST: None. FINDINGS: LUNGS AND PLEURA: Interval placement of right chest tube with tip along the lateral pleural surface of the right upper lobe. There is a very small amount of loculated air anteriorly in the  right middle lobe, no significant pneumothorax. There is persistent small pleural effusion at the right lung base with right lower lobe atelectasis and right middle lobe atelectasis. There is a small amount of loculated fluid along the right heart border in the inferior right middle lobe. There is a new small left pleural effusion and there is atelectasis in the left lower lobe. There is bandlike atelectasis or scarring in the posterior right upper lobe. There is minimal airspace consolidation in the left upper lobe on image 19 of series 3. MEDIASTINUM/AXILLAE: Small to moderate pericardial effusion. Borderline right paratracheal lymph nodes. No thoracic aortic aneurysm. CORONARY ARTERY CALCIFICATION: Mild. UPPER ABDOMEN: No significant finding. MUSCULOSKELETAL: Thoracolumbar scoliosis. No fracture or suspicious bony lesion.     IMPRESSION: 1.  New right chest tube. There is persistent small loculated pleural effusion at the right lung base which does not communicate with the chest tube. There is additional small loculated hydropneumothorax in the anterior right middle lobe that also does not communicate with the chest tube. There is atelectasis in both lower lobes and in the right middle lobe. 2.  New small right pleural effusion. 3.  Patchy airspace opacities in the left upper lobe consistent with a small focus of pneumonia.       Latest radiology report personally reviewed.    Note created using dragon voice recognition software so sounds alike errors may have escaped editing.      11/17/2021   Levar Landeros MD  Hospitalist, [facility]  Pager: [phone]

## 2021-11-17 NOTE — PLAN OF CARE
Problem: Adult Inpatient Plan of Care  Goal: Absence of Hospital-Acquired Illness or Injury  Intervention: Prevent Skin Injury  Recent Flowsheet Documentation  Taken 11/17/2021 0517 by Eleanor Kumar RN  Body Position: position changed independently  Taken 11/17/2021 0126 by Eleanor Kumar RN  Body Position: position changed independently     Problem: Infection (Pneumonia)  Goal: Resolution of Infection Signs and Symptoms  Intervention: Prevent Infection Progression  Recent Flowsheet Documentation  Taken 11/17/2021 0517 by Eleanor Kumar RN  Isolation Precautions:   airborne precautions maintained   contact precautions maintained   droplet precautions maintained  Taken 11/17/2021 0126 by Eleanor Kumar RN  Isolation Precautions:   airborne precautions maintained   contact precautions maintained   droplet precautions maintained     Problem: Respiratory Compromise (Pneumonia)  Goal: Effective Oxygenation and Ventilation  Intervention: Optimize Oxygenation and Ventilation  Recent Flowsheet Documentation  Taken 11/17/2021 0517 by Eleanor Kumar RN  Head of Bed (HOB) Positioning: HOB at 15 degrees  Taken 11/17/2021 0126 by Eleanor Kumar RN  Head of Bed (HOB) Positioning: HOB at 15 degrees   Pt a/o with pain controlled by oxycodone, tylenol, and neurontin. Pt tolerated alteplase and was able to turn self. Total chest tube output 340 this shift. Pt o2 sats maintaining on 2L nc. Will monitor.

## 2021-11-17 NOTE — CONSULTS
Chart reviewed and consult request dated 11/10/21 @ 5386 noted.    Patient seen 11/10 21 @ 1348 for initial care management admission consult.  Also, note 11/11, 11/12, 11/14 CM follow up.    Care Management Follow Up    Length of Stay (days): 7    Expected Discharge Date: 11/19/2021     Concerns to be Addressed:    COVID-19 positive, chest tube, Pulmonary following, pain control, oxygen needs   Patient plan of care discussed at interdisciplinary rounds: Yes    Anticipated Discharge Disposition:  Transitional care, home with assist, home with home care (PT)     Anticipated Discharge Services:  To be determined  Anticipated Discharge DME:  Per therapy recommendations    Patient/family educated on Medicare website which has current facility and service quality ratings:    Education Provided on the Discharge Plan:  Yes, per care team  Patient/Family in Agreement with the Plan:  Plan is in progress    Referrals Placed by CM/SW:  No new referrals at this time  Private pay costs discussed: Not applicable    Additional Information:  Patient not medically ready for discharge.  Care Management following medical progression, will review team recommendations and assist as needed.    Lydia Pearl RN

## 2021-11-17 NOTE — PLAN OF CARE
"Problem: Adult Inpatient Plan of Care  Goal: Plan of Care Review  Outcome: Improving   Educated pt on treatment plan, pt voiced understanding.    Problem: Respiratory Compromise (Pneumonia)  Goal: Effective Oxygenation and Ventilation  Outcome: Improving   Pt decreased 02 use from 3L to 2L via N/C on shift. Cont. Pulse ox on & functioning.    See flowsheet for documentation, unclamped (turned stopcock) after TPA installation on AM shift at ordered time frame. Minimal output noted on shift from CT. Remains on water seal per order, irrigated with 10cc NS per order. Noted old drainage on CT dressing. Pt denied pain after \"re-opening\" tube from stopcock, pain tolerable all shift.   "

## 2021-11-17 NOTE — PROGRESS NOTES
Instilled alteplase through chest tube at 0206, went through the detailed instructions with primary RN, she will follow up with next steps.    Temp: 98.1  F (36.7  C) Temp src: Oral BP: 101/53 Pulse: 83   Resp: 20 SpO2: 95 % O2 Device: Nasal cannula Oxygen Delivery: 2 LPM     Patient denied any shortness of breath at the time of instillation.

## 2021-11-18 ENCOUNTER — APPOINTMENT (OUTPATIENT)
Dept: PHYSICAL THERAPY | Facility: HOSPITAL | Age: 76
DRG: 163 | End: 2021-11-18
Payer: COMMERCIAL

## 2021-11-18 ENCOUNTER — APPOINTMENT (OUTPATIENT)
Dept: RADIOLOGY | Facility: HOSPITAL | Age: 76
DRG: 163 | End: 2021-11-18
Attending: INTERNAL MEDICINE
Payer: COMMERCIAL

## 2021-11-18 LAB
ANION GAP SERPL CALCULATED.3IONS-SCNC: 6 MMOL/L (ref 5–18)
BACTERIA PLR CULT: ABNORMAL
BACTERIA PLR CULT: ABNORMAL
BUN SERPL-MCNC: 11 MG/DL (ref 8–28)
CALCIUM SERPL-MCNC: 8.3 MG/DL (ref 8.5–10.5)
CHLORIDE BLD-SCNC: 102 MMOL/L (ref 98–107)
CO2 SERPL-SCNC: 27 MMOL/L (ref 22–31)
CREAT SERPL-MCNC: 0.55 MG/DL (ref 0.6–1.1)
ERYTHROCYTE [DISTWIDTH] IN BLOOD BY AUTOMATED COUNT: 12.9 % (ref 10–15)
GFR SERPL CREATININE-BSD FRML MDRD: 90 ML/MIN/1.73M2
GLUCOSE BLD-MCNC: 117 MG/DL (ref 70–125)
HCT VFR BLD AUTO: 27.8 % (ref 35–47)
HGB BLD-MCNC: 8.7 G/DL (ref 11.7–15.7)
MAGNESIUM SERPL-MCNC: 1.8 MG/DL (ref 1.8–2.6)
MCH RBC QN AUTO: 30.9 PG (ref 26.5–33)
MCHC RBC AUTO-ENTMCNC: 31.3 G/DL (ref 31.5–36.5)
MCV RBC AUTO: 99 FL (ref 78–100)
PLATELET # BLD AUTO: 400 10E3/UL (ref 150–450)
POTASSIUM BLD-SCNC: 4 MMOL/L (ref 3.5–5)
RBC # BLD AUTO: 2.82 10E6/UL (ref 3.8–5.2)
SODIUM SERPL-SCNC: 135 MMOL/L (ref 136–145)
WBC # BLD AUTO: 14.6 10E3/UL (ref 4–11)

## 2021-11-18 PROCEDURE — 97110 THERAPEUTIC EXERCISES: CPT | Mod: GP

## 2021-11-18 PROCEDURE — 97116 GAIT TRAINING THERAPY: CPT | Mod: GP

## 2021-11-18 PROCEDURE — 120N000001 HC R&B MED SURG/OB

## 2021-11-18 PROCEDURE — 250N000013 HC RX MED GY IP 250 OP 250 PS 637: Performed by: RADIOLOGY

## 2021-11-18 PROCEDURE — 99231 SBSQ HOSP IP/OBS SF/LOW 25: CPT | Performed by: INTERNAL MEDICINE

## 2021-11-18 PROCEDURE — 250N000013 HC RX MED GY IP 250 OP 250 PS 637: Performed by: PAIN MEDICINE

## 2021-11-18 PROCEDURE — 99231 SBSQ HOSP IP/OBS SF/LOW 25: CPT | Performed by: PAIN MEDICINE

## 2021-11-18 PROCEDURE — 83735 ASSAY OF MAGNESIUM: CPT | Performed by: INTERNAL MEDICINE

## 2021-11-18 PROCEDURE — 99207 PR CDG-CODE CATEGORY CHANGED: CPT | Performed by: NURSE PRACTITIONER

## 2021-11-18 PROCEDURE — 250N000013 HC RX MED GY IP 250 OP 250 PS 637: Performed by: INTERNAL MEDICINE

## 2021-11-18 PROCEDURE — 250N000011 HC RX IP 250 OP 636: Performed by: RADIOLOGY

## 2021-11-18 PROCEDURE — 36415 COLL VENOUS BLD VENIPUNCTURE: CPT | Performed by: INTERNAL MEDICINE

## 2021-11-18 PROCEDURE — 99233 SBSQ HOSP IP/OBS HIGH 50: CPT | Performed by: NURSE PRACTITIONER

## 2021-11-18 PROCEDURE — 85027 COMPLETE CBC AUTOMATED: CPT | Performed by: INTERNAL MEDICINE

## 2021-11-18 PROCEDURE — 71045 X-RAY EXAM CHEST 1 VIEW: CPT

## 2021-11-18 PROCEDURE — 258N000003 HC RX IP 258 OP 636: Performed by: NURSE PRACTITIONER

## 2021-11-18 PROCEDURE — 80048 BASIC METABOLIC PNL TOTAL CA: CPT | Performed by: INTERNAL MEDICINE

## 2021-11-18 PROCEDURE — 250N000011 HC RX IP 250 OP 636: Performed by: NURSE PRACTITIONER

## 2021-11-18 PROCEDURE — 250N000009 HC RX 250: Performed by: NURSE PRACTITIONER

## 2021-11-18 PROCEDURE — 250N000011 HC RX IP 250 OP 636: Performed by: INTERNAL MEDICINE

## 2021-11-18 RX ORDER — CALCIUM CARBONATE 500 MG/1
500 TABLET, CHEWABLE ORAL 3 TIMES DAILY PRN
Status: DISCONTINUED | OUTPATIENT
Start: 2021-11-18 | End: 2021-12-02 | Stop reason: HOSPADM

## 2021-11-18 RX ORDER — POLYETHYLENE GLYCOL 3350 17 G/17G
17 POWDER, FOR SOLUTION ORAL 2 TIMES DAILY
Status: DISCONTINUED | OUTPATIENT
Start: 2021-11-18 | End: 2021-11-21

## 2021-11-18 RX ORDER — OXYCODONE HCL 5 MG/5 ML
10 SOLUTION, ORAL ORAL 2 TIMES DAILY PRN
Status: DISCONTINUED | OUTPATIENT
Start: 2021-11-18 | End: 2021-11-19

## 2021-11-18 RX ORDER — CALCIUM CARBONATE 500 MG/1
500 TABLET, CHEWABLE ORAL DAILY PRN
Status: DISCONTINUED | OUTPATIENT
Start: 2021-11-18 | End: 2021-11-18

## 2021-11-18 RX ORDER — BISACODYL 10 MG
10 SUPPOSITORY, RECTAL RECTAL DAILY PRN
Status: DISCONTINUED | OUTPATIENT
Start: 2021-11-18 | End: 2021-11-23

## 2021-11-18 RX ADMIN — SENNOSIDES, DOCUSATE SODIUM 2 TABLET: 8.6; 5 TABLET ORAL at 08:40

## 2021-11-18 RX ADMIN — POLYETHYLENE GLYCOL 3350 17 G: 17 POWDER, FOR SOLUTION ORAL at 20:53

## 2021-11-18 RX ADMIN — SENNOSIDES, DOCUSATE SODIUM 2 TABLET: 8.6; 5 TABLET ORAL at 20:54

## 2021-11-18 RX ADMIN — CEFTRIAXONE SODIUM 1 G: 1 INJECTION, POWDER, FOR SOLUTION INTRAMUSCULAR; INTRAVENOUS at 14:49

## 2021-11-18 RX ADMIN — ENOXAPARIN SODIUM 40 MG: 40 INJECTION SUBCUTANEOUS at 08:39

## 2021-11-18 RX ADMIN — Medication 1 CAPSULE: at 20:54

## 2021-11-18 RX ADMIN — MAGNESIUM OXIDE TAB 400 MG (241.3 MG ELEMENTAL MG) 200 MG: 400 (241.3 MG) TAB at 08:40

## 2021-11-18 RX ADMIN — ACETAMINOPHEN 975 MG: 325 TABLET ORAL at 05:37

## 2021-11-18 RX ADMIN — VALACYCLOVIR 1000 MG: 500 TABLET, FILM COATED ORAL at 20:54

## 2021-11-18 RX ADMIN — SERTRALINE HYDROCHLORIDE 200 MG: 50 TABLET ORAL at 08:39

## 2021-11-18 RX ADMIN — GABAPENTIN 100 MG: 100 CAPSULE ORAL at 20:54

## 2021-11-18 RX ADMIN — THERA TABS 1 TABLET: TAB at 08:39

## 2021-11-18 RX ADMIN — Medication 1 TABLET: at 08:39

## 2021-11-18 RX ADMIN — OXYCODONE HYDROCHLORIDE 10 MG: 5 SOLUTION ORAL at 20:43

## 2021-11-18 RX ADMIN — PANTOPRAZOLE SODIUM 40 MG: 20 TABLET, DELAYED RELEASE ORAL at 08:40

## 2021-11-18 RX ADMIN — Medication 1 TABLET: at 20:54

## 2021-11-18 RX ADMIN — DORNASE ALFA 50 ML: 1 SOLUTION RESPIRATORY (INHALATION) at 08:28

## 2021-11-18 RX ADMIN — ACETAMINOPHEN 975 MG: 325 SOLUTION ORAL at 19:00

## 2021-11-18 RX ADMIN — GABAPENTIN 100 MG: 100 CAPSULE ORAL at 05:38

## 2021-11-18 RX ADMIN — DORNASE ALFA 50 ML: 1 SOLUTION RESPIRATORY (INHALATION) at 22:07

## 2021-11-18 RX ADMIN — LIDOCAINE 3 PATCH: 246 PATCH TOPICAL at 08:38

## 2021-11-18 RX ADMIN — CALCIUM CARBONATE (ANTACID) CHEW TAB 500 MG 500 MG: 500 CHEW TAB at 10:32

## 2021-11-18 RX ADMIN — OXYCODONE HYDROCHLORIDE 10 MG: 5 TABLET ORAL at 05:38

## 2021-11-18 RX ADMIN — ALUMINUM HYDROXIDE, MAGNESIUM HYDROXIDE, AND SIMETHICONE 30 ML: 200; 200; 20 SUSPENSION ORAL at 17:23

## 2021-11-18 RX ADMIN — POLYETHYLENE GLYCOL 3350 17 G: 17 POWDER, FOR SOLUTION ORAL at 08:40

## 2021-11-18 ASSESSMENT — ACTIVITIES OF DAILY LIVING (ADL)
ADLS_ACUITY_SCORE: 6
ADLS_ACUITY_SCORE: 8
ADLS_ACUITY_SCORE: 6
ADLS_ACUITY_SCORE: 6
ADLS_ACUITY_SCORE: 8
ADLS_ACUITY_SCORE: 6
ADLS_ACUITY_SCORE: 8
ADLS_ACUITY_SCORE: 6
ADLS_ACUITY_SCORE: 8
ADLS_ACUITY_SCORE: 8
ADLS_ACUITY_SCORE: 6
ADLS_ACUITY_SCORE: 8
ADLS_ACUITY_SCORE: 6

## 2021-11-18 NOTE — PLAN OF CARE
Patient may meet critieria for COVID Recovered.    1. COVID + 10/28/21.  Today is day 22  2.  Afebrile without use of fever reducing meds  3. Substantial improvement in COVID sysmptoms.   Patient is on nasal cannula.    If provider agrees with assessment to enrrique COVID Recovered, she would be eligible today.    Yudi Fox, IP  [phone]

## 2021-11-18 NOTE — PROGRESS NOTES
Daily Progress Note        CODE STATUS:  No CPR- Do NOT Intubate    11/17/21  Assessment/Plan:  76 year old lady with history of stage 1 breast cancer s/p lumpectomy and radiation in 2015, depression, GERD, OA, presented with right sided chest pain and dry cough. She was diagnosed with breakthrough covid-19 infection on 10/28 and presented on 11/10 with several days of worsening right-sided pleuritic chest pain, cough, low grade fevers and chills at home.     Pneumonia  Complex parapneumonic effusion  -- Likely due to post viral strep PNA. The right pleural effusion was drained at bedside 11/10  but effusion recurred   -- Chest tube placement 11/13 , pulm managing  -- C/w broad spectrum abx per pulm   -- Follow pending cx and pleural fluid studies results      Chest pain   -- Clinically better. Occurred after lytic therapy given for chest tube  -- Declined further lytic therapy yesterday, but later agreed to take it.  -- Appreciate pain team, pulmonary consult  -- EKG unremarkable for ischemia     Acute resp failure due to above   -- Remains on supp o2  -- Cxr with no large ptx per rads      COVID-19 - since 10/28, breakthrough case  -- Completed remdesivir but no steroids per pulm for now.   -- Appreciate infectionist's input. Patient can be considered  recovered covid now. Stopped isolation today.     Anemia:  -- Drop in hgb noted. No e/o GI bleeding.   -- Will monitor hgb    DVT PPX lovenox      Barriers to discharge resp failure      Anticipated Discharge date  Few days     Daughter in law, Camille called for update. Answered her questions.      LOS: 7 days     Subjective:  Interval History: Patient seen and examined this morning. Patient reports doing better. Oxycodone prior to lytics helping with pain. No fevers. CT drained about 1.13L in last 24 hours. Her only complaint this morning was feeling weak.     Review of Systems:   As mentioned in subjective.    Patient Active Problem List   Diagnosis     Breast  cancer, stage 1, right (H)     Osteopenia determined by x-ray     Pneumonia due to infectious organism, unspecified laterality, unspecified part of lung     Abscess of middle lobe of right lung with pneumonia (H)     Infection due to 2019 novel coronavirus       Scheduled Meds:    acetaminophen  975 mg Oral Q6H     alteplase (ACTIVASE) 10 mg and dornase 5 mg in NS syringe for chest tube instillation  50 mL Chest Tube Q12H     calcium carbonate-vitamin D  1 tablet Oral BID     cefTRIAXone  1 g Intravenous Q24H     enoxaparin ANTICOAGULANT  40 mg Subcutaneous Q24H     lactobacillus rhamnosus (GG)  1 capsule Oral Daily     lidocaine  3 patch Transdermal Q24H     lidocaine   Transdermal Q8H     magnesium oxide  200 mg Oral BID     multivitamin, therapeutic  1 tablet Oral Daily     [START ON 11/19/2021] omeprazole  40 mg Oral QAM AC     polyethylene glycol  17 g Oral Daily     senna-docusate  2 tablet Oral BID     sertraline  200 mg Oral Daily     sodium chloride (PF)  3 mL Irrigation Q8H     sodium chloride (PF)  3 mL Intracatheter Q8H     traZODone  150 mg Oral At Bedtime     valACYclovir  1,000 mg Oral At Bedtime     vitamin C  250 mg Oral At Bedtime     Continuous Infusions:    - MEDICATION INSTRUCTIONS -       PRN Meds:.alum & mag hydroxide-simethicone, bisacodyl, calcium carbonate, gabapentin, lidocaine 4%, lidocaine (buffered or not buffered), - MEDICATION INSTRUCTIONS -, melatonin, naloxone **OR** naloxone **OR** naloxone **OR** naloxone, ondansetron **OR** ondansetron, oxyCODONE, oxyCODONE, prochlorperazine **OR** prochlorperazine **OR** prochlorperazine, sodium chloride (PF)    Objective:  Vital signs in last 24 hours:  Temp:  [97.9  F (36.6  C)-98.7  F (37.1  C)] 98.1  F (36.7  C)  Pulse:  [79-92] 79  Resp:  [16-18] 18  BP: (100-128)/(52-63) 111/55  SpO2:  [86 %-97 %] 97 %        Intake/Output Summary (Last 24 hours) at 11/17/2021 1517  Last data filed at 11/17/2021 1415  Gross per 24 hour   Intake 250 ml    Output 285 ml   Net -35 ml       Physical Exam:    General: Not in obvious distress.  HEENT: NC, AT   Chest: Shallow breathing, right chest tube in place.  Heart: S1S2 normal, regular. No M/R/G  Abdomen: Soft. NT, ND. Bowel sounds- active.  Extremities: No legs swelling  Neuro: alert and awake, grossly non-focal      Lab Results:(I have personally reviewed the results)    Recent Results (from the past 24 hour(s))   Magnesium    Collection Time: 11/18/21  6:51 AM   Result Value Ref Range    Magnesium 1.8 1.8 - 2.6 mg/dL   Basic metabolic panel    Collection Time: 11/18/21  6:51 AM   Result Value Ref Range    Sodium 135 (L) 136 - 145 mmol/L    Potassium 4.0 3.5 - 5.0 mmol/L    Chloride 102 98 - 107 mmol/L    Carbon Dioxide (CO2) 27 22 - 31 mmol/L    Anion Gap 6 5 - 18 mmol/L    Urea Nitrogen 11 8 - 28 mg/dL    Creatinine 0.55 (L) 0.60 - 1.10 mg/dL    Calcium 8.3 (L) 8.5 - 10.5 mg/dL    Glucose 117 70 - 125 mg/dL    GFR Estimate 90 >60 mL/min/1.73m2   CBC with platelets    Collection Time: 11/18/21  7:09 AM   Result Value Ref Range    WBC Count 14.6 (H) 4.0 - 11.0 10e3/uL    RBC Count 2.82 (L) 3.80 - 5.20 10e6/uL    Hemoglobin 8.7 (L) 11.7 - 15.7 g/dL    Hematocrit 27.8 (L) 35.0 - 47.0 %    MCV 99 78 - 100 fL    MCH 30.9 26.5 - 33.0 pg    MCHC 31.3 (L) 31.5 - 36.5 g/dL    RDW 12.9 10.0 - 15.0 %    Platelet Count 400 150 - 450 10e3/uL       All laboratory and imaging data in the past 24 hours reviewed  Serum Glucose range:   Recent Labs   Lab 11/18/21  0651 11/17/21  0620 11/16/21  0621 11/15/21  0558    109 115 116     ABG: No lab results found in last 7 days.  CBC:   Recent Labs   Lab 11/18/21  0709 11/17/21  0620 11/16/21  0621 11/15/21  1520 11/15/21  0558   WBC 14.6* 12.8* 12.8*  --  13.3*   HGB 8.7* 8.5* 9.8*   < > 10.3*   HCT 27.8* 27.3* 29.5*  --  32.5*   MCV 99 99 96  --  96    355 385  --  381   NEUTROPHIL  --  84 83  --  86   LYMPH  --  6 6  --  5   MONOCYTE  --  6 8  --  7   EOSINOPHIL  --  2  1  --  1    < > = values in this interval not displayed.     Chemistry:   Recent Labs   Lab 11/18/21  0651 11/17/21  0620 11/16/21  0621   * 138 135*   POTASSIUM 4.0 4.0 3.6   CHLORIDE 102 104 102   CO2 27 29 31   BUN 11 10 14   CR 0.55* 0.60 0.64   GFRESTIMATED 90 88 86   EMILIANO 8.3* 8.6 8.6   MAG 1.8 2.0 1.9     Coags:  No results for input(s): INR, PROTIME, PTT in the last 168 hours.    Invalid input(s): APTT  Cardiac Markers:  No results for input(s): CKTOTAL, TROPONINI in the last 168 hours.       XR Chest Port 1 View    Result Date: 11/17/2021  EXAM: XR CHEST PORT 1 VIEW LOCATION: [facility] DATE/TIME: 11/17/2021 6:28 AM INDICATION: chest tube in place. COMPARISON: Portable AP view the chest 11/16/2021 at 0606 hours; chest CT 11/16/2021 at 0858 hours     IMPRESSION: A right pleural drain is similarly positioned with pigtail in the upper lateral aspect of the pleural space. There is a crimp in the tube at the level of the insertion projecting at the level of the posterolateral seventh intercostal space. Opacity with indistinct borders in the lower third of the right chest likely reflects a combination of residual complex pleural fluid and atelectasis. Mild but diffuse thickening of the pleura including the apex and along the mediastinal pleura. Focal lucency lucency  in the medial basal right chest adjacent to the right atrial heart border corresponds to loculated anterior pneumothorax on the prior CT. There are interstitial opacities in the right mid and upper lung consistent with mild interstitial edema. No progressive airspace opacity. Pleural fluid and atelectasis atelectasis in the medial left lower lobe obscures the medial 20% of the left hemidiaphragm, not increased. Unchanged mild enlargement of the cardiac silhouette.    XR Chest Port 1 View    Result Date: 11/16/2021  EXAM: XR CHEST PORT 1 VIEW LOCATION: [facility] DATE/TIME: 11/16/2021 5:39 AM  INDICATION: Follow-up chest tube COMPARISON: 11/15/2021     IMPRESSION: Right chest tube in stable position. No pneumothorax. Tiny right pleural effusion remains. Small left pleural effusion is stable. Atelectasis left lower lobe behind the heart is stable. Minimal diffuse interstitial infiltrates stable. No new findings.    XR Chest Port 1 View    Result Date: 11/15/2021  EXAM: XR CHEST PORT 1 VIEW LOCATION: [facility] DATE/TIME: 11/15/2021 6:30 AM INDICATION: chest tube in place. COMPARISON: 11/14/2021     IMPRESSION: Right pigtail pleural drain with trace pneumothorax near tube insertion site but no new significant pneumothorax. Further decrease in the small right pleural effusion and improved aeration of the right mid and lower lung. Decreasing retrocardiac left lower lung atelectasis or infiltrate. Stable borderline enlarged cardiac silhouette. Normal pulmonary vascularity. Healing fractures left ribs 3-6. Findings discussed with Dr Avitia.    XR Chest Port 1 View    Result Date: 11/14/2021  EXAM: XR CHEST PORT 1 VIEW LOCATION: [facility] DATE/TIME: 11/14/2021 5:59 AM INDICATION: chest tube in place. COMPARISON: 11/12/2021.     IMPRESSION: Interval placement of right pigtail pleural drain. Decreased small to moderate right pleural effusion. Improved aeration of the right mid and lower lung. Decreasing retrocardiac left lower lung atelectasis or infiltrate. Stable borderline enlarged cardiac silhouette. Normal pulmonary vascularity. No pneumothorax.    XR Chest Port 1 View    Result Date: 11/12/2021  EXAM: XR CHEST PORT 1 VIEW LOCATION: [facility] DATE/TIME: 11/12/2021 1:51 PM INDICATION: complicated pleural effusion COMPARISON: 11/10/2021     IMPRESSION: The right pleural effusion is increasing. Stable heart size. No pulmonary edema. Increasing left medial basilar opacity could represent atelectasis or pneumonia.    XR Chest Port 1  View    Result Date: 11/10/2021  EXAM: XR CHEST PORT 1 VIEW LOCATION: [facility] DATE/TIME: 11/10/2021 6:22 PM INDICATION: s/p right thoracentesis, eval for PTX, eval for reduction in effusion. COMPARISON: CT earlier today.     IMPRESSION: Infiltrate right lung base by CT mostly in the right middle lobe. Small right pleural effusion. No pneumothorax. Left lung is clear. Scoliosis. Healing left rib fractures.    IR Chest Tube Place Non Tunneled Right    Result Date: 11/13/2021  [place] [facility] LOCATION: [facility] DATE: 11/13/2021 PROCEDURE: RIGHT ULTRASOUND GUIDED 14 Kinyarwanda CHEST TUBE PLACEMENT INTERVENTIONAL RADIOLOGIST: Citlaly Rosales DO INDICATION: Recurrent pleural effusion, COVID. Effusion concerning for empyema CONSENT: The risks, benefits and alternatives of right chest tube were discussed with the patient  in detail. All questions were answered. Informed consent was given to proceed with the procedure. MEDICATIONS: 1% lidocaine and 25 mcg fentanyl IV CONTRAST: None ANTIBIOTICS: None. FLUOROSCOPIC TIME: 0 minutes. RADIATION DOSE: Air Kerma: 0 mGy. COMPLICATIONS: No immediate complications. STERILE BARRIER TECHNIQUE: Maximum sterile barrier technique was used. Cutaneous antisepsis was performed at the operative site with application of 2% chlorhexidine and large sterile drape. Prior to the procedure, the  and assistant performed hand hygiene and wore hat, mask, sterile gown, and sterile gloves during the entire procedure. PROCEDURE/FINDINGS:  Following a discussion of the risks, benefits, indications and alternatives to treatment, appropriate informed consent was obtained.  The patient was brought to the interventional radiology suite and placed upright on the table. The right hemithorax was prepped and draped in the usual sterile fashion. A timeout was performed per universal protocol policy to confirm the correct patient, site and procedure to  "be performed. A preliminary ultrasound was performed to access for the appropriate site to access the pleural effusion. These images reveal a large right pleural effusion and an ultrasound image was archived.  Once an appropriate site for chest tube placement was localized, the overlying skin was anesthetized with 1% Lidocaine. Under direct ultrasound guidance, a 5 Turkish Yueh needle was advanced into the pleural space via an intercostal approach. The catheter  was advanced off of the needle. A 0.035\" guidewire was advanced through the micropuncture sheath and the tract was serially dilated.  A 14 Turkish nonlocking loop chest tube was placed with the tip coiled in the pleural space. The catheter was sutured to  the skin using 2-0 suture. A sterile dressing was applied. Chest tube was connected to a Pleuravac device in the interventional suite. Throughout the procedure, the patient was monitored by a radiology nurse for cardiac rhythm and oxygen saturation which remained stable. The patient tolerated the procedure well and left interventional radiology in stable condition.     IMPRESSION: Successful right sided 14 Turkish chest tube placement under ultrasound guidance.     CT Chest Pulmonary Embolism w Contrast    Result Date: 11/10/2021  EXAM: CT CHEST PULMONARY EMBOLISM W CONTRAST LOCATION: [facility] DATE/TIME: 11/10/2021 1:42 PM INDICATION: Chest pain. Covid positive 10/28/2021. PE suspected, high prob COMPARISON: 09/20/2021 chest x-ray. CT chest 03/26/2019 TECHNIQUE: CT chest pulmonary angiogram during arterial phase injection of IV contrast. Multiplanar reformats and MIP reconstructions were performed. Dose reduction techniques were used. CONTRAST: IsoVue 370 100mL FINDINGS: ANGIOGRAM CHEST: Pulmonary arteries are normal caliber and negative for pulmonary emboli. Thoracic aorta is negative for dissection. No CT evidence of right heart strain. LUNGS AND PLEURA: Dense focal infiltrate " right middle lobe medial segment and some of the adjacent anterior segment right upper lobe. Tiny less than 1 cm microabscess within this infiltrate. Small right pleural effusion including some focal pleural reaction immediately adjacent to the pneumonia anteriorly. No other infiltrates. Would favor a bacterial pneumonia rather than COVID pneumonia. MEDIASTINUM/AXILLAE: Small pericardial effusion. UPPER ABDOMEN: Normal. MUSCULOSKELETAL: Normal.     IMPRESSION: 1.  Above findings most suggestive of moderately severe bacterial pneumonia right middle lobe with possible associated tiny microabscess within it. 2.  Small right pleural effusion and some focal pleural reaction immediately adjacent to the pneumonia anteriorly.    CT Chest w/o Contrast    Result Date: 11/16/2021  EXAM: CT CHEST W/O CONTRAST LOCATION: [facility] DATE/TIME: 11/16/2021 8:52 AM INDICATION: Pneumonia, effusion or abscess suspected, x-ray done. COMPARISON: Chest CTA dated 11/10/2021. TECHNIQUE: CT chest without IV contrast. Multiplanar reformats were obtained. Dose reduction techniques were used. CONTRAST: None. FINDINGS: LUNGS AND PLEURA: Interval placement of right chest tube with tip along the lateral pleural surface of the right upper lobe. There is a very small amount of loculated air anteriorly in the right middle lobe, no significant pneumothorax. There is persistent small pleural effusion at the right lung base with right lower lobe atelectasis and right middle lobe atelectasis. There is a small amount of loculated fluid along the right heart border in the inferior right middle lobe. There is a new small left pleural effusion and there is atelectasis in the left lower lobe. There is bandlike atelectasis or scarring in the posterior right upper lobe. There is minimal airspace consolidation in the left upper lobe on image 19 of series 3. MEDIASTINUM/AXILLAE: Small to moderate pericardial effusion. Borderline right  paratracheal lymph nodes. No thoracic aortic aneurysm. CORONARY ARTERY CALCIFICATION: Mild. UPPER ABDOMEN: No significant finding. MUSCULOSKELETAL: Thoracolumbar scoliosis. No fracture or suspicious bony lesion.     IMPRESSION: 1.  New right chest tube. There is persistent small loculated pleural effusion at the right lung base which does not communicate with the chest tube. There is additional small loculated hydropneumothorax in the anterior right middle lobe that also does not communicate with the chest tube. There is atelectasis in both lower lobes and in the right middle lobe. 2.  New small right pleural effusion. 3.  Patchy airspace opacities in the left upper lobe consistent with a small focus of pneumonia.       Latest radiology report personally reviewed.    Note created using dragon voice recognition software so sounds alike errors may have escaped editing.      11/18/2021   Levar Landeros MD  Hospitalist, [facility]  Pager: [phone]

## 2021-11-18 NOTE — PROGRESS NOTES
[facility]    ([facility], [facility], [facility])  Daily PAIN Progress Note    Assessment/Plan:  Salome Maravilla is an opioid naïve 76 year old female who was admitted on 11/10/2021. I was asked to see the patient for acute pain s/p chest tube placement. History of stage 1 breast CA s/p lumpectomy and XRT in 2015 who presents with progressive cough and chest pain. Pt vaccinated with COVID diagnosed 10/28 who presents with pleuritic R sided chest pain, now with chest tube and undergoing lytic therapy. Did well with lytics yesterday, awaiting treatment today.      PLAN:   1) Chest pain with chest tube and now premedicating prior to lytics. Oxycodone 10 and gabapentin 100mg prior to. Monitor closely for hypoxia given covid, opiid use, advanced age, and opioid naive status.  Suggest increasing activity if possible.  2)Multimodal Medication Therapy  Topical:  lidocaine patches  NSAID'S: crcl = 67.5 ml/min, hgb 9.8 ml/min  Adjuvants: APAP QID. And trial gabapentin 100 mg po before alteplase for chest pain  Antidepressants/anxiolytics: sertraline 200 mg po daily (home med), trazodone 150 mg po at bedtime (home med)  Opioids: oxycodone 5 mg po q3h prn - has been effective  Add 10mg oxycodone PRN prior to alteplase   3)Non-medication interventions- Ice, Heat, physical therapy,   incentive spirometry  4)Constipation Prophylaxis- prn ordered only  will add scheduled bowel meds   5) Follow up   -PCP is Charity Mann  -Discharge Recommendations - We recommend prescribing the following at the time of discharge: TBD     MN -pulled from system on 11/16/21. This indicated no opioid use or controlled medication use in th past 12 months.        Subjective:    Felicity indicates that her pain is well controlled.  She states that she had her oxycodone and gabapentin at 6 AM this morning.  She is still waiting for the instillation of alteplase.  I discussed with the nurse and she states she is  "waiting for hemoglobin prior to giving the alteplase.      Abscess of middle lobe of right lung with pneumonia (H)   Patient Active Problem List   Diagnosis     Breast cancer, stage 1, right (H)     Osteopenia determined by x-ray     Pneumonia due to infectious organism, unspecified laterality, unspecified part of lung     Abscess of middle lobe of right lung with pneumonia (H)     Infection due to 2019 novel coronavirus        History   Drug Use No         Tobacco Use      Smoking status: Former Smoker        Quit date: 1988        Years since quittin.5      Smokeless tobacco: Never Used          acetaminophen  975 mg Oral Q6H     alteplase (ACTIVASE) 10 mg and dornase 5 mg in NS syringe for chest tube instillation  50 mL Chest Tube Q12H     calcium carbonate-vitamin D  1 tablet Oral BID     cefTRIAXone  1 g Intravenous Q24H     enoxaparin ANTICOAGULANT  40 mg Subcutaneous Q24H     lactobacillus rhamnosus (GG)  1 capsule Oral Daily     lidocaine  3 patch Transdermal Q24H     lidocaine   Transdermal Q8H     magnesium oxide  200 mg Oral BID     multivitamin, therapeutic  1 tablet Oral Daily     pantoprazole  40 mg Oral Daily     polyethylene glycol  17 g Oral Daily     senna-docusate  2 tablet Oral BID     sertraline  200 mg Oral Daily     sodium chloride (PF)  3 mL Irrigation Q8H     sodium chloride (PF)  3 mL Intracatheter Q8H     traZODone  150 mg Oral At Bedtime     valACYclovir  1,000 mg Oral At Bedtime     vitamin C  250 mg Oral At Bedtime       Objective:  Vital signs in last 24 hours:  B/P: 108/52, T: 98.3, P: 80, R: 16   Blood pressure 121/57, pulse 85, temperature 98.4  F (36.9  C), temperature source Oral, resp. rate 18, height 1.664 m (5' 5.5\"), weight 63.5 kg (139 lb 14.4 oz), SpO2 95 %.      Weight:   Wt Readings from Last 2 Encounters:   21 63.5 kg (139 lb 14.4 oz)   21 62.6 kg (138 lb 1.6 oz)           Intake/Output:    Intake/Output Summary (Last 24 hours) at 2021 " 0829  Last data filed at 11/17/2021 0128  Gross per 24 hour   Intake 10 ml   Output 175 ml   Net -165 ml        Review of Systems:   As per subjective, all others negative.    Physical Exam:  Constitutional: healthy, alert and no distress-laying flat in bed  Head: negative  Neck: negative findings: no asymmetry, masses, or scars  Resp- no dyspnea  GI- NO BM   Psych- calm and pleasant          Lab Results:  Personally Reviewed.   Last Comprehensive Metabolic Panel:  Sodium   Date Value Ref Range Status   11/18/2021 135 (L) 136 - 145 mmol/L Final     Potassium   Date Value Ref Range Status   11/18/2021 4.0 3.5 - 5.0 mmol/L Final     Chloride   Date Value Ref Range Status   11/18/2021 102 98 - 107 mmol/L Final     Carbon Dioxide (CO2)   Date Value Ref Range Status   11/18/2021 27 22 - 31 mmol/L Final     Anion Gap   Date Value Ref Range Status   11/18/2021 6 5 - 18 mmol/L Final     Glucose   Date Value Ref Range Status   11/18/2021 117 70 - 125 mg/dL Final     Urea Nitrogen   Date Value Ref Range Status   11/18/2021 11 8 - 28 mg/dL Final     Creatinine   Date Value Ref Range Status   11/18/2021 0.55 (L) 0.60 - 1.10 mg/dL Final     GFR Estimate   Date Value Ref Range Status   11/18/2021 90 >60 mL/min/1.73m2 Final     Comment:     As of July 11, 2021, eGFR is calculated by the CKD-EPI creatinine equation, without race adjustment. eGFR can be influenced by muscle mass, exercise, and diet. The reported eGFR is an estimation only and is only applicable if the renal function is stable.   05/04/2021 >60 >60 mL/min/1.73m2 Final     Calcium   Date Value Ref Range Status   11/18/2021 8.3 (L) 8.5 - 10.5 mg/dL Final        UA: No results found for: UAMP, UBARB, BENZODIAZEUR, UCANN, UCOC, OPIT, UPCP          Total unit/floor time 15  minutes, time consisted of the following, examination of patient, reviewing the record and lab results, and completing documentation. Coordination of care time with nurse.      Margo MAN,  CNS-BC, CNP, ACHPN  Acute Care Pain Management Program Hour 7a-1700  [facility] ([facility], Joes, JNs)   Page via [employer]- Click HERE to page Margo or call [phone]

## 2021-11-18 NOTE — PLAN OF CARE
Problem: Adult Inpatient Plan of Care  Goal: Absence of Hospital-Acquired Illness or Injury  Intervention: Identify and Manage Fall Risk  Recent Flowsheet Documentation  Taken 11/17/2021 2350 by Eleanor Kumar, RN  Safety Promotion/Fall Prevention:   lighting adjusted   nonskid shoes/slippers when out of bed   room near nurse's station   room organization consistent   safety round/check completed  Intervention: Prevent Skin Injury  Recent Flowsheet Documentation  Taken 11/17/2021 2347 by Eleanor Kumar, RN  Body Position:   position changed independently   turned   left     Problem: Infection (Pneumonia)  Goal: Resolution of Infection Signs and Symptoms  Intervention: Prevent Infection Progression  Recent Flowsheet Documentation  Taken 11/17/2021 2350 by Eleanor Kumar, RN  Isolation Precautions:   airborne precautions maintained   contact precautions maintained   droplet precautions maintained     Problem: Respiratory Compromise (Pneumonia)  Goal: Effective Oxygenation and Ventilation  Intervention: Optimize Oxygenation and Ventilation  Recent Flowsheet Documentation  Taken 11/17/2021 2347 by Eleanor Kumar, RN  Head of Bed (HOB) Positioning: HOB at 20 degrees   Pt pain controlled with oxycodone and tylenol. VSS but chest tube output continues to be bloody. Text sent to hospitalist without response. Resident paged and told to talk to intensivist who ordered Alteplase to be held pending stable hgb and vital signs. Waiting on lab results.

## 2021-11-18 NOTE — PROGRESS NOTES
Care Management Follow Up    Length of Stay (days): 8    Expected Discharge Date: 11/21/2021     Concerns to be Addressed:     COVID +, Chest tube with lytics, O2 needs, monitor labs, awaiting cx, IV abx, further PT/OT.   Patient plan of care discussed at interdisciplinary rounds: Yes    Anticipated Discharge Disposition:  TCU vs Home Care     Anticipated Discharge Services:  TBD  Anticipated Discharge DME:      Patient/family educated on Medicare website which has current facility and service quality ratings:    Education Provided on the Discharge Plan:  Yes to Camille ELLISON    Patient/Family in Agreement with the Plan:  Yes... hoping TCU will be needed at time of discharge    Referrals Placed by CM/SW:    Private pay costs discussed: Not applicable    Additional Information:  Spoke with Camille ELLISON to update on pt. And discuss discharge planning. 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 Currently Camille is taking care of pt's  while pt. Is in hospital. There have been family conversations if maybe JARROD is in the near future. Currently Camille said she is working with [facility] for possible assist with pt.  in the home. Camille stated that she is hoping pt. May need TCU level of care at discharge, as she is not sure if she can handle taking on care for both of her in laws at home. Wants pt. To be strong and close to baseline before coming back home and taking over care of pt. . Informed her that currently therapies are still recommending TCU, but that we anticipate pt. Will be here through the weekend and we will keep her updated and work with her on pt. Discharge needs.       Citlaly Harris RN

## 2021-11-18 NOTE — PLAN OF CARE
Right chest tube in place, water seal, no leaks. Hbg 8.7. ok to give alteplase per Pulmonary. SWAT nurse gave Alteplase at 0830. Pt was repositioned per order. Clamp open at 1030. Initial drainage 120ml, sanguinous fluid. Pt tolerated well. No increased pain like Pt reported with past administration. Continue to monitor.    Pt reported increased acid reflux. Received Protonix this morning. Pt asked to switch to omeprazole as its what she takes at home. Got order for TUMS as well. Gave TUMS. Pharmacy to clear and schedule omeprazole order. Pt declined acetaminophen as it is not coded and she believes this adds to her acid reflux. Pain 2/10 this morning. Lidocaine patches placed. 4/10 when declined acetaminophen. Declined need for other pain intervention.     Total chest tube output 400ml. sanguouse drainage. No air leak. Tube in place

## 2021-11-18 NOTE — PLAN OF CARE
Problem: Adult Inpatient Plan of Care  Goal: Plan of Care Review  Outcome: Improving   Educated pt on treatment plan, pt voiced understanding.    Problem: Activity Intolerance (Pulmonary Impairment)  Goal: Improved Activity Tolerance  Outcome: Improving   SBA with transfers.     CT remains in place to R chest area. SWAT infused TPA per order @ 1824. Pt had approx 60cc output from 6455-9610 prior to TPA infusion. Pt clamped per order x2hrs. Pt able to perform q15min position changes independently. Writer went into room/called into room as well to ensure position changes. Pt tolerated. Writer unclamped CT at approx. 2030. No increased pain noted after pt unclamped. From 6172-5497 CT output was 360 ml (minus 10cc NS irrigation order). Total output on PM shft of 430cc. Pt medicated prior to TPA infusion per order with PRN gabapentin & oxycodone 10mg. PRN oxycodone 5mg given again per PRN order @ approx. 2145. Pt sleeping at end of shift.

## 2021-11-18 NOTE — CONSULTS
"NUTRITION UPDATE    RD consult from RN for \"patient/family request\".   Called pt and states she did not request to talk to RD. Likely consult d/t concern pt not eating well.     See full RD assessment 11/16.    Pt reports her appetite/intake continues to be 50% of baseline-estimate intake yesterday 800 kcal, 25 g protein which meets 50% of estimated kcal and 35% of estimated protein needs.     Pt continues to decline supplements, \"I am not interested\".   Still no BM x 8 days. She took miralax and prune juice this am. She reports no  abdominal discomfort.     Encouraged intake of non starchy fruits and prune juice.       "

## 2021-11-18 NOTE — PROGRESS NOTES
Pulmonary Progress Note:    76 year old lady with history of stage 1 breast cancer s/p lumpectomy and radiation in 2015.  Breakthrough Covid infection diagnosed 10/28.  Presented to the hospital on 11/10 and found to have right sided pleural effusion.  Drained dry with thoracentesis.  Studies found to be consistent with complicated parapneumonic effusion, likely due to Streptococcus pneumonia.    Admit Date:  11/10/2021  Code Status:  DNR/DNI    Assessment/Plan:    Intrapleural lytics were initiated on 11/14, CT output > one liter following.  After the first dose, patient had significant pain.  This has resolved, and she did not experience this today with administration. Her last dose is tonight.  Pain team is also involved to help control pain.    Plan:  1.  Last dose of IP lytics tonight, this will have given her a total of 6 doses.    2.  Please chart chest tube I/O, continue CT to water seal.     3.  Expect CT output to be serosang. In color, Hgb stable.    4.  Goal SpO2 > 92%  Titrate O2 as tolerated.    5.  Continue Ceftriaxone. Continue to follow cultures.    6.  Non-contrast CT scan tomorrow am.    Subjective:  Patient has some right sided pain at chest tube site, especially with coughing.  Her pain is better controlled, however the narcotics cause her GERD to worsen.  She is on a PPI daily, and has prn TUMS and mylanta available.    Current Facility-Administered Medications   Medication     acetaminophen (TYLENOL) tablet 975 mg     alteplase (ACTIVASE) 10 mg, dornase alpha (PULMOZYME) 5 mg in sodium chloride 0.9 % 50 mL for chest tube instillation in syringe     alum & mag hydroxide-simethicone (MAALOX) suspension 30 mL     bisacodyl (DULCOLAX) Suppository 10 mg     calcium carbonate (TUMS) chewable tablet 500 mg     calcium carbonate-vitamin D (OS-EMILIANO with D) per tablet 1 tablet     cefTRIAXone (ROCEPHIN) 1 g vial to attach to  mL bag for ADULTS or NS 50 mL bag for PEDS     enoxaparin ANTICOAGULANT  "(LOVENOX) injection 40 mg     gabapentin (NEURONTIN) capsule 100 mg     lactobacillus rhamnosus (GG) (CULTURELL) capsule 1 capsule     Lidocaine (LIDOCARE) 4 % Patch 3 patch     lidocaine (LMX4) cream     lidocaine 1 % 0.1-1 mL     lidocaine patch in PLACE     magnesium oxide (MAG-OX) half-tab 200 mg     Medication instructions: Do NOT use nebulized medications     melatonin tablet 1 mg     multivitamin, therapeutic (THERA-VIT) tablet 1 tablet     naloxone (NARCAN) injection 0.2 mg    Or     naloxone (NARCAN) injection 0.4 mg    Or     naloxone (NARCAN) injection 0.2 mg    Or     naloxone (NARCAN) injection 0.4 mg     [START ON 11/19/2021] omeprazole (priLOSEC) CR capsule 40 mg     ondansetron (ZOFRAN-ODT) ODT tab 4 mg    Or     ondansetron (ZOFRAN) injection 4 mg     oxyCODONE (ROXICODONE) tablet 10 mg     oxyCODONE (ROXICODONE) tablet 5 mg     polyethylene glycol (MIRALAX) Packet 17 g     prochlorperazine (COMPAZINE) injection 5 mg    Or     prochlorperazine (COMPAZINE) tablet 5 mg    Or     prochlorperazine (COMPAZINE) suppository 12.5 mg     senna-docusate (SENOKOT-S/PERICOLACE) 8.6-50 MG per tablet 2 tablet     sertraline (ZOLOFT) tablet 200 mg     sodium chloride (PF) 0.9% PF flush 3 mL     sodium chloride (PF) 0.9% PF flush 3 mL     sodium chloride (PF) 0.9% PF flush 3 mL     traZODone (DESYREL) tablet 150 mg     valACYclovir (VALTREX) tablet 1,000 mg     vitamin C (ASCORBIC ACID) tablet 250 mg     EXAM:    Vitals: /55 (BP Location: Right arm)   Pulse 79   Temp 98.1  F (36.7  C) (Tympanic)   Resp 18   Ht 1.664 m (5' 5.5\")   Wt 63.5 kg (139 lb 14.4 oz)   SpO2 97%   BMI 22.93 kg/m    BMI= Body mass index is 22.93 kg/m .    General:  Awake, alert, up in chair.  Cardiac:  RRR, no murmurs noted.  Respiratory:  Lungs clear to auscultation, diminished in right base.  Abd:  + BS X 4 quds.  Soft, non-tender.  Extremities:  No edema noted.  Neuro:  Grossly intact.       ROS:  A 10-system review was " obtained and is negative with the exception of the symptoms noted above.     Micro  Urine Strep pneumo antigen positive  Legionella urine Ag neg  Blood with staph hominis     Pleural fluid 11/10  Studies reviewed.  Pleural culture negative.     RIGHT PLEURAL FLUID:    LARGE NUMBERS OF ACUTE INFLAMMATORY CELLS, POSSIBLE EARLY EMPYEMA    NEGATIVE FOR ATYPICAL OR MALIGNANT CELLS     IMAGIN/17 CXR - A right pleural drain is similarly positioned with pigtail in the upper lateral aspect of the pleural space. There is a crimp in the tube at the level of the insertion projecting at the level of the posterolateral seventh intercostal space. Opacity with   indistinct borders in the lower third of the right chest likely reflects a combination of residual complex pleural fluid and atelectasis. Mild but diffuse thickening of the pleura including the apex and along the mediastinal pleura. Focal lucency lucency   in the medial basal right chest adjacent to the right atrial heart border corresponds to loculated anterior pneumothorax on the prior CT.     There are interstitial opacities in the right mid and upper lung consistent with mild interstitial edema. No progressive airspace opacity.     Pleural fluid and atelectasis atelectasis in the medial left lower lobe obscures the medial 20% of the left hemidiaphragm, not increased.     Unchanged mild enlargement of the cardiac silhouette.     11/15/21 CXR:  Right pigtail pleural drain with trace pneumothorax near tube insertion site but no new significant pneumothorax. Further decrease in the small right pleural effusion and improved aeration of the right mid and lower lung. Decreasing retrocardiac left lower lung atelectasis or infiltrate. Stable borderline enlarged cardiac silhouette. Normal pulmonary vascularity. Healing fractures left ribs 3-6.     DONOVAN Hilario, CNP  Pulmonary Critical Care  Pager: [phone]

## 2021-11-19 ENCOUNTER — APPOINTMENT (OUTPATIENT)
Dept: CT IMAGING | Facility: HOSPITAL | Age: 76
DRG: 163 | End: 2021-11-19
Attending: NURSE PRACTITIONER
Payer: COMMERCIAL

## 2021-11-19 ENCOUNTER — APPOINTMENT (OUTPATIENT)
Dept: PHYSICAL THERAPY | Facility: HOSPITAL | Age: 76
DRG: 163 | End: 2021-11-19
Payer: COMMERCIAL

## 2021-11-19 ENCOUNTER — APPOINTMENT (OUTPATIENT)
Dept: RADIOLOGY | Facility: HOSPITAL | Age: 76
DRG: 163 | End: 2021-11-19
Attending: INTERNAL MEDICINE
Payer: COMMERCIAL

## 2021-11-19 LAB
ANION GAP SERPL CALCULATED.3IONS-SCNC: 4 MMOL/L (ref 5–18)
BUN SERPL-MCNC: 11 MG/DL (ref 8–28)
CALCIUM SERPL-MCNC: 8.2 MG/DL (ref 8.5–10.5)
CHLORIDE BLD-SCNC: 101 MMOL/L (ref 98–107)
CO2 SERPL-SCNC: 28 MMOL/L (ref 22–31)
CREAT SERPL-MCNC: 0.54 MG/DL (ref 0.6–1.1)
ERYTHROCYTE [DISTWIDTH] IN BLOOD BY AUTOMATED COUNT: 13 % (ref 10–15)
GFR SERPL CREATININE-BSD FRML MDRD: >90 ML/MIN/1.73M2
GLUCOSE BLD-MCNC: 105 MG/DL (ref 70–125)
HCT VFR BLD AUTO: 30.7 % (ref 35–47)
HGB BLD-MCNC: 9.9 G/DL (ref 11.7–15.7)
HOLD SPECIMEN: NORMAL
MAGNESIUM SERPL-MCNC: 1.8 MG/DL (ref 1.8–2.6)
MCH RBC QN AUTO: 30.7 PG (ref 26.5–33)
MCHC RBC AUTO-ENTMCNC: 32.2 G/DL (ref 31.5–36.5)
MCV RBC AUTO: 95 FL (ref 78–100)
PLATELET # BLD AUTO: 501 10E3/UL (ref 150–450)
POTASSIUM BLD-SCNC: 4.1 MMOL/L (ref 3.5–5)
RBC # BLD AUTO: 3.22 10E6/UL (ref 3.8–5.2)
SODIUM SERPL-SCNC: 133 MMOL/L (ref 136–145)
WBC # BLD AUTO: 19.9 10E3/UL (ref 4–11)

## 2021-11-19 PROCEDURE — 999N000157 HC STATISTIC RCP TIME EA 10 MIN

## 2021-11-19 PROCEDURE — 250N000009 HC RX 250: Performed by: NURSE PRACTITIONER

## 2021-11-19 PROCEDURE — 85041 AUTOMATED RBC COUNT: CPT | Performed by: INTERNAL MEDICINE

## 2021-11-19 PROCEDURE — 250N000011 HC RX IP 250 OP 636: Performed by: RADIOLOGY

## 2021-11-19 PROCEDURE — 272N000270 HC CIRCUIT, METANEB

## 2021-11-19 PROCEDURE — 250N000013 HC RX MED GY IP 250 OP 250 PS 637: Performed by: INTERNAL MEDICINE

## 2021-11-19 PROCEDURE — 94667 MNPJ CHEST WALL 1ST: CPT

## 2021-11-19 PROCEDURE — 71045 X-RAY EXAM CHEST 1 VIEW: CPT

## 2021-11-19 PROCEDURE — 120N000001 HC R&B MED SURG/OB

## 2021-11-19 PROCEDURE — 99231 SBSQ HOSP IP/OBS SF/LOW 25: CPT | Performed by: INTERNAL MEDICINE

## 2021-11-19 PROCEDURE — 94640 AIRWAY INHALATION TREATMENT: CPT | Mod: 76

## 2021-11-19 PROCEDURE — 250N000013 HC RX MED GY IP 250 OP 250 PS 637: Performed by: RADIOLOGY

## 2021-11-19 PROCEDURE — 36415 COLL VENOUS BLD VENIPUNCTURE: CPT | Performed by: INTERNAL MEDICINE

## 2021-11-19 PROCEDURE — 80048 BASIC METABOLIC PNL TOTAL CA: CPT | Performed by: INTERNAL MEDICINE

## 2021-11-19 PROCEDURE — 94640 AIRWAY INHALATION TREATMENT: CPT

## 2021-11-19 PROCEDURE — 97116 GAIT TRAINING THERAPY: CPT | Mod: GP | Performed by: PHYSICAL THERAPIST

## 2021-11-19 PROCEDURE — 99207 PR CDG-CODE CATEGORY CHANGED: CPT | Performed by: NURSE PRACTITIONER

## 2021-11-19 PROCEDURE — 999N000078 HC STATISTIC INTRAPULMONARY PERCUSSIVE VENT

## 2021-11-19 PROCEDURE — 83735 ASSAY OF MAGNESIUM: CPT | Performed by: INTERNAL MEDICINE

## 2021-11-19 PROCEDURE — 94660 CPAP INITIATION&MGMT: CPT

## 2021-11-19 PROCEDURE — 250N000011 HC RX IP 250 OP 636: Performed by: INTERNAL MEDICINE

## 2021-11-19 PROCEDURE — 99233 SBSQ HOSP IP/OBS HIGH 50: CPT | Performed by: NURSE PRACTITIONER

## 2021-11-19 PROCEDURE — 250N000013 HC RX MED GY IP 250 OP 250 PS 637: Performed by: PAIN MEDICINE

## 2021-11-19 PROCEDURE — 71250 CT THORAX DX C-: CPT

## 2021-11-19 PROCEDURE — 99231 SBSQ HOSP IP/OBS SF/LOW 25: CPT | Performed by: PAIN MEDICINE

## 2021-11-19 RX ORDER — BISACODYL 10 MG
10 SUPPOSITORY, RECTAL RECTAL ONCE
Status: COMPLETED | OUTPATIENT
Start: 2021-11-19 | End: 2021-11-19

## 2021-11-19 RX ORDER — ALBUTEROL SULFATE 0.83 MG/ML
2.5 SOLUTION RESPIRATORY (INHALATION) 4 TIMES DAILY
Status: DISCONTINUED | OUTPATIENT
Start: 2021-11-19 | End: 2021-11-22

## 2021-11-19 RX ORDER — ACETYLCYSTEINE 200 MG/ML
2 SOLUTION ORAL; RESPIRATORY (INHALATION) 4 TIMES DAILY
Status: DISCONTINUED | OUTPATIENT
Start: 2021-11-19 | End: 2021-11-22

## 2021-11-19 RX ADMIN — CEFTRIAXONE SODIUM 1 G: 1 INJECTION, POWDER, FOR SOLUTION INTRAMUSCULAR; INTRAVENOUS at 13:55

## 2021-11-19 RX ADMIN — ACETAMINOPHEN 975 MG: 325 SOLUTION ORAL at 12:52

## 2021-11-19 RX ADMIN — ACETYLCYSTEINE 2 ML: 200 SOLUTION ORAL; RESPIRATORY (INHALATION) at 19:50

## 2021-11-19 RX ADMIN — THERA TABS 1 TABLET: TAB at 09:16

## 2021-11-19 RX ADMIN — Medication 1 CAPSULE: at 21:20

## 2021-11-19 RX ADMIN — LIDOCAINE 3 PATCH: 246 PATCH TOPICAL at 09:54

## 2021-11-19 RX ADMIN — ACETAMINOPHEN 975 MG: 325 SOLUTION ORAL at 21:21

## 2021-11-19 RX ADMIN — SERTRALINE HYDROCHLORIDE 200 MG: 50 TABLET ORAL at 09:15

## 2021-11-19 RX ADMIN — Medication 1 TABLET: at 09:15

## 2021-11-19 RX ADMIN — SENNOSIDES, DOCUSATE SODIUM 2 TABLET: 8.6; 5 TABLET ORAL at 09:16

## 2021-11-19 RX ADMIN — Medication 10 MG: at 12:43

## 2021-11-19 RX ADMIN — ACETYLCYSTEINE 2 ML: 200 SOLUTION ORAL; RESPIRATORY (INHALATION) at 16:41

## 2021-11-19 RX ADMIN — OMEPRAZOLE 40 MG: 20 CAPSULE, DELAYED RELEASE ORAL at 06:34

## 2021-11-19 RX ADMIN — TRAZODONE HYDROCHLORIDE 150 MG: 50 TABLET ORAL at 21:19

## 2021-11-19 RX ADMIN — ALBUTEROL SULFATE 2.5 MG: 2.5 SOLUTION RESPIRATORY (INHALATION) at 16:41

## 2021-11-19 RX ADMIN — VALACYCLOVIR 1000 MG: 500 TABLET, FILM COATED ORAL at 21:18

## 2021-11-19 RX ADMIN — ENOXAPARIN SODIUM 40 MG: 40 INJECTION SUBCUTANEOUS at 09:15

## 2021-11-19 RX ADMIN — POLYETHYLENE GLYCOL 3350 17 G: 17 POWDER, FOR SOLUTION ORAL at 09:16

## 2021-11-19 ASSESSMENT — ACTIVITIES OF DAILY LIVING (ADL)
ADLS_ACUITY_SCORE: 10
ADLS_ACUITY_SCORE: 8
ADLS_ACUITY_SCORE: 6
ADLS_ACUITY_SCORE: 8
ADLS_ACUITY_SCORE: 8
ADLS_ACUITY_SCORE: 6
ADLS_ACUITY_SCORE: 8
ADLS_ACUITY_SCORE: 6
ADLS_ACUITY_SCORE: 8
ADLS_ACUITY_SCORE: 8
ADLS_ACUITY_SCORE: 6
ADLS_ACUITY_SCORE: 10
ADLS_ACUITY_SCORE: 10
ADLS_ACUITY_SCORE: 6
ADLS_ACUITY_SCORE: 8
ADLS_ACUITY_SCORE: 10
ADLS_ACUITY_SCORE: 8
ADLS_ACUITY_SCORE: 10
ADLS_ACUITY_SCORE: 8
ADLS_ACUITY_SCORE: 10
ADLS_ACUITY_SCORE: 10

## 2021-11-19 NOTE — PROGRESS NOTES
[facility] ACUTE PAIN SERVICE    ([facility], [facility], [facility])  Daily PAIN Progress Note    Assessment/Plan:  Salome Maravilla is an opioid naïve 76 year old female who was admitted on 11/10/2021. I was asked to see the patient for acute pain s/p chest tube placement. History of stage 1 breast CA s/p lumpectomy and XRT in 2015 who presents with progressive cough and chest pain. Pt vaccinated with COVID diagnosed 10/28 who presented with pleuritic R sided chest pain, now with chest tube and undergoing lytic therapy. Having a CT at 930 today. Pain is controlled, although reporting GERD.      PLAN:   1) Chest pain with chest tube and now premedicating prior to lytics. Yesterday changed oxycodone to liquid given concern of GERD.  She feels that Tylenol is worsening the GERD.  Although it is more likely that other factors are increasing GERD symptoms for example, her head of bed has been quite low, has not yet decreased the dietary triggers, and she is taking vitamin C at bedtime.    2)Multimodal Medication Therapy  Topical:  lidocaine patches  NSAID'S: crcl = 67.5 ml/min, hgb 9.8 ml/min  Adjuvants: APAP QID- hold per pt request. And trial gabapentin 100 mg po before alteplase for chest pain  Antidepressants/anxiolytics: sertraline 200 mg po daily (home med), trazodone 150 mg po at bedtime (home med)  Opioids: oxycodone 5 mg po q3h prn -no need for liquid oxycodone if lytics stop  3)Non-medication interventions- Ice, Heat, physical therapy,   incentive spirometry  4)Constipation Prophylaxis- prn ordered only  will add scheduled bowel meds   5) Follow up   -PCP is Charity Mann  -Discharge Recommendations - We recommend prescribing the following at the time of discharge: likely none        [place] -pulled from system on 11/16/21. This indicated no opioid use or controlled medication use in th past 12 months.        Subjective:    Felicity indicates that her pain is well controlled.  She states that  "she had her oxycodone and gabapentin at 6 AM this morning.  She is still waiting for the instillation of alteplase.  I discussed with the nurse and she states she is waiting for hemoglobin prior to giving the alteplase.      Abscess of middle lobe of right lung with pneumonia (H)   Patient Active Problem List   Diagnosis     Breast cancer, stage 1, right (H)     Osteopenia determined by x-ray     Pneumonia due to infectious organism, unspecified laterality, unspecified part of lung     Abscess of middle lobe of right lung with pneumonia (H)     Infection due to 2019 novel coronavirus        History   Drug Use No         Tobacco Use      Smoking status: Former Smoker        Quit date: 1988        Years since quittin.5      Smokeless tobacco: Never Used          acetaminophen  975 mg Oral Q6H SRINIVASAN     calcium carbonate-vitamin D  1 tablet Oral BID     cefTRIAXone  1 g Intravenous Q24H     enoxaparin ANTICOAGULANT  40 mg Subcutaneous Q24H     lactobacillus rhamnosus (GG)  1 capsule Oral Daily     lidocaine  3 patch Transdermal Q24H     lidocaine   Transdermal Q8H     magnesium oxide  200 mg Oral BID     multivitamin, therapeutic  1 tablet Oral Daily     omeprazole  40 mg Oral QAM AC     polyethylene glycol  17 g Oral BID     senna-docusate  2 tablet Oral BID     sertraline  200 mg Oral Daily     sodium chloride (PF)  3 mL Irrigation Q8H     sodium chloride (PF)  3 mL Intracatheter Q8H     traZODone  150 mg Oral At Bedtime     valACYclovir  1,000 mg Oral At Bedtime     vitamin C  250 mg Oral Daily       Objective:  Vital signs in last 24 hours:  B/P: 108/52, T: 98.3, P: 80, R: 16   Blood pressure 113/55, pulse 90, temperature 98.3  F (36.8  C), temperature source Oral, resp. rate 18, height 1.664 m (5' 5.5\"), weight 63.5 kg (139 lb 14.4 oz), SpO2 95 %.      Weight:   Wt Readings from Last 2 Encounters:   21 63.5 kg (139 lb 14.4 oz)   21 62.6 kg (138 lb 1.6 oz) "           Intake/Output:    Intake/Output Summary (Last 24 hours) at 11/17/2021 0829  Last data filed at 11/17/2021 0128  Gross per 24 hour   Intake 10 ml   Output 175 ml   Net -165 ml        Review of Systems:   As per subjective, all others negative.    Physical Exam:  Constitutional: healthy, alert and no distress-laying flat in bed  Head: negative  Neck: negative findings: no asymmetry, masses, or scars  Resp- no dyspnea  GI- NO BM   Psych- calm and pleasant          Lab Results:  Personally Reviewed.   Last Comprehensive Metabolic Panel:  Sodium   Date Value Ref Range Status   11/19/2021 133 (L) 136 - 145 mmol/L Final     Potassium   Date Value Ref Range Status   11/19/2021 4.1 3.5 - 5.0 mmol/L Final     Chloride   Date Value Ref Range Status   11/19/2021 101 98 - 107 mmol/L Final     Carbon Dioxide (CO2)   Date Value Ref Range Status   11/19/2021 28 22 - 31 mmol/L Final     Anion Gap   Date Value Ref Range Status   11/19/2021 4 (L) 5 - 18 mmol/L Final     Glucose   Date Value Ref Range Status   11/19/2021 105 70 - 125 mg/dL Final     Urea Nitrogen   Date Value Ref Range Status   11/19/2021 11 8 - 28 mg/dL Final     Creatinine   Date Value Ref Range Status   11/19/2021 0.54 (L) 0.60 - 1.10 mg/dL Final     GFR Estimate   Date Value Ref Range Status   11/19/2021 >90 >60 mL/min/1.73m2 Final     Comment:     As of July 11, 2021, eGFR is calculated by the CKD-EPI creatinine equation, without race adjustment. eGFR can be influenced by muscle mass, exercise, and diet. The reported eGFR is an estimation only and is only applicable if the renal function is stable.   05/04/2021 >60 >60 mL/min/1.73m2 Final     Calcium   Date Value Ref Range Status   11/19/2021 8.2 (L) 8.5 - 10.5 mg/dL Final        UA: No results found for: UAMP, UBARB, BENZODIAZEUR, UCANN, UCOC, OPIT, UPCP          Total unit/floor time 15  minutes, time consisted of the following, examination of patient, reviewing the record and lab results, and  completing documentation. Coordination of care time with nurse. Talked with nurse to make several changes as well. Yesterday added gi cocktail, discussed lifestyle and GERD/envirnomental factors. Discussed pain and liquid changes with Dr. Landeros.           Margo MAN, CNS-BC, CNP, ACHPN  Acute Care Pain Management Program Hour 7a-1700  [facility] ([place], [place], [place])   Page via [facility]- Click HERE to page Margo or call [phone]

## 2021-11-19 NOTE — PROGRESS NOTES
Pulmonary Progress Note:     76 year old lady with history of stage 1 breast cancer s/p lumpectomy and radiation in 2015.  Breakthrough Covid infection diagnosed 10/28.  Presented to the hospital on 11/10 and found to have right sided pleural effusion.  Drained dry with thoracentesis.  Studies found to be consistent with complicated parapneumonic effusion, likely due to Streptococcus pneumonia.     Admit Date:  11/10/2021  Code Status:  DNR/DNI     Assessment/Plan:     Intrapleural lytics were initiated on 11/14, CT output > one liter following.  After the first dose, patient had significant pain.  This has resolved, and she did not experience this today with administration. She has completed her lytic therapy as of 11/18.   Pain team is also involved to help control pain.  Repeat CT this am shows continued effusion on right, with new small effusion on left side.  Reviewed images with Dr. Quesada, plan to have Dr. Gutierrez see to evaluate, as well as start increasing activity.  Mucomyst and Metaneb therapies added for improved pulmonary hygiene.      Plan:  1. Completed total of 6 dose of lytics.      2.  Please chart chest tube I/O, continue CT to water seal.      3.  Expect CT output to be serosang. In color, Hgb stable.     4.  Goal SpO2 > 92%  Titrate O2 as tolerated.     5.  Continue Ceftriaxone. Continue to follow cultures.     6.  Add metanebs and mucomyst QID.     7.  Have general surgery to see, Dr. Gutierrez.     Subjective:  Patient has some right sided pain at chest tube site, especially with coughing.  Her pain is better controlled.  She had a good day, walked around unit, got bed bath.         acetylcysteine  2 mL Nebulization 4x Daily     albuterol  2.5 mg Nebulization 4x Daily     calcium carbonate-vitamin D  1 tablet Oral BID     cefTRIAXone  1 g Intravenous Q24H     enoxaparin ANTICOAGULANT  40 mg Subcutaneous Q24H     lactobacillus rhamnosus (GG)  1 capsule Oral Daily     lidocaine  3 patch Transdermal  "Q24H     lidocaine   Transdermal Q8H     magnesium oxide  200 mg Oral BID     multivitamin, therapeutic  1 tablet Oral Daily     omeprazole  40 mg Oral QAM AC     polyethylene glycol  17 g Oral BID     senna-docusate  2 tablet Oral BID     sertraline  200 mg Oral Daily     sodium chloride (PF)  3 mL Irrigation Q8H     sodium chloride (PF)  3 mL Intracatheter Q8H     traZODone  150 mg Oral At Bedtime     valACYclovir  1,000 mg Oral At Bedtime     vitamin C  250 mg Oral Daily     EXAM:  Vitals: /58 (BP Location: Right arm)   Pulse 81   Temp 98.6  F (37  C) (Oral)   Resp 18   Ht 1.664 m (5' 5.5\")   Wt 63.5 kg (139 lb 14.4 oz)   SpO2 91%   BMI 22.93 kg/m    BMI= Body mass index is 22.93 kg/m .  General:  Awake, alert, up in chair.  Cardiac:  RRR, no murmurs noted.  Respiratory:  Lungs clear to auscultation, diminished in right base. Chest tube with serosang drainage.   Abd:  + BS X 4 quds.  Soft, non-tender.  Extremities:  No edema noted.  Neuro:  Grossly intact.      ROS:  A 10-system review was obtained and is negative with the exception of the symptoms noted above.    Micro  Urine Strep pneumo antigen positive  Legionella urine Ag neg  Blood with staph hominis     Pleural fluid 11/10  Studies reviewed.  Pleural culture negative.     RIGHT PLEURAL FLUID:    LARGE NUMBERS OF ACUTE INFLAMMATORY CELLS, POSSIBLE EARLY EMPYEMA    NEGATIVE FOR ATYPICAL OR MALIGNANT CELLS     IMAGIN/17 CXR - A right pleural drain is similarly positioned with pigtail in the upper lateral aspect of the pleural space. There is a crimp in the tube at the level of the insertion projecting at the level of the posterolateral seventh intercostal space. Opacity with   indistinct borders in the lower third of the right chest likely reflects a combination of residual complex pleural fluid and atelectasis. Mild but diffuse thickening of the pleura including the apex and along the mediastinal pleura. Focal lucency lucency   in the " medial basal right chest adjacent to the right atrial heart border corresponds to loculated anterior pneumothorax on the prior CT.     There are interstitial opacities in the right mid and upper lung consistent with mild interstitial edema. No progressive airspace opacity.     Pleural fluid and atelectasis atelectasis in the medial left lower lobe obscures the medial 20% of the left hemidiaphragm, not increased.     Unchanged mild enlargement of the cardiac silhouette.     11/15/21 CXR:  Right pigtail pleural drain with trace pneumothorax near tube insertion site but no new significant pneumothorax. Further decrease in the small right pleural effusion and improved aeration of the right mid and lower lung. Decreasing retrocardiac left lower lung atelectasis or infiltrate. Stable borderline enlarged cardiac silhouette. Normal pulmonary vascularity. Healing fractures left ribs 3-6.       EXAM: CT CHEST W/O CONTRAST  LOCATION: [facility]  DATE/TIME: 11/19/2021 9:46 AM     INDICATION: Empyema  COMPARISON: CT chest without contrast 11/16/2021  TECHNIQUE: CT chest without IV contrast. Multiplanar reformats were obtained. Dose reduction techniques were used.  CONTRAST: None.     FINDINGS:   LUNGS AND PLEURA: Right pleural drain passes through the posterolateral right intercostal space with catheter positioned in the upper lateral pleural space, unchanged from 11/16/2021. Heterogeneous attenuation debris layers dependently within the   posterior right pleural space higher attenuation and small gas components with adjacent pleural fluid. Small loculated right anterior pneumothorax with fairly exuberant or surrounding visceral and parietal pleural thickening. Smaller left pleural   effusion is homogeneous attenuation, layering posteriorly.     Unchanged consolidation and volume loss of the caudal and anterior right middle lobe and a limited subsegment of the anterior medial right upper lobe along the  mediastinal pleura. Sparse peripheral groundglass airspace opacity in the anterior apical left   upper lobe.     Trachea and central airways are patent.     MEDIASTINUM: Unchanged small circumferential pericardial effusion. Cardiac chambers are not enlarged. Main pulmonary artery is normal in size. Normal caliber thoracic aorta. Decompressed esophagus. No new enlargement of mediastinal or hilar lymph nodes.     CORONARY ARTERY CALCIFICATION: None.     UPPER ABDOMEN: No new findings in the imaged upper abdomen.     MUSCULOSKELETAL: Medullary lucency and irregular cortex of the right anterior second through seventh ribs in a pattern consistent with previous tangential beam chest wall radiation, unchanged. Bony callus around the left lateral third through sixth ribs   consistent with subacute fractures. No thoracic vertebral compression deformities.                                                                      IMPRESSION:      1.  Unchanged position of the right pleural drain. Localized visceral and parietal pleural thickening anterior to the right middle lobe and low anteromedial right upper lobe with adjacent lung consolidation.  2.  New heterogeneous attenuation debris layering dependently in the posterior right pleural space, most likely representing organized blood products.  3.  Unchanged circumferential pericardial effusion and small posteriorly layering left pleural effusion.

## 2021-11-19 NOTE — PLAN OF CARE
Problem: Adult Inpatient Plan of Care  Goal: Plan of Care Review  Outcome: Improving  Goal: Patient-Specific Goal (Individualized)  Outcome: Improving  Goal: Absence of Hospital-Acquired Illness or Injury  Outcome: Improving  Intervention: Identify and Manage Fall Risk  Recent Flowsheet Documentation  Taken 11/19/2021 0405 by Eleanor Kumar RN  Safety Promotion/Fall Prevention: lighting adjusted  Taken 11/19/2021 0005 by Eleanor Kumar RN  Safety Promotion/Fall Prevention: lighting adjusted  Intervention: Prevent Skin Injury  Recent Flowsheet Documentation  Taken 11/19/2021 0405 by Eleanor Kumar RN  Body Position: position changed independently  Taken 11/19/2021 0005 by Eleanor Kumar RN  Body Position: position changed independently  Goal: Optimal Comfort and Wellbeing  Outcome: Improving  Goal: Readiness for Transition of Care  Outcome: Improving     Problem: Risk for Delirium  Goal: Optimal Coping  Outcome: Improving  Goal: Improved Behavioral Control  Outcome: Improving  Goal: Improved Attention and Thought Clarity  Outcome: Improving  Goal: Improved Sleep  Outcome: Improving     Problem: Fluid Imbalance (Pneumonia)  Goal: Fluid Balance  Outcome: Improving     Problem: Infection (Pneumonia)  Goal: Resolution of Infection Signs and Symptoms  Outcome: Improving  Intervention: Prevent Infection Progression  Recent Flowsheet Documentation  Taken 11/19/2021 0405 by Eleanor Kumar RN  Isolation Precautions: airborne precautions discontinued  Taken 11/19/2021 0005 by Eleanor Kumar RN  Isolation Precautions: airborne precautions discontinued     Problem: Respiratory Compromise (Pneumonia)  Goal: Effective Oxygenation and Ventilation  Outcome: Improving     Problem: OT General Care Plan  Goal: Transfer (OT)  Description: Transfer (OT)  Outcome: Improving  Goal: Toilet Transfer/Toileting (OT)  Description: Toilet Transfer/Toileting (OT)  Outcome: Improving     Problem: Activity Intolerance  (Pulmonary Impairment)  Goal: Improved Activity Tolerance  Outcome: Improving     Problem: Airway Clearance Ineffective (Pulmonary Impairment)  Goal: Effective Airway Clearance  Outcome: Improving     Problem: Gas Exchange Impaired (Pulmonary Impairment)  Goal: Optimal Gas Exchange  Outcome: Improving     Pt a/o without c/o of pain this shift. Pt refusing scheduled tylenol. Pt tolerated alteplase with chest tube output of 310 this shift. Chest tube on water seal. Tele maintains NSR and VSS on 1L nc. Pt to have CT at 9:20 this am. Will monitor.

## 2021-11-19 NOTE — PROGRESS NOTES
RT Note    Patient ordered on metaneb treatment with scheduled QID albuterol and mucomyst.     Patient had difficulty tolerating metaneb. RT will continue to work with patient on acclimation to the metaneb.     BS diminished   1LPM nasal cannula    Samantha Frank, RT

## 2021-11-19 NOTE — PROGRESS NOTES
Daily Progress Note        CODE STATUS:  No CPR- Do NOT Intubate    11/17/21  Assessment/Plan:  76 year old lady with history of stage 1 breast cancer s/p lumpectomy and radiation in 2015, depression, GERD, OA, presented with right sided chest pain and dry cough. She was diagnosed with breakthrough covid-19 infection on 10/28 and presented on 11/10 with several days of worsening right-sided pleuritic chest pain, cough, low grade fevers and chills at home.     Pneumonia  Complex parapneumonic effusion  -- Likely due to post viral strep PNA. The right pleural effusion was drained at bedside 11/10  but effusion recurred   -- Chest tube placement 11/13 , pulm managing. About 1 litre output noted from the CT.  -- C/w broad spectrum abx per pulm      Chest pain   -- Clinically better. Occurred after lytic therapy given for chest tube  -- Declined further lytic therapy yesterday, but later agreed to take it.  -- Appreciate pain team, pulmonary consult  -- EKG unremarkable for ischemia     Acute resp failure due to above   -- Remains on supp o2  -- Cxr with no large ptx per rads      COVID-19 - since 10/28, breakthrough case  -- Completed remdesivir but no steroids per pulm for now.   -- Appreciate infectionist's input. Patient can be considered  recovered covid now. Stopped isolation 11/18.     GERD/Heart burn  -- Patient believes its due to PO oxycodone. Changed to liquid oxycodone per pain team  -- Changed protonix to omeprazole per patient's request. Changed HS vit C to am dosing.     Constipation:  -- Cont with stool softners. Suppository/enema prn    Anemia:  -- Drop in hgb noted. No e/o GI bleeding.   -- Will monitor hgb    DVT PPX lovenox      Barriers to discharge resp failure      Anticipated Discharge date  Few days     Daughter in law, Camille called for update. Answered her questions.      LOS: 7 days     Subjective:  Interval History: Patient seen and examined this morning. She reported doing fairly well. No  fever or chills. The heart burn is slightly better. Appetite remains fair. Continues to feel very weak in general.     Review of Systems:   As mentioned in subjective.    Patient Active Problem List   Diagnosis     Breast cancer, stage 1, right (H)     Osteopenia determined by x-ray     Pneumonia due to infectious organism, unspecified laterality, unspecified part of lung     Abscess of middle lobe of right lung with pneumonia (H)     Infection due to 2019 novel coronavirus       Scheduled Meds:    calcium carbonate-vitamin D  1 tablet Oral BID     cefTRIAXone  1 g Intravenous Q24H     enoxaparin ANTICOAGULANT  40 mg Subcutaneous Q24H     lactobacillus rhamnosus (GG)  1 capsule Oral Daily     lidocaine  3 patch Transdermal Q24H     lidocaine   Transdermal Q8H     magnesium oxide  200 mg Oral BID     multivitamin, therapeutic  1 tablet Oral Daily     omeprazole  40 mg Oral QAM AC     polyethylene glycol  17 g Oral BID     senna-docusate  2 tablet Oral BID     sertraline  200 mg Oral Daily     sodium chloride (PF)  3 mL Irrigation Q8H     sodium chloride (PF)  3 mL Intracatheter Q8H     traZODone  150 mg Oral At Bedtime     valACYclovir  1,000 mg Oral At Bedtime     vitamin C  250 mg Oral Daily     Continuous Infusions:    - MEDICATION INSTRUCTIONS -       PRN Meds:.acetaminophen, alum & mag hydroxide-simethicone, bisacodyl, calcium carbonate, gabapentin, lidocaine 4%, lidocaine viscous 2% 15 mL and maalox/mylanta w/ simethicone 15 mL (GI COCKTAIL), lidocaine (buffered or not buffered), - MEDICATION INSTRUCTIONS -, melatonin, naloxone **OR** naloxone **OR** naloxone **OR** naloxone, ondansetron **OR** ondansetron, oxyCODONE, prochlorperazine **OR** prochlorperazine **OR** prochlorperazine, sodium chloride (PF)    Objective:  Vital signs in last 24 hours:  Temp:  [98  F (36.7  C)-98.5  F (36.9  C)] 98.5  F (36.9  C)  Pulse:  [86-96] 86  Resp:  [18-20] 20  BP: (107-131)/(45-56) 124/56  SpO2:  [92 %-96 %] 92  %        Intake/Output Summary (Last 24 hours) at 11/17/2021 1517  Last data filed at 11/17/2021 1415  Gross per 24 hour   Intake 250 ml   Output 285 ml   Net -35 ml       Physical Exam:    General: Not in obvious distress.  HEENT: NC, AT   Chest: Shallow breathing, right chest tube in place.  Heart: S1S2 normal, regular. No M/R/G  Abdomen: Soft. NT, ND. Bowel sounds- active.  Extremities: No legs swelling  Neuro: alert and awake, grossly non-focal      Lab Results:(I have personally reviewed the results)    Recent Results (from the past 24 hour(s))   CBC with platelets    Collection Time: 11/19/21  5:55 AM   Result Value Ref Range    WBC Count 19.9 (H) 4.0 - 11.0 10e3/uL    RBC Count 3.22 (L) 3.80 - 5.20 10e6/uL    Hemoglobin 9.9 (L) 11.7 - 15.7 g/dL    Hematocrit 30.7 (L) 35.0 - 47.0 %    MCV 95 78 - 100 fL    MCH 30.7 26.5 - 33.0 pg    MCHC 32.2 31.5 - 36.5 g/dL    RDW 13.0 10.0 - 15.0 %    Platelet Count 501 (H) 150 - 450 10e3/uL   Basic metabolic panel    Collection Time: 11/19/21  5:55 AM   Result Value Ref Range    Sodium 133 (L) 136 - 145 mmol/L    Potassium 4.1 3.5 - 5.0 mmol/L    Chloride 101 98 - 107 mmol/L    Carbon Dioxide (CO2) 28 22 - 31 mmol/L    Anion Gap 4 (L) 5 - 18 mmol/L    Urea Nitrogen 11 8 - 28 mg/dL    Creatinine 0.54 (L) 0.60 - 1.10 mg/dL    Calcium 8.2 (L) 8.5 - 10.5 mg/dL    Glucose 105 70 - 125 mg/dL    GFR Estimate >90 >60 mL/min/1.73m2   Magnesium    Collection Time: 11/19/21  5:55 AM   Result Value Ref Range    Magnesium 1.8 1.8 - 2.6 mg/dL   Extra Blue Top Tube    Collection Time: 11/19/21  5:55 AM   Result Value Ref Range    Hold Specimen JIC        All laboratory and imaging data in the past 24 hours reviewed  Serum Glucose range:   Recent Labs   Lab 11/19/21  0555 11/18/21  0651 11/17/21  0620 11/16/21  0621    117 109 115     ABG: No lab results found in last 7 days.  CBC:   Recent Labs   Lab 11/19/21  0555 11/18/21  0709 11/17/21  0620 11/16/21  0621 11/15/21  1520  11/15/21  0558   WBC 19.9* 14.6* 12.8* 12.8*  --  13.3*   HGB 9.9* 8.7* 8.5* 9.8*   < > 10.3*   HCT 30.7* 27.8* 27.3* 29.5*  --  32.5*   MCV 95 99 99 96  --  96   * 400 355 385  --  381   NEUTROPHIL  --   --  84 83  --  86   LYMPH  --   --  6 6  --  5   MONOCYTE  --   --  6 8  --  7   EOSINOPHIL  --   --  2 1  --  1    < > = values in this interval not displayed.     Chemistry:   Recent Labs   Lab 11/19/21  0555 11/18/21  0651 11/17/21  0620   * 135* 138   POTASSIUM 4.1 4.0 4.0   CHLORIDE 101 102 104   CO2 28 27 29   BUN 11 11 10   CR 0.54* 0.55* 0.60   GFRESTIMATED >90 90 88   EMILIANO 8.2* 8.3* 8.6   MAG 1.8 1.8 2.0     Coags:  No results for input(s): INR, PROTIME, PTT in the last 168 hours.    Invalid input(s): APTT  Cardiac Markers:  No results for input(s): CKTOTAL, TROPONINI in the last 168 hours.       XR Chest Port 1 View    Result Date: 11/17/2021  EXAM: XR CHEST PORT 1 VIEW LOCATION: [facility] DATE/TIME: 11/17/2021 6:28 AM INDICATION: chest tube in place. COMPARISON: Portable AP view the chest 11/16/2021 at 0606 hours; chest CT 11/16/2021 at 0858 hours     IMPRESSION: A right pleural drain is similarly positioned with pigtail in the upper lateral aspect of the pleural space. There is a crimp in the tube at the level of the insertion projecting at the level of the posterolateral seventh intercostal space. Opacity with indistinct borders in the lower third of the right chest likely reflects a combination of residual complex pleural fluid and atelectasis. Mild but diffuse thickening of the pleura including the apex and along the mediastinal pleura. Focal lucency lucency  in the medial basal right chest adjacent to the right atrial heart border corresponds to loculated anterior pneumothorax on the prior CT. There are interstitial opacities in the right mid and upper lung consistent with mild interstitial edema. No progressive airspace opacity. Pleural fluid and atelectasis  atelectasis in the medial left lower lobe obscures the medial 20% of the left hemidiaphragm, not increased. Unchanged mild enlargement of the cardiac silhouette.    XR Chest Port 1 View    Result Date: 11/16/2021  EXAM: XR CHEST PORT 1 VIEW LOCATION: [facility] DATE/TIME: 11/16/2021 5:39 AM INDICATION: Follow-up chest tube COMPARISON: 11/15/2021     IMPRESSION: Right chest tube in stable position. No pneumothorax. Tiny right pleural effusion remains. Small left pleural effusion is stable. Atelectasis left lower lobe behind the heart is stable. Minimal diffuse interstitial infiltrates stable. No new findings.    XR Chest Port 1 View    Result Date: 11/15/2021  EXAM: XR CHEST PORT 1 VIEW LOCATION: [facility] DATE/TIME: 11/15/2021 6:30 AM INDICATION: chest tube in place. COMPARISON: 11/14/2021     IMPRESSION: Right pigtail pleural drain with trace pneumothorax near tube insertion site but no new significant pneumothorax. Further decrease in the small right pleural effusion and improved aeration of the right mid and lower lung. Decreasing retrocardiac left lower lung atelectasis or infiltrate. Stable borderline enlarged cardiac silhouette. Normal pulmonary vascularity. Healing fractures left ribs 3-6. Findings discussed with Dr Avitia.    XR Chest Port 1 View    Result Date: 11/14/2021  EXAM: XR CHEST PORT 1 VIEW LOCATION: [facility] DATE/TIME: 11/14/2021 5:59 AM INDICATION: chest tube in place. COMPARISON: 11/12/2021.     IMPRESSION: Interval placement of right pigtail pleural drain. Decreased small to moderate right pleural effusion. Improved aeration of the right mid and lower lung. Decreasing retrocardiac left lower lung atelectasis or infiltrate. Stable borderline enlarged cardiac silhouette. Normal pulmonary vascularity. No pneumothorax.    XR Chest Port 1 View    Result Date: 11/12/2021  EXAM: XR CHEST PORT 1 VIEW LOCATION: [facility] DATE/TIME: 11/12/2021 1:51 PM INDICATION: complicated pleural effusion COMPARISON: 11/10/2021     IMPRESSION: The right pleural effusion is increasing. Stable heart size. No pulmonary edema. Increasing left medial basilar opacity could represent atelectasis or pneumonia.    XR Chest Port 1 View    Result Date: 11/10/2021  EXAM: XR CHEST PORT 1 VIEW LOCATION: [facility] DATE/TIME: 11/10/2021 6:22 PM INDICATION: s/p right thoracentesis, eval for PTX, eval for reduction in effusion. COMPARISON: CT earlier today.     IMPRESSION: Infiltrate right lung base by CT mostly in the right middle lobe. Small right pleural effusion. No pneumothorax. Left lung is clear. Scoliosis. Healing left rib fractures.    IR Chest Tube Place Non Tunneled Right    [facility] Date: [facility]13/2021  [facility] DATE: 11/13/2021 PROCEDURE: RIGHT ULTRASOUND GUIDED 14 Mosotho CHEST TUBE PLACEMENT INTERVENTIONAL RADIOLOGIST: Citlaly Rosales DO INDICATION: Recurrent pleural effusion, COVID. Effusion concerning for empyema CONSENT: The risks, benefits and alternatives of right chest tube were discussed with the patient  in detail. All questions were answered. Informed consent was given to proceed with the procedure. MEDICATIONS: 1% lidocaine and 25 mcg fentanyl IV CONTRAST: None ANTIBIOTICS: None. FLUOROSCOPIC TIME: 0 minutes. RADIATION DOSE: Air Kerma: 0 mGy. COMPLICATIONS: No immediate complications. STERILE BARRIER TECHNIQUE: Maximum sterile barrier technique was used. Cutaneous antisepsis was performed at the operative site with application of 2% chlorhexidine and large sterile drape. Prior to the procedure, the  and assistant performed hand hygiene and wore hat, mask, sterile gown, and sterile gloves during the entire procedure. PROCEDURE/FINDINGS:  Following a discussion of the risks, benefits, indications and alternatives to treatment,  "appropriate informed consent was obtained.  The patient was brought to the interventional radiology suite and placed upright on the table. The right hemithorax was prepped and draped in the usual sterile fashion. A timeout was performed per universal protocol policy to confirm the correct patient, site and procedure to be performed. A preliminary ultrasound was performed to access for the appropriate site to access the pleural effusion. These images reveal a large right pleural effusion and an ultrasound image was archived.  Once an appropriate site for chest tube placement was localized, the overlying skin was anesthetized with 1% Lidocaine. Under direct ultrasound guidance, a 5 Slovenian Yueh needle was advanced into the pleural space via an intercostal approach. The catheter  was advanced off of the needle. A 0.035\" guidewire was advanced through the micropuncture sheath and the tract was serially dilated.  A 14 Slovenian nonlocking loop chest tube was placed with the tip coiled in the pleural space. The catheter was sutured to  the skin using 2-0 suture. A sterile dressing was applied. Chest tube was connected to a Pleuravac device in the interventional suite. Throughout the procedure, the patient was monitored by a radiology nurse for cardiac rhythm and oxygen saturation which remained stable. The patient tolerated the procedure well and left interventional radiology in stable condition.     IMPRESSION: Successful right sided 14 Slovenian chest tube placement under ultrasound guidance.     CT Chest Pulmonary Embolism w Contrast    Result Date: 11/10/2021  EXAM: CT CHEST PULMONARY EMBOLISM W CONTRAST LOCATION: [facility] DATE/TIME: 11/10/2021 1:42 PM INDICATION: Chest pain. Covid positive 10/28/2021. PE suspected, high prob COMPARISON: 09/20/2021 chest x-ray. CT chest 03/26/2019 TECHNIQUE: CT chest pulmonary angiogram during arterial phase injection of IV contrast. Multiplanar reformats and MIP " reconstructions were performed. Dose reduction techniques were used. CONTRAST: IsoVue 370 100mL FINDINGS: ANGIOGRAM CHEST: Pulmonary arteries are normal caliber and negative for pulmonary emboli. Thoracic aorta is negative for dissection. No CT evidence of right heart strain. LUNGS AND PLEURA: Dense focal infiltrate right middle lobe medial segment and some of the adjacent anterior segment right upper lobe. Tiny less than 1 cm microabscess within this infiltrate. Small right pleural effusion including some focal pleural reaction immediately adjacent to the pneumonia anteriorly. No other infiltrates. Would favor a bacterial pneumonia rather than COVID pneumonia. MEDIASTINUM/AXILLAE: Small pericardial effusion. UPPER ABDOMEN: Normal. MUSCULOSKELETAL: Normal.     IMPRESSION: 1.  Above findings most suggestive of moderately severe bacterial pneumonia right middle lobe with possible associated tiny microabscess within it. 2.  Small right pleural effusion and some focal pleural reaction immediately adjacent to the pneumonia anteriorly.    CT Chest w/o Contrast    Result Date: 11/16/2021  EXAM: CT CHEST W/O CONTRAST LOCATION: [facility] DATE/TIME: 11/16/2021 8:52 AM INDICATION: Pneumonia, effusion or abscess suspected, x-ray done. COMPARISON: Chest CTA dated 11/10/2021. TECHNIQUE: CT chest without IV contrast. Multiplanar reformats were obtained. Dose reduction techniques were used. CONTRAST: None. FINDINGS: LUNGS AND PLEURA: Interval placement of right chest tube with tip along the lateral pleural surface of the right upper lobe. There is a very small amount of loculated air anteriorly in the right middle lobe, no significant pneumothorax. There is persistent small pleural effusion at the right lung base with right lower lobe atelectasis and right middle lobe atelectasis. There is a small amount of loculated fluid along the right heart border in the inferior right middle lobe. There is a new small  left pleural effusion and there is atelectasis in the left lower lobe. There is bandlike atelectasis or scarring in the posterior right upper lobe. There is minimal airspace consolidation in the left upper lobe on image 19 of series 3. MEDIASTINUM/AXILLAE: Small to moderate pericardial effusion. Borderline right paratracheal lymph nodes. No thoracic aortic aneurysm. CORONARY ARTERY CALCIFICATION: Mild. UPPER ABDOMEN: No significant finding. MUSCULOSKELETAL: Thoracolumbar scoliosis. No fracture or suspicious bony lesion.     IMPRESSION: 1.  New right chest tube. There is persistent small loculated pleural effusion at the right lung base which does not communicate with the chest tube. There is additional small loculated hydropneumothorax in the anterior right middle lobe that also does not communicate with the chest tube. There is atelectasis in both lower lobes and in the right middle lobe. 2.  New small right pleural effusion. 3.  Patchy airspace opacities in the left upper lobe consistent with a small focus of pneumonia.       Latest radiology report personally reviewed.    Note created using dragon voice recognition software so sounds alike errors may have escaped editing.      11/19/2021   Levar Landeros MD  Hospitalist, [facility]  Pager: [phone]

## 2021-11-20 ENCOUNTER — APPOINTMENT (OUTPATIENT)
Dept: OCCUPATIONAL THERAPY | Facility: HOSPITAL | Age: 76
DRG: 163 | End: 2021-11-20
Payer: COMMERCIAL

## 2021-11-20 ENCOUNTER — APPOINTMENT (OUTPATIENT)
Dept: RADIOLOGY | Facility: HOSPITAL | Age: 76
DRG: 163 | End: 2021-11-20
Attending: INTERNAL MEDICINE
Payer: COMMERCIAL

## 2021-11-20 LAB
ANION GAP SERPL CALCULATED.3IONS-SCNC: 7 MMOL/L (ref 5–18)
BUN SERPL-MCNC: 11 MG/DL (ref 8–28)
CALCIUM SERPL-MCNC: 8 MG/DL (ref 8.5–10.5)
CHLORIDE BLD-SCNC: 100 MMOL/L (ref 98–107)
CO2 SERPL-SCNC: 30 MMOL/L (ref 22–31)
CREAT SERPL-MCNC: 0.55 MG/DL (ref 0.6–1.1)
ERYTHROCYTE [DISTWIDTH] IN BLOOD BY AUTOMATED COUNT: 13 % (ref 10–15)
GFR SERPL CREATININE-BSD FRML MDRD: 90 ML/MIN/1.73M2
GLUCOSE BLD-MCNC: 93 MG/DL (ref 70–125)
HCT VFR BLD AUTO: 27.9 % (ref 35–47)
HGB BLD-MCNC: 9 G/DL (ref 11.7–15.7)
MAGNESIUM SERPL-MCNC: 2 MG/DL (ref 1.8–2.6)
MCH RBC QN AUTO: 30.9 PG (ref 26.5–33)
MCHC RBC AUTO-ENTMCNC: 32.3 G/DL (ref 31.5–36.5)
MCV RBC AUTO: 96 FL (ref 78–100)
PLATELET # BLD AUTO: 448 10E3/UL (ref 150–450)
POTASSIUM BLD-SCNC: 3.8 MMOL/L (ref 3.5–5)
RBC # BLD AUTO: 2.91 10E6/UL (ref 3.8–5.2)
SODIUM SERPL-SCNC: 137 MMOL/L (ref 136–145)
WBC # BLD AUTO: 16 10E3/UL (ref 4–11)

## 2021-11-20 PROCEDURE — 250N000009 HC RX 250: Performed by: NURSE PRACTITIONER

## 2021-11-20 PROCEDURE — 80048 BASIC METABOLIC PNL TOTAL CA: CPT | Performed by: INTERNAL MEDICINE

## 2021-11-20 PROCEDURE — 94640 AIRWAY INHALATION TREATMENT: CPT

## 2021-11-20 PROCEDURE — 99231 SBSQ HOSP IP/OBS SF/LOW 25: CPT | Performed by: INTERNAL MEDICINE

## 2021-11-20 PROCEDURE — 250N000011 HC RX IP 250 OP 636: Performed by: INTERNAL MEDICINE

## 2021-11-20 PROCEDURE — 250N000013 HC RX MED GY IP 250 OP 250 PS 637: Performed by: RADIOLOGY

## 2021-11-20 PROCEDURE — 36415 COLL VENOUS BLD VENIPUNCTURE: CPT | Performed by: INTERNAL MEDICINE

## 2021-11-20 PROCEDURE — 250N000011 HC RX IP 250 OP 636: Performed by: RADIOLOGY

## 2021-11-20 PROCEDURE — 97535 SELF CARE MNGMENT TRAINING: CPT | Mod: GO

## 2021-11-20 PROCEDURE — 99231 SBSQ HOSP IP/OBS SF/LOW 25: CPT | Performed by: PAIN MEDICINE

## 2021-11-20 PROCEDURE — 94660 CPAP INITIATION&MGMT: CPT

## 2021-11-20 PROCEDURE — 250N000013 HC RX MED GY IP 250 OP 250 PS 637: Performed by: PAIN MEDICINE

## 2021-11-20 PROCEDURE — 250N000013 HC RX MED GY IP 250 OP 250 PS 637: Performed by: INTERNAL MEDICINE

## 2021-11-20 PROCEDURE — 94668 MNPJ CHEST WALL SBSQ: CPT

## 2021-11-20 PROCEDURE — 99232 SBSQ HOSP IP/OBS MODERATE 35: CPT | Performed by: NURSE PRACTITIONER

## 2021-11-20 PROCEDURE — 99207 PR CDG-MDM COMPONENT: MEETS MODERATE - DOWN CODED: CPT | Performed by: NURSE PRACTITIONER

## 2021-11-20 PROCEDURE — 120N000001 HC R&B MED SURG/OB

## 2021-11-20 PROCEDURE — 94640 AIRWAY INHALATION TREATMENT: CPT | Mod: 76

## 2021-11-20 PROCEDURE — 999N000078 HC STATISTIC INTRAPULMONARY PERCUSSIVE VENT

## 2021-11-20 PROCEDURE — 83735 ASSAY OF MAGNESIUM: CPT | Performed by: INTERNAL MEDICINE

## 2021-11-20 PROCEDURE — 71045 X-RAY EXAM CHEST 1 VIEW: CPT

## 2021-11-20 PROCEDURE — 85014 HEMATOCRIT: CPT | Performed by: INTERNAL MEDICINE

## 2021-11-20 RX ADMIN — SENNOSIDES, DOCUSATE SODIUM 2 TABLET: 8.6; 5 TABLET ORAL at 09:49

## 2021-11-20 RX ADMIN — CEFTRIAXONE SODIUM 1 G: 1 INJECTION, POWDER, FOR SOLUTION INTRAMUSCULAR; INTRAVENOUS at 13:16

## 2021-11-20 RX ADMIN — SERTRALINE HYDROCHLORIDE 200 MG: 50 TABLET ORAL at 09:48

## 2021-11-20 RX ADMIN — OMEPRAZOLE 40 MG: 20 CAPSULE, DELAYED RELEASE ORAL at 06:52

## 2021-11-20 RX ADMIN — TRAZODONE HYDROCHLORIDE 150 MG: 50 TABLET ORAL at 20:58

## 2021-11-20 RX ADMIN — LIDOCAINE 3 PATCH: 246 PATCH TOPICAL at 09:54

## 2021-11-20 RX ADMIN — ACETAMINOPHEN 975 MG: 325 SOLUTION ORAL at 10:35

## 2021-11-20 RX ADMIN — ACETAMINOPHEN 975 MG: 325 SOLUTION ORAL at 21:00

## 2021-11-20 RX ADMIN — Medication 1 CAPSULE: at 20:58

## 2021-11-20 RX ADMIN — Medication 1 TABLET: at 09:49

## 2021-11-20 RX ADMIN — VALACYCLOVIR 1000 MG: 500 TABLET, FILM COATED ORAL at 20:59

## 2021-11-20 RX ADMIN — ENOXAPARIN SODIUM 40 MG: 40 INJECTION SUBCUTANEOUS at 09:52

## 2021-11-20 RX ADMIN — ALBUTEROL SULFATE 2.5 MG: 2.5 SOLUTION RESPIRATORY (INHALATION) at 08:27

## 2021-11-20 RX ADMIN — ACETYLCYSTEINE 2 ML: 200 SOLUTION ORAL; RESPIRATORY (INHALATION) at 08:26

## 2021-11-20 RX ADMIN — THERA TABS 1 TABLET: TAB at 09:49

## 2021-11-20 ASSESSMENT — ACTIVITIES OF DAILY LIVING (ADL)
ADLS_ACUITY_SCORE: 10
ADLS_ACUITY_SCORE: 8
ADLS_ACUITY_SCORE: 10
ADLS_ACUITY_SCORE: 8
ADLS_ACUITY_SCORE: 10
ADLS_ACUITY_SCORE: 10
ADLS_ACUITY_SCORE: 8
ADLS_ACUITY_SCORE: 10
ADLS_ACUITY_SCORE: 10
ADLS_ACUITY_SCORE: 8
ADLS_ACUITY_SCORE: 8
ADLS_ACUITY_SCORE: 10
ADLS_ACUITY_SCORE: 8
ADLS_ACUITY_SCORE: 10
ADLS_ACUITY_SCORE: 8
ADLS_ACUITY_SCORE: 8
ADLS_ACUITY_SCORE: 10

## 2021-11-20 NOTE — PLAN OF CARE
Problem: Respiratory Compromise (Pneumonia)  Goal: Effective Oxygenation and Ventilation  11/20/2021 1412 by Deanna Rawls, RN  Outcome: Improving  11/20/2021 1410 by Deanna Rawls RN  Outcome: No Change  Intervention: Promote Airway Secretion Clearance  Recent Flowsheet Documentation  Taken 11/20/2021 1330 by Deanna Rawls RN  Cough And Deep Breathing: done with encouragement  Taken 11/20/2021 0830 by Deanna Rawls RN  Cough And Deep Breathing: done with encouragement  Intervention: Optimize Oxygenation and Ventilation  Recent Flowsheet Documentation  Taken 11/20/2021 0830 by Deanna Rawls RN  Head of Bed (HOB) Positioning: HOB at 30 degrees  O2 sat on 1L/NC= 95-96%, When patient turns to side, O2 sat drops into the upper 80's, but she has a quick recovery back up to the 90's again, diminished BS bilaterally and fine crackles in R base, dry cough, some chest discomfort 4/10, Tylenol given with relief,   Moderate amt of leaking of serosanguinus fluid at CT insertion site, Dr. Quesada notified and order obtained to change the dressing. Vaseline impregnated gauze, dry gauze and pressure dressing applied,  150 ml serosanguinos drainage from CT this shift.

## 2021-11-20 NOTE — PROGRESS NOTES
1950 Albuterol/Mucomyst tx given inline with Metaneb, BS fine crackles LLL, otherwise mostly clear, no change with tx. Patient does not like the CHFO mode and requested to use the CEP nebulizer mode instead, also does not like the taste/smell of the mucomyst and asked to stop the early after several breaks. Treatment not highly effective. O2 at 1 lpm/NC with SpO2 95 %. RT to monitor.

## 2021-11-20 NOTE — PLAN OF CARE
Problem: Fluid Imbalance (Pneumonia)  Goal: Fluid Balance  Outcome: Improving     Problem: Infection (Pneumonia)  Goal: Resolution of Infection Signs and Symptoms  Outcome: Improving     Problem: Gas Exchange Impaired (Pulmonary Impairment)  Goal: Optimal Gas Exchange  Outcome: Improving   Pt alert and oriented x 4, sats 92-94% on 1 L oxygen per NC, rates pain 1/10 and refused scheduled oxycodone at midnight,chest tube total  output =100ml,pleasant and cooperative with cares. Will continue to monitor.

## 2021-11-20 NOTE — PROGRESS NOTES
Pulmonary Progress Note:     76 year old lady with history of stage 1 breast cancer s/p lumpectomy and radiation in 2015.  Breakthrough Covid infection diagnosed 10/28.  Presented to the hospital on 11/10 and found to have right sided pleural effusion.  Drained dry with thoracentesis.  Studies found to be consistent with complicated parapneumonic effusion, likely due to Streptococcus pneumonia.     Admit Date:  11/10/2021  Code Status:  DNR/DNI     Assessment/Plan:     Intrapleural lytics were initiated on 11/14, CT output > one liter following.  After the first dose, patient had significant pain.  This has resolved, and she did not experience this today with administration. She has completed her lytic therapy as of 11/18.   Pain team is also involved to help control pain.  Repeat CT this am shows continued effusion on right, with new small effusion on left side.  Dr. Gutierrez has been consulted as well.  Mucomyst and Metaneb therapies added for improved pulmonary hygiene.      Plan:  1. Completed total of 6 dose of lytics.      2.  Please chart chest tube I/O, continue CT to water seal.      3.  Expect CT output to be serosang. In color, Hgb stable.     4.  Goal SpO2 > 92%  Titrate O2 as tolerated.     5.  Continue Ceftriaxone. Continue to follow cultures.     6.  Continue metanebs and mucomyst QID.      7.  Have general surgery to see, Dr. Gutierrez:  Consult has been called, appreciate.  He will be back and able to see patient on Monday, which is fine.      Subjective:  Patient has some right sided pain at chest tube site, especially with coughing.  Her pain is better controlled.  She had a good day, walked around unit, got bed bath.         acetylcysteine  2 mL Nebulization 4x Daily     albuterol  2.5 mg Nebulization 4x Daily     calcium carbonate-vitamin D  1 tablet Oral BID     cefTRIAXone  1 g Intravenous Q24H     enoxaparin ANTICOAGULANT  40 mg Subcutaneous Q24H     lactobacillus rhamnosus (GG)  1 capsule Oral  "Daily     lidocaine  3 patch Transdermal Q24H     lidocaine   Transdermal Q8H     magnesium oxide  200 mg Oral BID     multivitamin, therapeutic  1 tablet Oral Daily     omeprazole  40 mg Oral QAM AC     polyethylene glycol  17 g Oral BID     senna-docusate  2 tablet Oral BID     sertraline  200 mg Oral Daily     sodium chloride (PF)  3 mL Irrigation Q8H     sodium chloride (PF)  3 mL Intracatheter Q8H     traZODone  150 mg Oral At Bedtime     valACYclovir  1,000 mg Oral At Bedtime     vitamin C  250 mg Oral Daily      EXAM:  Vitals: /58 (BP Location: Right arm)   Pulse 81   Temp 98.6  F (37  C) (Oral)   Resp 18   Ht 1.664 m (5' 5.5\")   Wt 63.5 kg (139 lb 14.4 oz)   SpO2 91%   BMI 22.93 kg/m    BMI= Body mass index is 22.93 kg/m .  General:  Awake, alert, up in chair.  Cardiac:  RRR, no murmurs noted.  Respiratory:  Lungs clear to auscultation, diminished in right base. Chest tube with serosang drainage. To suction, functioning and draining.  Dressing changed today.   Abd:  + BS X 4 quds.  Soft, non-tender.  Extremities:  No edema noted.  Neuro:  Grossly intact.      ROS:  A 10-system review was obtained and is negative with the exception of the symptoms noted above.     Micro  Urine Strep pneumo antigen positive  Legionella urine Ag neg  Blood with staph hominis     Pleural fluid 11/10  Studies reviewed.  Pleural culture negative.     RIGHT PLEURAL FLUID:    LARGE NUMBERS OF ACUTE INFLAMMATORY CELLS, POSSIBLE EARLY EMPYEMA    NEGATIVE FOR ATYPICAL OR MALIGNANT CELLS     IMAGIN/17 CXR - A right pleural drain is similarly positioned with pigtail in the upper lateral aspect of the pleural space. There is a crimp in the tube at the level of the insertion projecting at the level of the posterolateral seventh intercostal space. Opacity with   indistinct borders in the lower third of the right chest likely reflects a combination of residual complex pleural fluid and atelectasis. Mild but diffuse " thickening of the pleura including the apex and along the mediastinal pleura. Focal lucency lucency   in the medial basal right chest adjacent to the right atrial heart border corresponds to loculated anterior pneumothorax on the prior CT.     There are interstitial opacities in the right mid and upper lung consistent with mild interstitial edema. No progressive airspace opacity.     Pleural fluid and atelectasis atelectasis in the medial left lower lobe obscures the medial 20% of the left hemidiaphragm, not increased.     Unchanged mild enlargement of the cardiac silhouette.     11/15/21 CXR:  Right pigtail pleural drain with trace pneumothorax near tube insertion site but no new significant pneumothorax. Further decrease in the small right pleural effusion and improved aeration of the right mid and lower lung. Decreasing retrocardiac left lower lung atelectasis or infiltrate. Stable borderline enlarged cardiac silhouette. Normal pulmonary vascularity. Healing fractures left ribs 3-6.         EXAM: CT CHEST W/O CONTRAST  LOCATION: [facility]  DATE/TIME: 11/19/2021 9:46 AM     INDICATION: Empyema  COMPARISON: CT chest without contrast 11/16/2021  TECHNIQUE: CT chest without IV contrast. Multiplanar reformats were obtained. Dose reduction techniques were used.  CONTRAST: None.     FINDINGS:   LUNGS AND PLEURA: Right pleural drain passes through the posterolateral right intercostal space with catheter positioned in the upper lateral pleural space, unchanged from 11/16/2021. Heterogeneous attenuation debris layers dependently within the   posterior right pleural space higher attenuation and small gas components with adjacent pleural fluid. Small loculated right anterior pneumothorax with fairly exuberant or surrounding visceral and parietal pleural thickening. Smaller left pleural   effusion is homogeneous attenuation, layering posteriorly.     Unchanged consolidation and volume loss of the  caudal and anterior right middle lobe and a limited subsegment of the anterior medial right upper lobe along the mediastinal pleura. Sparse peripheral groundglass airspace opacity in the anterior apical left   upper lobe.     Trachea and central airways are patent.     MEDIASTINUM: Unchanged small circumferential pericardial effusion. Cardiac chambers are not enlarged. Main pulmonary artery is normal in size. Normal caliber thoracic aorta. Decompressed esophagus. No new enlargement of mediastinal or hilar lymph nodes.     CORONARY ARTERY CALCIFICATION: None.     UPPER ABDOMEN: No new findings in the imaged upper abdomen.     MUSCULOSKELETAL: Medullary lucency and irregular cortex of the right anterior second through seventh ribs in a pattern consistent with previous tangential beam chest wall radiation, unchanged. Bony callus around the left lateral third through sixth ribs   consistent with subacute fractures. No thoracic vertebral compression deformities.                                                                      IMPRESSION:      1.  Unchanged position of the right pleural drain. Localized visceral and parietal pleural thickening anterior to the right middle lobe and low anteromedial right upper lobe with adjacent lung consolidation.  2.  New heterogeneous attenuation debris layering dependently in the posterior right pleural space, most likely representing organized blood products.  3.  Unchanged circumferential pericardial effusion and small posteriorly layering left pleural effusion.

## 2021-11-20 NOTE — PROVIDER NOTIFICATION
Dr Travis unable to see pt until  Monday per service. Dr Martha Quiros notied and said that ok. Told unit coordinator. May need to repage for Dr Travis to see closer to time.

## 2021-11-20 NOTE — PROGRESS NOTES
RESPIRATORY CARE NOTE   Patient is on 1 L N/C, BS decreased, gave Albuterol, Mucomyst treatment x1, BS post treatment no change, patient perceives no improvement, patient tolerated well.     Mustapha Manley, RT

## 2021-11-20 NOTE — PROGRESS NOTES
RESPIRATORY CARE NOTE   Patient is on 2 l N/C, BS decreased, gave Albuterol, Mucomyst treatment x1, BS post treatment no change, patient perceives mild improvement, patient tolerated fair.     Mustapha Manley, RT

## 2021-11-20 NOTE — PROGRESS NOTES
[facility] ACUTE PAIN SERVICE    ([facility], [facility], [facility])  Daily PAIN Progress Note    Assessment/Plan:  Salome Maravilla is an opioid naïve 76 year old female who was admitted on 11/10/2021. I was asked to see the patient for acute pain s/p chest tube placement. History of stage 1 breast CA s/p lumpectomy and XRT in 2015 who presents with progressive cough and chest pain. Pt vaccinated with COVID diagnosed 10/28 who presented with pleuritic R sided chest pain, now with chest tube and she completed a course of lytic therapy. Surgical consult for Monday. Her pain is controlled on tylenol/lidocaine and not taking opioids currently.      PLAN:   1) Atypical chest pain secondary to chest tube. PRN oxycodone available, which she has not required.   2)Multimodal Medication Therapy  Topical:  lidocaine patches  NSAID'S: crcl = 67.5 ml/min, hgb 9.8 ml/min  Adjuvants: APAP QID- hold per pt request. Gabapentin is PRN   Antidepressants/anxiolytics: sertraline 200 mg po daily (home med), trazodone 150 mg po at bedtime (home med)  Opioids: oxycodone 5 mg po q3h prn -no need for liquid oxycodone if lytics stop  3)Non-medication interventions- Ice, Heat, physical therapy,   incentive spirometry  4)Constipation Prophylaxis- prn ordered only  will add scheduled bowel meds   5) Follow up   -PCP is Charity Mann  -Discharge Recommendations - We recommend prescribing the following at the time of discharge: likely none        MN -pulled from system on 11/16/21. This indicated no opioid use or controlled medication use in th past 12 months.        Subjective:    Felicity states her pain is controlled  Slept ok  Still feels weak   sats 92-94% on 1 L oxygen per NC, rates pain 1/10       Abscess of middle lobe of right lung with pneumonia (H)   Patient Active Problem List   Diagnosis     Breast cancer, stage 1, right (H)     Osteopenia determined by x-ray     Pneumonia due to infectious organism, unspecified  "laterality, unspecified part of lung     Abscess of middle lobe of right lung with pneumonia (H)     Infection due to 2019 novel coronavirus        History   Drug Use No         Tobacco Use      Smoking status: Former Smoker        Quit date: 1988        Years since quittin.5      Smokeless tobacco: Never Used          acetylcysteine  2 mL Nebulization 4x Daily     albuterol  2.5 mg Nebulization 4x Daily     calcium carbonate-vitamin D  1 tablet Oral BID     cefTRIAXone  1 g Intravenous Q24H     enoxaparin ANTICOAGULANT  40 mg Subcutaneous Q24H     lactobacillus rhamnosus (GG)  1 capsule Oral Daily     lidocaine  3 patch Transdermal Q24H     lidocaine   Transdermal Q8H     magnesium oxide  200 mg Oral BID     multivitamin, therapeutic  1 tablet Oral Daily     omeprazole  40 mg Oral QAM AC     polyethylene glycol  17 g Oral BID     senna-docusate  2 tablet Oral BID     sertraline  200 mg Oral Daily     sodium chloride (PF)  3 mL Irrigation Q8H     sodium chloride (PF)  3 mL Intracatheter Q8H     traZODone  150 mg Oral At Bedtime     valACYclovir  1,000 mg Oral At Bedtime     vitamin C  250 mg Oral Daily       Objective:  Vital signs in last 24 hours:  B/P: 108/52, T: 98.3, P: 80, R: 16   Blood pressure 114/54, pulse 94, temperature 98.5  F (36.9  C), temperature source Oral, resp. rate 18, height 1.664 m (5' 5.5\"), weight 63.5 kg (139 lb 14.4 oz), SpO2 92 %.      Weight:   Wt Readings from Last 2 Encounters:   21 63.5 kg (139 lb 14.4 oz)   21 62.6 kg (138 lb 1.6 oz)           Intake/Output:    Intake/Output Summary (Last 24 hours) at 2021 0829  Last data filed at 2021 0128  Gross per 24 hour   Intake 10 ml   Output 175 ml   Net -165 ml        Review of Systems:   As per subjective, all others negative.    Physical Exam:  Constitutional: healthy, alert and no distress-laying flat in bed  Pleasant   Head: negative  Neck: negative findings: no asymmetry, masses, or scars  Resp- no " dyspnea  GI- NO BM   Psych- calm and pleasant          Lab Results:  Personally Reviewed.   Last Comprehensive Metabolic Panel:  Sodium   Date Value Ref Range Status   11/20/2021 137 136 - 145 mmol/L Final     Potassium   Date Value Ref Range Status   11/20/2021 3.8 3.5 - 5.0 mmol/L Final     Chloride   Date Value Ref Range Status   11/20/2021 100 98 - 107 mmol/L Final     Carbon Dioxide (CO2)   Date Value Ref Range Status   11/20/2021 30 22 - 31 mmol/L Final     Anion Gap   Date Value Ref Range Status   11/20/2021 7 5 - 18 mmol/L Final     Glucose   Date Value Ref Range Status   11/20/2021 93 70 - 125 mg/dL Final     Urea Nitrogen   Date Value Ref Range Status   11/20/2021 11 8 - 28 mg/dL Final     Creatinine   Date Value Ref Range Status   11/20/2021 0.55 (L) 0.60 - 1.10 mg/dL Final     GFR Estimate   Date Value Ref Range Status   11/20/2021 90 >60 mL/min/1.73m2 Final     Comment:     As of July 11, 2021, eGFR is calculated by the CKD-EPI creatinine equation, without race adjustment. eGFR can be influenced by muscle mass, exercise, and diet. The reported eGFR is an estimation only and is only applicable if the renal function is stable.   05/04/2021 >60 >60 mL/min/1.73m2 Final     Calcium   Date Value Ref Range Status   11/20/2021 8.0 (L) 8.5 - 10.5 mg/dL Final        UA: No results found for: UAMP, UBARB, BENZODIAZEUR, UCANN, UCOC, OPIT, UPCP          Total unit/floor time 15  minutes, time consisted of the following, examination of patient, reviewing the record and lab results, and completing documentation.            Margo Dozier APRN, CNS-BC, CNP, ACHPN  Acute Care Pain Management Program Hour 7a-1700  [facility] ([facility] [place])   Page via [employer]- Click HERE to page Margo or call [phone]

## 2021-11-20 NOTE — PLAN OF CARE
Problem: Adult Inpatient Plan of Care  Goal: Absence of Hospital-Acquired Illness or Injury  Intervention: Prevent Skin Injury  Recent Flowsheet Documentation  Taken 11/19/2021 2100 by DayConchis RN  Body Position: position changed independently  Taken 11/19/2021 1600 by Day, Conchis STEIN RN  Body Position: position changed independently     Problem: Respiratory Compromise (Pneumonia)  Goal: Effective Oxygenation and Ventilation  Outcome: Improving     Problem: Airway Clearance Ineffective (Pulmonary Impairment)  Goal: Effective Airway Clearance  Outcome: Improving   Pt on 1 L-sats 91-95. When pt gets up to commode sats will drop down to mid 80s. Pt rebounds back up to 90s. Ct drained 60 ml.    Pt had 3 bm today-held Miralax and senna

## 2021-11-20 NOTE — PROGRESS NOTES
Daily Progress Note        CODE STATUS:  No CPR- Do NOT Intubate    11/17/21  Assessment/Plan:  76 year old lady with history of stage 1 breast cancer s/p lumpectomy and radiation in 2015, depression, GERD, OA, presented with right sided chest pain and dry cough. She was diagnosed with breakthrough covid-19 infection on 10/28 and presented on 11/10 with several days of worsening right-sided pleuritic chest pain, cough, low grade fevers and chills at home.     Pneumonia  Complex parapneumonic effusion  -- Likely due to post viral strep PNA. The right pleural effusion was drained at bedside 11/10  but effusion recurred   -- Chest tube placement 11/13 , pulm managing. Pulm consulted Gen surgery for their input.  -- C/w broad spectrum abx per pulm      Chest pain   -- Clinically better. Occurred after lytic therapy given for chest tube  -- Declined further lytic therapy yesterday, but later agreed to take it.  -- Appreciate pain team, pulmonary consult  -- EKG unremarkable for ischemia     Acute resp failure due to above   -- Remains on supp o2  -- Cxr with no large ptx per rads      COVID-19 - since 10/28, breakthrough case  -- Completed remdesivir but no steroids per pulm for now.   -- Appreciate infectionist's input. Patient can be considered  recovered covid now. Stopped isolation 11/18.     GERD/Heart burn  -- Patient believes its due to PO oxycodone. Changed to liquid oxycodone per pain team  -- Changed protonix to omeprazole per patient's request. Changed HS vit C to am dosing.     Constipation:  -- Cont with stool softners. Suppository/enema prn    Anemia:  -- Drop in hgb noted. No e/o GI bleeding.   -- Will monitor hgb    DVT PPX lovenox      Barriers to discharge resp failure      Anticipated Discharge date  Few days        LOS: 7 days     Subjective:  Interval History: Patient seen and examined this morning. She reported doing about the same. There was some leaking of bloody discharge from around the chest  tube. RN instructed to let the pulm team know about it. No other issues overnight    Review of Systems:   As mentioned in subjective.    Patient Active Problem List   Diagnosis     Breast cancer, stage 1, right (H)     Osteopenia determined by x-ray     Pneumonia due to infectious organism, unspecified laterality, unspecified part of lung     Abscess of middle lobe of right lung with pneumonia (H)     Infection due to 2019 novel coronavirus       Scheduled Meds:    acetylcysteine  2 mL Nebulization 4x Daily     albuterol  2.5 mg Nebulization 4x Daily     calcium carbonate-vitamin D  1 tablet Oral BID     cefTRIAXone  1 g Intravenous Q24H     enoxaparin ANTICOAGULANT  40 mg Subcutaneous Q24H     lactobacillus rhamnosus (GG)  1 capsule Oral Daily     lidocaine  3 patch Transdermal Q24H     lidocaine   Transdermal Q8H     magnesium oxide  200 mg Oral BID     multivitamin, therapeutic  1 tablet Oral Daily     omeprazole  40 mg Oral QAM AC     polyethylene glycol  17 g Oral BID     senna-docusate  2 tablet Oral BID     sertraline  200 mg Oral Daily     sodium chloride (PF)  3 mL Irrigation Q8H     sodium chloride (PF)  3 mL Intracatheter Q8H     traZODone  150 mg Oral At Bedtime     valACYclovir  1,000 mg Oral At Bedtime     vitamin C  250 mg Oral Daily     Continuous Infusions:    - MEDICATION INSTRUCTIONS -       PRN Meds:.acetaminophen, alum & mag hydroxide-simethicone, bisacodyl, calcium carbonate, gabapentin, lidocaine 4%, lidocaine viscous 2% 15 mL and maalox/mylanta w/ simethicone 15 mL (GI COCKTAIL), lidocaine (buffered or not buffered), - MEDICATION INSTRUCTIONS -, melatonin, naloxone **OR** naloxone **OR** naloxone **OR** naloxone, ondansetron **OR** ondansetron, oxyCODONE, prochlorperazine **OR** prochlorperazine **OR** prochlorperazine, sodium chloride (PF)    Objective:  Vital signs in last 24 hours:  Temp:  [98  F (36.7  C)-99.1  F (37.3  C)] 98.3  F (36.8  C)  Pulse:  [87-99] 87  Resp:  [18-20] 20  BP:  (106-151)/(54-68) 113/54  SpO2:  [92 %-96 %] 96 %        Intake/Output Summary (Last 24 hours) at 11/17/2021 1517  Last data filed at 11/17/2021 1415  Gross per 24 hour   Intake 250 ml   Output 285 ml   Net -35 ml       Physical Exam:    General: Not in obvious distress.  HEENT: NC, AT   Chest: Shallow breathing, right chest tube in place.  Heart: S1S2 normal, regular. No M/R/G  Abdomen: Soft. NT, ND. Bowel sounds- active.  Extremities: No legs swelling  Neuro: alert and awake, grossly non-focal      Lab Results:(I have personally reviewed the results)    Recent Results (from the past 24 hour(s))   CBC with platelets    Collection Time: 11/20/21  6:42 AM   Result Value Ref Range    WBC Count 16.0 (H) 4.0 - 11.0 10e3/uL    RBC Count 2.91 (L) 3.80 - 5.20 10e6/uL    Hemoglobin 9.0 (L) 11.7 - 15.7 g/dL    Hematocrit 27.9 (L) 35.0 - 47.0 %    MCV 96 78 - 100 fL    MCH 30.9 26.5 - 33.0 pg    MCHC 32.3 31.5 - 36.5 g/dL    RDW 13.0 10.0 - 15.0 %    Platelet Count 448 150 - 450 10e3/uL   Basic metabolic panel    Collection Time: 11/20/21  6:42 AM   Result Value Ref Range    Sodium 137 136 - 145 mmol/L    Potassium 3.8 3.5 - 5.0 mmol/L    Chloride 100 98 - 107 mmol/L    Carbon Dioxide (CO2) 30 22 - 31 mmol/L    Anion Gap 7 5 - 18 mmol/L    Urea Nitrogen 11 8 - 28 mg/dL    Creatinine 0.55 (L) 0.60 - 1.10 mg/dL    Calcium 8.0 (L) 8.5 - 10.5 mg/dL    Glucose 93 70 - 125 mg/dL    GFR Estimate 90 >60 mL/min/1.73m2   Magnesium    Collection Time: 11/20/21  6:42 AM   Result Value Ref Range    Magnesium 2.0 1.8 - 2.6 mg/dL       All laboratory and imaging data in the past 24 hours reviewed  Serum Glucose range:   Recent Labs   Lab 11/20/21  0642 11/19/21  0555 11/18/21  0651 11/17/21  0620   GLC 93 105 117 109     ABG: No lab results found in last 7 days.  CBC:   Recent Labs   Lab 11/20/21  0642 11/19/21  0555 11/18/21  0709 11/17/21  0620 11/16/21  0621 11/15/21  1520 11/15/21  0558   WBC 16.0* 19.9* 14.6* 12.8* 12.8*  --  13.3*    HGB 9.0* 9.9* 8.7* 8.5* 9.8*   < > 10.3*   HCT 27.9* 30.7* 27.8* 27.3* 29.5*  --  32.5*   MCV 96 95 99 99 96  --  96    501* 400 355 385  --  381   NEUTROPHIL  --   --   --  84 83  --  86   LYMPH  --   --   --  6 6  --  5   MONOCYTE  --   --   --  6 8  --  7   EOSINOPHIL  --   --   --  2 1  --  1    < > = values in this interval not displayed.     Chemistry:   Recent Labs   Lab 11/20/21  0642 11/19/21  0555 11/18/21  0651    133* 135*   POTASSIUM 3.8 4.1 4.0   CHLORIDE 100 101 102   CO2 30 28 27   BUN 11 11 11   CR 0.55* 0.54* 0.55*   GFRESTIMATED 90 >90 90   EMILIANO 8.0* 8.2* 8.3*   MAG 2.0 1.8 1.8     Coags:  No results for input(s): INR, PROTIME, PTT in the last 168 hours.    Invalid input(s): APTT  Cardiac Markers:  No results for input(s): CKTOTAL, TROPONINI in the last 168 hours.       XR Chest Port 1 View    Result Date: 11/17/2021  EXAM: XR CHEST PORT 1 VIEW LOCATION: [facility] DATE/TIME: 11/17/2021 6:28 AM INDICATION: chest tube in place. COMPARISON: Portable AP view the chest 11/16/2021 at 0606 hours; chest CT 11/16/2021 at 0858 hours     IMPRESSION: A right pleural drain is similarly positioned with pigtail in the upper lateral aspect of the pleural space. There is a crimp in the tube at the level of the insertion projecting at the level of the posterolateral seventh intercostal space. Opacity with indistinct borders in the lower third of the right chest likely reflects a combination of residual complex pleural fluid and atelectasis. Mild but diffuse thickening of the pleura including the apex and along the mediastinal pleura. Focal lucency lucency  in the medial basal right chest adjacent to the right atrial heart border corresponds to loculated anterior pneumothorax on the prior CT. There are interstitial opacities in the right mid and upper lung consistent with mild interstitial edema. No progressive airspace opacity. Pleural fluid and atelectasis atelectasis in the  medial left lower lobe obscures the medial 20% of the left hemidiaphragm, not increased. Unchanged mild enlargement of the cardiac silhouette.    XR Chest Port 1 View    Result Date: 11/16/2021  EXAM: XR CHEST PORT 1 VIEW LOCATION: [facility] DATE/TIME: 11/16/2021 5:39 AM INDICATION: Follow-up chest tube COMPARISON: 11/15/2021     IMPRESSION: Right chest tube in stable position. No pneumothorax. Tiny right pleural effusion remains. Small left pleural effusion is stable. Atelectasis left lower lobe behind the heart is stable. Minimal diffuse interstitial infiltrates stable. No new findings.    XR Chest Port 1 View    Result Date: 11/15/2021  EXAM: XR CHEST PORT 1 VIEW LOCATION: [facility] DATE/TIME: 11/15/2021 6:30 AM INDICATION: chest tube in place. COMPARISON: 11/14/2021     IMPRESSION: Right pigtail pleural drain with trace pneumothorax near tube insertion site but no new significant pneumothorax. Further decrease in the small right pleural effusion and improved aeration of the right mid and lower lung. Decreasing retrocardiac left lower lung atelectasis or infiltrate. Stable borderline enlarged cardiac silhouette. Normal pulmonary vascularity. Healing fractures left ribs 3-6. Findings discussed with Dr Avitia.    XR Chest Port 1 View    Result Date: 11/14/2021  EXAM: XR CHEST PORT 1 VIEW LOCATION: [facility] DATE/TIME: 11/14/2021 5:59 AM INDICATION: chest tube in place. COMPARISON: 11/12/2021.     IMPRESSION: Interval placement of right pigtail pleural drain. Decreased small to moderate right pleural effusion. Improved aeration of the right mid and lower lung. Decreasing retrocardiac left lower lung atelectasis or infiltrate. Stable borderline enlarged cardiac silhouette. Normal pulmonary vascularity. No pneumothorax.    XR Chest Port 1 View    Result Date: 11/12/2021  EXAM: XR CHEST PORT 1 VIEW LOCATION: [facility] DATE/TIME: 11/12/2021 1:51 PM INDICATION: complicated pleural effusion COMPARISON: 11/10/2021     IMPRESSION: The right pleural effusion is increasing. Stable heart size. No pulmonary edema. Increasing left medial basilar opacity could represent atelectasis or pneumonia.    XR Chest Port 1 View    Result Date: 11/10/2021  EXAM: XR CHEST PORT 1 VIEW LOCATION: [facility] DATE/TIME: 11/10/2021 6:22 PM INDICATION: s/p right thoracentesis, eval for PTX, eval for reduction in effusion. COMPARISON: CT earlier today.     IMPRESSION: Infiltrate right lung base by CT mostly in the right middle lobe. Small right pleural effusion. No pneumothorax. Left lung is clear. Scoliosis. Healing left rib fractures.    IR Chest Tube Place Non Tunneled Right    Result Date: 11/13/2021  Jud RADIOLOGY LOCATION: [facility] DATE: 11/13/2021 PROCEDURE: RIGHT ULTRASOUND GUIDED 14 Chinese CHEST TUBE PLACEMENT INTERVENTIONAL RADIOLOGIST: Citlaly Rosales DO INDICATION: Recurrent pleural effusion, COVID. Effusion concerning for empyema CONSENT: The risks, benefits and alternatives of right chest tube were discussed with the patient  in detail. All questions were answered. Informed consent was given to proceed with the procedure. MEDICATIONS: 1% lidocaine and 25 mcg fentanyl IV CONTRAST: None ANTIBIOTICS: None. FLUOROSCOPIC TIME: 0 minutes. RADIATION DOSE: Air Kerma: 0 mGy. COMPLICATIONS: No immediate complications. STERILE BARRIER TECHNIQUE: Maximum sterile barrier technique was used. Cutaneous antisepsis was performed at the operative site with application of 2% chlorhexidine and large sterile drape. Prior to the procedure, the  and assistant performed hand hygiene and wore hat, mask, sterile gown, and sterile gloves during the entire procedure. PROCEDURE/FINDINGS:  Following a discussion of the risks, benefits, indications and alternatives to treatment, appropriate informed  "consent was obtained.  The patient was brought to the interventional radiology suite and placed upright on the table. The right hemithorax was prepped and draped in the usual sterile fashion. A timeout was performed per universal protocol policy to confirm the correct patient, site and procedure to be performed. A preliminary ultrasound was performed to access for the appropriate site to access the pleural effusion. These images reveal a large right pleural effusion and an ultrasound image was archived.  Once an appropriate site for chest tube placement was localized, the overlying skin was anesthetized with 1% Lidocaine. Under direct ultrasound guidance, a 5 Chadian Yueh needle was advanced into the pleural space via an intercostal approach. The catheter  was advanced off of the needle. A 0.035\" guidewire was advanced through the micropuncture sheath and the tract was serially dilated.  A 14 Chadian nonlocking loop chest tube was placed with the tip coiled in the pleural space. The catheter was sutured to  the skin using 2-0 suture. A sterile dressing was applied. Chest tube was connected to a Pleuravac device in the interventional suite. Throughout the procedure, the patient was monitored by a radiology nurse for cardiac rhythm and oxygen saturation which remained stable. The patient tolerated the procedure well and left interventional radiology in stable condition.     IMPRESSION: Successful right sided 14 Chadian chest tube placement under ultrasound guidance.     CT Chest Pulmonary Embolism w Contrast    Result Date: 11/10/2021  EXAM: CT CHEST PULMONARY EMBOLISM W CONTRAST LOCATION: [facility] DATE/TIME: 11/10/2021 1:42 PM INDICATION: Chest pain. Covid positive 10/28/2021. PE suspected, high prob COMPARISON: 09/20/2021 chest x-ray. CT chest 03/26/2019 TECHNIQUE: CT chest pulmonary angiogram during arterial phase injection of IV contrast. Multiplanar reformats and MIP reconstructions were " performed. Dose reduction techniques were used. CONTRAST: IsoVue 370 100mL FINDINGS: ANGIOGRAM CHEST: Pulmonary arteries are normal caliber and negative for pulmonary emboli. Thoracic aorta is negative for dissection. No CT evidence of right heart strain. LUNGS AND PLEURA: Dense focal infiltrate right middle lobe medial segment and some of the adjacent anterior segment right upper lobe. Tiny less than 1 cm microabscess within this infiltrate. Small right pleural effusion including some focal pleural reaction immediately adjacent to the pneumonia anteriorly. No other infiltrates. Would favor a bacterial pneumonia rather than COVID pneumonia. MEDIASTINUM/AXILLAE: Small pericardial effusion. UPPER ABDOMEN: Normal. MUSCULOSKELETAL: Normal.     IMPRESSION: 1.  Above findings most suggestive of moderately severe bacterial pneumonia right middle lobe with possible associated tiny microabscess within it. 2.  Small right pleural effusion and some focal pleural reaction immediately adjacent to the pneumonia anteriorly.    CT Chest w/o Contrast    Result Date: 11/16/2021  EXAM: CT CHEST W/O CONTRAST LOCATION: [facility] DATE/TIME: 11/16/2021 8:52 AM INDICATION: Pneumonia, effusion or abscess suspected, x-ray done. COMPARISON: Chest CTA dated 11/10/2021. TECHNIQUE: CT chest without IV contrast. Multiplanar reformats were obtained. Dose reduction techniques were used. CONTRAST: None. FINDINGS: LUNGS AND PLEURA: Interval placement of right chest tube with tip along the lateral pleural surface of the right upper lobe. There is a very small amount of loculated air anteriorly in the right middle lobe, no significant pneumothorax. There is persistent small pleural effusion at the right lung base with right lower lobe atelectasis and right middle lobe atelectasis. There is a small amount of loculated fluid along the right heart border in the inferior right middle lobe. There is a new small left pleural  effusion and there is atelectasis in the left lower lobe. There is bandlike atelectasis or scarring in the posterior right upper lobe. There is minimal airspace consolidation in the left upper lobe on image 19 of series 3. MEDIASTINUM/AXILLAE: Small to moderate pericardial effusion. Borderline right paratracheal lymph nodes. No thoracic aortic aneurysm. CORONARY ARTERY CALCIFICATION: Mild. UPPER ABDOMEN: No significant finding. MUSCULOSKELETAL: Thoracolumbar scoliosis. No fracture or suspicious bony lesion.     IMPRESSION: 1.  New right chest tube. There is persistent small loculated pleural effusion at the right lung base which does not communicate with the chest tube. There is additional small loculated hydropneumothorax in the anterior right middle lobe that also does not communicate with the chest tube. There is atelectasis in both lower lobes and in the right middle lobe. 2.  New small right pleural effusion. 3.  Patchy airspace opacities in the left upper lobe consistent with a small focus of pneumonia.       Latest radiology report personally reviewed.    Note created using dragon voice recognition software so sounds alike errors may have escaped editing.      11/20/2021   Levar Landeros MD  Hospitalist, [facility]  Pager: [phone]

## 2021-11-21 ENCOUNTER — APPOINTMENT (OUTPATIENT)
Dept: RADIOLOGY | Facility: HOSPITAL | Age: 76
DRG: 163 | End: 2021-11-21
Attending: NURSE PRACTITIONER
Payer: COMMERCIAL

## 2021-11-21 ENCOUNTER — APPOINTMENT (OUTPATIENT)
Dept: PHYSICAL THERAPY | Facility: HOSPITAL | Age: 76
DRG: 163 | End: 2021-11-21
Payer: COMMERCIAL

## 2021-11-21 LAB
ANION GAP SERPL CALCULATED.3IONS-SCNC: 7 MMOL/L (ref 5–18)
BUN SERPL-MCNC: 9 MG/DL (ref 8–28)
CALCIUM SERPL-MCNC: 8.3 MG/DL (ref 8.5–10.5)
CHLORIDE BLD-SCNC: 101 MMOL/L (ref 98–107)
CO2 SERPL-SCNC: 30 MMOL/L (ref 22–31)
CREAT SERPL-MCNC: 0.59 MG/DL (ref 0.6–1.1)
ERYTHROCYTE [DISTWIDTH] IN BLOOD BY AUTOMATED COUNT: 13.2 % (ref 10–15)
GFR SERPL CREATININE-BSD FRML MDRD: 88 ML/MIN/1.73M2
GLUCOSE BLD-MCNC: 82 MG/DL (ref 70–125)
HCT VFR BLD AUTO: 29.6 % (ref 35–47)
HGB BLD-MCNC: 9.3 G/DL (ref 11.7–15.7)
MAGNESIUM SERPL-MCNC: 2.1 MG/DL (ref 1.8–2.6)
MCH RBC QN AUTO: 30.8 PG (ref 26.5–33)
MCHC RBC AUTO-ENTMCNC: 31.4 G/DL (ref 31.5–36.5)
MCV RBC AUTO: 98 FL (ref 78–100)
PLATELET # BLD AUTO: 513 10E3/UL (ref 150–450)
POTASSIUM BLD-SCNC: 3.6 MMOL/L (ref 3.5–5)
RBC # BLD AUTO: 3.02 10E6/UL (ref 3.8–5.2)
SODIUM SERPL-SCNC: 138 MMOL/L (ref 136–145)
WBC # BLD AUTO: 17.3 10E3/UL (ref 4–11)

## 2021-11-21 PROCEDURE — 99231 SBSQ HOSP IP/OBS SF/LOW 25: CPT | Performed by: PAIN MEDICINE

## 2021-11-21 PROCEDURE — 999N000157 HC STATISTIC RCP TIME EA 10 MIN

## 2021-11-21 PROCEDURE — 71045 X-RAY EXAM CHEST 1 VIEW: CPT

## 2021-11-21 PROCEDURE — 99207 PR CDG-MDM COMPONENT: MEETS MODERATE - DOWN CODED: CPT | Performed by: NURSE PRACTITIONER

## 2021-11-21 PROCEDURE — 250N000011 HC RX IP 250 OP 636: Performed by: RADIOLOGY

## 2021-11-21 PROCEDURE — 250N000013 HC RX MED GY IP 250 OP 250 PS 637: Performed by: INTERNAL MEDICINE

## 2021-11-21 PROCEDURE — 250N000009 HC RX 250: Performed by: NURSE PRACTITIONER

## 2021-11-21 PROCEDURE — 120N000001 HC R&B MED SURG/OB

## 2021-11-21 PROCEDURE — 85014 HEMATOCRIT: CPT | Performed by: INTERNAL MEDICINE

## 2021-11-21 PROCEDURE — 94640 AIRWAY INHALATION TREATMENT: CPT

## 2021-11-21 PROCEDURE — 250N000011 HC RX IP 250 OP 636: Performed by: INTERNAL MEDICINE

## 2021-11-21 PROCEDURE — 94668 MNPJ CHEST WALL SBSQ: CPT

## 2021-11-21 PROCEDURE — 36415 COLL VENOUS BLD VENIPUNCTURE: CPT | Performed by: INTERNAL MEDICINE

## 2021-11-21 PROCEDURE — 80048 BASIC METABOLIC PNL TOTAL CA: CPT | Performed by: INTERNAL MEDICINE

## 2021-11-21 PROCEDURE — 99231 SBSQ HOSP IP/OBS SF/LOW 25: CPT | Performed by: INTERNAL MEDICINE

## 2021-11-21 PROCEDURE — 94640 AIRWAY INHALATION TREATMENT: CPT | Mod: 76

## 2021-11-21 PROCEDURE — 83735 ASSAY OF MAGNESIUM: CPT | Performed by: INTERNAL MEDICINE

## 2021-11-21 PROCEDURE — 94660 CPAP INITIATION&MGMT: CPT

## 2021-11-21 PROCEDURE — 99232 SBSQ HOSP IP/OBS MODERATE 35: CPT | Performed by: NURSE PRACTITIONER

## 2021-11-21 PROCEDURE — 999N000078 HC STATISTIC INTRAPULMONARY PERCUSSIVE VENT

## 2021-11-21 PROCEDURE — 250N000013 HC RX MED GY IP 250 OP 250 PS 637: Performed by: RADIOLOGY

## 2021-11-21 PROCEDURE — 97530 THERAPEUTIC ACTIVITIES: CPT | Mod: GP | Performed by: PHYSICAL THERAPIST

## 2021-11-21 PROCEDURE — 250N000013 HC RX MED GY IP 250 OP 250 PS 637: Performed by: PAIN MEDICINE

## 2021-11-21 RX ORDER — CEFTRIAXONE 1 G/1
1 INJECTION, POWDER, FOR SOLUTION INTRAMUSCULAR; INTRAVENOUS EVERY 24 HOURS
Status: DISPENSED | OUTPATIENT
Start: 2021-11-22 | End: 2021-11-24

## 2021-11-21 RX ADMIN — ACETYLCYSTEINE 2 ML: 200 SOLUTION ORAL; RESPIRATORY (INHALATION) at 12:48

## 2021-11-21 RX ADMIN — ACETYLCYSTEINE 2 ML: 200 SOLUTION ORAL; RESPIRATORY (INHALATION) at 16:15

## 2021-11-21 RX ADMIN — VALACYCLOVIR 1000 MG: 500 TABLET, FILM COATED ORAL at 20:59

## 2021-11-21 RX ADMIN — ACETYLCYSTEINE 2 ML: 200 SOLUTION ORAL; RESPIRATORY (INHALATION) at 08:52

## 2021-11-21 RX ADMIN — ALBUTEROL SULFATE 2.5 MG: 2.5 SOLUTION RESPIRATORY (INHALATION) at 08:52

## 2021-11-21 RX ADMIN — ALBUTEROL SULFATE 2.5 MG: 2.5 SOLUTION RESPIRATORY (INHALATION) at 16:15

## 2021-11-21 RX ADMIN — OMEPRAZOLE 40 MG: 20 CAPSULE, DELAYED RELEASE ORAL at 05:19

## 2021-11-21 RX ADMIN — ALBUTEROL SULFATE 2.5 MG: 2.5 SOLUTION RESPIRATORY (INHALATION) at 12:48

## 2021-11-21 RX ADMIN — ENOXAPARIN SODIUM 40 MG: 40 INJECTION SUBCUTANEOUS at 08:39

## 2021-11-21 RX ADMIN — ACETAMINOPHEN 975 MG: 325 SOLUTION ORAL at 21:00

## 2021-11-21 RX ADMIN — Medication 1 CAPSULE: at 20:59

## 2021-11-21 RX ADMIN — THERA TABS 1 TABLET: TAB at 08:39

## 2021-11-21 RX ADMIN — ACETYLCYSTEINE 2 ML: 200 SOLUTION ORAL; RESPIRATORY (INHALATION) at 19:32

## 2021-11-21 RX ADMIN — SERTRALINE HYDROCHLORIDE 200 MG: 50 TABLET ORAL at 08:39

## 2021-11-21 RX ADMIN — LIDOCAINE 3 PATCH: 246 PATCH TOPICAL at 08:39

## 2021-11-21 RX ADMIN — ALBUTEROL SULFATE 2.5 MG: 2.5 SOLUTION RESPIRATORY (INHALATION) at 19:32

## 2021-11-21 RX ADMIN — TRAZODONE HYDROCHLORIDE 150 MG: 50 TABLET ORAL at 20:59

## 2021-11-21 RX ADMIN — CEFTRIAXONE SODIUM 1 G: 1 INJECTION, POWDER, FOR SOLUTION INTRAMUSCULAR; INTRAVENOUS at 13:44

## 2021-11-21 ASSESSMENT — ACTIVITIES OF DAILY LIVING (ADL)
ADLS_ACUITY_SCORE: 8

## 2021-11-21 NOTE — PROGRESS NOTES
Daily Progress Note        CODE STATUS:  No CPR- Do NOT Intubate    11/17/21  Assessment/Plan:  76 year old lady with history of stage 1 breast cancer s/p lumpectomy and radiation in 2015, depression, GERD, OA, presented with right sided chest pain and dry cough. She was diagnosed with breakthrough covid-19 infection on 10/28 and presented on 11/10 with several days of worsening right-sided pleuritic chest pain, cough, low grade fevers and chills at home.     Pneumonia  Complex parapneumonic effusion  -- Likely due to post viral strep PNA. The right pleural effusion was drained at bedside 11/10  but effusion recurred   -- Chest tube placement 11/13 , pulm managing. Pulm consulted Gen surgery for their input.  -- C/w broad spectrum abx per pulm      Chest pain   -- Clinically better. Occurred after lytic therapy given for chest tube  -- Declined further lytic therapy yesterday, but later agreed to take it.  -- Appreciate pain team, pulmonary consult  -- EKG unremarkable for ischemia     Acute resp failure due to above   -- Remains on supp o2  -- Cxr with no large ptx per rads      COVID-19 - since 10/28, breakthrough case  -- Completed remdesivir but no steroids per pulm for now.   -- Appreciate infectionist's input. Patient can be considered  recovered covid now. Stopped isolation 11/18.     GERD/Heart burn  -- Patient believes its due to PO oxycodone. Changed to liquid oxycodone per pain team  -- Changed protonix to omeprazole per patient's request. Changed HS vit C to am dosing.   -- Better     Constipation:  -- Cont with stool softners. Suppository/enema prn    Anemia:  -- Drop in hgb noted. No e/o GI bleeding.   -- Will monitor hgb    DVT PPX lovenox      Barriers to discharge resp failure      Anticipated Discharge date  Few days        LOS: 7 days     Subjective:  Interval History: Patient seen and examined this morning. She offered no new complaints. No fevers or chills. Denies any shortness of breath at  rest.     Review of Systems:   As mentioned in subjective.    Patient Active Problem List   Diagnosis     Breast cancer, stage 1, right (H)     Osteopenia determined by x-ray     Pneumonia due to infectious organism, unspecified laterality, unspecified part of lung     Abscess of middle lobe of right lung with pneumonia (H)     Infection due to 2019 novel coronavirus       Scheduled Meds:    acetylcysteine  2 mL Nebulization 4x Daily     albuterol  2.5 mg Nebulization 4x Daily     calcium carbonate-vitamin D  1 tablet Oral BID     [START ON 11/22/2021] cefTRIAXone  1 g Intravenous Q24H     enoxaparin ANTICOAGULANT  40 mg Subcutaneous Q24H     lactobacillus rhamnosus (GG)  1 capsule Oral Daily     lidocaine  3 patch Transdermal Q24H     lidocaine   Transdermal Q8H     magnesium oxide  200 mg Oral BID     multivitamin, therapeutic  1 tablet Oral Daily     omeprazole  40 mg Oral QAM AC     senna-docusate  2 tablet Oral BID     sertraline  200 mg Oral Daily     sodium chloride (PF)  3 mL Intracatheter Q8H     traZODone  150 mg Oral At Bedtime     valACYclovir  1,000 mg Oral At Bedtime     vitamin C  250 mg Oral Daily     Continuous Infusions:    - MEDICATION INSTRUCTIONS -       PRN Meds:.acetaminophen, alum & mag hydroxide-simethicone, bisacodyl, calcium carbonate, gabapentin, lidocaine 4%, lidocaine viscous 2% 15 mL and maalox/mylanta w/ simethicone 15 mL (GI COCKTAIL), lidocaine (buffered or not buffered), - MEDICATION INSTRUCTIONS -, melatonin, naloxone **OR** naloxone **OR** naloxone **OR** naloxone, ondansetron **OR** ondansetron, oxyCODONE, prochlorperazine **OR** prochlorperazine **OR** prochlorperazine, sodium chloride (PF)    Objective:  Vital signs in last 24 hours:  Temp:  [98.2  F (36.8  C)-98.7  F (37.1  C)] 98.2  F (36.8  C)  Pulse:  [87-93] 90  Resp:  [18-20] 18  BP: (113-121)/(48-81) 113/53  SpO2:  [93 %-96 %] 95 %        Intake/Output Summary (Last 24 hours) at 11/17/2021 1587  Last data filed at  11/17/2021 1415  Gross per 24 hour   Intake 250 ml   Output 285 ml   Net -35 ml       Physical Exam:    General: Not in obvious distress.  HEENT: NC, AT   Chest: Shallow breathing, right chest tube in place.  Heart: S1S2 normal, regular. No M/R/G  Abdomen: Soft. NT, ND. Bowel sounds- active.  Extremities: No legs swelling  Neuro: alert and awake, grossly non-focal      Lab Results:(I have personally reviewed the results)    Recent Results (from the past 24 hour(s))   CBC with platelets    Collection Time: 11/21/21  6:09 AM   Result Value Ref Range    WBC Count 17.3 (H) 4.0 - 11.0 10e3/uL    RBC Count 3.02 (L) 3.80 - 5.20 10e6/uL    Hemoglobin 9.3 (L) 11.7 - 15.7 g/dL    Hematocrit 29.6 (L) 35.0 - 47.0 %    MCV 98 78 - 100 fL    MCH 30.8 26.5 - 33.0 pg    MCHC 31.4 (L) 31.5 - 36.5 g/dL    RDW 13.2 10.0 - 15.0 %    Platelet Count 513 (H) 150 - 450 10e3/uL   Basic metabolic panel    Collection Time: 11/21/21  6:09 AM   Result Value Ref Range    Sodium 138 136 - 145 mmol/L    Potassium 3.6 3.5 - 5.0 mmol/L    Chloride 101 98 - 107 mmol/L    Carbon Dioxide (CO2) 30 22 - 31 mmol/L    Anion Gap 7 5 - 18 mmol/L    Urea Nitrogen 9 8 - 28 mg/dL    Creatinine 0.59 (L) 0.60 - 1.10 mg/dL    Calcium 8.3 (L) 8.5 - 10.5 mg/dL    Glucose 82 70 - 125 mg/dL    GFR Estimate 88 >60 mL/min/1.73m2   Magnesium    Collection Time: 11/21/21  6:09 AM   Result Value Ref Range    Magnesium 2.1 1.8 - 2.6 mg/dL       All laboratory and imaging data in the past 24 hours reviewed  Serum Glucose range:   Recent Labs   Lab 11/21/21  0609 11/20/21  0642 11/19/21  0555 11/18/21  0651   GLC 82 93 105 117     ABG: No lab results found in last 7 days.  CBC:   Recent Labs   Lab 11/21/21  0609 11/20/21  0642 11/19/21  0555 11/18/21  0709 11/17/21  0620 11/16/21  0621 11/15/21  1520 11/15/21  0558   WBC 17.3* 16.0* 19.9*   < > 12.8* 12.8*  --  13.3*   HGB 9.3* 9.0* 9.9*   < > 8.5* 9.8*   < > 10.3*   HCT 29.6* 27.9* 30.7*   < > 27.3* 29.5*  --  32.5*   MCV 98  96 95   < > 99 96  --  96   * 448 501*   < > 355 385  --  381   NEUTROPHIL  --   --   --   --  84 83  --  86   LYMPH  --   --   --   --  6 6  --  5   MONOCYTE  --   --   --   --  6 8  --  7   EOSINOPHIL  --   --   --   --  2 1  --  1    < > = values in this interval not displayed.     Chemistry:   Recent Labs   Lab 11/21/21  0609 11/20/21  0642 11/19/21  0555    137 133*   POTASSIUM 3.6 3.8 4.1   CHLORIDE 101 100 101   CO2 30 30 28   BUN 9 11 11   CR 0.59* 0.55* 0.54*   GFRESTIMATED 88 90 >90   EMILIANO 8.3* 8.0* 8.2*   MAG 2.1 2.0 1.8     Coags:  No results for input(s): INR, PROTIME, PTT in the last 168 hours.    Invalid input(s): APTT  Cardiac Markers:  No results for input(s): CKTOTAL, TROPONINI in the last 168 hours.       XR Chest Port 1 View    Result Date: 11/17/2021  EXAM: XR CHEST PORT 1 VIEW LOCATION: [facility] DATE/TIME: 11/17/2021 6:28 AM INDICATION: chest tube in place. COMPARISON: Portable AP view the chest 11/16/2021 at 0606 hours; chest CT 11/16/2021 at 0858 hours     IMPRESSION: A right pleural drain is similarly positioned with pigtail in the upper lateral aspect of the pleural space. There is a crimp in the tube at the level of the insertion projecting at the level of the posterolateral seventh intercostal space. Opacity with indistinct borders in the lower third of the right chest likely reflects a combination of residual complex pleural fluid and atelectasis. Mild but diffuse thickening of the pleura including the apex and along the mediastinal pleura. Focal lucency lucency  in the medial basal right chest adjacent to the right atrial heart border corresponds to loculated anterior pneumothorax on the prior CT. There are interstitial opacities in the right mid and upper lung consistent with mild interstitial edema. No progressive airspace opacity. Pleural fluid and atelectasis atelectasis in the medial left lower lobe obscures the medial 20% of the left  hemidiaphragm, not increased. Unchanged mild enlargement of the cardiac silhouette.    XR Chest Port 1 View    Result Date: 11/16/2021  EXAM: XR CHEST PORT 1 VIEW LOCATION: [facility] DATE/TIME: 11/16/2021 5:39 AM INDICATION: Follow-up chest tube COMPARISON: 11/15/2021     IMPRESSION: Right chest tube in stable position. No pneumothorax. Tiny right pleural effusion remains. Small left pleural effusion is stable. Atelectasis left lower lobe behind the heart is stable. Minimal diffuse interstitial infiltrates stable. No new findings.    XR Chest Port 1 View    Result Date: 11/15/2021  EXAM: XR CHEST PORT 1 VIEW LOCATION: [facility] DATE/TIME: 11/15/2021 6:30 AM INDICATION: chest tube in place. COMPARISON: 11/14/2021     IMPRESSION: Right pigtail pleural drain with trace pneumothorax near tube insertion site but no new significant pneumothorax. Further decrease in the small right pleural effusion and improved aeration of the right mid and lower lung. Decreasing retrocardiac left lower lung atelectasis or infiltrate. Stable borderline enlarged cardiac silhouette. Normal pulmonary vascularity. Healing fractures left ribs 3-6. Findings discussed with Dr Avitia.    XR Chest Port 1 View    Result Date: 11/14/2021  EXAM: XR CHEST PORT 1 VIEW LOCATION: [facility] DATE/TIME: 11/14/2021 5:59 AM INDICATION: chest tube in place. COMPARISON: 11/12/2021.     IMPRESSION: Interval placement of right pigtail pleural drain. Decreased small to moderate right pleural effusion. Improved aeration of the right mid and lower lung. Decreasing retrocardiac left lower lung atelectasis or infiltrate. Stable borderline enlarged cardiac silhouette. Normal pulmonary vascularity. No pneumothorax.    XR Chest Port 1 View    Result Date: 11/12/2021  EXAM: XR CHEST PORT 1 VIEW LOCATION: [facility] DATE/TIME: 11/12/2021 1:51 PM INDICATION: complicated  pleural effusion COMPARISON: 11/10/2021     IMPRESSION: The right pleural effusion is increasing. Stable heart size. No pulmonary edema. Increasing left medial basilar opacity could represent atelectasis or pneumonia.    XR Chest Port 1 View    Result Date: 11/10/2021  EXAM: XR CHEST PORT 1 VIEW LOCATION: [facility] DATE/TIME: 11/10/2021 6:22 PM INDICATION: s/p right thoracentesis, eval for PTX, eval for reduction in effusion. COMPARISON: CT earlier today.     IMPRESSION: Infiltrate right lung base by CT mostly in the right middle lobe. Small right pleural effusion. No pneumothorax. Left lung is clear. Scoliosis. Healing left rib fractures.    IR Chest Tube Place Non Tunneled Right    Result Date: 11/13/2021  [place] [facility] LOCATION: [facility] DATE: 11/13/2021 PROCEDURE: RIGHT ULTRASOUND GUIDED 14 Irish CHEST TUBE PLACEMENT INTERVENTIONAL RADIOLOGIST: Citlaly Rosales DO INDICATION: Recurrent pleural effusion, COVID. Effusion concerning for empyema CONSENT: The risks, benefits and alternatives of right chest tube were discussed with the patient  in detail. All questions were answered. Informed consent was given to proceed with the procedure. MEDICATIONS: 1% lidocaine and 25 mcg fentanyl IV CONTRAST: None ANTIBIOTICS: None. FLUOROSCOPIC TIME: 0 minutes. RADIATION DOSE: Air Kerma: 0 mGy. COMPLICATIONS: No immediate complications. STERILE BARRIER TECHNIQUE: Maximum sterile barrier technique was used. Cutaneous antisepsis was performed at the operative site with application of 2% chlorhexidine and large sterile drape. Prior to the procedure, the  and assistant performed hand hygiene and wore hat, mask, sterile gown, and sterile gloves during the entire procedure. PROCEDURE/FINDINGS:  Following a discussion of the risks, benefits, indications and alternatives to treatment, appropriate informed consent was obtained.  The patient was brought to the  "interventional radiology suite and placed upright on the table. The right hemithorax was prepped and draped in the usual sterile fashion. A timeout was performed per universal protocol policy to confirm the correct patient, site and procedure to be performed. A preliminary ultrasound was performed to access for the appropriate site to access the pleural effusion. These images reveal a large right pleural effusion and an ultrasound image was archived.  Once an appropriate site for chest tube placement was localized, the overlying skin was anesthetized with 1% Lidocaine. Under direct ultrasound guidance, a 5 Emirati Yueh needle was advanced into the pleural space via an intercostal approach. The catheter  was advanced off of the needle. A 0.035\" guidewire was advanced through the micropuncture sheath and the tract was serially dilated.  A 14 Emirati nonlocking loop chest tube was placed with the tip coiled in the pleural space. The catheter was sutured to  the skin using 2-0 suture. A sterile dressing was applied. Chest tube was connected to a Pleuravac device in the interventional suite. Throughout the procedure, the patient was monitored by a radiology nurse for cardiac rhythm and oxygen saturation which remained stable. The patient tolerated the procedure well and left interventional radiology in stable condition.     IMPRESSION: Successful right sided 14 Emirati chest tube placement under ultrasound guidance.     CT Chest Pulmonary Embolism w Contrast    Result Date: 11/10/2021  EXAM: CT CHEST PULMONARY EMBOLISM W CONTRAST LOCATION: [facility] DATE/TIME: 11/10/2021 1:42 PM INDICATION: Chest pain. Covid positive 10/28/2021. PE suspected, high prob COMPARISON: 09/20/2021 chest x-ray. CT chest 03/26/2019 TECHNIQUE: CT chest pulmonary angiogram during arterial phase injection of IV contrast. Multiplanar reformats and MIP reconstructions were performed. Dose reduction techniques were used. " CONTRAST: IsoVue 370 100mL FINDINGS: ANGIOGRAM CHEST: Pulmonary arteries are normal caliber and negative for pulmonary emboli. Thoracic aorta is negative for dissection. No CT evidence of right heart strain. LUNGS AND PLEURA: Dense focal infiltrate right middle lobe medial segment and some of the adjacent anterior segment right upper lobe. Tiny less than 1 cm microabscess within this infiltrate. Small right pleural effusion including some focal pleural reaction immediately adjacent to the pneumonia anteriorly. No other infiltrates. Would favor a bacterial pneumonia rather than COVID pneumonia. MEDIASTINUM/AXILLAE: Small pericardial effusion. UPPER ABDOMEN: Normal. MUSCULOSKELETAL: Normal.     IMPRESSION: 1.  Above findings most suggestive of moderately severe bacterial pneumonia right middle lobe with possible associated tiny microabscess within it. 2.  Small right pleural effusion and some focal pleural reaction immediately adjacent to the pneumonia anteriorly.    CT Chest w/o Contrast    Result Date: 11/16/2021  EXAM: CT CHEST W/O CONTRAST LOCATION: [facility] DATE/TIME: 11/16/2021 8:52 AM INDICATION: Pneumonia, effusion or abscess suspected, x-ray done. COMPARISON: Chest CTA dated 11/10/2021. TECHNIQUE: CT chest without IV contrast. Multiplanar reformats were obtained. Dose reduction techniques were used. CONTRAST: None. FINDINGS: LUNGS AND PLEURA: Interval placement of right chest tube with tip along the lateral pleural surface of the right upper lobe. There is a very small amount of loculated air anteriorly in the right middle lobe, no significant pneumothorax. There is persistent small pleural effusion at the right lung base with right lower lobe atelectasis and right middle lobe atelectasis. There is a small amount of loculated fluid along the right heart border in the inferior right middle lobe. There is a new small left pleural effusion and there is atelectasis in the left lower  lobe. There is bandlike atelectasis or scarring in the posterior right upper lobe. There is minimal airspace consolidation in the left upper lobe on image 19 of series 3. MEDIASTINUM/AXILLAE: Small to moderate pericardial effusion. Borderline right paratracheal lymph nodes. No thoracic aortic aneurysm. CORONARY ARTERY CALCIFICATION: Mild. UPPER ABDOMEN: No significant finding. MUSCULOSKELETAL: Thoracolumbar scoliosis. No fracture or suspicious bony lesion.     IMPRESSION: 1.  New right chest tube. There is persistent small loculated pleural effusion at the right lung base which does not communicate with the chest tube. There is additional small loculated hydropneumothorax in the anterior right middle lobe that also does not communicate with the chest tube. There is atelectasis in both lower lobes and in the right middle lobe. 2.  New small right pleural effusion. 3.  Patchy airspace opacities in the left upper lobe consistent with a small focus of pneumonia.       Latest radiology report personally reviewed.    Note created using dragon voice recognition software so sounds alike errors may have escaped editing.      11/21/2021   Levar Landeros MD  Hospitalist, [facility]  Pager: [phone]

## 2021-11-21 NOTE — PLAN OF CARE
Problem: Adult Inpatient Plan of Care  Goal: Plan of Care Review  Outcome: No Change  Goal: Patient-Specific Goal (Individualized)  Outcome: No Change  Goal: Absence of Hospital-Acquired Illness or Injury  Outcome: No Change  Intervention: Identify and Manage Fall Risk  Recent Flowsheet Documentation  Taken 11/21/2021 0013 by Emma Kumar, RN  Safety Promotion/Fall Prevention:    nonskid shoes/slippers when out of bed    room near nurse's station    room organization consistent    assistive device/personal items within reach  Intervention: Prevent Skin Injury  Recent Flowsheet Documentation  Taken 11/21/2021 0013 by Emma Kumar, RN  Body Position: position changed independently  Goal: Optimal Comfort and Wellbeing  Outcome: No Change  Goal: Readiness for Transition of Care  Outcome: No Change     Problem: Infection (Pneumonia)  Goal: Resolution of Infection Signs and Symptoms  Outcome: No Change     Problem: Respiratory Compromise (Pneumonia)  Goal: Effective Oxygenation and Ventilation  Outcome: No Change  Intervention: Optimize Oxygenation and Ventilation  Recent Flowsheet Documentation  Taken 11/21/2021 0013 by Emma Kumar, RN  Head of Bed (HOB) Positioning: HOB flat     Problem: Activity Intolerance (Pulmonary Impairment)  Goal: Improved Activity Tolerance  Outcome: No Change   Pt denies pain at rest. Reports discomfort at chest tube site when she gets oob. Slight sob and brief oxygen desaturation after getting up to bsc. Chest tube to water seal. Minimal serosanguinous output so far this shift.

## 2021-11-21 NOTE — PROGRESS NOTES
RESPIRATORY CARE NOTE   Patient is on 1L NC, BS diminished. Gave albuterol & mucomyst treatment x3. Did not tolerate MetaNeb -- d/c'd. BS post treatment unchanged, patient perceives mild improvement, tolerated well. Will continue to monitor.     Gino Becker, RT

## 2021-11-21 NOTE — PLAN OF CARE
Problem: Adult Inpatient Plan of Care  Goal: Absence of Hospital-Acquired Illness or Injury  Intervention: Identify and Manage Fall Risk  Recent Flowsheet Documentation  Taken 11/20/2021 2100 by Conchis Key RN  Safety Promotion/Fall Prevention: safety round/check completed  Taken 11/20/2021 1630 by Conchis Key RN  Safety Promotion/Fall Prevention: safety round/check completed     Problem: Respiratory Compromise (Pneumonia)  Goal: Effective Oxygenation and Ventilation  Outcome: Improving     Problem: Airway Clearance Ineffective (Pulmonary Impairment)  Goal: Effective Airway Clearance  Outcome: Improving   Pt ct drained 120 ml. Pt on oxygen at 1 L-sats 92-96. Pt desats when up to commode to mid 80s but rebounds quickly.    Problem: Adult Inpatient Plan of Care  Goal: Optimal Comfort and Wellbeing  Outcome: Improving   Medicated with tylenol with good relief. Pt has not much of appetite. Pt has metallic taste when eating.

## 2021-11-21 NOTE — PROGRESS NOTES
Pulmonary Progress Note:     76 year old lady with history of stage 1 breast cancer s/p lumpectomy and radiation in 2015.  Breakthrough Covid infection diagnosed 10/28.  Presented to the hospital on 11/10 and found to have right sided pleural effusion.  Drained dry with thoracentesis.  Studies found to be consistent with complicated parapneumonic effusion, likely due to Streptococcus pneumonia.     Admit Date:  11/10/2021  Code Status:  DNR/DNI     Assessment/Plan:     Intrapleural lytics were initiated on 11/14, CT output > one liter following.  After the first dose, patient had significant pain.  This has resolved, and she did not experience this today with administration. She has completed her lytic therapy as of 11/18.   Pain team is also involved to help control pain.  Repeat CT this am shows continued effusion on right, with new small effusion on left side.  Dr. Gutierrez has been consulted as well.  Mucomyst and Metaneb therapies added for improved pulmonary hygiene.      Plan:  1. Completed total of 6 dose of lytics.      2.  Please chart chest tube I/O, continue CT to water seal.      3.  Expect CT output to be serosang. In color, Hgb stable.     4.  Goal SpO2 > 92%  Titrate O2 as tolerated.     5.  Continue Ceftriaxone. Stop date placed (will then have received 14 days)     6.  Continue metanebs and mucomyst QID.      7.  Have general surgery to see, Dr. Gutierrez:  Consult has been called, appreciate.  He will be back and able to see patient on Monday, which is fine.     Subjective:    In good spirits.  Still unfortunately has metallic taste with food. Hopefully this will improve after antibiotic therapy is complete.         acetylcysteine  2 mL Nebulization 4x Daily     albuterol  2.5 mg Nebulization 4x Daily     calcium carbonate-vitamin D  1 tablet Oral BID     cefTRIAXone  1 g Intravenous Q24H     enoxaparin ANTICOAGULANT  40 mg Subcutaneous Q24H     lactobacillus rhamnosus (GG)  1 capsule Oral Daily      "lidocaine  3 patch Transdermal Q24H     lidocaine   Transdermal Q8H     magnesium oxide  200 mg Oral BID     multivitamin, therapeutic  1 tablet Oral Daily     omeprazole  40 mg Oral QAM AC     polyethylene glycol  17 g Oral BID     senna-docusate  2 tablet Oral BID     sertraline  200 mg Oral Daily     sodium chloride (PF)  3 mL Irrigation Q8H     sodium chloride (PF)  3 mL Intracatheter Q8H     traZODone  150 mg Oral At Bedtime     valACYclovir  1,000 mg Oral At Bedtime     vitamin C  250 mg Oral Daily     EXAM:  Vitals: /49 (BP Location: Right arm, Patient Position: Semi-Horowitz's)   Pulse 87   Temp 98.2  F (36.8  C) (Oral)   Resp 20   Ht 1.664 m (5' 5.5\")   Wt 63.5 kg (139 lb 14.4 oz)   SpO2 96%   BMI 22.93 kg/m    BMI= Body mass index is 22.93 kg/m .       General:  Awake, alert, up in chair.  Cardiac:  RRR, no murmurs noted.  Respiratory:  Lungs clear to auscultation, diminished in right base. Chest tube with serosang drainage. To suction, functioning and draining.  Dressing changed today.   Abd:  + BS X 4 quds.  Soft, non-tender.  Extremities:  No edema noted.  Neuro:  Grossly intact.     ROS:  A 10-system review was obtained and is negative with the exception of the symptoms noted above.     Micro  Urine Strep pneumo antigen positive  Legionella urine Ag neg  Blood with staph hominis     Pleural fluid 11/10  Studies reviewed.  Pleural culture negative.     RIGHT PLEURAL FLUID:    LARGE NUMBERS OF ACUTE INFLAMMATORY CELLS, POSSIBLE EARLY EMPYEMA    NEGATIVE FOR ATYPICAL OR MALIGNANT CELLS     IMAGING:     EXAM: XR CHEST PORT 1 VIEW  LOCATION: [facility]  DATE/TIME: 11/21/2021 5:41 AM     INDICATION: follow up, has right chest tube.  COMPARISON: 11/20/2021 radiograph                                                                       IMPRESSION: A right lateral approach pigtail catheter has not changed in position. No pneumothorax. Right pulmonary opacities have not " significantly changed. Normal size of the heart.

## 2021-11-21 NOTE — PLAN OF CARE
Problem: Adult Inpatient Plan of Care  Goal: Optimal Comfort and Wellbeing  Outcome: Improving  Patient states pain level 2/10, declines offer of pain med, denies epigastric burning, po intake remains poor, Chest tube dressing changed with moderate amount of serosanguinous fluid, CT put out 70 ml serosanguinous fluid, patient c/o fatigue today, denies shortness of breath, encourage po intake,

## 2021-11-21 NOTE — PROGRESS NOTES
[facility] ACUTE PAIN SERVICE    ([facility])  Daily PAIN Progress Note    Assessment/Plan:  Salome Maravilla is an opioid naïve 76 year old female who was admitted on 11/10/2021. I was asked to see the patient for acute pain s/p chest tube placement. History of stage 1 breast CA s/p lumpectomy and XRT in 2015 who presents with progressive cough and chest pain. Pt vaccinated with COVID diagnosed 10/28 who presented with pleuritic R sided chest pain, now with chest tube and she completed a course of lytic therapy. Surgical consult for Monday. Minimal pain. Not using opioids. Pain team will sign off. Please reconsult if needed.      PLAN:   1) Atypical chest pain secondary to chest tube. PRN oxycodone available, which she has not required.   2)Multimodal Medication Therapy  Topical:  lidocaine patches  NSAID'S: crcl = 67.5 ml/min, hgb 9.8 ml/min  Adjuvants: APAP QID- hold per pt request. Gabapentin is PRN   Antidepressants/anxiolytics: sertraline 200 mg po daily (home med), trazodone 150 mg po at bedtime (home med)  Opioids: oxycodone 5 mg po q3h prn -no need for liquid oxycodone if lytics stop  3)Non-medication interventions- Ice, Heat, physical therapy,   incentive spirometry  4)Constipation Prophylaxis- stools too loose- back off on laxative while not on opioids   5) Follow up   -PCP is Charity Mann  -Discharge Recommendations - We recommend prescribing the following at the time of discharge: likely none        [place] -pulled from system on 11/16/21. This indicated no opioid use or controlled medication use in th past 12 months.        Subjective:    Pain is controlled  Slept ok, but woken up often       Abscess of middle lobe of right lung with pneumonia (H)   Patient Active Problem List   Diagnosis     Breast cancer, stage 1, right (H)     Osteopenia determined by x-ray     Pneumonia due to infectious organism, unspecified laterality, unspecified part of lung      "Abscess of middle lobe of right lung with pneumonia (H)     Infection due to 2019 novel coronavirus        History   Drug Use No         Tobacco Use      Smoking status: Former Smoker        Quit date: 1988        Years since quittin.5      Smokeless tobacco: Never Used          acetylcysteine  2 mL Nebulization 4x Daily     albuterol  2.5 mg Nebulization 4x Daily     calcium carbonate-vitamin D  1 tablet Oral BID     cefTRIAXone  1 g Intravenous Q24H     enoxaparin ANTICOAGULANT  40 mg Subcutaneous Q24H     lactobacillus rhamnosus (GG)  1 capsule Oral Daily     lidocaine  3 patch Transdermal Q24H     lidocaine   Transdermal Q8H     magnesium oxide  200 mg Oral BID     multivitamin, therapeutic  1 tablet Oral Daily     omeprazole  40 mg Oral QAM AC     polyethylene glycol  17 g Oral BID     senna-docusate  2 tablet Oral BID     sertraline  200 mg Oral Daily     sodium chloride (PF)  3 mL Intracatheter Q8H     traZODone  150 mg Oral At Bedtime     valACYclovir  1,000 mg Oral At Bedtime     vitamin C  250 mg Oral Daily       Objective:  Vital signs in last 24 hours:  B/P: 108/52, T: 98.3, P: 80, R: 16   Blood pressure 115/49, pulse 87, temperature 98.2  F (36.8  C), temperature source Oral, resp. rate 20, height 1.664 m (5' 5.5\"), weight 63.5 kg (139 lb 14.4 oz), SpO2 94 %.      Weight:   Wt Readings from Last 2 Encounters:   21 63.5 kg (139 lb 14.4 oz)   21 62.6 kg (138 lb 1.6 oz)           Intake/Output:    Intake/Output Summary (Last 24 hours) at 2021 0829  Last data filed at 2021 0128  Gross per 24 hour   Intake 10 ml   Output 175 ml   Net -165 ml        Review of Systems:   As per subjective, all others negative.    Physical Exam:  Constitutional: healthy, alert and no distress-laying flat in bed  Pleasant   Head: negative  CV: s1/S2  Pulm: lungs diminished CT in place   Neck: negative findings: no asymmetry, masses, or scars  Resp- no dyspnea  GI- NO BM   Psych- calm and pleasant "          Lab Results:  Personally Reviewed.   Last Comprehensive Metabolic Panel:  Sodium   Date Value Ref Range Status   11/21/2021 138 136 - 145 mmol/L Final     Potassium   Date Value Ref Range Status   11/21/2021 3.6 3.5 - 5.0 mmol/L Final     Chloride   Date Value Ref Range Status   11/21/2021 101 98 - 107 mmol/L Final     Carbon Dioxide (CO2)   Date Value Ref Range Status   11/21/2021 30 22 - 31 mmol/L Final     Anion Gap   Date Value Ref Range Status   11/21/2021 7 5 - 18 mmol/L Final     Glucose   Date Value Ref Range Status   11/21/2021 82 70 - 125 mg/dL Final     Urea Nitrogen   Date Value Ref Range Status   11/21/2021 9 8 - 28 mg/dL Final     Creatinine   Date Value Ref Range Status   11/21/2021 0.59 (L) 0.60 - 1.10 mg/dL Final     GFR Estimate   Date Value Ref Range Status   11/21/2021 88 >60 mL/min/1.73m2 Final     Comment:     As of July 11, 2021, eGFR is calculated by the CKD-EPI creatinine equation, without race adjustment. eGFR can be influenced by muscle mass, exercise, and diet. The reported eGFR is an estimation only and is only applicable if the renal function is stable.   05/04/2021 >60 >60 mL/min/1.73m2 Final     Calcium   Date Value Ref Range Status   11/21/2021 8.3 (L) 8.5 - 10.5 mg/dL Final        UA: No results found for: UAMP, UBARB, BENZODIAZEUR, UCANN, UCOC, OPIT, UPCP          Total unit/floor time 15  minutes, time consisted of the following, examination of patient, reviewing the record and lab results, and completing documentation.            Margo Dozier APRN, CNS-BC, CNP, ACHPN  Acute Care Pain Management Program Hour 7a-1700  [facility] ([facility], [place], [place])   Page via [employer]- Click HERE to page Margo or call [phone]

## 2021-11-22 ENCOUNTER — ANESTHESIA EVENT (OUTPATIENT)
Dept: SURGERY | Facility: HOSPITAL | Age: 76
DRG: 163 | End: 2021-11-22
Payer: COMMERCIAL

## 2021-11-22 ENCOUNTER — APPOINTMENT (OUTPATIENT)
Dept: PHYSICAL THERAPY | Facility: HOSPITAL | Age: 76
DRG: 163 | End: 2021-11-22
Payer: COMMERCIAL

## 2021-11-22 ENCOUNTER — APPOINTMENT (OUTPATIENT)
Dept: OCCUPATIONAL THERAPY | Facility: HOSPITAL | Age: 76
DRG: 163 | End: 2021-11-22
Payer: COMMERCIAL

## 2021-11-22 LAB
ANION GAP SERPL CALCULATED.3IONS-SCNC: 8 MMOL/L (ref 5–18)
BUN SERPL-MCNC: 8 MG/DL (ref 8–28)
C DIFF TOX B STL QL: NEGATIVE
CALCIUM SERPL-MCNC: 8.3 MG/DL (ref 8.5–10.5)
CHLORIDE BLD-SCNC: 102 MMOL/L (ref 98–107)
CO2 SERPL-SCNC: 29 MMOL/L (ref 22–31)
CREAT SERPL-MCNC: 0.59 MG/DL (ref 0.6–1.1)
ERYTHROCYTE [DISTWIDTH] IN BLOOD BY AUTOMATED COUNT: 13.4 % (ref 10–15)
GFR SERPL CREATININE-BSD FRML MDRD: 88 ML/MIN/1.73M2
GLUCOSE BLD-MCNC: 87 MG/DL (ref 70–125)
HCT VFR BLD AUTO: 26.8 % (ref 35–47)
HGB BLD-MCNC: 8.4 G/DL (ref 11.7–15.7)
MAGNESIUM SERPL-MCNC: 2.2 MG/DL (ref 1.8–2.6)
MCH RBC QN AUTO: 30.7 PG (ref 26.5–33)
MCHC RBC AUTO-ENTMCNC: 31.3 G/DL (ref 31.5–36.5)
MCV RBC AUTO: 98 FL (ref 78–100)
PLATELET # BLD AUTO: 429 10E3/UL (ref 150–450)
POTASSIUM BLD-SCNC: 3.6 MMOL/L (ref 3.5–5)
RBC # BLD AUTO: 2.74 10E6/UL (ref 3.8–5.2)
SODIUM SERPL-SCNC: 139 MMOL/L (ref 136–145)
WBC # BLD AUTO: 13 10E3/UL (ref 4–11)

## 2021-11-22 PROCEDURE — 999N000157 HC STATISTIC RCP TIME EA 10 MIN

## 2021-11-22 PROCEDURE — 36415 COLL VENOUS BLD VENIPUNCTURE: CPT | Performed by: INTERNAL MEDICINE

## 2021-11-22 PROCEDURE — 250N000009 HC RX 250: Performed by: NURSE PRACTITIONER

## 2021-11-22 PROCEDURE — 250N000013 HC RX MED GY IP 250 OP 250 PS 637: Performed by: RADIOLOGY

## 2021-11-22 PROCEDURE — 87493 C DIFF AMPLIFIED PROBE: CPT | Performed by: INTERNAL MEDICINE

## 2021-11-22 PROCEDURE — 250N000013 HC RX MED GY IP 250 OP 250 PS 637: Performed by: INTERNAL MEDICINE

## 2021-11-22 PROCEDURE — 83735 ASSAY OF MAGNESIUM: CPT | Performed by: INTERNAL MEDICINE

## 2021-11-22 PROCEDURE — 94640 AIRWAY INHALATION TREATMENT: CPT

## 2021-11-22 PROCEDURE — 250N000013 HC RX MED GY IP 250 OP 250 PS 637: Performed by: PAIN MEDICINE

## 2021-11-22 PROCEDURE — 250N000011 HC RX IP 250 OP 636: Performed by: INTERNAL MEDICINE

## 2021-11-22 PROCEDURE — 94799 UNLISTED PULMONARY SVC/PX: CPT

## 2021-11-22 PROCEDURE — 250N000011 HC RX IP 250 OP 636: Performed by: NURSE PRACTITIONER

## 2021-11-22 PROCEDURE — G0463 HOSPITAL OUTPT CLINIC VISIT: HCPCS

## 2021-11-22 PROCEDURE — 85027 COMPLETE CBC AUTOMATED: CPT | Performed by: INTERNAL MEDICINE

## 2021-11-22 PROCEDURE — 272N000202 HC AEROBIKA WITH MANOMETER

## 2021-11-22 PROCEDURE — 97535 SELF CARE MNGMENT TRAINING: CPT | Mod: GO

## 2021-11-22 PROCEDURE — 120N000001 HC R&B MED SURG/OB

## 2021-11-22 PROCEDURE — 80048 BASIC METABOLIC PNL TOTAL CA: CPT | Performed by: INTERNAL MEDICINE

## 2021-11-22 PROCEDURE — 97116 GAIT TRAINING THERAPY: CPT | Mod: GP | Performed by: PHYSICAL THERAPIST

## 2021-11-22 PROCEDURE — 99231 SBSQ HOSP IP/OBS SF/LOW 25: CPT | Performed by: INTERNAL MEDICINE

## 2021-11-22 RX ORDER — ACETYLCYSTEINE 200 MG/ML
2 SOLUTION ORAL; RESPIRATORY (INHALATION) 4 TIMES DAILY PRN
Status: DISCONTINUED | OUTPATIENT
Start: 2021-11-22 | End: 2021-12-02 | Stop reason: HOSPADM

## 2021-11-22 RX ORDER — ALBUTEROL SULFATE 0.83 MG/ML
2.5 SOLUTION RESPIRATORY (INHALATION) EVERY 4 HOURS PRN
Status: DISCONTINUED | OUTPATIENT
Start: 2021-11-22 | End: 2021-12-02 | Stop reason: HOSPADM

## 2021-11-22 RX ADMIN — MAGNESIUM OXIDE TAB 400 MG (241.3 MG ELEMENTAL MG) 200 MG: 400 (241.3 MG) TAB at 08:42

## 2021-11-22 RX ADMIN — SERTRALINE HYDROCHLORIDE 200 MG: 50 TABLET ORAL at 08:42

## 2021-11-22 RX ADMIN — Medication 1 TABLET: at 20:44

## 2021-11-22 RX ADMIN — THERA TABS 1 TABLET: TAB at 08:41

## 2021-11-22 RX ADMIN — Medication 1 CAPSULE: at 20:44

## 2021-11-22 RX ADMIN — TRAZODONE HYDROCHLORIDE 150 MG: 50 TABLET ORAL at 20:44

## 2021-11-22 RX ADMIN — LIDOCAINE 1 PATCH: 246 PATCH TOPICAL at 15:59

## 2021-11-22 RX ADMIN — ENOXAPARIN SODIUM 40 MG: 40 INJECTION SUBCUTANEOUS at 08:42

## 2021-11-22 RX ADMIN — Medication 1 TABLET: at 08:41

## 2021-11-22 RX ADMIN — ACETAMINOPHEN 975 MG: 325 SOLUTION ORAL at 20:43

## 2021-11-22 RX ADMIN — MAGNESIUM OXIDE TAB 400 MG (241.3 MG ELEMENTAL MG) 200 MG: 400 (241.3 MG) TAB at 20:44

## 2021-11-22 RX ADMIN — VALACYCLOVIR 1000 MG: 500 TABLET, FILM COATED ORAL at 20:44

## 2021-11-22 RX ADMIN — ACETYLCYSTEINE 2 ML: 200 SOLUTION ORAL; RESPIRATORY (INHALATION) at 07:30

## 2021-11-22 RX ADMIN — OMEPRAZOLE 40 MG: 20 CAPSULE, DELAYED RELEASE ORAL at 05:38

## 2021-11-22 RX ADMIN — ALBUTEROL SULFATE 2.5 MG: 2.5 SOLUTION RESPIRATORY (INHALATION) at 07:29

## 2021-11-22 RX ADMIN — CEFTRIAXONE SODIUM 1 G: 1 INJECTION, POWDER, FOR SOLUTION INTRAMUSCULAR; INTRAVENOUS at 13:50

## 2021-11-22 ASSESSMENT — ACTIVITIES OF DAILY LIVING (ADL)
ADLS_ACUITY_SCORE: 8
ADLS_ACUITY_SCORE: 7
ADLS_ACUITY_SCORE: 8
ADLS_ACUITY_SCORE: 5
ADLS_ACUITY_SCORE: 7
ADLS_ACUITY_SCORE: 5
ADLS_ACUITY_SCORE: 7
ADLS_ACUITY_SCORE: 8
ADLS_ACUITY_SCORE: 5
ADLS_ACUITY_SCORE: 7
ADLS_ACUITY_SCORE: 5
ADLS_ACUITY_SCORE: 8
ADLS_ACUITY_SCORE: 5
ADLS_ACUITY_SCORE: 7
ADLS_ACUITY_SCORE: 8
ADLS_ACUITY_SCORE: 7
ADLS_ACUITY_SCORE: 7
ADLS_ACUITY_SCORE: 5
ADLS_ACUITY_SCORE: 8

## 2021-11-22 ASSESSMENT — MIFFLIN-ST. JEOR: SCORE: 1067.6

## 2021-11-22 NOTE — PROGRESS NOTES
Daily Progress Note        CODE STATUS:  No CPR- Do NOT Intubate    11/17/21  Assessment/Plan:  76 year old lady with history of stage 1 breast cancer s/p lumpectomy and radiation in 2015, depression, GERD, OA, presented with right sided chest pain and dry cough. She was diagnosed with breakthrough covid-19 infection on 10/28 and presented on 11/10 with several days of worsening right-sided pleuritic chest pain, cough, low grade fevers and chills at home.     Pneumonia  Complex parapneumonic effusion  -- Likely due to post viral strep PNA. The right pleural effusion was drained at bedside 11/10  but effusion recurred   -- Chest tube placement 11/13 , pulm managing. Pulm consulted Gen surgery for their input. Discussed with Dr Zuluaga. He is planning for a thorascopic surgery tomorrow. Will hold lovenox. Keep NPO after midnight  -- C/w broad spectrum abx per pulm. I would assume it would be at least for 14 days for empyema. Defer to pulm.     Chest pain   -- Clinically better. Occurred after lytic therapy given for chest tube  -- Declined further lytic therapy yesterday, but later agreed to take it.  -- Appreciate pain team, pulmonary consult  -- EKG unremarkable for ischemia     Acute resp failure due to above   -- Remains on supp o2  -- Cxr with no large ptx per rads      COVID-19 - since 10/28, breakthrough case  -- Completed remdesivir but no steroids per pulm for now.   -- Appreciate infectionist's input. Patient can be considered  recovered covid now. Stopped isolation 11/18.     GERD/Heart burn  -- Patient believes its due to PO oxycodone. Changed to liquid oxycodone per pain team  -- Changed protonix to omeprazole per patient's request. Changed HS vit C to am dosing.   -- Better     Constipation:  -- Cont with stool softners. Suppository/enema prn    Anemia:  -- Drop in hgb noted. No e/o GI bleeding.   -- Will monitor hgb    DVT PPX lovenox      Barriers to discharge resp failure      Anticipated Discharge  date  Few days     Daughter in law, Camille called and updated. Answered her questions to the best of my knowledge.    Subjective:  Interval History: Patient seen and examined this morning. No new issues overnight. Reports doing about the same. No fevers or chills. No shortness of breath or chest pain at rest.     Review of Systems:   As mentioned in subjective.    Patient Active Problem List   Diagnosis     Breast cancer, stage 1, right (H)     Osteopenia determined by x-ray     Pneumonia due to infectious organism, unspecified laterality, unspecified part of lung     Abscess of middle lobe of right lung with pneumonia (H)     Infection due to 2019 novel coronavirus       Scheduled Meds:    calcium carbonate-vitamin D  1 tablet Oral BID     cefTRIAXone  1 g Intravenous Q24H     [Held by provider] enoxaparin ANTICOAGULANT  40 mg Subcutaneous Q24H     lactobacillus rhamnosus (GG)  1 capsule Oral Daily     lidocaine  3 patch Transdermal Q24H     lidocaine   Transdermal Q8H     magnesium oxide  200 mg Oral BID     multivitamin, therapeutic  1 tablet Oral Daily     omeprazole  40 mg Oral QAM AC     senna-docusate  2 tablet Oral BID     sertraline  200 mg Oral Daily     sodium chloride (PF)  3 mL Intracatheter Q8H     traZODone  150 mg Oral At Bedtime     valACYclovir  1,000 mg Oral At Bedtime     vitamin C  250 mg Oral Daily     Continuous Infusions:    - MEDICATION INSTRUCTIONS -       PRN Meds:.acetaminophen, acetylcysteine, albuterol, alum & mag hydroxide-simethicone, bisacodyl, calcium carbonate, gabapentin, lidocaine 4%, lidocaine viscous 2% 15 mL and maalox/mylanta w/ simethicone 15 mL (GI COCKTAIL), lidocaine (buffered or not buffered), - MEDICATION INSTRUCTIONS -, melatonin, naloxone **OR** naloxone **OR** naloxone **OR** naloxone, ondansetron **OR** ondansetron, oxyCODONE, prochlorperazine **OR** prochlorperazine **OR** prochlorperazine, sodium chloride (PF)    Objective:  Vital signs in last 24 hours:  Temp:   [98.2  F (36.8  C)-99.2  F (37.3  C)] 99.2  F (37.3  C)  Pulse:  [81-97] 82  Resp:  [16-20] 16  BP: (106-117)/(42-55) 106/42  SpO2:  [86 %-99 %] 98 %        Intake/Output Summary (Last 24 hours) at 11/17/2021 1517  Last data filed at 11/17/2021 1415  Gross per 24 hour   Intake 250 ml   Output 285 ml   Net -35 ml       Physical Exam:    General: Not in obvious distress.  HEENT: NC, AT   Chest: Shallow breathing, right chest tube in place.  Heart: S1S2 normal, regular. No M/R/G  Abdomen: Soft. NT, ND. Bowel sounds- active.  Extremities: No legs swelling  Neuro: alert and awake, grossly non-focal      Lab Results:(I have personally reviewed the results)    Recent Results (from the past 24 hour(s))   CBC with platelets    Collection Time: 11/22/21  5:32 AM   Result Value Ref Range    WBC Count 13.0 (H) 4.0 - 11.0 10e3/uL    RBC Count 2.74 (L) 3.80 - 5.20 10e6/uL    Hemoglobin 8.4 (L) 11.7 - 15.7 g/dL    Hematocrit 26.8 (L) 35.0 - 47.0 %    MCV 98 78 - 100 fL    MCH 30.7 26.5 - 33.0 pg    MCHC 31.3 (L) 31.5 - 36.5 g/dL    RDW 13.4 10.0 - 15.0 %    Platelet Count 429 150 - 450 10e3/uL   Basic metabolic panel    Collection Time: 11/22/21  5:32 AM   Result Value Ref Range    Sodium 139 136 - 145 mmol/L    Potassium 3.6 3.5 - 5.0 mmol/L    Chloride 102 98 - 107 mmol/L    Carbon Dioxide (CO2) 29 22 - 31 mmol/L    Anion Gap 8 5 - 18 mmol/L    Urea Nitrogen 8 8 - 28 mg/dL    Creatinine 0.59 (L) 0.60 - 1.10 mg/dL    Calcium 8.3 (L) 8.5 - 10.5 mg/dL    Glucose 87 70 - 125 mg/dL    GFR Estimate 88 >60 mL/min/1.73m2   Magnesium    Collection Time: 11/22/21  5:32 AM   Result Value Ref Range    Magnesium 2.2 1.8 - 2.6 mg/dL       All laboratory and imaging data in the past 24 hours reviewed  Serum Glucose range:   Recent Labs   Lab 11/22/21  0532 11/21/21  0609 11/20/21  0642 11/19/21  0555   GLC 87 82 93 105     ABG: No lab results found in last 7 days.  CBC:   Recent Labs   Lab 11/22/21  0532 11/21/21  0609 11/20/21  0642  11/18/21  0709 11/17/21  0620 11/16/21  0621   WBC 13.0* 17.3* 16.0*   < > 12.8* 12.8*   HGB 8.4* 9.3* 9.0*   < > 8.5* 9.8*   HCT 26.8* 29.6* 27.9*   < > 27.3* 29.5*   MCV 98 98 96   < > 99 96    513* 448   < > 355 385   NEUTROPHIL  --   --   --   --  84 83   LYMPH  --   --   --   --  6 6   MONOCYTE  --   --   --   --  6 8   EOSINOPHIL  --   --   --   --  2 1    < > = values in this interval not displayed.     Chemistry:   Recent Labs   Lab 11/22/21  0532 11/21/21  0609 11/20/21  0642    138 137   POTASSIUM 3.6 3.6 3.8   CHLORIDE 102 101 100   CO2 29 30 30   BUN 8 9 11   CR 0.59* 0.59* 0.55*   GFRESTIMATED 88 88 90   EMILIANO 8.3* 8.3* 8.0*   MAG 2.2 2.1 2.0     Coags:  No results for input(s): INR, PROTIME, PTT in the last 168 hours.    Invalid input(s): APTT  Cardiac Markers:  No results for input(s): CKTOTAL, TROPONINI in the last 168 hours.       XR Chest Port 1 View    Result Date: 11/17/2021  EXAM: XR CHEST PORT 1 VIEW LOCATION: [facility] DATE/TIME: 11/17/2021 6:28 AM INDICATION: chest tube in place. COMPARISON: Portable AP view the chest 11/16/2021 at 0606 hours; chest CT 11/16/2021 at 0858 hours     IMPRESSION: A right pleural drain is similarly positioned with pigtail in the upper lateral aspect of the pleural space. There is a crimp in the tube at the level of the insertion projecting at the level of the posterolateral seventh intercostal space. Opacity with indistinct borders in the lower third of the right chest likely reflects a combination of residual complex pleural fluid and atelectasis. Mild but diffuse thickening of the pleura including the apex and along the mediastinal pleura. Focal lucency lucency  in the medial basal right chest adjacent to the right atrial heart border corresponds to loculated anterior pneumothorax on the prior CT. There are interstitial opacities in the right mid and upper lung consistent with mild interstitial edema. No progressive airspace  opacity. Pleural fluid and atelectasis atelectasis in the medial left lower lobe obscures the medial 20% of the left hemidiaphragm, not increased. Unchanged mild enlargement of the cardiac silhouette.    XR Chest Port 1 View    Result Date: 11/16/2021  EXAM: XR CHEST PORT 1 VIEW LOCATION: [facility] DATE/TIME: 11/16/2021 5:39 AM INDICATION: Follow-up chest tube COMPARISON: 11/15/2021     IMPRESSION: Right chest tube in stable position. No pneumothorax. Tiny right pleural effusion remains. Small left pleural effusion is stable. Atelectasis left lower lobe behind the heart is stable. Minimal diffuse interstitial infiltrates stable. No new findings.    XR Chest Port 1 View    Result Date: 11/15/2021  EXAM: XR CHEST PORT 1 VIEW LOCATION: [facility] DATE/TIME: 11/15/2021 6:30 AM INDICATION: chest tube in place. COMPARISON: 11/14/2021     IMPRESSION: Right pigtail pleural drain with trace pneumothorax near tube insertion site but no new significant pneumothorax. Further decrease in the small right pleural effusion and improved aeration of the right mid and lower lung. Decreasing retrocardiac left lower lung atelectasis or infiltrate. Stable borderline enlarged cardiac silhouette. Normal pulmonary vascularity. Healing fractures left ribs 3-6. Findings discussed with Dr Avitia.    XR Chest Port 1 View    Result Date: 11/14/2021  EXAM: XR CHEST PORT 1 VIEW LOCATION: [facility] DATE/TIME: 11/14/2021 5:59 AM INDICATION: chest tube in place. COMPARISON: 11/12/2021.     IMPRESSION: Interval placement of right pigtail pleural drain. Decreased small to moderate right pleural effusion. Improved aeration of the right mid and lower lung. Decreasing retrocardiac left lower lung atelectasis or infiltrate. Stable borderline enlarged cardiac silhouette. Normal pulmonary vascularity. No pneumothorax.    XR Chest Port 1 View    Result Date: 11/12/2021  EXAM: XR  CHEST PORT 1 VIEW LOCATION: [facility] DATE/TIME: 11/12/2021 1:51 PM INDICATION: complicated pleural effusion COMPARISON: 11/10/2021     IMPRESSION: The right pleural effusion is increasing. Stable heart size. No pulmonary edema. Increasing left medial basilar opacity could represent atelectasis or pneumonia.    XR Chest Port 1 View    Result Date: 11/10/2021  EXAM: XR CHEST PORT 1 VIEW LOCATION: [facility] DATE/TIME: 11/10/2021 6:22 PM INDICATION: s/p right thoracentesis, eval for PTX, eval for reduction in effusion. COMPARISON: CT earlier today.     IMPRESSION: Infiltrate right lung base by CT mostly in the right middle lobe. Small right pleural effusion. No pneumothorax. Left lung is clear. Scoliosis. Healing left rib fractures.    IR Chest Tube Place Non Tunneled Right    Result Date: 11/13/2021  [place] RADIOLOGY LOCATION: [facility] DATE: 11/13/2021 PROCEDURE: RIGHT ULTRASOUND GUIDED 14 Turkish CHEST TUBE PLACEMENT INTERVENTIONAL RADIOLOGIST: Citlaly Rosales DO INDICATION: Recurrent pleural effusion, COVID. Effusion concerning for empyema CONSENT: The risks, benefits and alternatives of right chest tube were discussed with the patient  in detail. All questions were answered. Informed consent was given to proceed with the procedure. MEDICATIONS: 1% lidocaine and 25 mcg fentanyl IV CONTRAST: None ANTIBIOTICS: None. FLUOROSCOPIC TIME: 0 minutes. RADIATION DOSE: Air Kerma: 0 mGy. COMPLICATIONS: No immediate complications. STERILE BARRIER TECHNIQUE: Maximum sterile barrier technique was used. Cutaneous antisepsis was performed at the operative site with application of 2% chlorhexidine and large sterile drape. Prior to the procedure, the  and assistant performed hand hygiene and wore hat, mask, sterile gown, and sterile gloves during the entire procedure. PROCEDURE/FINDINGS:  Following a discussion of the risks, benefits,  "indications and alternatives to treatment, appropriate informed consent was obtained.  The patient was brought to the interventional radiology suite and placed upright on the table. The right hemithorax was prepped and draped in the usual sterile fashion. A timeout was performed per universal protocol policy to confirm the correct patient, site and procedure to be performed. A preliminary ultrasound was performed to access for the appropriate site to access the pleural effusion. These images reveal a large right pleural effusion and an ultrasound image was archived.  Once an appropriate site for chest tube placement was localized, the overlying skin was anesthetized with 1% Lidocaine. Under direct ultrasound guidance, a 5 Burmese Arkadium needle was advanced into the pleural space via an intercostal approach. The catheter  was advanced off of the needle. A 0.035\" guidewire was advanced through the micropuncture sheath and the tract was serially dilated.  A 14 Burmese nonlocking loop chest tube was placed with the tip coiled in the pleural space. The catheter was sutured to  the skin using 2-0 suture. A sterile dressing was applied. Chest tube was connected to a Pleuravac device in the interventional suite. Throughout the procedure, the patient was monitored by a radiology nurse for cardiac rhythm and oxygen saturation which remained stable. The patient tolerated the procedure well and left interventional radiology in stable condition.     IMPRESSION: Successful right sided 14 Burmese chest tube placement under ultrasound guidance.     CT Chest Pulmonary Embolism w Contrast    Result Date: 11/10/2021  EXAM: CT CHEST PULMONARY EMBOLISM W CONTRAST LOCATION: [facility] DATE/TIME: 11/10/2021 1:42 PM INDICATION: Chest pain. Covid positive 10/28/2021. PE suspected, high prob COMPARISON: 09/20/2021 chest x-ray. CT chest 03/26/2019 TECHNIQUE: CT chest pulmonary angiogram during arterial phase injection of " IV contrast. Multiplanar reformats and MIP reconstructions were performed. Dose reduction techniques were used. CONTRAST: IsoVue 370 100mL FINDINGS: ANGIOGRAM CHEST: Pulmonary arteries are normal caliber and negative for pulmonary emboli. Thoracic aorta is negative for dissection. No CT evidence of right heart strain. LUNGS AND PLEURA: Dense focal infiltrate right middle lobe medial segment and some of the adjacent anterior segment right upper lobe. Tiny less than 1 cm microabscess within this infiltrate. Small right pleural effusion including some focal pleural reaction immediately adjacent to the pneumonia anteriorly. No other infiltrates. Would favor a bacterial pneumonia rather than COVID pneumonia. MEDIASTINUM/AXILLAE: Small pericardial effusion. UPPER ABDOMEN: Normal. MUSCULOSKELETAL: Normal.     IMPRESSION: 1.  Above findings most suggestive of moderately severe bacterial pneumonia right middle lobe with possible associated tiny microabscess within it. 2.  Small right pleural effusion and some focal pleural reaction immediately adjacent to the pneumonia anteriorly.    CT Chest w/o Contrast    Result Date: 11/16/2021  EXAM: CT CHEST W/O CONTRAST LOCATION: [facility] DATE/TIME: 11/16/2021 8:52 AM INDICATION: Pneumonia, effusion or abscess suspected, x-ray done. COMPARISON: Chest CTA dated 11/10/2021. TECHNIQUE: CT chest without IV contrast. Multiplanar reformats were obtained. Dose reduction techniques were used. CONTRAST: None. FINDINGS: LUNGS AND PLEURA: Interval placement of right chest tube with tip along the lateral pleural surface of the right upper lobe. There is a very small amount of loculated air anteriorly in the right middle lobe, no significant pneumothorax. There is persistent small pleural effusion at the right lung base with right lower lobe atelectasis and right middle lobe atelectasis. There is a small amount of loculated fluid along the right heart border in the  inferior right middle lobe. There is a new small left pleural effusion and there is atelectasis in the left lower lobe. There is bandlike atelectasis or scarring in the posterior right upper lobe. There is minimal airspace consolidation in the left upper lobe on image 19 of series 3. MEDIASTINUM/AXILLAE: Small to moderate pericardial effusion. Borderline right paratracheal lymph nodes. No thoracic aortic aneurysm. CORONARY ARTERY CALCIFICATION: Mild. UPPER ABDOMEN: No significant finding. MUSCULOSKELETAL: Thoracolumbar scoliosis. No fracture or suspicious bony lesion.     IMPRESSION: 1.  New right chest tube. There is persistent small loculated pleural effusion at the right lung base which does not communicate with the chest tube. There is additional small loculated hydropneumothorax in the anterior right middle lobe that also does not communicate with the chest tube. There is atelectasis in both lower lobes and in the right middle lobe. 2.  New small right pleural effusion. 3.  Patchy airspace opacities in the left upper lobe consistent with a small focus of pneumonia.       Latest radiology report personally reviewed.    Note created using dragon voice recognition software so sounds alike errors may have escaped editing.      11/22/2021   Levar Landeros MD  Hospitalist, [facility]  Pager: [phone]

## 2021-11-22 NOTE — PLAN OF CARE
Problem: Adult Inpatient Plan of Care  Goal: Plan of Care Review  Outcome: No Change  Goal: Patient-Specific Goal (Individualized)  Outcome: No Change  Goal: Absence of Hospital-Acquired Illness or Injury  Outcome: No Change  Intervention: Prevent Skin Injury  Recent Flowsheet Documentation  Taken 11/22/2021 0100 by Emma Kumar, RN  Body Position: position changed independently  Goal: Optimal Comfort and Wellbeing  Outcome: No Change  Goal: Readiness for Transition of Care  Outcome: No Change     Problem: Infection (Pneumonia)  Goal: Resolution of Infection Signs and Symptoms  Outcome: No Change     Problem: Respiratory Compromise (Pneumonia)  Goal: Effective Oxygenation and Ventilation  Outcome: No Change  Intervention: Optimize Oxygenation and Ventilation  Recent Flowsheet Documentation  Taken 11/22/2021 0100 by Emma Kumar, RN  Head of Bed (HOB) Positioning: HOB flat     Problem: Activity Intolerance (Pulmonary Impairment)  Goal: Improved Activity Tolerance  Outcome: No Change   Pt denies pain and nausea. Chest tube in place, no drng so far this shift. Oxygen saturation 95% on 1.5L NC. Medium loose bm in bsc.

## 2021-11-22 NOTE — PLAN OF CARE
Problem: Adult Inpatient Plan of Care  Goal: Plan of Care Review  Outcome: Improving     Problem: Adult Inpatient Plan of Care  Goal: Absence of Hospital-Acquired Illness or Injury  Outcome: Improving   Pt on 2L [place], and has tolerated getting up to the commode without feeling extreme short of breath. Encouraged to use bedside flutter valve and incentive spirometer. Stool sample collected and sent. CT output 75 ml base on previous reading. Chest tube site is clean, dry and intact. No prn was given.

## 2021-11-22 NOTE — PROGRESS NOTES
Care Management Follow Up    Length of Stay (days): 12    Expected Discharge Date: 11/24/2021     Concerns to be Addressed:       Patient plan of care discussed at interdisciplinary rounds: Yes    Anticipated Discharge Disposition:  Skilled Nursing     Anticipated Discharge Services:  TCU  Anticipated Discharge DME:      Patient/family educated on Medicare website which has current facility and service quality ratings:  yes  Education Provided on the Discharge Plan:  yes  Patient/Family in Agreement with the Plan:  yes    Referrals Placed by CM/SW:    Private pay costs discussed: Not applicable    Additional Information:  SW spoke with pt daughter in law Camille, She had power of  paperwork to bring to pt. Camille is providing care to pts spouse in her home and writer answered questions regarding TCU length of stay determined by pt health status and ability to regain strength. Camille aware of [facility] and [facility] are only facilities for Covid currently and is agreeable. Pt is Covid recovered, SW provided pt daughter with list of TCUs . Pt having surgery tomorrow. SW to follow for discharge planning.       NIKO Jesus     Pt and daughter prefer private room near [place]. Referrals are queued up and ready to be sent closer to when pt is medically ready to discharge.    NIKO Jesus

## 2021-11-22 NOTE — PROGRESS NOTES
"CLINICAL NUTRITION SERVICES - REASSESSMENT NOTE     Nutrition Prescription    RECOMMENDATIONS FOR MDs/PROVIDERS TO ORDER:  none    Malnutrition Status:    Severe    Recommendations already ordered by Registered Dietitian (RD):  New weight    Future/Additional Recommendations:  Continue to offer and encourage supplements     EVALUATION OF THE PROGRESS TOWARD GOALS   Diet: Regular  Intake: Poor, and decreased from 11/17 at 50 %of meals and baseline to 25% of meals.   No intakes documented 11/21 but nsg noted appetite was poor. Ate 25% of breakfast today and  Improved at lunch at 75% at 183 kcal, 1.5 g protein.   Ordering small bland meals.     Altered taste and fatigue main barriers to improved intake  Pt has declined supplements      NEW FINDINGS   Planning TCU discharge in a couple of days    ANTHROPOMETRICS  Height: 166.4 cm (5' 5.5\")  Most Recent Weight: 63.5 kg (139 lb 14.4 oz)  11/16- expect this has decreased further with poor intake and diuresis  IBW: 56.8 kg  BMI: Normal BMI  Weight History:       Wt Readings from Last 10 Encounters:   11/10/21 59 kg (130 lb) - admit   07/12/21 62.6 kg (138 lb 1.6 oz)   06/10/19 63.1 kg (139 lb 3.2 oz)   06/04/18 66.2 kg (145 lb 14.4 oz)   5.7% weight loss x 2 weeks    PHYSICAL FINDINGS  See malnutrition section below.    GI CONCERNS  1-2 BM day  Altered taste    LABS  Reviewed    MEDICATIONS  Reviewed  Oscal, iv abx, culturelle, magox, mvi with minerals, prilosec, pericolace bid, oral abx, vit C 250 mg daily    MALNUTRITION:   % Weight Loss:  > 2% in 1 week (severe malnutrition)  % Intake:  </= 50% for >/= 5 days (severe malnutrition)  Subcutaneous Fat Loss:  Unable to assess  Muscle Loss:  Unable to assess  Fluid Retention: None - pleural effusion    Malnutrition Diagnosis: Severe malnutrition  In Context of:  Acute illness or injury    Previous Goals   Intake > 75%of estimated needs  Maintain weight  Evaluation: Not met    Previous Nutrition Diagnosis  Malnutrition related " to covid as evidenced by intake 50% x 3 weeks, 5.7% weight loss x 2 weeks    Evaluation: Declining    CURRENT NUTRITION DIAGNOSIS  Malnutrition related to covid as evidenced by intake 50% x 3 weeks + intake </= 25% x 5 days hospitalized,  5.7% weight loss x 2 weeks      INTERVENTIONS  Implementation  Ordered new weight    Goals  Intake > 75%of estimated needs  Maintain weight    Monitoring/Evaluation  Progress toward goals will be monitored and evaluated per protocol.

## 2021-11-22 NOTE — CONSULTS
GENERAL SURGERY CONSULTATION    Salome Maravilla  [facility]  Medical Record #:  [id number]  YOB: 1945  Age:  76 year old     Date of Consultation: 11/22/2021    Reason for Consultation: Right side pneumonia with pleural effusion refractory to tube thoracostomy status post lytic therapy pneumonia status post Covid    Salome Maravilla is a 76 year old female who presents with a history of 2 weeks of the respiratory illness and eventual diagnosis of a Covid disease.  He subsequently had a pneumonia developed in the right lung and she was admitted to the hospital on 11/10/2021 and she underwent a tube thoracostomy and this was consistent with probable strep pneumonia subsequent to Covid disease.  Patient has had multiple lytics and has residual pleural effusion on the right side and potential limited hemothorax inferiorly in the right thorax.  She is seen in consultation general care [facility] setting at this time.  She has right tube thoracostomy in place.    PHH:    Past Medical History:   Diagnosis Date     Breast cancer (H) 2010    hx of right lumpectomy for breast      Depression      GERD (gastroesophageal reflux disease)      Hx of radiation therapy right 2010    hx of right lumpectomy for breast cancer     Osteoarthritis         Past Surgical History:   Procedure Laterality Date     APPENDECTOMY       ARTHROSCOPY SHOULDER ROTATOR CUFF REPAIR       BIOPSY BREAST Right 2010    hx of right lumpectomy     COLONOSCOPY       HYSTERECTOMY  1982     IR CHEST TUBE PLACEMENT NON-TUNNELED RIGHT  11/13/2021     LUMPECTOMY BREAST Right 2010    right lumpectomy     OOPHORECTOMY       RELEASE CARPAL TUNNEL Right        ALLERGIES:  Arimidex [anastrozole], Penicillins, Statins-hmg-coa reductase inhibitors [hmg-coa-r inhibitors], and Fenofibrate    MEDS:    Current Facility-Administered Medications:      acetaminophen (TYLENOL) solution 975 mg, 975 mg, Oral, Q6H PRN, Margo Dozier, APRN CNP, 975 mg at  11/21/21 2100     acetylcysteine (MUCOMYST) 20 % nebulizer solution 2 mL, 2 mL, Nebulization, 4x Daily, Saira Quiros APRN CNP, 2 mL at 11/22/21 0730     albuterol (PROVENTIL) neb solution 2.5 mg, 2.5 mg, Nebulization, 4x Daily, Saira Quiros APRN CNP, 2.5 mg at 11/22/21 0729     alum & mag hydroxide-simethicone (MAALOX) suspension 30 mL, 30 mL, Oral, Q4H PRN, Citlaly Rosales DO, 30 mL at 11/18/21 1723     bisacodyl (DULCOLAX) Suppository 10 mg, 10 mg, Rectal, Daily PRN, Margo Dozier APRN CNP     calcium carbonate (TUMS) chewable tablet 500 mg, 500 mg, Oral, TID PRN, Saira Quiros APRN CNP     calcium carbonate-vitamin D (OS-EMILIANO with D) per tablet 1 tablet, 1 tablet, Oral, BID, Citlaly Rosales DO, 1 tablet at 11/22/21 0841     cefTRIAXone (ROCEPHIN) 1 g vial to attach to  mL bag for ADULTS or NS 50 mL bag for PEDS, 1 g, Intravenous, Q24H, Saira Quiros APRN CNP     enoxaparin ANTICOAGULANT (LOVENOX) injection 40 mg, 40 mg, Subcutaneous, Q24H, Papa Avitia MD, 40 mg at 11/22/21 0842     gabapentin (NEURONTIN) capsule 100 mg, 100 mg, Oral, BID PRN, Levar Landeros MBBS, 100 mg at 11/18/21 2054     lactobacillus rhamnosus (GG) (CULTURELL) capsule 1 capsule, 1 capsule, Oral, Daily, Citlaly Rosales DO, 1 capsule at 11/21/21 2059     Lidocaine (LIDOCARE) 4 % Patch 3 patch, 3 patch, Transdermal, Q24H, Margo Dozier APRN CNP, 3 patch at 11/21/21 0839     lidocaine (LMX4) cream, , Topical, Q1H PRN, Bobby, Citlaly Mile, DO     lidocaine (XYLOCAINE) 2 % 15 mL, alum & mag hydroxide-simethicone (MAALOX) 15 mL GI Cocktail, 30 mL, Oral, Once PRN, Margo Dozier APRN CNP     lidocaine 1 % 0.1-1 mL, 0.1-1 mL, Other, Q1H PRN, Citlaly Rosales, DO     lidocaine patch in PLACE, , Transdermal, Q8H, Margo Dozier APRN CNP     magnesium oxide (MAG-OX) half-tab 200 mg, 200 mg, Oral, BID, Citlaly Rosales  DO Mile, 200 mg at 11/22/21 0842     Medication instructions: Do NOT use nebulized medications, , Does not apply, Continuous PRN, Citlaly Rosales DO     melatonin tablet 1 mg, 1 mg, Oral, At Bedtime PRN, Citlaly Rosales DO     multivitamin, therapeutic (THERA-VIT) tablet 1 tablet, 1 tablet, Oral, Daily, Papa Avitia MD, 1 tablet at 11/22/21 0841     naloxone (NARCAN) injection 0.2 mg, 0.2 mg, Intravenous, Q2 Min PRN **OR** naloxone (NARCAN) injection 0.4 mg, 0.4 mg, Intravenous, Q2 Min PRN **OR** naloxone (NARCAN) injection 0.2 mg, 0.2 mg, Intramuscular, Q2 Min PRN **OR** naloxone (NARCAN) injection 0.4 mg, 0.4 mg, Intramuscular, Q2 Min PRN, Citlayl Rosales DO     omeprazole (priLOSEC) CR capsule 40 mg, 40 mg, Oral, QAM AC, Levar Landeros B, MBBS, 40 mg at 11/22/21 0538     ondansetron (ZOFRAN-ODT) ODT tab 4 mg, 4 mg, Oral, Q6H PRN **OR** ondansetron (ZOFRAN) injection 4 mg, 4 mg, Intravenous, Q6H PRN, Citlaly Rosales DO     oxyCODONE (ROXICODONE) tablet 5 mg, 5 mg, Oral, Q3H PRN, Citlaly Rosales DO, 5 mg at 11/17/21 2142     prochlorperazine (COMPAZINE) injection 5 mg, 5 mg, Intravenous, Q6H PRN **OR** prochlorperazine (COMPAZINE) tablet 5 mg, 5 mg, Oral, Q6H PRN **OR** prochlorperazine (COMPAZINE) suppository 12.5 mg, 12.5 mg, Rectal, Q12H PRN, Citlaly Rosales DO     [DISCONTINUED] senna-docusate (SENOKOT-S/PERICOLACE) 8.6-50 MG per tablet 1 tablet, 1 tablet, Oral, BID PRN **OR** senna-docusate (SENOKOT-S/PERICOLACE) 8.6-50 MG per tablet 2 tablet, 2 tablet, Oral, BID, Margo Dozier, DONOVAN CNP, 2 tablet at 11/20/21 0949     sertraline (ZOLOFT) tablet 200 mg, 200 mg, Oral, Daily, Citlaly Rosales DO, 200 mg at 11/22/21 0842     sodium chloride (PF) 0.9% PF flush 3 mL, 3 mL, Intracatheter, Q8H, Citlaly Rosales DO, 3 mL at 11/22/21 0538     sodium chloride (PF) 0.9% PF flush 3 mL, 3 mL,  "Intracatheter, q1 min prn, Citlaly Rosales DO     traZODone (DESYREL) tablet 150 mg, 150 mg, Oral, At Bedtime, Citlaly Rosales DO, 150 mg at 11/21/21 2059     valACYclovir (VALTREX) tablet 1,000 mg, 1,000 mg, Oral, At Bedtime, Citlaly Rosales DO, 1,000 mg at 11/21/21 2059     vitamin C (ASCORBIC ACID) tablet 250 mg, 250 mg, Oral, Daily, Margo Dozier, APRN CNP    SOCIAL HABITS:    History   Smoking Status     Former Smoker     Quit date: 5/6/1988   Smokeless Tobacco     Never Used     Social History    Substance and Sexual Activity      Alcohol use: Yes        Comment: Alcoholic Drinks/day: social      History   Drug Use No       FAMILY HISTORY:    Family History   Problem Relation Age of Onset     Chronic Obstructive Pulmonary Disease Father      Breast Cancer Sister 71.00     Cancer Sister      Atrial fibrillation Sister      Cancer Brother 19.00     Testicular cancer Brother      No Known Problems Son      No Known Problems Son        REVIEW OF SYSTEMS: Noted and reviewed.  Without specific cardiac respiratory CNS or vascular disease.    PE:    /55 (BP Location: Right arm)   Pulse 81   Temp 98.4  F (36.9  C) (Oral)   Resp 18   Ht 1.664 m (5' 5.5\")   Wt 63.5 kg (139 lb 14.4 oz)   SpO2 95%   BMI 22.93 kg/m      HEENT: Normal except that she appears ill  Chest: Benign except for a tube thoracostomy right side serosanguineous drainage no air leak  Lungs: Decreased breath sounds right side some rhonchi.  Heart: Heart rate 80/min regular  Abd: Benign  Ext: Normal  Vascular: Normal    LABS:    Recent Labs   Lab 11/22/21  0532      CO2 29   BUN 8      Recent Labs   Lab 11/22/21  0532   WBC 13.0*   HGB 8.4*   HCT 26.8*       No results for input(s): ALKPHOS, BILITOT, ALT, AST in the last 168 hours.    Invalid input(s): BILIDIR, PROT, LABALBU  No results for input(s): AMYLASE in the last 168 hours.  No results for input(s): INR in the last 168 " hours.    XRAYS: X-rays and CTs reviewed.    ASSESSMENT: Right empyema refractory to thoracoscopy possible limited hemothorax.    PLAN: Thoracoscopy right with risk benefits comp case ergometer reviewed with the patient.  We will proceed with surgery tomorrow.    Timbo bowen md  [facility]

## 2021-11-22 NOTE — PLAN OF CARE
Problem: Airway Clearance Ineffective (Pulmonary Impairment)  Goal: Effective Airway Clearance  Outcome: Improving   Oxygen at 1 L sat 97%.  CT drained 90 ml. Pt very tired today. Continue to have poor appetite. Everything tastes metallic. Able to eat rice. Son brought in her some 7up. Pt had several stools today. Held pericolace.

## 2021-11-22 NOTE — PROGRESS NOTES
[facility] Treatment Plan    Patient Score: 9  Patient Acuity: 4    Clinical Indication for Therapy: PNA    Therapy Ordered: mucomyst QID, albuterol QID    Assessment Summary: Pt is a former smoker and is currently on 2 lpm nasal cannula with no respiratory distress. Pt is not on home nebs, inhalers, nor home oxygen. BBS scattered crackles. Effective, strong, nonproductive cough. No changes in breath sounds post neb tx. Pt perceives no improvement. Flutter valve instructed and pt is able to demonstrate proper technique. Encourage deep breathing and coughing techniques. Will change mucomyst and albuterol neb to PRN and add flutter valve PRN for bronchial hygiene.     Kimberli Chen, RT  11/22/2021

## 2021-11-23 ENCOUNTER — APPOINTMENT (OUTPATIENT)
Dept: RADIOLOGY | Facility: HOSPITAL | Age: 76
DRG: 163 | End: 2021-11-23
Attending: SURGERY
Payer: COMMERCIAL

## 2021-11-23 ENCOUNTER — ANESTHESIA (OUTPATIENT)
Dept: SURGERY | Facility: HOSPITAL | Age: 76
DRG: 163 | End: 2021-11-23
Payer: COMMERCIAL

## 2021-11-23 LAB
ABO/RH(D): NORMAL
ANION GAP SERPL CALCULATED.3IONS-SCNC: 6 MMOL/L (ref 5–18)
ANTIBODY SCREEN: NEGATIVE
BUN SERPL-MCNC: 7 MG/DL (ref 8–28)
CALCIUM SERPL-MCNC: 8.3 MG/DL (ref 8.5–10.5)
CHLORIDE BLD-SCNC: 103 MMOL/L (ref 98–107)
CO2 SERPL-SCNC: 30 MMOL/L (ref 22–31)
CREAT SERPL-MCNC: 0.59 MG/DL (ref 0.6–1.1)
ERYTHROCYTE [DISTWIDTH] IN BLOOD BY AUTOMATED COUNT: 13.5 % (ref 10–15)
GFR SERPL CREATININE-BSD FRML MDRD: 88 ML/MIN/1.73M2
GLUCOSE BLD-MCNC: 93 MG/DL (ref 70–125)
HCT VFR BLD AUTO: 26.8 % (ref 35–47)
HGB BLD-MCNC: 8.3 G/DL (ref 11.7–15.7)
MAGNESIUM SERPL-MCNC: 2.1 MG/DL (ref 1.8–2.6)
MCH RBC QN AUTO: 30.7 PG (ref 26.5–33)
MCHC RBC AUTO-ENTMCNC: 31 G/DL (ref 31.5–36.5)
MCV RBC AUTO: 99 FL (ref 78–100)
PLATELET # BLD AUTO: 437 10E3/UL (ref 150–450)
POTASSIUM BLD-SCNC: 3.8 MMOL/L (ref 3.5–5)
RBC # BLD AUTO: 2.7 10E6/UL (ref 3.8–5.2)
SODIUM SERPL-SCNC: 139 MMOL/L (ref 136–145)
SPECIMEN EXPIRATION DATE: NORMAL
WBC # BLD AUTO: 10.4 10E3/UL (ref 4–11)

## 2021-11-23 PROCEDURE — 250N000013 HC RX MED GY IP 250 OP 250 PS 637: Performed by: SURGERY

## 2021-11-23 PROCEDURE — 250N000011 HC RX IP 250 OP 636: Performed by: ANESTHESIOLOGY

## 2021-11-23 PROCEDURE — 250N000011 HC RX IP 250 OP 636: Performed by: SURGERY

## 2021-11-23 PROCEDURE — 80048 BASIC METABOLIC PNL TOTAL CA: CPT | Performed by: INTERNAL MEDICINE

## 2021-11-23 PROCEDURE — 360N000077 HC SURGERY LEVEL 4, PER MIN: Performed by: SURGERY

## 2021-11-23 PROCEDURE — 258N000003 HC RX IP 258 OP 636: Performed by: ANESTHESIOLOGY

## 2021-11-23 PROCEDURE — 250N000009 HC RX 250: Performed by: ANESTHESIOLOGY

## 2021-11-23 PROCEDURE — 370N000017 HC ANESTHESIA TECHNICAL FEE, PER MIN: Performed by: SURGERY

## 2021-11-23 PROCEDURE — 272N000001 HC OR GENERAL SUPPLY STERILE: Performed by: SURGERY

## 2021-11-23 PROCEDURE — 258N000001 HC RX 258: Performed by: SURGERY

## 2021-11-23 PROCEDURE — 258N000003 HC RX IP 258 OP 636: Performed by: SURGERY

## 2021-11-23 PROCEDURE — 0BDN4ZZ EXTRACTION OF RIGHT PLEURA, PERCUTANEOUS ENDOSCOPIC APPROACH: ICD-10-PCS | Performed by: SURGERY

## 2021-11-23 PROCEDURE — 710N000009 HC RECOVERY PHASE 1, LEVEL 1, PER MIN: Performed by: SURGERY

## 2021-11-23 PROCEDURE — 250N000009 HC RX 250: Performed by: SURGERY

## 2021-11-23 PROCEDURE — 36415 COLL VENOUS BLD VENIPUNCTURE: CPT | Performed by: INTERNAL MEDICINE

## 2021-11-23 PROCEDURE — 83735 ASSAY OF MAGNESIUM: CPT | Performed by: INTERNAL MEDICINE

## 2021-11-23 PROCEDURE — 0BNK4ZZ RELEASE RIGHT LUNG, PERCUTANEOUS ENDOSCOPIC APPROACH: ICD-10-PCS | Performed by: SURGERY

## 2021-11-23 PROCEDURE — 999N000065 XR CHEST 1 VIEW

## 2021-11-23 PROCEDURE — 99232 SBSQ HOSP IP/OBS MODERATE 35: CPT | Performed by: INTERNAL MEDICINE

## 2021-11-23 PROCEDURE — 120N000001 HC R&B MED SURG/OB

## 2021-11-23 PROCEDURE — 85027 COMPLETE CBC AUTOMATED: CPT | Performed by: INTERNAL MEDICINE

## 2021-11-23 PROCEDURE — 999N000141 HC STATISTIC PRE-PROCEDURE NURSING ASSESSMENT: Performed by: SURGERY

## 2021-11-23 PROCEDURE — 88305 TISSUE EXAM BY PATHOLOGIST: CPT | Mod: TC | Performed by: SURGERY

## 2021-11-23 PROCEDURE — 250N000013 HC RX MED GY IP 250 OP 250 PS 637: Performed by: INTERNAL MEDICINE

## 2021-11-23 PROCEDURE — 250N000025 HC SEVOFLURANE, PER MIN: Performed by: SURGERY

## 2021-11-23 PROCEDURE — 86900 BLOOD TYPING SEROLOGIC ABO: CPT | Performed by: INTERNAL MEDICINE

## 2021-11-23 RX ORDER — BISACODYL 10 MG
10 SUPPOSITORY, RECTAL RECTAL DAILY PRN
Status: DISCONTINUED | OUTPATIENT
Start: 2021-11-23 | End: 2021-12-02 | Stop reason: HOSPADM

## 2021-11-23 RX ORDER — LABETALOL HYDROCHLORIDE 5 MG/ML
5 INJECTION, SOLUTION INTRAVENOUS
Status: DISCONTINUED | OUTPATIENT
Start: 2021-11-23 | End: 2021-11-23 | Stop reason: HOSPADM

## 2021-11-23 RX ORDER — DEXAMETHASONE SODIUM PHOSPHATE 10 MG/ML
INJECTION, SOLUTION INTRAMUSCULAR; INTRAVENOUS PRN
Status: DISCONTINUED | OUTPATIENT
Start: 2021-11-23 | End: 2021-11-23

## 2021-11-23 RX ORDER — HYDROMORPHONE HYDROCHLORIDE 4 MG/1
4 TABLET ORAL EVERY 4 HOURS PRN
Status: DISCONTINUED | OUTPATIENT
Start: 2021-11-23 | End: 2021-11-28

## 2021-11-23 RX ORDER — HYDROMORPHONE HCL IN WATER/PF 6 MG/30 ML
0.4 PATIENT CONTROLLED ANALGESIA SYRINGE INTRAVENOUS EVERY 5 MIN PRN
Status: DISCONTINUED | OUTPATIENT
Start: 2021-11-23 | End: 2021-11-23 | Stop reason: HOSPADM

## 2021-11-23 RX ORDER — SODIUM CHLORIDE, SODIUM LACTATE, POTASSIUM CHLORIDE, CALCIUM CHLORIDE 600; 310; 30; 20 MG/100ML; MG/100ML; MG/100ML; MG/100ML
INJECTION, SOLUTION INTRAVENOUS CONTINUOUS
Status: DISCONTINUED | OUTPATIENT
Start: 2021-11-23 | End: 2021-11-23 | Stop reason: HOSPADM

## 2021-11-23 RX ORDER — ACETAMINOPHEN 325 MG/1
975 TABLET ORAL EVERY 8 HOURS
Status: DISCONTINUED | OUTPATIENT
Start: 2021-11-23 | End: 2021-11-23

## 2021-11-23 RX ORDER — BACITRACIN ZINC 500 [USP'U]/G
OINTMENT TOPICAL PRN
Status: DISCONTINUED | OUTPATIENT
Start: 2021-11-23 | End: 2021-11-23 | Stop reason: HOSPADM

## 2021-11-23 RX ORDER — ESMOLOL HYDROCHLORIDE 10 MG/ML
INJECTION INTRAVENOUS PRN
Status: DISCONTINUED | OUTPATIENT
Start: 2021-11-23 | End: 2021-11-23

## 2021-11-23 RX ORDER — EPHEDRINE SULFATE 50 MG/ML
INJECTION, SOLUTION INTRAMUSCULAR; INTRAVENOUS; SUBCUTANEOUS PRN
Status: DISCONTINUED | OUTPATIENT
Start: 2021-11-23 | End: 2021-11-23

## 2021-11-23 RX ORDER — POLYETHYLENE GLYCOL 3350 17 G/17G
17 POWDER, FOR SOLUTION ORAL DAILY
Status: DISCONTINUED | OUTPATIENT
Start: 2021-11-24 | End: 2021-11-28 | Stop reason: ALTCHOICE

## 2021-11-23 RX ORDER — ACETAMINOPHEN 325 MG/1
975 TABLET ORAL
Status: DISCONTINUED | OUTPATIENT
Start: 2021-11-23 | End: 2021-11-23 | Stop reason: HOSPADM

## 2021-11-23 RX ORDER — KETAMINE HYDROCHLORIDE 50 MG/ML
INJECTION, SOLUTION INTRAMUSCULAR; INTRAVENOUS PRN
Status: DISCONTINUED | OUTPATIENT
Start: 2021-11-23 | End: 2021-11-23

## 2021-11-23 RX ORDER — ONDANSETRON 2 MG/ML
4 INJECTION INTRAMUSCULAR; INTRAVENOUS EVERY 6 HOURS PRN
Status: DISCONTINUED | OUTPATIENT
Start: 2021-11-23 | End: 2021-11-23

## 2021-11-23 RX ORDER — ONDANSETRON 4 MG/1
4 TABLET, ORALLY DISINTEGRATING ORAL EVERY 30 MIN PRN
Status: DISCONTINUED | OUTPATIENT
Start: 2021-11-23 | End: 2021-11-23 | Stop reason: HOSPADM

## 2021-11-23 RX ORDER — HALOPERIDOL 5 MG/ML
1 INJECTION INTRAMUSCULAR
Status: DISCONTINUED | OUTPATIENT
Start: 2021-11-23 | End: 2021-11-23 | Stop reason: HOSPADM

## 2021-11-23 RX ORDER — FENTANYL CITRATE 50 UG/ML
25-50 INJECTION, SOLUTION INTRAMUSCULAR; INTRAVENOUS EVERY 5 MIN PRN
Status: DISCONTINUED | OUTPATIENT
Start: 2021-11-23 | End: 2021-11-23 | Stop reason: HOSPADM

## 2021-11-23 RX ORDER — DEXMEDETOMIDINE HYDROCHLORIDE 4 UG/ML
0.2-0.7 INJECTION, SOLUTION INTRAVENOUS CONTINUOUS
Status: DISCONTINUED | OUTPATIENT
Start: 2021-11-23 | End: 2021-11-23 | Stop reason: HOSPADM

## 2021-11-23 RX ORDER — DEXMEDETOMIDINE HYDROCHLORIDE 4 UG/ML
INJECTION, SOLUTION INTRAVENOUS
Status: DISCONTINUED
Start: 2021-11-23 | End: 2021-11-23 | Stop reason: HOSPADM

## 2021-11-23 RX ORDER — ONDANSETRON 2 MG/ML
INJECTION INTRAMUSCULAR; INTRAVENOUS PRN
Status: DISCONTINUED | OUTPATIENT
Start: 2021-11-23 | End: 2021-11-23

## 2021-11-23 RX ORDER — FENTANYL CITRATE 50 UG/ML
INJECTION, SOLUTION INTRAMUSCULAR; INTRAVENOUS PRN
Status: DISCONTINUED | OUTPATIENT
Start: 2021-11-23 | End: 2021-11-23

## 2021-11-23 RX ORDER — ONDANSETRON 2 MG/ML
4 INJECTION INTRAMUSCULAR; INTRAVENOUS EVERY 30 MIN PRN
Status: DISCONTINUED | OUTPATIENT
Start: 2021-11-23 | End: 2021-11-23 | Stop reason: HOSPADM

## 2021-11-23 RX ORDER — ONDANSETRON 4 MG/1
4 TABLET, ORALLY DISINTEGRATING ORAL EVERY 6 HOURS PRN
Status: DISCONTINUED | OUTPATIENT
Start: 2021-11-23 | End: 2021-11-23

## 2021-11-23 RX ORDER — LIDOCAINE 40 MG/G
CREAM TOPICAL
Status: DISCONTINUED | OUTPATIENT
Start: 2021-11-23 | End: 2021-11-23 | Stop reason: HOSPADM

## 2021-11-23 RX ORDER — HYDROMORPHONE HYDROCHLORIDE 1 MG/ML
0.5 INJECTION, SOLUTION INTRAMUSCULAR; INTRAVENOUS; SUBCUTANEOUS
Status: DISCONTINUED | OUTPATIENT
Start: 2021-11-23 | End: 2021-11-28 | Stop reason: ALTCHOICE

## 2021-11-23 RX ORDER — OXYCODONE HCL 5 MG/5 ML
5 SOLUTION, ORAL ORAL
Status: DISCONTINUED | OUTPATIENT
Start: 2021-11-23 | End: 2021-12-02 | Stop reason: HOSPADM

## 2021-11-23 RX ORDER — LIDOCAINE HYDROCHLORIDE 20 MG/ML
INJECTION, SOLUTION INFILTRATION; PERINEURAL PRN
Status: DISCONTINUED | OUTPATIENT
Start: 2021-11-23 | End: 2021-11-23

## 2021-11-23 RX ORDER — ACETAMINOPHEN 325 MG/1
650 TABLET ORAL EVERY 4 HOURS PRN
Status: DISCONTINUED | OUTPATIENT
Start: 2021-11-26 | End: 2021-11-23

## 2021-11-23 RX ORDER — LIDOCAINE 40 MG/G
CREAM TOPICAL
Status: DISCONTINUED | OUTPATIENT
Start: 2021-11-23 | End: 2021-11-23

## 2021-11-23 RX ORDER — AMOXICILLIN 250 MG
1 CAPSULE ORAL 2 TIMES DAILY
Status: DISCONTINUED | OUTPATIENT
Start: 2021-11-23 | End: 2021-11-23

## 2021-11-23 RX ORDER — PROCHLORPERAZINE MALEATE 5 MG
5 TABLET ORAL EVERY 6 HOURS PRN
Status: DISCONTINUED | OUTPATIENT
Start: 2021-11-23 | End: 2021-11-23

## 2021-11-23 RX ORDER — DEXTROSE MONOHYDRATE, SODIUM CHLORIDE, AND POTASSIUM CHLORIDE 50; 1.49; 4.5 G/1000ML; G/1000ML; G/1000ML
INJECTION, SOLUTION INTRAVENOUS CONTINUOUS
Status: DISCONTINUED | OUTPATIENT
Start: 2021-11-23 | End: 2021-11-24 | Stop reason: ALTCHOICE

## 2021-11-23 RX ORDER — PROPOFOL 10 MG/ML
INJECTION, EMULSION INTRAVENOUS PRN
Status: DISCONTINUED | OUTPATIENT
Start: 2021-11-23 | End: 2021-11-23

## 2021-11-23 RX ORDER — OXYCODONE HYDROCHLORIDE 5 MG/1
5 TABLET ORAL EVERY 4 HOURS PRN
Status: DISCONTINUED | OUTPATIENT
Start: 2021-11-23 | End: 2021-11-23 | Stop reason: HOSPADM

## 2021-11-23 RX ADMIN — PHENYLEPHRINE HYDROCHLORIDE 200 MCG: 10 INJECTION INTRAVENOUS at 08:36

## 2021-11-23 RX ADMIN — ROCURONIUM BROMIDE 20 MG: 50 INJECTION, SOLUTION INTRAVENOUS at 08:04

## 2021-11-23 RX ADMIN — FENTANYL CITRATE 50 MCG: 50 INJECTION INTRAMUSCULAR; INTRAVENOUS at 09:36

## 2021-11-23 RX ADMIN — ESMOLOL HYDROCHLORIDE 60 MG: 100 INJECTION, SOLUTION INTRAVENOUS at 07:50

## 2021-11-23 RX ADMIN — Medication 5 MG: at 08:36

## 2021-11-23 RX ADMIN — PROPOFOL 40 MG: 10 INJECTION, EMULSION INTRAVENOUS at 07:46

## 2021-11-23 RX ADMIN — POTASSIUM CHLORIDE, DEXTROSE MONOHYDRATE AND SODIUM CHLORIDE: 150; 5; 450 INJECTION, SOLUTION INTRAVENOUS at 12:39

## 2021-11-23 RX ADMIN — FENTANYL CITRATE 75 MCG: 50 INJECTION, SOLUTION INTRAMUSCULAR; INTRAVENOUS at 07:41

## 2021-11-23 RX ADMIN — Medication 5 MG: at 08:38

## 2021-11-23 RX ADMIN — SODIUM CHLORIDE, POTASSIUM CHLORIDE, SODIUM LACTATE AND CALCIUM CHLORIDE: 600; 310; 30; 20 INJECTION, SOLUTION INTRAVENOUS at 06:26

## 2021-11-23 RX ADMIN — PHENYLEPHRINE HYDROCHLORIDE 100 MCG: 10 INJECTION INTRAVENOUS at 08:29

## 2021-11-23 RX ADMIN — POTASSIUM CHLORIDE, DEXTROSE MONOHYDRATE AND SODIUM CHLORIDE: 150; 5; 450 INJECTION, SOLUTION INTRAVENOUS at 19:57

## 2021-11-23 RX ADMIN — FENTANYL CITRATE 25 MCG: 50 INJECTION INTRAMUSCULAR; INTRAVENOUS at 09:27

## 2021-11-23 RX ADMIN — PHENYLEPHRINE HYDROCHLORIDE 100 MCG: 10 INJECTION INTRAVENOUS at 08:01

## 2021-11-23 RX ADMIN — SODIUM CHLORIDE, POTASSIUM CHLORIDE, SODIUM LACTATE AND CALCIUM CHLORIDE: 600; 310; 30; 20 INJECTION, SOLUTION INTRAVENOUS at 08:30

## 2021-11-23 RX ADMIN — HYDROMORPHONE HYDROCHLORIDE 0.5 MG: 1 INJECTION, SOLUTION INTRAMUSCULAR; INTRAVENOUS; SUBCUTANEOUS at 11:28

## 2021-11-23 RX ADMIN — DEXMEDETOMIDINE HYDROCHLORIDE 0.5 MCG/KG/HR: 4 INJECTION, SOLUTION INTRAVENOUS at 07:50

## 2021-11-23 RX ADMIN — KETAMINE HYDROCHLORIDE 20 MG: 50 INJECTION, SOLUTION INTRAMUSCULAR; INTRAVENOUS at 08:08

## 2021-11-23 RX ADMIN — CEFTRIAXONE SODIUM 1 G: 1 INJECTION, POWDER, FOR SOLUTION INTRAMUSCULAR; INTRAVENOUS at 14:11

## 2021-11-23 RX ADMIN — Medication 5 MG: at 08:29

## 2021-11-23 RX ADMIN — PROPOFOL 150 MG: 10 INJECTION, EMULSION INTRAVENOUS at 07:41

## 2021-11-23 RX ADMIN — FENTANYL CITRATE 25 MCG: 50 INJECTION INTRAMUSCULAR; INTRAVENOUS at 09:56

## 2021-11-23 RX ADMIN — KETAMINE HYDROCHLORIDE 30 MG: 50 INJECTION, SOLUTION INTRAMUSCULAR; INTRAVENOUS at 07:41

## 2021-11-23 RX ADMIN — Medication 1 TABLET: at 21:24

## 2021-11-23 RX ADMIN — PHENYLEPHRINE HYDROCHLORIDE 100 MCG: 10 INJECTION INTRAVENOUS at 07:42

## 2021-11-23 RX ADMIN — FENTANYL CITRATE 25 MCG: 50 INJECTION, SOLUTION INTRAMUSCULAR; INTRAVENOUS at 07:37

## 2021-11-23 RX ADMIN — MAGNESIUM OXIDE TAB 400 MG (241.3 MG ELEMENTAL MG) 200 MG: 400 (241.3 MG) TAB at 21:24

## 2021-11-23 RX ADMIN — FENTANYL CITRATE 50 MCG: 50 INJECTION INTRAMUSCULAR; INTRAVENOUS at 10:21

## 2021-11-23 RX ADMIN — PHENYLEPHRINE HYDROCHLORIDE 100 MCG: 10 INJECTION INTRAVENOUS at 08:15

## 2021-11-23 RX ADMIN — ROCURONIUM BROMIDE 50 MG: 50 INJECTION, SOLUTION INTRAVENOUS at 07:42

## 2021-11-23 RX ADMIN — SENNOSIDES, DOCUSATE SODIUM 2 TABLET: 8.6; 5 TABLET ORAL at 21:24

## 2021-11-23 RX ADMIN — Medication 10 MG: at 08:15

## 2021-11-23 RX ADMIN — HYDROMORPHONE HYDROCHLORIDE 0.5 MG: 1 INJECTION, SOLUTION INTRAMUSCULAR; INTRAVENOUS; SUBCUTANEOUS at 14:09

## 2021-11-23 RX ADMIN — SODIUM CHLORIDE, POTASSIUM CHLORIDE, SODIUM LACTATE AND CALCIUM CHLORIDE: 600; 310; 30; 20 INJECTION, SOLUTION INTRAVENOUS at 10:15

## 2021-11-23 RX ADMIN — ACETAMINOPHEN 975 MG: 325 SOLUTION ORAL at 21:26

## 2021-11-23 RX ADMIN — HYDROMORPHONE HYDROCHLORIDE 0.5 MG: 1 INJECTION, SOLUTION INTRAMUSCULAR; INTRAVENOUS; SUBCUTANEOUS at 16:55

## 2021-11-23 RX ADMIN — SUGAMMADEX 150 MG: 100 INJECTION, SOLUTION INTRAVENOUS at 08:55

## 2021-11-23 RX ADMIN — TRAZODONE HYDROCHLORIDE 150 MG: 50 TABLET ORAL at 21:24

## 2021-11-23 RX ADMIN — ONDANSETRON 4 MG: 2 INJECTION INTRAMUSCULAR; INTRAVENOUS at 08:33

## 2021-11-23 RX ADMIN — LIDOCAINE HYDROCHLORIDE 60 MG: 20 INJECTION, SOLUTION INFILTRATION; PERINEURAL at 07:41

## 2021-11-23 RX ADMIN — DEXAMETHASONE SODIUM PHOSPHATE 10 MG: 10 INJECTION, SOLUTION INTRAMUSCULAR; INTRAVENOUS at 07:41

## 2021-11-23 RX ADMIN — ACETAMINOPHEN 975 MG: 325 SOLUTION ORAL at 13:22

## 2021-11-23 RX ADMIN — PHENYLEPHRINE HYDROCHLORIDE 100 MCG: 10 INJECTION INTRAVENOUS at 08:18

## 2021-11-23 RX ADMIN — HYDROMORPHONE HYDROCHLORIDE 0.5 MG: 1 INJECTION, SOLUTION INTRAMUSCULAR; INTRAVENOUS; SUBCUTANEOUS at 21:40

## 2021-11-23 RX ADMIN — VALACYCLOVIR 1000 MG: 500 TABLET, FILM COATED ORAL at 21:25

## 2021-11-23 RX ADMIN — PHENYLEPHRINE HYDROCHLORIDE 100 MCG: 10 INJECTION INTRAVENOUS at 07:46

## 2021-11-23 RX ADMIN — FENTANYL CITRATE 50 MCG: 50 INJECTION INTRAMUSCULAR; INTRAVENOUS at 10:10

## 2021-11-23 RX ADMIN — Medication 1 CAPSULE: at 21:25

## 2021-11-23 ASSESSMENT — ACTIVITIES OF DAILY LIVING (ADL)
ADLS_ACUITY_SCORE: 8
ADLS_ACUITY_SCORE: 7
ADLS_ACUITY_SCORE: 8
ADLS_ACUITY_SCORE: 7
ADLS_ACUITY_SCORE: 7
ADLS_ACUITY_SCORE: 8
ADLS_ACUITY_SCORE: 8
ADLS_ACUITY_SCORE: 5
ADLS_ACUITY_SCORE: 8
ADLS_ACUITY_SCORE: 7
ADLS_ACUITY_SCORE: 8
ADLS_ACUITY_SCORE: 8
ADLS_ACUITY_SCORE: 5
ADLS_ACUITY_SCORE: 8
ADLS_ACUITY_SCORE: 7
ADLS_ACUITY_SCORE: 8

## 2021-11-23 NOTE — PROGRESS NOTES
[facility]    PROGRESS NOTE - Hospitalist Service    Assessment and Plan    Principal Problem:    Abscess of middle lobe of right lung with pneumonia (H)  Active Problems:    Breast cancer, stage 1, right (H)    Pneumonia due to infectious organism, unspecified laterality, unspecified part of lung    Infection due to 2019 novel coronavirus    Salome Maravilla is a 76 year old old female with h/o stage 1 breast cancer s/p lumpectomy and radiation in 2015, depression, GERD, OA, presented with right sided chest pain and dry cough. She was diagnosed with breakthrough covid-19 infection on 10/28 and presented on 11/10 with several days of worsening right-sided pleuritic chest pain, cough, low grade fevers and chills at home.     Pneumonia  Complex parapneumonic effusion  -- Likely due to post viral strep PNA. The right pleural effusion was drained at bedside 11/10  but effusion recurred   -- Chest tube placement 11/13 ,   - Pulm consulted now signed off,previously given lytics   - Gen surgery Dr Zuluaga performed thorascopic surgery tomorrow.  - lovenox on hold for now awaiting surgery when ok to resume, SCDs.   -- C/w broad spectrum abx per pulm. I would assume it would be at least for 14 days for empyema.      Chest pain   -- Appreciate pain team,  -- EKG unremarkable for ischemia     Acute resp failure due to above   -- Remains on supp o2  -- Cxr      COVID-19 - since 10/28, breakthrough case  -- Completed remdesivir but no steroids per pulm for now.   -- Recovered covid now. Stopped isolation 11/18.     GERD/Heart burn  -- Patient believes its due to PO oxycodone. Changed to liquid oxycodone per pain team  -- Changed protonix to omeprazole per patient's request. Symptoms improved      Constipation:  -- Cont with stool softners. Suppository/enema prn     Anemia:  -- Drop in hgb noted.  - trend labs     COVID-19 PCR Results    COVID-19 PCR Results 10/28/21   SARS CoV2 PCR Positive (A)   (A)  Abnormal value       Comments are available for some flowsheets but are not being displayed.         COVID-19 Antibody Results, Testing for Immunity    COVID-19 Antibody Results, Testing for Immunity   No data to display.            VTE prophylaxis:  Pneumatic Compression Devices  DIET: Orders Placed This Encounter      Clear Liquid Diet    Drains/Lines: Chest Tube   Weight bearing status: WBAt  Disposition/Barriers to discharge: pending CT removal, multiple days   Code Status: Full Code    Subjective:  Still having some pain after surgery but controlled with meds     PHYSICAL EXAM  Temp:  [97.6  F (36.4  C)-99.3  F (37.4  C)] 97.6  F (36.4  C)  Pulse:  [77-94] 85  Resp:  [12-30] 20  BP: ()/(42-65) 145/65  SpO2:  [92 %-100 %] 100 %  Wt Readings from Last 1 Encounters:   11/22/21 57.9 kg (127 lb 9.6 oz)       Intake/Output Summary (Last 24 hours) at 11/23/2021 0831  Last data filed at 11/23/2021 0517  Gross per 24 hour   Intake 420 ml   Output 125 ml   Net 295 ml      Body mass index is 20.91 kg/m .    Physical Exam  Constitutional:       General: She is not in acute distress.     Appearance: She is ill-appearing.   HENT:      Head: Normocephalic and atraumatic.      Mouth/Throat:      Mouth: Mucous membranes are moist.      Pharynx: Oropharynx is clear.   Eyes:      Extraocular Movements: Extraocular movements intact.      Conjunctiva/sclera: Conjunctivae normal.   Cardiovascular:      Rate and Rhythm: Normal rate and regular rhythm.      Pulses: Normal pulses.   Pulmonary:      Comments: CT on right, crackles, pain with dep inspiration   Abdominal:      General: Bowel sounds are normal.      Palpations: Abdomen is soft.   Musculoskeletal:      Right lower leg: No edema.      Left lower leg: No edema.   Skin:     General: Skin is warm and dry.      Capillary Refill: Capillary refill takes less than 2 seconds.   Neurological:      General: No focal deficit present.      Mental Status: She is oriented to person,  place, and time. Mental status is at baseline.       PERTINENT LABS/IMAGING:  Results for orders placed or performed during the hospital encounter of 11/10/21   CT Chest Pulmonary Embolism w Contrast    Impression    IMPRESSION:  1.  Above findings most suggestive of moderately severe bacterial pneumonia right middle lobe with possible associated tiny microabscess within it.    2.  Small right pleural effusion and some focal pleural reaction immediately adjacent to the pneumonia anteriorly.   XR Chest Port 1 View    Impression    IMPRESSION: Infiltrate right lung base by CT mostly in the right middle lobe. Small right pleural effusion. No pneumothorax. Left lung is clear. Scoliosis. Healing left rib fractures.   XR Chest Port 1 View    Impression    IMPRESSION: The right pleural effusion is increasing. Stable heart size. No pulmonary edema. Increasing left medial basilar opacity could represent atelectasis or pneumonia.   IR Chest Tube Place Non Tunneled Right    Impression    IMPRESSION:  Successful right sided 14 [place] chest tube placement under ultrasound guidance.        XR Chest Port 1 View    Impression    IMPRESSION: Interval placement of right pigtail pleural drain. Decreased small to moderate right pleural effusion. Improved aeration of the right mid and lower lung. Decreasing retrocardiac left lower lung atelectasis or infiltrate. Stable borderline   enlarged cardiac silhouette. Normal pulmonary vascularity. No pneumothorax.   XR Chest Port 1 View    Impression    IMPRESSION: Right pigtail pleural drain with trace pneumothorax near tube insertion site but no new significant pneumothorax. Further decrease in the small right pleural effusion and improved aeration of the right mid and lower lung. Decreasing   retrocardiac left lower lung atelectasis or infiltrate. Stable borderline enlarged cardiac silhouette. Normal pulmonary vascularity. Healing fractures left ribs 3-6. Findings discussed with Dr Avitia.    XR Chest Port 1 View    Impression    IMPRESSION: Right chest tube in stable position. No pneumothorax. Tiny right pleural effusion remains. Small left pleural effusion is stable. Atelectasis left lower lobe behind the heart is stable. Minimal diffuse interstitial infiltrates stable.    No new findings.   CT Chest w/o Contrast    Impression    IMPRESSION:   1.  New right chest tube. There is persistent small loculated pleural effusion at the right lung base which does not communicate with the chest tube. There is additional small loculated hydropneumothorax in the anterior right middle lobe that also does   not communicate with the chest tube. There is atelectasis in both lower lobes and in the right middle lobe.  2.  New small right pleural effusion.  3.  Patchy airspace opacities in the left upper lobe consistent with a small focus of pneumonia.     XR Chest Port 1 View    Impression    IMPRESSION:     A right pleural drain is similarly positioned with pigtail in the upper lateral aspect of the pleural space. There is a crimp in the tube at the level of the insertion projecting at the level of the posterolateral seventh intercostal space. Opacity with   indistinct borders in the lower third of the right chest likely reflects a combination of residual complex pleural fluid and atelectasis. Mild but diffuse thickening of the pleura including the apex and along the mediastinal pleura. Focal lucency lucency   in the medial basal right chest adjacent to the right atrial heart border corresponds to loculated anterior pneumothorax on the prior CT.    There are interstitial opacities in the right mid and upper lung consistent with mild interstitial edema. No progressive airspace opacity.    Pleural fluid and atelectasis atelectasis in the medial left lower lobe obscures the medial 20% of the left hemidiaphragm, not increased.    Unchanged mild enlargement of the cardiac silhouette.   XR Chest Port 1 View    Impression     IMPRESSION: A right lateral approach pigtail catheter is again seen. No significant pneumothorax. Right basilar atelectasis is again seen. Mild interstitial edema has minimally decreased. The cardiomediastinal silhouette is at the upper limits of normal   in size.    XR Chest Port 1 View    Impression    IMPRESSION: The right chest pigtail catheter remains in place. A second loop has developed which could be external to the patient. This could also cause tube kinking. Recommend correlation with tube function. No pneumothorax. Stable moderate right and   mild left basilar opacities.    No significant effusion or pulmonary edema seen.   CT Chest w/o Contrast    Impression    IMPRESSION:     1.  Unchanged position of the right pleural drain. Localized visceral and parietal pleural thickening anterior to the right middle lobe and low anteromedial right upper lobe with adjacent lung consolidation.  2.  New heterogeneous attenuation debris layering dependently in the posterior right pleural space, most likely representing organized blood products.  3.  Unchanged circumferential pericardial effusion and small posteriorly layering left pleural effusion.     XR Chest Port 1 View    Impression    IMPRESSION: No change right chest tube with tip over the lateral right lung at the second intercostal space and a loop outside the patient. There may be a kink in the tube in the loop which is presumably external to the patient. No pneumothorax. No   change bibasilar opacities.   XR Chest Port 1 View    Impression    IMPRESSION: A right lateral approach pigtail catheter has not changed in position. No pneumothorax. Right pulmonary opacities have not significantly changed. Normal size of the heart.      Most Recent 3 CBC's:  Recent Labs   Lab Test 11/23/21  0509 11/22/21  0532 11/21/21  0609   WBC 10.4 13.0* 17.3*   HGB 8.3* 8.4* 9.3*   MCV 99 98 98    429 513*     Most Recent 3 BMP's:  Recent Labs   Lab Test 11/23/21  0509  11/22/21  0532 11/21/21  0609    139 138   POTASSIUM 3.8 3.6 3.6   CHLORIDE 103 102 101   CO2 30 29 30   BUN 7* 8 9   CR 0.59* 0.59* 0.59*   ANIONGAP 6 8 7   EMILIANO 8.3* 8.3* 8.3*   GLC 93 87 82     Most Recent 2 LFT's:  Recent Labs   Lab Test 11/10/21  1733 05/04/21  1350   AST 13 18   ALT <9 19   ALKPHOS 80 52   BILITOTAL 0.4 0.4       Recent Labs   Lab Test 06/21/21  1153   CHOL 284*   HDL 51   *   TRIG 197*     Recent Labs   Lab Test 06/21/21  1153 12/09/20  1039 06/08/20  0913   * 164* 174*     Recent Labs   Lab Test 11/23/21  0509      POTASSIUM 3.8   CHLORIDE 103   CO2 30   GLC 93   BUN 7*   CR 0.59*   GFRESTIMATED 88   EMILIANO 8.3*     No results for input(s): A1C in the last 14328 hours.  Recent Labs   Lab Test 11/23/21  0509 11/22/21  0532 11/21/21  0609   HGB 8.3* 8.4* 9.3*     Recent Labs   Lab Test 11/10/21  1145   TROPONINI 0.01     No results for input(s): BNP, NTBNPI, NTBNP in the last 52855 hours.  Recent Labs   Lab Test 05/04/21  1350   TSH 1.38     Recent Labs   Lab Test 11/10/21  1733   INR 1.44*       Ann Wen MD  [facility]  [phone]

## 2021-11-23 NOTE — ANESTHESIA PROCEDURE NOTES
Airway       Patient location during procedure: OR       Procedure Start/Stop Times: 11/23/2021 7:45 AM  Staff -        Anesthesiologist:  Lashae Amaro MD       CRNA: Triston Wu APRN CRNA       Other Anesthesia Staff: Willie Galeano       Performed By: SRNAIndications and Patient Condition       Indications for airway management: pérez-procedural       Induction type:intravenous       Mask difficulty assessment: 1 - vent by mask    Final Airway Details       Final airway type: endotracheal airway       Successful airway: ETT - double lumen left  Endotracheal Airway Details        Cuffed: yes       Successful intubation technique: video laryngoscopy       VL Blade Size: Glidescope 3       Grade View of Cords: 1       Adjucts: stylet       Position: Center       Measured from: gums/teeth       Secured at (cm): 31       Bite block used: None       ETT Double lumen (fr): 35    Post intubation assessment        Placement verified by: capnometry, equal breath sounds and chest rise        Number of attempts at approach: 1       Number of other approaches attempted: 0       Secured with: silk tape       Ease of procedure: easy       Dentition: Unchanged

## 2021-11-23 NOTE — PLAN OF CARE
Problem: Adult Inpatient Plan of Care  Goal: Plan of Care Review  11/22/2021 1459 by Triston Herrera, RN  Outcome: Improving     Problem: Adult Inpatient Plan of Care  Goal: Patient-Specific Goal (Individualized)  11/22/2021 1459 by Triston Herrera, RN  Outcome: Improving   Pt remains alert/O x 4, Vss. Npo midnight for thoracoscopic surgery tomorrow. Lovenox held for procedure.

## 2021-11-23 NOTE — PROGRESS NOTES
Pulmonary Update Note    Pt going to OR for thoracostomy. Post Op care per Sx recommendations.    Will sign off, please let our service know if any change in clinical condition or questions.    Nicolasa Erickson,   Pulmonary and Critical Care Attending  pgr [phone]

## 2021-11-23 NOTE — PLAN OF CARE
Problem: Adult Inpatient Plan of Care  Goal: Plan of Care Review  Outcome: Improving   Patient returned from surgery.  Chest tubes placed on wall suction.  Chest tube #1 had 100cc output and #2 had 50cc output.  Patient complaining of right sided chest pain at chest tube sites.  IV dilaudid helpful for pain relief.  Maintaining sats on 2L NC.  Tolerating clears.

## 2021-11-23 NOTE — PROGRESS NOTES
SW in receipt of voicemail message from Sameera Martins from [facility] declining pt due to no available beds.        NIKO Davis, LGSW 11/23/21 5:26 PM

## 2021-11-23 NOTE — ANESTHESIA CARE TRANSFER NOTE
Patient: Salome Maravilla    Procedure: Procedure(s):  THORACOSCOPY, PARIETAL PLEURECTOMY       Diagnosis: Abscess of middle lobe of right lung with pneumonia (H) [J85.1]  Diagnosis Additional Information: No value filed.    Anesthesia Type:   General     Note:    Oropharynx: oropharynx clear of all foreign objects and spontaneously breathing  Level of Consciousness: drowsy  Oxygen Supplementation: face mask  Level of Supplemental Oxygen (L/min / FiO2): 8  Independent Airway: airway patency satisfactory and stable  Dentition: dentition unchanged  Vital Signs Stable: post-procedure vital signs reviewed and stable  Report to RN Given: handoff report given  Patient transferred to: PACU    Handoff Report: Identifed the Patient, Identified the Reponsible Provider, Reviewed the pertinent medical history, Discussed the surgical course, Reviewed Intra-OP anesthesia mangement and issues during anesthesia, Set expectations for post-procedure period and Allowed opportunity for questions and acknowledgement of understanding      Vitals:  Vitals Value Taken Time   BP     Temp     Pulse     Resp     SpO2         Electronically Signed By: DONOVAN Ohara CRNA  November 23, 2021  9:05 AM

## 2021-11-23 NOTE — ANESTHESIA PREPROCEDURE EVALUATION
Anesthesia Pre-Procedure Evaluation    Patient: Salome Maravilla   MRN: [id number] : 1945        Preoperative Diagnosis: Abscess of middle lobe of right lung with pneumonia (H) [J85.1]    Procedure : Procedure(s):  THORACOSCOPY          Past Medical History:   Diagnosis Date     Breast cancer (H) 2010    hx of right lumpectomy for breast      Depression      GERD (gastroesophageal reflux disease)      Hx of radiation therapy right 2010    hx of right lumpectomy for breast cancer     Osteoarthritis       Past Surgical History:   Procedure Laterality Date     APPENDECTOMY       ARTHROSCOPY SHOULDER ROTATOR CUFF REPAIR       BIOPSY BREAST Right 2010    hx of right lumpectomy     COLONOSCOPY       HYSTERECTOMY  1982     IR CHEST TUBE PLACEMENT NON-TUNNELED RIGHT  2021     LUMPECTOMY BREAST Right 2010    right lumpectomy     OOPHORECTOMY       RELEASE CARPAL TUNNEL Right       Allergies   Allergen Reactions     Arimidex [Anastrozole] Unknown     Hot flashes     Penicillins Hives     Statins-Hmg-Coa Reductase Inhibitors [Hmg-Coa-R Inhibitors] Muscle Pain (Myalgia)     Fenofibrate Rash      Social History     Tobacco Use     Smoking status: Former Smoker     Quit date: 1988     Years since quittin.5     Smokeless tobacco: Never Used   Substance Use Topics     Alcohol use: Yes     Comment: Alcoholic Drinks/day: social      Wt Readings from Last 1 Encounters:   21 57.9 kg (127 lb 9.6 oz)        Anesthesia Evaluation   Pt has had prior anesthetic.     No history of anesthetic complications       ROS/MED HX  ENT/Pulmonary:     (+) recent URI,     Neurologic:  - neg neurologic ROS     Cardiovascular:  - neg cardiovascular ROS     METS/Exercise Tolerance: >4 METS    Hematologic:  - neg hematologic  ROS     Musculoskeletal:   (+) arthritis,     GI/Hepatic:     (+) GERD,     Renal/Genitourinary:  - neg Renal ROS     Endo:  - neg endo ROS     Psychiatric/Substance Use:     (+) psychiatric history  anxiety and depression     Infectious Disease: Comment: COVID pos 10/28 - off isolation 11/18      Malignancy:       Other:            Physical Exam    Airway        Mallampati: III   TM distance: > 3 FB   Neck ROM: full     Respiratory Devices and Support     Nasal Canula 2  l/min.     Dental       (+) chipped      Cardiovascular   cardiovascular exam normal          Pulmonary       Comment: Mild crackles right upper            OUTSIDE LABS:  CBC:   Lab Results   Component Value Date    WBC 10.4 11/23/2021    WBC 13.0 (H) 11/22/2021    HGB 8.3 (L) 11/23/2021    HGB 8.4 (L) 11/22/2021    HCT 26.8 (L) 11/23/2021    HCT 26.8 (L) 11/22/2021     11/23/2021     11/22/2021     BMP:   Lab Results   Component Value Date     11/23/2021     11/22/2021    POTASSIUM 3.8 11/23/2021    POTASSIUM 3.6 11/22/2021    CHLORIDE 103 11/23/2021    CHLORIDE 102 11/22/2021    CO2 30 11/23/2021    CO2 29 11/22/2021    BUN 7 (L) 11/23/2021    BUN 8 11/22/2021    CR 0.59 (L) 11/23/2021    CR 0.59 (L) 11/22/2021    GLC 93 11/23/2021    GLC 87 11/22/2021     COAGS:   Lab Results   Component Value Date    PTT 42 (H) 11/10/2021    INR 1.44 (H) 11/10/2021    FIBR 655 (H) 11/14/2021     POC: No results found for: BGM, HCG, HCGS  HEPATIC:   Lab Results   Component Value Date    ALBUMIN 2.5 (L) 11/10/2021    PROTTOTAL 7.2 11/10/2021    PROTTOTAL 7.2 11/10/2021    ALT <9 11/10/2021    AST 13 11/10/2021    ALKPHOS 80 11/10/2021    BILITOTAL 0.4 11/10/2021     OTHER:   Lab Results   Component Value Date    LACT 0.6 (L) 11/15/2021    EMILIANO 8.3 (L) 11/23/2021    MAG 2.1 11/23/2021    TSH 1.38 05/04/2021    CRP 27.1 (H) 11/14/2021       Anesthesia Plan    ASA Status:  3, emergent    NPO Status:  NPO Appropriate    Anesthesia Type: General.     - Airway: ETT   Induction: Intravenous.      Techniques and Equipment:     - Airway: Video-Laryngoscope, Fiberoptic Bronchoscope, Double lumen ETT     - Lines/Monitors: 2nd IV      Consents    Anesthesia Plan(s) and associated risks, benefits, and realistic alternatives discussed. Questions answered and patient/representative(s) expressed understanding.    - Discussed:     - Discussed with:  Patient         Postoperative Care    Pain management: Multi-modal analgesia.   PONV prophylaxis: Dexamethasone or Solumedrol, Ondansetron (or other 5HT-3)     Comments:    Other Comments:     Precedex gtt  Ketamine  Esmolol  Robinul  Fent/Dilaudid  Zofran, decadron 10 mg                  Lashea Amaro MD

## 2021-11-23 NOTE — PLAN OF CARE
Problem: Adult Inpatient Plan of Care  Goal: Plan of Care Review  Outcome: Improving     Problem: Respiratory Compromise (Pneumonia)  Goal: Effective Oxygenation and Ventilation  Outcome: Improving  Intervention: Optimize Oxygenation and Ventilation  Recent Flowsheet Documentation  Taken 11/23/2021 0030 by Deanna Davis RN  Head of Bed (HOB) Positioning: HOB at 20-30 degrees     Problem: Airway Clearance Ineffective (Pulmonary Impairment)  Goal: Effective Airway Clearance  Outcome: Improving    Alert, oriented and pleasant. Reports no pain to the right chest at this time.  Chect tube in place and on water seal.  Patient on NPO starting 12midnight for thoracoscopy @ 0730.   Vitals are stable all t/o the night. Continent of urine,uses the commode 2x. No BM so far.  0515Vitals checked. Pt voided. 0 out from the chest tube. Report given to MADISON RN.  0540To MADISON per cart.

## 2021-11-23 NOTE — ANESTHESIA POSTPROCEDURE EVALUATION
Patient: Salome Maravilla    Procedure: Procedure(s):  THORACOSCOPY, PARIETAL PLEURECTOMY       Diagnosis:Abscess of middle lobe of right lung with pneumonia (H) [J85.1]  Diagnosis Additional Information: No value filed.    Anesthesia Type:  General    Note:     Postop Pain Control: Uneventful            Sign Out: Well controlled pain   PONV: No   Neuro/Psych: Uneventful            Sign Out: Acceptable/Baseline neuro status   Airway/Respiratory: Uneventful            Sign Out: Acceptable/Baseline resp. status   CV/Hemodynamics: Uneventful            Sign Out: Acceptable CV status; No obvious hypovolemia; No obvious fluid overload   Other NRE: NONE   DID A NON-ROUTINE EVENT OCCUR? No           Last vitals:  Vitals Value Taken Time   /61 11/23/21 1045   Temp 36.5  C (97.7  F) 11/23/21 1015   Pulse 91 11/23/21 1053   Resp 24 11/23/21 1052   SpO2 95 % 11/23/21 1053   Vitals shown include unvalidated device data.    Electronically Signed By: Lashae Aamro MD  November 23, 2021  2:09 PM

## 2021-11-23 NOTE — OP NOTE
OPERATIVE REPORT    Salome Maravilla   [facility]  Medical Record #:  [id number]  YOB: 1945  Age:  76 year old    PROCEDURE DATE:  11/10/2021 - 11/23/2021    PREOPERATIVE DIAGNOSIS: Right lung pneumonia with empyema refractory to tube thoracostomy and lytic therapy    POSTOPERATIVE DIAGNOSIS: Same    PROCEDURE: 1.  Bronchoscopy within normal limits 2.  Right thoracoscopy with complete decortication visceral parietal pleural surfaces it is time and diaphragmatic surfaces with freeing of the right lung 3.  Parietal pleurectomy    OPERATING SURGEON:  Timbo Zuluaga MD    ASSISTANT: Technician    ANESTHESIA: General    DESCRIPTION OF PROCEDURE: With the patient supine position under general anesthesia having reviewed the risk benefits complications of surgical intervention with her and the potential extent of surgical intervention first bronchoscopy was completed and double-lumen endotracheal tube is placed and secured patient turned to the left lateral position and a right chest prepped in the usual sterile fashion and previous tube thoracostomy removed.  It should be noted that bronchoscopy revealed the absence of endobronchial lesion trachea mainstem segmental bronchial segments.  The right lung is deflated and the the right thorax is entered bluntly thoracoscope introduced and a additional trochars are placed for a number.  Using suction irrigation and blunt dissection and piecemeal removal of particulate debris and clot complete decortication the right lung and pleural surfaces is completed.  Significant parietal pleurectomy is completed as well.  At the completion procedure time is taken to ensure hemostasis is obtained and air and fluid removed from the thoracic space.  336 [place] chest tubes are placed and secured 1 in the lower chest tender to the posterior laterally.  These are secured and the patient is stable from a respiratory standpoint.  The patient is discharged the PAR stable  condition.  Sponge and counts are correct x2.  Estimated blood loss minimal.    EBL:  [unfilled]    SPECIMENS:    ID Type Source Tests Collected by Time Destination   1 : Right Parietal Pleura Tissue Pleural Cavity, Right SURGICAL PATHOLOGY EXAM Timbo Bowen MD 11/23/2021  8:41 AM        Timbo bowen md  [facility]

## 2021-11-23 NOTE — PROGRESS NOTES
Pt was pleasant and cooperative this evening.  Pt aware she will be NPO at midnight for procedure tomorrow.  Tylenol for right sided chest pain with relief.  Chest tube intact.

## 2021-11-24 ENCOUNTER — APPOINTMENT (OUTPATIENT)
Dept: RADIOLOGY | Facility: HOSPITAL | Age: 76
DRG: 163 | End: 2021-11-24
Attending: SURGERY
Payer: COMMERCIAL

## 2021-11-24 ENCOUNTER — APPOINTMENT (OUTPATIENT)
Dept: PHYSICAL THERAPY | Facility: HOSPITAL | Age: 76
DRG: 163 | End: 2021-11-24
Payer: COMMERCIAL

## 2021-11-24 PROBLEM — J96.01 ACUTE RESPIRATORY FAILURE WITH HYPOXIA (H): Status: ACTIVE | Noted: 2021-11-24

## 2021-11-24 PROBLEM — D72.829 LEUKOCYTOSIS, UNSPECIFIED TYPE: Status: ACTIVE | Noted: 2021-11-24

## 2021-11-24 LAB
ANION GAP SERPL CALCULATED.3IONS-SCNC: 6 MMOL/L (ref 5–18)
BUN SERPL-MCNC: 6 MG/DL (ref 8–28)
CALCIUM SERPL-MCNC: 8.3 MG/DL (ref 8.5–10.5)
CHLORIDE BLD-SCNC: 104 MMOL/L (ref 98–107)
CO2 SERPL-SCNC: 25 MMOL/L (ref 22–31)
CREAT SERPL-MCNC: 0.55 MG/DL (ref 0.6–1.1)
ERYTHROCYTE [DISTWIDTH] IN BLOOD BY AUTOMATED COUNT: 13.7 % (ref 10–15)
GFR SERPL CREATININE-BSD FRML MDRD: 90 ML/MIN/1.73M2
GLUCOSE BLD-MCNC: 100 MG/DL (ref 70–125)
HCT VFR BLD AUTO: 27.9 % (ref 35–47)
HGB BLD-MCNC: 8.7 G/DL (ref 11.7–15.7)
HOLD SPECIMEN: NORMAL
MAGNESIUM SERPL-MCNC: 1.9 MG/DL (ref 1.8–2.6)
MCH RBC QN AUTO: 31.4 PG (ref 26.5–33)
MCHC RBC AUTO-ENTMCNC: 31.2 G/DL (ref 31.5–36.5)
MCV RBC AUTO: 101 FL (ref 78–100)
PATH REPORT.COMMENTS IMP SPEC: NORMAL
PATH REPORT.COMMENTS IMP SPEC: NORMAL
PATH REPORT.FINAL DX SPEC: NORMAL
PATH REPORT.GROSS SPEC: NORMAL
PATH REPORT.MICROSCOPIC SPEC OTHER STN: NORMAL
PATH REPORT.RELEVANT HX SPEC: NORMAL
PHOTO IMAGE: NORMAL
PLATELET # BLD AUTO: 597 10E3/UL (ref 150–450)
POTASSIUM BLD-SCNC: 4.1 MMOL/L (ref 3.5–5)
RBC # BLD AUTO: 2.77 10E6/UL (ref 3.8–5.2)
SODIUM SERPL-SCNC: 135 MMOL/L (ref 136–145)
WBC # BLD AUTO: 22.3 10E3/UL (ref 4–11)

## 2021-11-24 PROCEDURE — 250N000011 HC RX IP 250 OP 636: Performed by: INTERNAL MEDICINE

## 2021-11-24 PROCEDURE — 250N000013 HC RX MED GY IP 250 OP 250 PS 637: Performed by: SURGERY

## 2021-11-24 PROCEDURE — 83735 ASSAY OF MAGNESIUM: CPT | Performed by: INTERNAL MEDICINE

## 2021-11-24 PROCEDURE — 120N000001 HC R&B MED SURG/OB

## 2021-11-24 PROCEDURE — 85027 COMPLETE CBC AUTOMATED: CPT | Performed by: SURGERY

## 2021-11-24 PROCEDURE — 71045 X-RAY EXAM CHEST 1 VIEW: CPT

## 2021-11-24 PROCEDURE — 250N000011 HC RX IP 250 OP 636: Performed by: SURGERY

## 2021-11-24 PROCEDURE — 97530 THERAPEUTIC ACTIVITIES: CPT | Mod: GP

## 2021-11-24 PROCEDURE — 258N000003 HC RX IP 258 OP 636: Performed by: SURGERY

## 2021-11-24 PROCEDURE — 99233 SBSQ HOSP IP/OBS HIGH 50: CPT | Performed by: INTERNAL MEDICINE

## 2021-11-24 PROCEDURE — 80048 BASIC METABOLIC PNL TOTAL CA: CPT | Performed by: SURGERY

## 2021-11-24 PROCEDURE — 36415 COLL VENOUS BLD VENIPUNCTURE: CPT | Performed by: SURGERY

## 2021-11-24 PROCEDURE — 250N000013 HC RX MED GY IP 250 OP 250 PS 637: Performed by: INTERNAL MEDICINE

## 2021-11-24 PROCEDURE — 88305 TISSUE EXAM BY PATHOLOGIST: CPT | Mod: 26 | Performed by: PATHOLOGY

## 2021-11-24 PROCEDURE — 97116 GAIT TRAINING THERAPY: CPT | Mod: GP

## 2021-11-24 RX ORDER — CEFTRIAXONE 1 G/1
1 INJECTION, POWDER, FOR SOLUTION INTRAMUSCULAR; INTRAVENOUS EVERY 24 HOURS
Status: DISCONTINUED | OUTPATIENT
Start: 2021-11-24 | End: 2021-11-28 | Stop reason: ALTCHOICE

## 2021-11-24 RX ADMIN — SENNOSIDES, DOCUSATE SODIUM 2 TABLET: 8.6; 5 TABLET ORAL at 21:22

## 2021-11-24 RX ADMIN — POTASSIUM CHLORIDE, DEXTROSE MONOHYDRATE AND SODIUM CHLORIDE: 150; 5; 450 INJECTION, SOLUTION INTRAVENOUS at 02:49

## 2021-11-24 RX ADMIN — MAGNESIUM OXIDE TAB 400 MG (241.3 MG ELEMENTAL MG) 200 MG: 400 (241.3 MG) TAB at 21:22

## 2021-11-24 RX ADMIN — CALCIUM CARBONATE (ANTACID) CHEW TAB 500 MG 500 MG: 500 CHEW TAB at 08:58

## 2021-11-24 RX ADMIN — OMEPRAZOLE 40 MG: 20 CAPSULE, DELAYED RELEASE ORAL at 06:46

## 2021-11-24 RX ADMIN — ACETAMINOPHEN 975 MG: 325 SOLUTION ORAL at 21:21

## 2021-11-24 RX ADMIN — HYDROMORPHONE HYDROCHLORIDE 0.5 MG: 1 INJECTION, SOLUTION INTRAMUSCULAR; INTRAVENOUS; SUBCUTANEOUS at 21:18

## 2021-11-24 RX ADMIN — ENOXAPARIN SODIUM 40 MG: 40 INJECTION SUBCUTANEOUS at 21:20

## 2021-11-24 RX ADMIN — Medication 1 TABLET: at 07:53

## 2021-11-24 RX ADMIN — HYDROMORPHONE HYDROCHLORIDE 0.5 MG: 1 INJECTION, SOLUTION INTRAMUSCULAR; INTRAVENOUS; SUBCUTANEOUS at 01:34

## 2021-11-24 RX ADMIN — MAGNESIUM OXIDE TAB 400 MG (241.3 MG ELEMENTAL MG) 200 MG: 400 (241.3 MG) TAB at 07:53

## 2021-11-24 RX ADMIN — HYDROMORPHONE HYDROCHLORIDE 0.5 MG: 1 INJECTION, SOLUTION INTRAMUSCULAR; INTRAVENOUS; SUBCUTANEOUS at 13:58

## 2021-11-24 RX ADMIN — ACETAMINOPHEN 975 MG: 325 SOLUTION ORAL at 06:37

## 2021-11-24 RX ADMIN — LIDOCAINE 3 PATCH: 246 PATCH TOPICAL at 07:54

## 2021-11-24 RX ADMIN — ACETAMINOPHEN 975 MG: 325 SOLUTION ORAL at 13:54

## 2021-11-24 RX ADMIN — Medication 1 TABLET: at 21:22

## 2021-11-24 RX ADMIN — TRAZODONE HYDROCHLORIDE 150 MG: 50 TABLET ORAL at 21:22

## 2021-11-24 RX ADMIN — HYDROMORPHONE HYDROCHLORIDE 0.5 MG: 1 INJECTION, SOLUTION INTRAMUSCULAR; INTRAVENOUS; SUBCUTANEOUS at 08:55

## 2021-11-24 RX ADMIN — THERA TABS 1 TABLET: TAB at 07:53

## 2021-11-24 RX ADMIN — CEFTRIAXONE SODIUM 1 G: 1 INJECTION, POWDER, FOR SOLUTION INTRAMUSCULAR; INTRAVENOUS at 16:14

## 2021-11-24 RX ADMIN — SERTRALINE HYDROCHLORIDE 200 MG: 50 TABLET ORAL at 07:54

## 2021-11-24 RX ADMIN — Medication 1 CAPSULE: at 21:22

## 2021-11-24 RX ADMIN — VALACYCLOVIR 1000 MG: 500 TABLET, FILM COATED ORAL at 21:22

## 2021-11-24 RX ADMIN — OXYCODONE HYDROCHLORIDE 5 MG: 5 SOLUTION ORAL at 02:54

## 2021-11-24 ASSESSMENT — ACTIVITIES OF DAILY LIVING (ADL)
ADLS_ACUITY_SCORE: 8
ADLS_ACUITY_SCORE: 10
ADLS_ACUITY_SCORE: 8
ADLS_ACUITY_SCORE: 8
ADLS_ACUITY_SCORE: 10
ADLS_ACUITY_SCORE: 8
ADLS_ACUITY_SCORE: 10
ADLS_ACUITY_SCORE: 8
ADLS_ACUITY_SCORE: 10
ADLS_ACUITY_SCORE: 8

## 2021-11-24 NOTE — PROGRESS NOTES
"Care Management Follow Up    Length of Stay (days): 14    Expected Discharge Date: 11/28/2021     Concerns to be Addressed:  Post procedure care and monitoring, pulm status       Patient plan of care discussed at interdisciplinary rounds: Yes    Anticipated Discharge Disposition:  TBD     Anticipated Discharge Services:  TBD  Anticipated Discharge DME:  TBD         Additional Information:  Patient admitted for abscess of right middle lung. POD 1 for thoracostomy. Chest tubes x2 in place. Recommendation for discharge TCU vs home with homecare.    Covid Vaccinated with Moderna.    Covid recovered, off isolation 11/18/21.    Assessment History: Felicity lives in a 1 level town house with her . She is independent with ADLs at baseline and still drives. Per , \"she is a very independent person and even when she has been sick she likes to do things on her own\". Son or daughter willing to transport at discharge.    Patient and daughter prefer private room near [place]. Referrals are queued up and ready to be sent closer to when pstient is close to medically ready for discharge.    Final discharge plan pending progression and recommendations.         Yudelka Novoa RN        "

## 2021-11-24 NOTE — PLAN OF CARE
Problem: Pain Chronic (Persistent)  Goal: Acceptable Pain Control and Functional Ability  Outcome: Improving  Intervention: Develop Pain Management Plan  Recent Flowsheet Documentation  Taken 11/24/2021 7188 by Zollinger, Nancy K, RN  Pain Management Interventions: medication (see MAR)   Patient complaining of pain at chest tube sites.  Dilaudid and tylenol given for pain relief.  Chest tubes to wall suction.  Ambulated 150ft in hallway.  Encouraged IS.  Passing gas.  Tolerating a regular diet.

## 2021-11-24 NOTE — PROGRESS NOTES
[facility]    PROGRESS NOTE - Hospitalist Service    Assessment and Plan    Principal Problem:    Abscess of middle lobe of right lung with pneumonia (H)  Active Problems:    Breast cancer, stage 1, right (H)    Pneumonia due to infectious organism, unspecified laterality, unspecified part of lung    Infection due to 2019 novel coronavirus    Salome Maravilla is a 76 year old old female with h/o stage 1 breast cancer s/p lumpectomy and radiation in , depression, GERD, OA, presented with right sided chest pain and dry cough. She was diagnosed with breakthrough covid-19 infection on 10/28 and presented on 11/10 with several days of worsening right-sided pleuritic chest pain, cough, low grade fevers and chills at home.     Pneumonia  Complex parapneumonic effusion  -- Likely due to post viral strep PNA. The right pleural effusion was drained at bedside 11/10  but effusion recurred   -- Chest tube placement    - Pulm consulted now signed off,previously given lytics   - Gen surgery Dr Zuluaga performed thorascopic surgery and new CT in place  - lovenox on hold for now awaiting surgery when ok to resume, SCDs.   - continue IV ceftriaxone for now it was to  today but she has CT in place for the parapneumonic effusion, having nurse page Dr. Gutierrez for recs      Chest pain   - secondary to above  - pain team seen  - pain controlled      Acute resp failure due to above   -- Remains on supp o2 only 1 liter   -- Cxr stable      COVID-19 - since 10/28, breakthrough case  -- Completed remdesivir but no steroids per pulm for now.   -- Recovered covid now. Stopped isolation .     GERD/Heart burn  -- Patient believes its due to PO oxycodone. Changed to liquid oxycodone per pain team  -- Changed protonix to omeprazole per patient's request. Symptoms improved      Constipation:  -- Cont with stool softners. Suppository/enema prn     Anemia:  -- Drop in hgb noted.  - trend labs     COVID-19  PCR Results    COVID-19 PCR Results 10/28/21   SARS CoV2 PCR Positive (A)   (A) Abnormal value       Comments are available for some flowsheets but are not being displayed.         COVID-19 Antibody Results, Testing for Immunity    COVID-19 Antibody Results, Testing for Immunity   No data to display.            VTE prophylaxis:  Pneumatic Compression Devices, page out to Dr. Gutierrez about starting lovenox which was held for surgery   DIET: Orders Placed This Encounter      Regular Diet Adult    Drains/Lines: CT   Weight bearing status: WBAT  Disposition/Barriers to discharge: pending CT removal   Code Status: Full Code    Subjective:  Pain controlled    PHYSICAL EXAM  Temp:  [97.8  F (36.6  C)-98.5  F (36.9  C)] 98.1  F (36.7  C)  Pulse:  [86-88] 86  Resp:  [18-20] 20  BP: (114-147)/(57-63) 143/63  SpO2:  [95 %-100 %] 96 %  Wt Readings from Last 1 Encounters:   11/22/21 57.9 kg (127 lb 9.6 oz)       Intake/Output Summary (Last 24 hours) at 11/24/2021 0846  Last data filed at 11/24/2021 0745  Gross per 24 hour   Intake 5201 ml   Output 1285 ml   Net 3916 ml      Body mass index is 20.91 kg/m .    Physical Exam  Constitutional:       General: She is not in acute distress.     Appearance: Normal appearance.   HENT:      Head: Normocephalic and atraumatic.   Cardiovascular:      Rate and Rhythm: Normal rate and regular rhythm.      Pulses: Normal pulses.      Heart sounds: Normal heart sounds.   Pulmonary:      Comments: CT on right, improved aeration on exam no rhonchi and squeaks heard    Abdominal:      General: Bowel sounds are normal.      Palpations: Abdomen is soft.   Musculoskeletal:         General: Normal range of motion.      Right lower leg: No edema.      Left lower leg: No edema.   Skin:     General: Skin is warm and dry.      Capillary Refill: Capillary refill takes less than 2 seconds.   Neurological:      General: No focal deficit present.      Mental Status: She is alert and oriented to person, place,  and time. Mental status is at baseline.       PERTINENT LABS/IMAGING:  Results for orders placed or performed during the hospital encounter of 11/10/21   CT Chest Pulmonary Embolism w Contrast    Impression    IMPRESSION:  1.  Above findings most suggestive of moderately severe bacterial pneumonia right middle lobe with possible associated tiny microabscess within it.    2.  Small right pleural effusion and some focal pleural reaction immediately adjacent to the pneumonia anteriorly.   XR Chest Port 1 View    Impression    IMPRESSION: Infiltrate right lung base by CT mostly in the right middle lobe. Small right pleural effusion. No pneumothorax. Left lung is clear. Scoliosis. Healing left rib fractures.   XR Chest Port 1 View    Impression    IMPRESSION: The right pleural effusion is increasing. Stable heart size. No pulmonary edema. Increasing left medial basilar opacity could represent atelectasis or pneumonia.   IR Chest Tube Place Non Tunneled Right    Impression    IMPRESSION:  Successful right sided 14 [place] chest tube placement under ultrasound guidance.        XR Chest Port 1 View    Impression    IMPRESSION: Interval placement of right pigtail pleural drain. Decreased small to moderate right pleural effusion. Improved aeration of the right mid and lower lung. Decreasing retrocardiac left lower lung atelectasis or infiltrate. Stable borderline   enlarged cardiac silhouette. Normal pulmonary vascularity. No pneumothorax.   XR Chest Port 1 View    Impression    IMPRESSION: Right pigtail pleural drain with trace pneumothorax near tube insertion site but no new significant pneumothorax. Further decrease in the small right pleural effusion and improved aeration of the right mid and lower lung. Decreasing   retrocardiac left lower lung atelectasis or infiltrate. Stable borderline enlarged cardiac silhouette. Normal pulmonary vascularity. Healing fractures left ribs 3-6. Findings discussed with Dr Avitia.   XR  Chest Port 1 View    Impression    IMPRESSION: Right chest tube in stable position. No pneumothorax. Tiny right pleural effusion remains. Small left pleural effusion is stable. Atelectasis left lower lobe behind the heart is stable. Minimal diffuse interstitial infiltrates stable.    No new findings.   CT Chest w/o Contrast    Impression    IMPRESSION:   1.  New right chest tube. There is persistent small loculated pleural effusion at the right lung base which does not communicate with the chest tube. There is additional small loculated hydropneumothorax in the anterior right middle lobe that also does   not communicate with the chest tube. There is atelectasis in both lower lobes and in the right middle lobe.  2.  New small right pleural effusion.  3.  Patchy airspace opacities in the left upper lobe consistent with a small focus of pneumonia.     XR Chest Port 1 View    Impression    IMPRESSION:     A right pleural drain is similarly positioned with pigtail in the upper lateral aspect of the pleural space. There is a crimp in the tube at the level of the insertion projecting at the level of the posterolateral seventh intercostal space. Opacity with   indistinct borders in the lower third of the right chest likely reflects a combination of residual complex pleural fluid and atelectasis. Mild but diffuse thickening of the pleura including the apex and along the mediastinal pleura. Focal lucency lucency   in the medial basal right chest adjacent to the right atrial heart border corresponds to loculated anterior pneumothorax on the prior CT.    There are interstitial opacities in the right mid and upper lung consistent with mild interstitial edema. No progressive airspace opacity.    Pleural fluid and atelectasis atelectasis in the medial left lower lobe obscures the medial 20% of the left hemidiaphragm, not increased.    Unchanged mild enlargement of the cardiac silhouette.   XR Chest Port 1 View    Impression     IMPRESSION: A right lateral approach pigtail catheter is again seen. No significant pneumothorax. Right basilar atelectasis is again seen. Mild interstitial edema has minimally decreased. The cardiomediastinal silhouette is at the upper limits of normal   in size.    XR Chest Port 1 View    Impression    IMPRESSION: The right chest pigtail catheter remains in place. A second loop has developed which could be external to the patient. This could also cause tube kinking. Recommend correlation with tube function. No pneumothorax. Stable moderate right and   mild left basilar opacities.    No significant effusion or pulmonary edema seen.   CT Chest w/o Contrast    Impression    IMPRESSION:     1.  Unchanged position of the right pleural drain. Localized visceral and parietal pleural thickening anterior to the right middle lobe and low anteromedial right upper lobe with adjacent lung consolidation.  2.  New heterogeneous attenuation debris layering dependently in the posterior right pleural space, most likely representing organized blood products.  3.  Unchanged circumferential pericardial effusion and small posteriorly layering left pleural effusion.     XR Chest Port 1 View    Impression    IMPRESSION: No change right chest tube with tip over the lateral right lung at the second intercostal space and a loop outside the patient. There may be a kink in the tube in the loop which is presumably external to the patient. No pneumothorax. No   change bibasilar opacities.   XR Chest Port 1 View    Impression    IMPRESSION: A right lateral approach pigtail catheter has not changed in position. No pneumothorax. Right pulmonary opacities have not significantly changed. Normal size of the heart.    XR Chest 1 View    Impression    IMPRESSION: Interval placement of 3 right-sided chest tubes with removal of the previously seen pigtail catheter on the right. No visible pneumothorax. There is patchy opacity at the right lung base which  likely reflects atelectasis. No obvious pleural   effusion. Retrocardiac opacity involving the medial left lower lobe is not significantly change with a trace left pleural effusion. Heart and mediastinal size are normal.     Most Recent 3 CBC's:  Recent Labs   Lab Test 11/24/21  1034 11/23/21  0509 11/22/21  0532   WBC 22.3* 10.4 13.0*   HGB 8.7* 8.3* 8.4*   * 99 98   * 437 429     Most Recent 3 BMP's:  Recent Labs   Lab Test 11/24/21  1034 11/23/21  0509 11/22/21  0532   * 139 139   POTASSIUM 4.1 3.8 3.6   CHLORIDE 104 103 102   CO2 25 30 29   BUN 6* 7* 8   CR 0.55* 0.59* 0.59*   ANIONGAP 6 6 8   EMILIANO 8.3* 8.3* 8.3*    93 87     Most Recent 2 LFT's:  Recent Labs   Lab Test 11/10/21  1733 05/04/21  1350   AST 13 18   ALT <9 19   ALKPHOS 80 52   BILITOTAL 0.4 0.4       Recent Labs   Lab Test 06/21/21  1153   CHOL 284*   HDL 51   *   TRIG 197*     Recent Labs   Lab Test 06/21/21  1153 12/09/20  1039 06/08/20  0913   * 164* 174*     Recent Labs   Lab Test 11/23/21  0509      POTASSIUM 3.8   CHLORIDE 103   CO2 30   GLC 93   BUN 7*   CR 0.59*   GFRESTIMATED 88   EMILIANO 8.3*     No results for input(s): A1C in the last 17983 hours.  Recent Labs   Lab Test 11/23/21  0509 11/22/21  0532 11/21/21  0609   HGB 8.3* 8.4* 9.3*     Recent Labs   Lab Test 11/10/21  1145   TROPONINI 0.01     No results for input(s): BNP, NTBNPI, NTBNP in the last 91133 hours.  Recent Labs   Lab Test 05/04/21  1350   TSH 1.38     Recent Labs   Lab Test 11/10/21  1733   INR 1.44*       Ann Wen MD  [facility]  [phone]

## 2021-11-24 NOTE — PLAN OF CARE
Problem: Infection (Pneumonia)  Goal: Resolution of Infection Signs and Symptoms  Outcome: Improving     Problem: Respiratory Compromise (Pneumonia)  Goal: Effective Oxygenation and Ventilation  Outcome: Improving     Problem: Gas Exchange Impaired (Pulmonary Impairment)  Goal: Optimal Gas Exchange  Outcome: Improving     Problem: Pain Chronic (Persistent)  Goal: Acceptable Pain Control and Functional Ability  Outcome: Improving   Pt alert and oriented x 4,PRN iv dilaudid given @ 0134 for incision site pain with  no effect,prn oxycodone given at 0254,pt asleep upon assessment,no SOB ,chest tube site dressing intact, chest tube # 1 has  150 ml serosanguinous output  and chest tube #2 has 175 ml serosanguinous output this shif ,slept between cares . Will continue to monitor.

## 2021-11-24 NOTE — PLAN OF CARE
Pt is covid recovered. Remdesivir therapy completed. Isolation discontinued 11/18. Had thorascopy and right pleurectomy performed today for abscess of right middle lobe of lung. New chest tubes placed today x2, placed on wall suction. Chest tube #1 has a Y connector with 60 ml of serosanguineous output this shift. Chest tube #2 has 40 ml of serosanguineous output recorded this shift. No air leaks noted this shift. Lovenox on hold. Pt denies cough or shortness of breath. Weaned to 1 liter of oxygen via NC and maintaining O2 sats in the mid 90's. On continuous pulse ox. Lungs are clear but diminished throughout. Respirations are shallow 2/2 pain with deep breathing. She is on abx in the form of IV Rocephin for empyema. Received scheduled Tylenol along with prn IV Dilaudid at 1700 and 2140 for complaints of pain at chest tube insertion site. Remains on bedrest this shift. Appetite is poor. Tolerating clear liquid diet. IV fluids running at 150 ml/hr of D5 and 1/2 NS with 20 mEq K+. On Mg protocol. Recheck in AM. Continent of bowel and bladder. Requesting bedpan use this shift.     Problem: Activity Intolerance (Pulmonary Impairment)  Goal: Improved Activity Tolerance  Outcome: No Change     Problem: Respiratory Compromise (Pneumonia)  Goal: Effective Oxygenation and Ventilation  Outcome: Improving  Intervention: Optimize Oxygenation and Ventilation  Recent Flowsheet Documentation  Taken 11/23/2021 6654 by Sandy Ocampo, RN  Head of Bed (HOB) Positioning: HOB at 20 degrees

## 2021-11-24 NOTE — PROGRESS NOTES
Progress Note    Assessment/Plan  Stable-xray ok-no air leak    Principal Problem:    Abscess of middle lobe of right lung with pneumonia (H)  Active Problems:    Breast cancer, stage 1, right (H)    Pneumonia due to infectious organism, unspecified laterality, unspecified part of lung    Infection due to 2019 novel coronavirus      Subjective  comfortable  Objective    Vital signs in last 24 hours  Temp:  [97.6  F (36.4  C)-98.5  F (36.9  C)] 98.1  F (36.7  C)  Pulse:  [84-90] 86  Resp:  [16-20] 20  BP: (114-147)/(51-65) 143/63  SpO2:  [95 %-100 %] 96 %  Weight:   [unfilled]    Intake/Output last 3 shifts  I/O last 3 completed shifts:  In: 4467 [P.O.:240; I.V.:4227]  Out: 1285 [Urine:350; Chest Tube:935]  Intake/Output this shift:  I/O this shift:  In: 984 [I.V.:984]  Out: -       Physical Exam  Good resp effort    Pertinent Labs   Lab Results   Component Value Date    WBC 10.4 11/23/2021    HGB 8.3 (L) 11/23/2021    HCT 26.8 (L) 11/23/2021    MCV 99 11/23/2021     11/23/2021             Pertinent Radiology     [unfilled]        Timbo Zuluaga MD

## 2021-11-24 NOTE — PROGRESS NOTES
"CLINICAL NUTRITION SERVICES - REASSESSMENT NOTE     Nutrition Prescription    RECOMMENDATIONS FOR MDs/PROVIDERS TO ORDER:  none    Malnutrition Status:    Severe    Recommendations already ordered by Registered Dietitian (RD):  Ensure Enlive and Ensure Clear daily    Future/Additional Recommendations:  Continue to offer and encourage supplements     EVALUATION OF THE PROGRESS TOWARD GOALS   Diet: Regular diet  NPO/CL yesterday d/t thoracotomy    Regular diet prior to yesterday     Intake:   NPO x 2 meals yesterday for procedure and clear liquids for supper  Estimate intake 11/22 645 kcal, 15 g protein over 3 meals  Ordering small bland meals.     Eating 50%of baseline 11/10-11/17. Intake < 50% since x 7 days   Pt reports decreased appetite prior to yesterday d/t bad diarrhea. She had been constipated prior. She state the food is \"terrrible\" but also acknowledges altered taste ongoing. Discussed ongoing inadequate intake and need for nutrition to promote recovery. Pt needing much encouragement to accept supplements, feeling she did not need them. Pt agreed to try. Provided recipe suggestions to dilute the flavor.     NEW FINDINGS   Thoracotomy yesterday for lung abscess.Chest tube x 2    ANTHROPOMETRICS  Height: 166.4 cm (5' 5.5\")  Most Recent Weight: 57.8 kg (127 lb 9.6 oz)  11/22- down 3 lb from admit  139 lb 11/16  IBW: 56.8 kg  BMI: Normal BMI  Weight History:       Wt Readings from Last 10 Encounters:   11/10/21 59 kg (130 lb) - admit   07/12/21 62.6 kg (138 lb 1.6 oz)   06/10/19 63.1 kg (139 lb 3.2 oz)   06/04/18 66.2 kg (145 lb 14.4 oz)   7.9% weight loss x 1 month    PHYSICAL FINDINGS  See malnutrition section below.    GI CONCERNS  Last BM 11/22 x 3  Altered taste    LABS  Reviewed  Na 135, decreased    MEDICATIONS  Reviewed  Oscal, iv abx, culturelle, magox, mvi with minerals, prilosec, pericolace bid, oral abx, vit C 250 mg daily  miralax daily added today    MALNUTRITION:   % Weight Loss:  > 2% in 1 week " (severe malnutrition)  % Intake:  </= 50% for >/= 5 days (severe malnutrition)  Subcutaneous Fat Loss: None noted  Muscle Loss: Mild temporal, buccal loss. Mild to moderate deltoid. Moderate UE  Fluid Retention: None - pleural effusion    Malnutrition Diagnosis: Severe malnutrition  In Context of:  Acute illness or injury    Previous Goals   Intake > 75%of estimated needs  Maintain weight  Evaluation: Not met    Previous Nutrition Diagnosis  Malnutrition related to covid as evidenced by intake 50% x 3 weeks, 7% weight loss x 2 weeks    Evaluation: Declining    CURRENT NUTRITION DIAGNOSIS  Malnutrition related to covid as evidenced by intake 50% x 3 weeks + intake </= 25% x 5 days hospitalized,  7.9% weight loss 1 month    INTERVENTIONS  Implementation  Ensure clear and Ensure enlive daily    Goals  Intake > 75%of estimated needs  Maintain weight    Monitoring/Evaluation  Progress toward goals will be monitored and evaluated per protocol.

## 2021-11-25 ENCOUNTER — APPOINTMENT (OUTPATIENT)
Dept: RADIOLOGY | Facility: HOSPITAL | Age: 76
DRG: 163 | End: 2021-11-25
Attending: SURGERY
Payer: COMMERCIAL

## 2021-11-25 LAB
ANION GAP SERPL CALCULATED.3IONS-SCNC: 7 MMOL/L (ref 5–18)
BUN SERPL-MCNC: 8 MG/DL (ref 8–28)
CALCIUM SERPL-MCNC: 8.2 MG/DL (ref 8.5–10.5)
CHLORIDE BLD-SCNC: 104 MMOL/L (ref 98–107)
CO2 SERPL-SCNC: 25 MMOL/L (ref 22–31)
CREAT SERPL-MCNC: 0.53 MG/DL (ref 0.6–1.1)
ERYTHROCYTE [DISTWIDTH] IN BLOOD BY AUTOMATED COUNT: 13.7 % (ref 10–15)
GFR SERPL CREATININE-BSD FRML MDRD: >90 ML/MIN/1.73M2
GLUCOSE BLD-MCNC: 94 MG/DL (ref 70–125)
HCT VFR BLD AUTO: 24.4 % (ref 35–47)
HGB BLD-MCNC: 7.4 G/DL (ref 11.7–15.7)
LACTATE SERPL-SCNC: 0.4 MMOL/L (ref 0.7–2)
MAGNESIUM SERPL-MCNC: 1.7 MG/DL (ref 1.8–2.6)
MCH RBC QN AUTO: 30.6 PG (ref 26.5–33)
MCHC RBC AUTO-ENTMCNC: 30.3 G/DL (ref 31.5–36.5)
MCV RBC AUTO: 101 FL (ref 78–100)
PLATELET # BLD AUTO: 487 10E3/UL (ref 150–450)
POTASSIUM BLD-SCNC: 4 MMOL/L (ref 3.5–5)
RBC # BLD AUTO: 2.42 10E6/UL (ref 3.8–5.2)
SODIUM SERPL-SCNC: 136 MMOL/L (ref 136–145)
WBC # BLD AUTO: 17 10E3/UL (ref 4–11)

## 2021-11-25 PROCEDURE — 250N000011 HC RX IP 250 OP 636: Performed by: SURGERY

## 2021-11-25 PROCEDURE — 250N000013 HC RX MED GY IP 250 OP 250 PS 637: Performed by: SURGERY

## 2021-11-25 PROCEDURE — 250N000011 HC RX IP 250 OP 636: Performed by: INTERNAL MEDICINE

## 2021-11-25 PROCEDURE — 120N000001 HC R&B MED SURG/OB

## 2021-11-25 PROCEDURE — 83735 ASSAY OF MAGNESIUM: CPT | Performed by: INTERNAL MEDICINE

## 2021-11-25 PROCEDURE — 250N000013 HC RX MED GY IP 250 OP 250 PS 637: Performed by: INTERNAL MEDICINE

## 2021-11-25 PROCEDURE — 99231 SBSQ HOSP IP/OBS SF/LOW 25: CPT | Performed by: INTERNAL MEDICINE

## 2021-11-25 PROCEDURE — 71045 X-RAY EXAM CHEST 1 VIEW: CPT

## 2021-11-25 PROCEDURE — 85027 COMPLETE CBC AUTOMATED: CPT | Performed by: SURGERY

## 2021-11-25 PROCEDURE — 83605 ASSAY OF LACTIC ACID: CPT | Performed by: INTERNAL MEDICINE

## 2021-11-25 PROCEDURE — 36415 COLL VENOUS BLD VENIPUNCTURE: CPT | Performed by: SURGERY

## 2021-11-25 PROCEDURE — 80048 BASIC METABOLIC PNL TOTAL CA: CPT | Performed by: SURGERY

## 2021-11-25 RX ADMIN — ENOXAPARIN SODIUM 40 MG: 40 INJECTION SUBCUTANEOUS at 20:14

## 2021-11-25 RX ADMIN — VALACYCLOVIR 1000 MG: 500 TABLET, FILM COATED ORAL at 20:14

## 2021-11-25 RX ADMIN — Medication 1 TABLET: at 09:03

## 2021-11-25 RX ADMIN — CEFTRIAXONE SODIUM 1 G: 1 INJECTION, POWDER, FOR SOLUTION INTRAMUSCULAR; INTRAVENOUS at 15:23

## 2021-11-25 RX ADMIN — MAGNESIUM OXIDE TAB 400 MG (241.3 MG ELEMENTAL MG) 200 MG: 400 (241.3 MG) TAB at 09:04

## 2021-11-25 RX ADMIN — ACETAMINOPHEN 975 MG: 325 SOLUTION ORAL at 14:14

## 2021-11-25 RX ADMIN — Medication 1 CAPSULE: at 20:14

## 2021-11-25 RX ADMIN — TRAZODONE HYDROCHLORIDE 150 MG: 50 TABLET ORAL at 21:08

## 2021-11-25 RX ADMIN — MAGNESIUM OXIDE TAB 400 MG (241.3 MG ELEMENTAL MG) 200 MG: 400 (241.3 MG) TAB at 20:14

## 2021-11-25 RX ADMIN — THERA TABS 1 TABLET: TAB at 09:04

## 2021-11-25 RX ADMIN — LIDOCAINE 1 PATCH: 246 PATCH TOPICAL at 09:04

## 2021-11-25 RX ADMIN — HYDROMORPHONE HYDROCHLORIDE 0.5 MG: 1 INJECTION, SOLUTION INTRAMUSCULAR; INTRAVENOUS; SUBCUTANEOUS at 21:08

## 2021-11-25 RX ADMIN — ACETAMINOPHEN 975 MG: 325 SOLUTION ORAL at 05:08

## 2021-11-25 RX ADMIN — ACETAMINOPHEN 975 MG: 325 SOLUTION ORAL at 21:08

## 2021-11-25 RX ADMIN — OMEPRAZOLE 40 MG: 20 CAPSULE, DELAYED RELEASE ORAL at 09:04

## 2021-11-25 RX ADMIN — HYDROMORPHONE HYDROCHLORIDE 0.5 MG: 1 INJECTION, SOLUTION INTRAMUSCULAR; INTRAVENOUS; SUBCUTANEOUS at 14:23

## 2021-11-25 RX ADMIN — HYDROMORPHONE HYDROCHLORIDE 0.5 MG: 1 INJECTION, SOLUTION INTRAMUSCULAR; INTRAVENOUS; SUBCUTANEOUS at 03:52

## 2021-11-25 RX ADMIN — Medication 250 MG: at 12:00

## 2021-11-25 RX ADMIN — SERTRALINE HYDROCHLORIDE 200 MG: 50 TABLET ORAL at 09:05

## 2021-11-25 RX ADMIN — Medication 1 TABLET: at 20:13

## 2021-11-25 ASSESSMENT — ACTIVITIES OF DAILY LIVING (ADL)
ADLS_ACUITY_SCORE: 10

## 2021-11-25 NOTE — PROGRESS NOTES
[facility]    PROGRESS NOTE - Hospitalist Service    Assessment and Plan    Principal Problem:    Abscess of middle lobe of right lung with pneumonia (H)  Active Problems:    Breast cancer, stage 1, right (H)    Pneumonia due to infectious organism, unspecified laterality, unspecified part of lung    Infection due to 2019 novel coronavirus    Acute respiratory failure with hypoxia (H)    Leukocytosis, unspecified type    Salome Maravilla is a 76 year old old female with h/o stage 1 breast cancer s/p lumpectomy and radiation in 2015, depression, GERD, OA, presented with right sided chest pain and dry cough. She was diagnosed with breakthrough covid-19 infection on 10/28 and presented on 11/10 with several days of worsening right-sided pleuritic chest pain, cough, low grade fevers and chills at home.     Pneumonia  Complex parapneumonic effusion  -- Likely due to post viral strep PNA. The right pleural effusion was drained at bedside 11/10  but effusion recurred   -- Chest tube placement 11/13   - Pulm consulted now signed off,previously given lytics unsuccessful   - Gen surgery Dr Zuluaga performed thorascopic surgery on 11/23 and new CT placed x2  - continue IV ceftriaxone   - lovenox ok to start per Dr. Gutierrez      Chest pain   - secondary to above  - pain team seen  - pain controlled      Acute resp failure due to above   -- Remains on supp o2 only 1 liter   -- Cxr stable      COVID-19 - since 10/28, breakthrough case  -- Completed remdesivir but no steroids per pulm for now.   -- Recovered covid now. Stopped isolation 11/18.     GERD/Heart burn  -- Patient believes its due to PO oxycodone. Changed to liquid oxycodone per pain team  -- Changed protonix to omeprazole per patient's request. Symptoms improved      Constipation:  -- Cont with stool softners. Suppository/enema prn     Anemia:  -- Drop in hgb noted.  - trend labs     COVID-19 PCR Results    COVID-19 PCR Results 10/28/21   SARS  CoV2 PCR Positive (A)   (A) Abnormal value       Comments are available for some flowsheets but are not being displayed.         COVID-19 Antibody Results, Testing for Immunity    COVID-19 Antibody Results, Testing for Immunity   No data to display.            VTE prophylaxis:  Pneumatic Compression Devices, Lovenox ok to start per D.r Shearan    DIET: Orders Placed This Encounter      Regular Diet Adult    Drains/Lines: CT   Weight bearing status: WBAT  Disposition/Barriers to discharge: pending CT removal   Code Status: Full Code    Subjective:  Patient sitting up at bedside, pain controlled per patient     PHYSICAL EXAM  Temp:  [98.1  F (36.7  C)-99.1  F (37.3  C)] 98.2  F (36.8  C)  Pulse:  [] 91  Resp:  [18] 18  BP: (107-133)/(48-63) 133/63  SpO2:  [92 %-97 %] 97 %  Wt Readings from Last 1 Encounters:   11/22/21 57.9 kg (127 lb 9.6 oz)       Intake/Output Summary (Last 24 hours) at 11/24/2021 0846  Last data filed at 11/24/2021 0745  Gross per 24 hour   Intake 5201 ml   Output 1285 ml   Net 3916 ml      Body mass index is 20.91 kg/m .  Physical Exam  Constitutional:       General: She is not in acute distress.     Appearance: Normal appearance.   HENT:      Head: Normocephalic and atraumatic.   Cardiovascular:      Rate and Rhythm: Normal rate and regular rhythm.      Pulses: Normal pulses.   Pulmonary:      Effort: Pulmonary effort is normal.      Comments: Rhonchi and squeaks on right lower chest  CT x2 right  Abdominal:      General: Bowel sounds are normal.      Palpations: Abdomen is soft.   Musculoskeletal:         General: Normal range of motion.   Skin:     General: Skin is warm and dry.   Neurological:      General: No focal deficit present.      Mental Status: She is alert and oriented to person, place, and time. Mental status is at baseline.       PERTINENT LABS/IMAGING:  Results for orders placed or performed during the hospital encounter of 11/10/21   CT Chest Pulmonary Embolism w Contrast     Impression    IMPRESSION:  1.  Above findings most suggestive of moderately severe bacterial pneumonia right middle lobe with possible associated tiny microabscess within it.    2.  Small right pleural effusion and some focal pleural reaction immediately adjacent to the pneumonia anteriorly.   XR Chest Port 1 View    Impression    IMPRESSION: Infiltrate right lung base by CT mostly in the right middle lobe. Small right pleural effusion. No pneumothorax. Left lung is clear. Scoliosis. Healing left rib fractures.   XR Chest Port 1 View    Impression    IMPRESSION: The right pleural effusion is increasing. Stable heart size. No pulmonary edema. Increasing left medial basilar opacity could represent atelectasis or pneumonia.   IR Chest Tube Place Non Tunneled Right    Impression    IMPRESSION:  Successful right sided 14 Israeli chest tube placement under ultrasound guidance.        XR Chest Port 1 View    Impression    IMPRESSION: Interval placement of right pigtail pleural drain. Decreased small to moderate right pleural effusion. Improved aeration of the right mid and lower lung. Decreasing retrocardiac left lower lung atelectasis or infiltrate. Stable borderline   enlarged cardiac silhouette. Normal pulmonary vascularity. No pneumothorax.   XR Chest Port 1 View    Impression    IMPRESSION: Right pigtail pleural drain with trace pneumothorax near tube insertion site but no new significant pneumothorax. Further decrease in the small right pleural effusion and improved aeration of the right mid and lower lung. Decreasing   retrocardiac left lower lung atelectasis or infiltrate. Stable borderline enlarged cardiac silhouette. Normal pulmonary vascularity. Healing fractures left ribs 3-6. Findings discussed with Dr Avitia.   XR Chest Port 1 View    Impression    IMPRESSION: Right chest tube in stable position. No pneumothorax. Tiny right pleural effusion remains. Small left pleural effusion is stable. Atelectasis left  lower lobe behind the heart is stable. Minimal diffuse interstitial infiltrates stable.    No new findings.   CT Chest w/o Contrast    Impression    IMPRESSION:   1.  New right chest tube. There is persistent small loculated pleural effusion at the right lung base which does not communicate with the chest tube. There is additional small loculated hydropneumothorax in the anterior right middle lobe that also does   not communicate with the chest tube. There is atelectasis in both lower lobes and in the right middle lobe.  2.  New small right pleural effusion.  3.  Patchy airspace opacities in the left upper lobe consistent with a small focus of pneumonia.     XR Chest Port 1 View    Impression    IMPRESSION:     A right pleural drain is similarly positioned with pigtail in the upper lateral aspect of the pleural space. There is a crimp in the tube at the level of the insertion projecting at the level of the posterolateral seventh intercostal space. Opacity with   indistinct borders in the lower third of the right chest likely reflects a combination of residual complex pleural fluid and atelectasis. Mild but diffuse thickening of the pleura including the apex and along the mediastinal pleura. Focal lucency lucency   in the medial basal right chest adjacent to the right atrial heart border corresponds to loculated anterior pneumothorax on the prior CT.    There are interstitial opacities in the right mid and upper lung consistent with mild interstitial edema. No progressive airspace opacity.    Pleural fluid and atelectasis atelectasis in the medial left lower lobe obscures the medial 20% of the left hemidiaphragm, not increased.    Unchanged mild enlargement of the cardiac silhouette.   XR Chest Port 1 View    Impression    IMPRESSION: A right lateral approach pigtail catheter is again seen. No significant pneumothorax. Right basilar atelectasis is again seen. Mild interstitial edema has minimally decreased. The  cardiomediastinal silhouette is at the upper limits of normal   in size.    XR Chest Port 1 View    Impression    IMPRESSION: The right chest pigtail catheter remains in place. A second loop has developed which could be external to the patient. This could also cause tube kinking. Recommend correlation with tube function. No pneumothorax. Stable moderate right and   mild left basilar opacities.    No significant effusion or pulmonary edema seen.   CT Chest w/o Contrast    Impression    IMPRESSION:     1.  Unchanged position of the right pleural drain. Localized visceral and parietal pleural thickening anterior to the right middle lobe and low anteromedial right upper lobe with adjacent lung consolidation.  2.  New heterogeneous attenuation debris layering dependently in the posterior right pleural space, most likely representing organized blood products.  3.  Unchanged circumferential pericardial effusion and small posteriorly layering left pleural effusion.     XR Chest Port 1 View    Impression    IMPRESSION: No change right chest tube with tip over the lateral right lung at the second intercostal space and a loop outside the patient. There may be a kink in the tube in the loop which is presumably external to the patient. No pneumothorax. No   change bibasilar opacities.   XR Chest Port 1 View    Impression    IMPRESSION: A right lateral approach pigtail catheter has not changed in position. No pneumothorax. Right pulmonary opacities have not significantly changed. Normal size of the heart.    XR Chest 1 View    Impression    IMPRESSION: Interval placement of 3 right-sided chest tubes with removal of the previously seen pigtail catheter on the right. No visible pneumothorax. There is patchy opacity at the right lung base which likely reflects atelectasis. No obvious pleural   effusion. Retrocardiac opacity involving the medial left lower lobe is not significantly change with a trace left pleural effusion. Heart and  mediastinal size are normal.     Most Recent 3 CBC's:  Recent Labs   Lab Test 11/25/21  0455 11/24/21  1034 11/23/21  0509   WBC 17.0* 22.3* 10.4   HGB 7.4* 8.7* 8.3*   * 101* 99   * 597* 437     Most Recent 3 BMP's:  Recent Labs   Lab Test 11/25/21  0455 11/24/21  1034 11/23/21  0509    135* 139   POTASSIUM 4.0 4.1 3.8   CHLORIDE 104 104 103   CO2 25 25 30   BUN 8 6* 7*   CR 0.53* 0.55* 0.59*   ANIONGAP 7 6 6   EMILIANO 8.2* 8.3* 8.3*   GLC 94 100 93     Most Recent 2 LFT's:  Recent Labs   Lab Test 11/10/21  1733 05/04/21  1350   AST 13 18   ALT <9 19   ALKPHOS 80 52   BILITOTAL 0.4 0.4       Recent Labs   Lab Test 06/21/21  1153   CHOL 284*   HDL 51   *   TRIG 197*     Recent Labs   Lab Test 06/21/21  1153 12/09/20  1039 06/08/20  0913   * 164* 174*     Recent Labs   Lab Test 11/23/21  0509      POTASSIUM 3.8   CHLORIDE 103   CO2 30   GLC 93   BUN 7*   CR 0.59*   GFRESTIMATED 88   EMILIANO 8.3*     No results for input(s): A1C in the last 15277 hours.  Recent Labs   Lab Test 11/23/21  0509 11/22/21  0532 11/21/21  0609   HGB 8.3* 8.4* 9.3*     Recent Labs   Lab Test 11/10/21  1145   TROPONINI 0.01     No results for input(s): BNP, NTBNPI, NTBNP in the last 40863 hours.  Recent Labs   Lab Test 05/04/21  1350   TSH 1.38     Recent Labs   Lab Test 11/10/21  1733   INR 1.44*       Ann Wen MD  [facility] Medicine Service  [phone]

## 2021-11-25 NOTE — PLAN OF CARE
Problem: Infection (Pneumonia)  Goal: Resolution of Infection Signs and Symptoms  Outcome: Improving   Patient alert and orientated.  Pain tolerable with PRN IV dilaudid.  Chest tubes patent and draining.  VSS.  No signs of crepitus

## 2021-11-25 NOTE — PROGRESS NOTES
"Salome Maravilla is a 76 year old female patient.  1. Abscess of middle lobe of right lung with pneumonia (H)    2. Pneumonia due to infectious organism, unspecified laterality, unspecified part of lung      Past Medical History:   Diagnosis Date     Breast cancer (H) 2010    hx of right lumpectomy for breast      Depression      GERD (gastroesophageal reflux disease)      Hx of radiation therapy right 2010    hx of right lumpectomy for breast cancer     Osteoarthritis      No current outpatient medications on file.     Allergies   Allergen Reactions     Arimidex [Anastrozole] Unknown     Hot flashes     Penicillins Hives     Statins-Hmg-Coa Reductase Inhibitors [Hmg-Coa-R Inhibitors] Muscle Pain (Myalgia)     Fenofibrate Rash     Principal Problem:    Abscess of middle lobe of right lung with pneumonia (H)  Active Problems:    Breast cancer, stage 1, right (H)    Pneumonia due to infectious organism, unspecified laterality, unspecified part of lung    Infection due to 2019 novel coronavirus    Acute respiratory failure with hypoxia (H)    Leukocytosis, unspecified type    Blood pressure 133/63, pulse 91, temperature 98.2  F (36.8  C), temperature source Oral, resp. rate 18, height 1.664 m (5' 5.5\"), weight 57.9 kg (127 lb 9.6 oz), SpO2 97 %.    Subjective comfortable sitting up in chair eating breakfast  Objective chest tubes 125 mL serosanguineous no obvious air leaks  Assessment & Plan encourage activity and cough and deep breathing, continue chest tube suction.    Alex Senior MD, MD  11/25/2021    "

## 2021-11-25 NOTE — PLAN OF CARE
Problem: Adult Inpatient Plan of Care  Goal: Plan of Care Review  Outcome: Improving     Problem: Infection (Pneumonia)  Goal: Resolution of Infection Signs and Symptoms  Outcome: Improving     Problem: Respiratory Compromise (Pneumonia)  Goal: Effective Oxygenation and Ventilation  Outcome: Improving     Problem: Pain Chronic (Persistent)  Goal: Acceptable Pain Control and Functional Ability  Outcome: Improving     Patient pain managed with prn dilaudid and scheduled tylenol.  Independently getting up to use commode at bedside.  Maintaining saturations on 1L.  Diminished and coarse lung sounds to right side and coarse on left that improved by second assessment.  Pt denies shortness of breath at rest but has some with activity.  No output from container 1 and 40cc serosanguinous out of container 2, both to wall suction.   Sepsis protocol was triggered d/t wbc and 102 HR. Lactic acid 0.4

## 2021-11-26 ENCOUNTER — APPOINTMENT (OUTPATIENT)
Dept: OCCUPATIONAL THERAPY | Facility: HOSPITAL | Age: 76
DRG: 163 | End: 2021-11-26
Payer: COMMERCIAL

## 2021-11-26 ENCOUNTER — APPOINTMENT (OUTPATIENT)
Dept: RADIOLOGY | Facility: HOSPITAL | Age: 76
DRG: 163 | End: 2021-11-26
Attending: SURGERY
Payer: COMMERCIAL

## 2021-11-26 LAB
ANION GAP SERPL CALCULATED.3IONS-SCNC: 7 MMOL/L (ref 5–18)
BUN SERPL-MCNC: 8 MG/DL (ref 8–28)
CALCIUM SERPL-MCNC: 8.2 MG/DL (ref 8.5–10.5)
CHLORIDE BLD-SCNC: 103 MMOL/L (ref 98–107)
CO2 SERPL-SCNC: 26 MMOL/L (ref 22–31)
CREAT SERPL-MCNC: 0.53 MG/DL (ref 0.6–1.1)
ERYTHROCYTE [DISTWIDTH] IN BLOOD BY AUTOMATED COUNT: 14 % (ref 10–15)
GFR SERPL CREATININE-BSD FRML MDRD: >90 ML/MIN/1.73M2
GLUCOSE BLD-MCNC: 129 MG/DL (ref 70–125)
HCT VFR BLD AUTO: 25.3 % (ref 35–47)
HGB BLD-MCNC: 7.8 G/DL (ref 11.7–15.7)
LACTATE SERPL-SCNC: 0.7 MMOL/L (ref 0.7–2)
MAGNESIUM SERPL-MCNC: 1.7 MG/DL (ref 1.8–2.6)
MCH RBC QN AUTO: 30.6 PG (ref 26.5–33)
MCHC RBC AUTO-ENTMCNC: 30.8 G/DL (ref 31.5–36.5)
MCV RBC AUTO: 99 FL (ref 78–100)
PLATELET # BLD AUTO: 576 10E3/UL (ref 150–450)
POTASSIUM BLD-SCNC: 3.6 MMOL/L (ref 3.5–5)
RBC # BLD AUTO: 2.55 10E6/UL (ref 3.8–5.2)
SODIUM SERPL-SCNC: 136 MMOL/L (ref 136–145)
WBC # BLD AUTO: 15.5 10E3/UL (ref 4–11)

## 2021-11-26 PROCEDURE — 250N000013 HC RX MED GY IP 250 OP 250 PS 637: Performed by: SURGERY

## 2021-11-26 PROCEDURE — 80048 BASIC METABOLIC PNL TOTAL CA: CPT | Performed by: SURGERY

## 2021-11-26 PROCEDURE — 85027 COMPLETE CBC AUTOMATED: CPT | Performed by: SURGERY

## 2021-11-26 PROCEDURE — 83605 ASSAY OF LACTIC ACID: CPT | Performed by: INTERNAL MEDICINE

## 2021-11-26 PROCEDURE — 71045 X-RAY EXAM CHEST 1 VIEW: CPT

## 2021-11-26 PROCEDURE — 250N000011 HC RX IP 250 OP 636: Performed by: INTERNAL MEDICINE

## 2021-11-26 PROCEDURE — 250N000013 HC RX MED GY IP 250 OP 250 PS 637: Performed by: INTERNAL MEDICINE

## 2021-11-26 PROCEDURE — 250N000011 HC RX IP 250 OP 636: Performed by: SURGERY

## 2021-11-26 PROCEDURE — 36415 COLL VENOUS BLD VENIPUNCTURE: CPT | Performed by: SURGERY

## 2021-11-26 PROCEDURE — 120N000001 HC R&B MED SURG/OB

## 2021-11-26 PROCEDURE — 97110 THERAPEUTIC EXERCISES: CPT | Mod: GO

## 2021-11-26 PROCEDURE — 36415 COLL VENOUS BLD VENIPUNCTURE: CPT | Performed by: INTERNAL MEDICINE

## 2021-11-26 PROCEDURE — 83735 ASSAY OF MAGNESIUM: CPT | Performed by: INTERNAL MEDICINE

## 2021-11-26 PROCEDURE — 99231 SBSQ HOSP IP/OBS SF/LOW 25: CPT | Performed by: INTERNAL MEDICINE

## 2021-11-26 PROCEDURE — 97535 SELF CARE MNGMENT TRAINING: CPT | Mod: GO

## 2021-11-26 RX ADMIN — Medication 1 TABLET: at 20:10

## 2021-11-26 RX ADMIN — CEFTRIAXONE SODIUM 1 G: 1 INJECTION, POWDER, FOR SOLUTION INTRAMUSCULAR; INTRAVENOUS at 14:33

## 2021-11-26 RX ADMIN — Medication 250 MG: at 13:50

## 2021-11-26 RX ADMIN — ACETAMINOPHEN 650 MG: 325 SOLUTION ORAL at 20:22

## 2021-11-26 RX ADMIN — Medication 1 TABLET: at 08:59

## 2021-11-26 RX ADMIN — MAGNESIUM OXIDE TAB 400 MG (241.3 MG ELEMENTAL MG) 200 MG: 400 (241.3 MG) TAB at 20:10

## 2021-11-26 RX ADMIN — ENOXAPARIN SODIUM 40 MG: 40 INJECTION SUBCUTANEOUS at 20:11

## 2021-11-26 RX ADMIN — HYDROMORPHONE HYDROCHLORIDE 0.5 MG: 1 INJECTION, SOLUTION INTRAMUSCULAR; INTRAVENOUS; SUBCUTANEOUS at 14:29

## 2021-11-26 RX ADMIN — THERA TABS 1 TABLET: TAB at 08:59

## 2021-11-26 RX ADMIN — ACETAMINOPHEN 975 MG: 325 SOLUTION ORAL at 05:52

## 2021-11-26 RX ADMIN — HYDROMORPHONE HYDROCHLORIDE 0.5 MG: 1 INJECTION, SOLUTION INTRAMUSCULAR; INTRAVENOUS; SUBCUTANEOUS at 20:22

## 2021-11-26 RX ADMIN — SERTRALINE HYDROCHLORIDE 200 MG: 50 TABLET ORAL at 08:59

## 2021-11-26 RX ADMIN — TRAZODONE HYDROCHLORIDE 150 MG: 50 TABLET ORAL at 20:10

## 2021-11-26 RX ADMIN — LIDOCAINE 1 PATCH: 246 PATCH TOPICAL at 09:01

## 2021-11-26 RX ADMIN — OMEPRAZOLE 40 MG: 20 CAPSULE, DELAYED RELEASE ORAL at 08:59

## 2021-11-26 RX ADMIN — Medication 1 CAPSULE: at 20:10

## 2021-11-26 RX ADMIN — VALACYCLOVIR 1000 MG: 500 TABLET, FILM COATED ORAL at 20:10

## 2021-11-26 RX ADMIN — MAGNESIUM OXIDE TAB 400 MG (241.3 MG ELEMENTAL MG) 200 MG: 400 (241.3 MG) TAB at 08:59

## 2021-11-26 ASSESSMENT — ACTIVITIES OF DAILY LIVING (ADL)
ADLS_ACUITY_SCORE: 14
ADLS_ACUITY_SCORE: 8
ADLS_ACUITY_SCORE: 10
ADLS_ACUITY_SCORE: 14
ADLS_ACUITY_SCORE: 8
ADLS_ACUITY_SCORE: 14
ADLS_ACUITY_SCORE: 10
ADLS_ACUITY_SCORE: 10
ADLS_ACUITY_SCORE: 8
ADLS_ACUITY_SCORE: 10
ADLS_ACUITY_SCORE: 14
ADLS_ACUITY_SCORE: 10
ADLS_ACUITY_SCORE: 8
ADLS_ACUITY_SCORE: 10
ADLS_ACUITY_SCORE: 14
ADLS_ACUITY_SCORE: 14
ADLS_ACUITY_SCORE: 10
ADLS_ACUITY_SCORE: 10

## 2021-11-26 ASSESSMENT — MIFFLIN-ST. JEOR: SCORE: 1099.81

## 2021-11-26 NOTE — CONSULTS
Integrative Therapy Consult    Healing PresenceYes  Essential Oils: Topical (EO/Topical Oil),Inhalation (EO/Topical oil)     Respiratory Support Blend - HC,Lavender Massage Oil - HC       Healing Music:       Breathwork:       Guided Imagery:       Acupressure:       Oshibori:       Energy Therapy:       Healing Touch:       Reiki:       Qi Gong:     Massage: Hand,Foot      Targeted Massage:    Sleep Promotion:       Other Therapy:       Intervention Reason: Anxiety/Stress,Pain     Pre and Post Session Scores: Patient Desires Treatment: yes  Pre-Session Anxiety: 3  Post-session Anxiety: 2     Pre-session Pain: 3 post-session 2               Delivery:         Referrals:      Toma Quinteros

## 2021-11-26 NOTE — PLAN OF CARE
Problem: Adult Inpatient Plan of Care  Goal: Plan of Care Review  Outcome: Improving  Pt tolerating periods of room air, nasal cannula kept nearby patient and at 0.5L, pt instructed to utilize nasal cannula as needed when pulse ox alarmed, as per PRN O2 order. Pt demonstrated understanding and compliance. Will continue to monitor O2 needs.     Problem: Adult Inpatient Plan of Care  Goal: Patient-Specific Goal (Individualized)  Outcome: Improving  Family called and voiced concerned regarding food intake. Family reassured that supplements were offered and that staff would encourage pt intake.     Problem: Activity Intolerance (Pulmonary Impairment)  Goal: Improved Activity Tolerance  Outcome: Improving   Pt up for walk in hallway x2 this shift. Ambulated approximately 50 ft with walker while on room air and chest tubes to water seal. Maintaining O2 sats above 90 on both walks while on room air.

## 2021-11-26 NOTE — PROGRESS NOTES
"CLINICAL NUTRITION SERVICES - REASSESSMENT NOTE     Nutrition Prescription    RECOMMENDATIONS FOR MDs/PROVIDERS TO ORDER:  none    Malnutrition Status:    Severe    Recommendations already ordered by Registered Dietitian (RD):  Discontinue Ensure Enlive     Future/Additional Recommendations:  Continue to offer and encourage supplements     EVALUATION OF THE PROGRESS TOWARD GOALS   Diet: Regular diet  Supplement: Ensure clear (240 kcal, 8 g protein) and Ensure enlive daily  Intake:   Improving. % of meals.   Estimate intake yesterday over 2 meals and 1 Ensure clear 1446 kcal, 52 g protein. 3rd meal ordered but intake not documented, meeting > 75% of estimated needs  Estimate intake 11/24 500 kcal, 20 g protein      Eating 50%of baseline 11/10-11/17. Intake < 50% since x 7 days  Pt reports eating a bit more. She doesn't mind the Ensure Clear and is willing to continue taking. She did not receive the Ensure enlive and does not want to try it.      NEW FINDINGS   Chest tube to water seal    ANTHROPOMETRICS  Height: 166.4 cm (5' 5.5\")  Most Recent Weight: 61.1 kg (134 lb 11.2 oz) 11/26, up 7 lb from 4 days prior. - up 4 lb from admit  139 lb 11/16  IBW: 56.8 kg  BMI: Normal BMI  Weight History:       Wt Readings from Last 10 Encounters:   11/10/21 59 kg (130 lb) - admit   07/12/21 62.6 kg (138 lb 1.6 oz)   06/10/19 63.1 kg (139 lb 3.2 oz)   06/04/18 66.2 kg (145 lb 14.4 oz)   7.9% weight loss x 1 month  Dosing Weight: 59 kg     ASSESSED NUTRITION NEEDS  Estimated Energy Needs: 5468-4567 kcals/day (25 - 30 kcals/kg)  Justification: Maintenance  Estimated Protein Needs: 71-88 grams protein/day (1.2 - 1.5 grams of pro/kg)  Justification: Hypercatabolism with acute illness  Estimated Fluid Needs: 1593-0270 mL/day (1 mL/kcal)   Justification: Maintenance    PHYSICAL FINDINGS  See malnutrition section below.    GI CONCERNS  BM yesterday  Altered taste    MALNUTRITION:   % Weight Loss:  > 2% in 1 week (severe " malnutrition)  % Intake:  </= 50% for >/= 5 days (severe malnutrition)  Subcutaneous Fat Loss: None noted  Muscle Loss: Mild temporal, buccal loss. Mild to moderate deltoid. Moderate UE  Fluid Retention: None - pleural effusion    Malnutrition Diagnosis: Severe malnutrition  In Context of:  Acute illness or injury    Previous Goals   Intake > 75%of estimated needs  Maintain weight  Evaluation: Not met, progressing    Previous Nutrition Diagnosis  Malnutrition related to covid as evidenced by intake 50% x 3 weeks, 7% weight loss x 2 weeks    Evaluation: Improving    CURRENT NUTRITION DIAGNOSIS  Malnutrition related to covid as evidenced by intake 50% x 3 weeks + intake </= 25% x 5 days hospitalized,  7.9% weight loss 1 month    INTERVENTIONS  Implementation  Discontinue Ensure enlive    Goals  Intake > 75%of estimated needs  Maintain weight    Monitoring/Evaluation  Progress toward goals will be monitored and evaluated per protocol.

## 2021-11-26 NOTE — PROGRESS NOTES
"Salome Maravilla is a 76 year old female patient.  1. Abscess of middle lobe of right lung with pneumonia (H)    2. Pneumonia due to infectious organism, unspecified laterality, unspecified part of lung      Past Medical History:   Diagnosis Date     Breast cancer (H) 2010    hx of right lumpectomy for breast      Depression      GERD (gastroesophageal reflux disease)      Hx of radiation therapy right 2010    hx of right lumpectomy for breast cancer     Osteoarthritis      No current outpatient medications on file.     Allergies   Allergen Reactions     Arimidex [Anastrozole] Unknown     Hot flashes     Penicillins Hives     Statins-Hmg-Coa Reductase Inhibitors [Hmg-Coa-R Inhibitors] Muscle Pain (Myalgia)     Fenofibrate Rash     Principal Problem:    Abscess of middle lobe of right lung with pneumonia (H)  Active Problems:    Breast cancer, stage 1, right (H)    Pneumonia due to infectious organism, unspecified laterality, unspecified part of lung    Infection due to 2019 novel coronavirus    Acute respiratory failure with hypoxia (H)    Leukocytosis, unspecified type    Blood pressure 119/56, pulse 95, temperature 98.6  F (37  C), temperature source Oral, resp. rate 18, height 1.664 m (5' 5.5\"), weight 61.1 kg (134 lb 11.2 oz), SpO2 96 %.    Subjective   able, no specific complaints  Objective chest tubes in place no visible air leak 175 mL serosanguineous  Assessment & Plan  Chest x-ray without pneumothorax, WBC improving.  Will ambulate on waterseal.  Alex Senior MD, MD  11/26/2021    "

## 2021-11-26 NOTE — PLAN OF CARE
Problem: Adult Inpatient Plan of Care  Goal: Plan of Care Review  Outcome: Improving  Plan of Care Reviewed With: patient  Outcome Summary: Patient reports intermittent chest wall pain. Medicated once with IV Dilaudid. Up to the commode.  Progress: improving

## 2021-11-26 NOTE — PROGRESS NOTES
[facility]    PROGRESS NOTE - Hospitalist Service    Assessment and Plan    Principal Problem:    Abscess of middle lobe of right lung with pneumonia (H)  Active Problems:    Breast cancer, stage 1, right (H)    Pneumonia due to infectious organism, unspecified laterality, unspecified part of lung    Infection due to 2019 novel coronavirus    Acute respiratory failure with hypoxia (H)    Leukocytosis, unspecified type    Salome Maravilla is a 76 year old old female with h/o stage 1 breast cancer s/p lumpectomy and radiation in 2015, depression, GERD, OA, presented with right sided chest pain and dry cough. She was diagnosed with breakthrough covid-19 infection on 10/28 and presented on 11/10 with several days of worsening right-sided pleuritic chest pain, cough, low grade fevers and chills at home.     Pneumonia  Complex parapneumonic effusion  -- Likely due to post viral strep PNA. The right pleural effusion was drained at bedside 11/10  but effusion recurred   -- Chest tube placement 11/13   - lytics previously ineffective   - Gen surgery Dr Zuluaga performed thorascopic surgery on 11/23 and new CT placed   - continue IV ceftriaxone   - lovenox ok to start per Dr. Gutierrez  - water seal and ambulate today      Chest pain - secondary to above  - pain team seen  - pain controlled      Acute resp failure due to above   -- Remains on supp o2 only 1 liter   -- Cxr improving      COVID-19 - since 10/28, breakthrough case  -- Completed remdesivir but no steroids per pulm for now.   -- Recovered covid now. Stopped isolation 11/18.     GERD/Heart burn  -- Patient believes its due to PO oxycodone. Changed to liquid oxycodone per pain team  -- Changed protonix to omeprazole per patient's request. Symptoms improved      Constipation:  - bowel regiment      Anemia:  -- Drop in hgb noted.  - trend labs     COVID-19 PCR Results    COVID-19 PCR Results 10/28/21   SARS CoV2 PCR Positive (A)   (A) Abnormal  value       Comments are available for some flowsheets but are not being displayed.         COVID-19 Antibody Results, Testing for Immunity    COVID-19 Antibody Results, Testing for Immunity   No data to display.            VTE prophylaxis:  Pneumatic Compression Devices, Lovenox ok to start per [facility]    DIET: Orders Placed This Encounter      Regular Diet Adult    Drains/Lines: CT   Weight bearing status: WBAT  Disposition/Barriers to discharge: pending CT removal   Code Status: Full Code    Subjective:  Comfortable, per surgery water seal today and ambulate more.     PHYSICAL EXAM  Temp:  [98.4  F (36.9  C)-98.9  F (37.2  C)] 98.6  F (37  C)  Pulse:  [] 95  Resp:  [18-20] 18  BP: (119-156)/(56-79) 119/56  SpO2:  [93 %-96 %] 96 %  Wt Readings from Last 1 Encounters:   11/26/21 61.1 kg (134 lb 11.2 oz)       Intake/Output Summary (Last 24 hours) at 11/24/2021 0846  Last data filed at 11/24/2021 0745  Gross per 24 hour   Intake 5201 ml   Output 1285 ml   Net 3916 ml      Body mass index is 22.07 kg/m .  Physical Exam  Constitutional:       General: She is not in acute distress.     Appearance: Normal appearance. She is not toxic-appearing.      Comments: In good spirits, comfortable    HENT:      Head: Normocephalic and atraumatic.   Cardiovascular:      Rate and Rhythm: Normal rate and regular rhythm.      Pulses: Normal pulses.      Heart sounds: Normal heart sounds.   Pulmonary:      Effort: Pulmonary effort is normal.      Comments: Coarse BS right base, nonlabored breathing   CT in place  Abdominal:      General: Bowel sounds are normal.      Palpations: Abdomen is soft.   Musculoskeletal:         General: Normal range of motion.      Right lower leg: No edema.      Left lower leg: No edema.   Skin:     General: Skin is warm and dry.   Neurological:      General: No focal deficit present.      Mental Status: She is alert and oriented to person, place, and time. Mental status is at baseline.          PERTINENT LABS/IMAGING:  Results for orders placed or performed during the hospital encounter of 11/10/21   CT Chest Pulmonary Embolism w Contrast    Impression    IMPRESSION:  1.  Above findings most suggestive of moderately severe bacterial pneumonia right middle lobe with possible associated tiny microabscess within it.    2.  Small right pleural effusion and some focal pleural reaction immediately adjacent to the pneumonia anteriorly.   XR Chest Port 1 View    Impression    IMPRESSION: Infiltrate right lung base by CT mostly in the right middle lobe. Small right pleural effusion. No pneumothorax. Left lung is clear. Scoliosis. Healing left rib fractures.   XR Chest Port 1 View    Impression    IMPRESSION: The right pleural effusion is increasing. Stable heart size. No pulmonary edema. Increasing left medial basilar opacity could represent atelectasis or pneumonia.   IR Chest Tube Place Non Tunneled Right    Impression    IMPRESSION:  Successful right sided 14 [place] chest tube placement under ultrasound guidance.        XR Chest Port 1 View    Impression    IMPRESSION: Interval placement of right pigtail pleural drain. Decreased small to moderate right pleural effusion. Improved aeration of the right mid and lower lung. Decreasing retrocardiac left lower lung atelectasis or infiltrate. Stable borderline   enlarged cardiac silhouette. Normal pulmonary vascularity. No pneumothorax.   XR Chest Port 1 View    Impression    IMPRESSION: Right pigtail pleural drain with trace pneumothorax near tube insertion site but no new significant pneumothorax. Further decrease in the small right pleural effusion and improved aeration of the right mid and lower lung. Decreasing   retrocardiac left lower lung atelectasis or infiltrate. Stable borderline enlarged cardiac silhouette. Normal pulmonary vascularity. Healing fractures left ribs 3-6. Findings discussed with Dr Avitia.   XR Chest Port 1 View    Impression     IMPRESSION: Right chest tube in stable position. No pneumothorax. Tiny right pleural effusion remains. Small left pleural effusion is stable. Atelectasis left lower lobe behind the heart is stable. Minimal diffuse interstitial infiltrates stable.    No new findings.   CT Chest w/o Contrast    Impression    IMPRESSION:   1.  New right chest tube. There is persistent small loculated pleural effusion at the right lung base which does not communicate with the chest tube. There is additional small loculated hydropneumothorax in the anterior right middle lobe that also does   not communicate with the chest tube. There is atelectasis in both lower lobes and in the right middle lobe.  2.  New small right pleural effusion.  3.  Patchy airspace opacities in the left upper lobe consistent with a small focus of pneumonia.     XR Chest Port 1 View    Impression    IMPRESSION:     A right pleural drain is similarly positioned with pigtail in the upper lateral aspect of the pleural space. There is a crimp in the tube at the level of the insertion projecting at the level of the posterolateral seventh intercostal space. Opacity with   indistinct borders in the lower third of the right chest likely reflects a combination of residual complex pleural fluid and atelectasis. Mild but diffuse thickening of the pleura including the apex and along the mediastinal pleura. Focal lucency lucency   in the medial basal right chest adjacent to the right atrial heart border corresponds to loculated anterior pneumothorax on the prior CT.    There are interstitial opacities in the right mid and upper lung consistent with mild interstitial edema. No progressive airspace opacity.    Pleural fluid and atelectasis atelectasis in the medial left lower lobe obscures the medial 20% of the left hemidiaphragm, not increased.    Unchanged mild enlargement of the cardiac silhouette.   XR Chest Port 1 View    Impression    IMPRESSION: A right lateral approach  pigtail catheter is again seen. No significant pneumothorax. Right basilar atelectasis is again seen. Mild interstitial edema has minimally decreased. The cardiomediastinal silhouette is at the upper limits of normal   in size.    XR Chest Port 1 View    Impression    IMPRESSION: The right chest pigtail catheter remains in place. A second loop has developed which could be external to the patient. This could also cause tube kinking. Recommend correlation with tube function. No pneumothorax. Stable moderate right and   mild left basilar opacities.    No significant effusion or pulmonary edema seen.   CT Chest w/o Contrast    Impression    IMPRESSION:     1.  Unchanged position of the right pleural drain. Localized visceral and parietal pleural thickening anterior to the right middle lobe and low anteromedial right upper lobe with adjacent lung consolidation.  2.  New heterogeneous attenuation debris layering dependently in the posterior right pleural space, most likely representing organized blood products.  3.  Unchanged circumferential pericardial effusion and small posteriorly layering left pleural effusion.     XR Chest Port 1 View    Impression    IMPRESSION: No change right chest tube with tip over the lateral right lung at the second intercostal space and a loop outside the patient. There may be a kink in the tube in the loop which is presumably external to the patient. No pneumothorax. No   change bibasilar opacities.   XR Chest Port 1 View    Impression    IMPRESSION: A right lateral approach pigtail catheter has not changed in position. No pneumothorax. Right pulmonary opacities have not significantly changed. Normal size of the heart.    XR Chest 1 View    Impression    IMPRESSION: Interval placement of 3 right-sided chest tubes with removal of the previously seen pigtail catheter on the right. No visible pneumothorax. There is patchy opacity at the right lung base which likely reflects atelectasis. No  obvious pleural   effusion. Retrocardiac opacity involving the medial left lower lobe is not significantly change with a trace left pleural effusion. Heart and mediastinal size are normal.     Most Recent 3 CBC's:  Recent Labs   Lab Test 11/25/21  0455 11/24/21  1034 11/23/21  0509   WBC 17.0* 22.3* 10.4   HGB 7.4* 8.7* 8.3*   * 101* 99   * 597* 437     Most Recent 3 BMP's:  Recent Labs   Lab Test 11/25/21  0455 11/24/21  1034 11/23/21  0509    135* 139   POTASSIUM 4.0 4.1 3.8   CHLORIDE 104 104 103   CO2 25 25 30   BUN 8 6* 7*   CR 0.53* 0.55* 0.59*   ANIONGAP 7 6 6   EMILIANO 8.2* 8.3* 8.3*   GLC 94 100 93     Most Recent 2 LFT's:  Recent Labs   Lab Test 11/10/21  1733 05/04/21  1350   AST 13 18   ALT <9 19   ALKPHOS 80 52   BILITOTAL 0.4 0.4       Recent Labs   Lab Test 06/21/21  1153   CHOL 284*   HDL 51   *   TRIG 197*     Recent Labs   Lab Test 06/21/21  1153 12/09/20  1039 06/08/20  0913   * 164* 174*     Recent Labs   Lab Test 11/23/21  0509      POTASSIUM 3.8   CHLORIDE 103   CO2 30   GLC 93   BUN 7*   CR 0.59*   GFRESTIMATED 88   EMILIANO 8.3*     No results for input(s): A1C in the last 12030 hours.  Recent Labs   Lab Test 11/23/21  0509 11/22/21  0532 11/21/21  0609   HGB 8.3* 8.4* 9.3*     Recent Labs   Lab Test 11/10/21  1145   TROPONINI 0.01     No results for input(s): BNP, NTBNPI, NTBNP in the last 66384 hours.  Recent Labs   Lab Test 05/04/21  1350   TSH 1.38     Recent Labs   Lab Test 11/10/21  1733   INR 1.44*       Ann Wen MD  [facility]  [phone]

## 2021-11-26 NOTE — PROGRESS NOTES
Care Management Follow Up    Length of Stay (days): 16    Expected Discharge Date: 11/29/2021     Concerns to be Addressed: discharge planning       Patient plan of care discussed at interdisciplinary rounds: Yes    Anticipated Discharge Disposition: Transitional Care     Anticipated Discharge Services:    Anticipated Discharge DME:      Patient/family educated on Medicare website which has current facility and service quality ratings:  yes  Education Provided on the Discharge Plan:  yes  Patient/Family in Agreement with the Plan: yes    Referrals Placed by CM/SW: Post Acute Facilities     Private pay costs discussed: N/A    Additional Information:  SW reviewed pt's chart.  Pt able to discharge once chest tube removed.  TCU referrals previously set in que by a different CM sent by this SW and are pending.  Referrals sent to [facility], and [facility].  Family to transport.      NIKO Davis, ADSW 11/26/21 3:56 PM

## 2021-11-26 NOTE — PLAN OF CARE
Problem: Infection (Pneumonia)  Goal: Resolution of Infection Signs and Symptoms  Outcome: Improving   Pt weaned to 1/2L O2, sat 93-94%.  Right lobe coarse, left lobe clear/diminished.  Pt has some shortness of breath when getting up to bedside commode.  IS up to 750 after encouragement.       Problem: Pain Chronic (Persistent)  Goal: Acceptable Pain Control and Functional Ability  Intervention: Develop Pain Management Plan   Pain managed well.  C/o pain at chest tube insertion site and sore upper right shoulder.  Scheduled tylenol given and prn iv dilaudid x1 with good relief.

## 2021-11-27 ENCOUNTER — APPOINTMENT (OUTPATIENT)
Dept: RADIOLOGY | Facility: HOSPITAL | Age: 76
DRG: 163 | End: 2021-11-27
Attending: SURGERY
Payer: COMMERCIAL

## 2021-11-27 ENCOUNTER — APPOINTMENT (OUTPATIENT)
Dept: PHYSICAL THERAPY | Facility: HOSPITAL | Age: 76
DRG: 163 | End: 2021-11-27
Payer: COMMERCIAL

## 2021-11-27 LAB
ANION GAP SERPL CALCULATED.3IONS-SCNC: 8 MMOL/L (ref 5–18)
BUN SERPL-MCNC: 7 MG/DL (ref 8–28)
CALCIUM SERPL-MCNC: 8.5 MG/DL (ref 8.5–10.5)
CHLORIDE BLD-SCNC: 104 MMOL/L (ref 98–107)
CO2 SERPL-SCNC: 27 MMOL/L (ref 22–31)
CREAT SERPL-MCNC: 0.53 MG/DL (ref 0.6–1.1)
ERYTHROCYTE [DISTWIDTH] IN BLOOD BY AUTOMATED COUNT: 13.9 % (ref 10–15)
GFR SERPL CREATININE-BSD FRML MDRD: >90 ML/MIN/1.73M2
GLUCOSE BLD-MCNC: 91 MG/DL (ref 70–125)
HCT VFR BLD AUTO: 25.3 % (ref 35–47)
HGB BLD-MCNC: 7.7 G/DL (ref 11.7–15.7)
MAGNESIUM SERPL-MCNC: 1.8 MG/DL (ref 1.8–2.6)
MCH RBC QN AUTO: 30 PG (ref 26.5–33)
MCHC RBC AUTO-ENTMCNC: 30.4 G/DL (ref 31.5–36.5)
MCV RBC AUTO: 98 FL (ref 78–100)
PLATELET # BLD AUTO: 590 10E3/UL (ref 150–450)
POTASSIUM BLD-SCNC: 3.7 MMOL/L (ref 3.5–5)
RBC # BLD AUTO: 2.57 10E6/UL (ref 3.8–5.2)
SODIUM SERPL-SCNC: 139 MMOL/L (ref 136–145)
WBC # BLD AUTO: 11.4 10E3/UL (ref 4–11)

## 2021-11-27 PROCEDURE — 250N000013 HC RX MED GY IP 250 OP 250 PS 637: Performed by: INTERNAL MEDICINE

## 2021-11-27 PROCEDURE — 80048 BASIC METABOLIC PNL TOTAL CA: CPT | Performed by: SURGERY

## 2021-11-27 PROCEDURE — 36415 COLL VENOUS BLD VENIPUNCTURE: CPT | Performed by: SURGERY

## 2021-11-27 PROCEDURE — 250N000013 HC RX MED GY IP 250 OP 250 PS 637: Performed by: SURGERY

## 2021-11-27 PROCEDURE — 83735 ASSAY OF MAGNESIUM: CPT | Performed by: INTERNAL MEDICINE

## 2021-11-27 PROCEDURE — 120N000001 HC R&B MED SURG/OB

## 2021-11-27 PROCEDURE — 250N000011 HC RX IP 250 OP 636: Performed by: SURGERY

## 2021-11-27 PROCEDURE — 250N000011 HC RX IP 250 OP 636: Performed by: INTERNAL MEDICINE

## 2021-11-27 PROCEDURE — 85027 COMPLETE CBC AUTOMATED: CPT | Performed by: SURGERY

## 2021-11-27 PROCEDURE — 99231 SBSQ HOSP IP/OBS SF/LOW 25: CPT | Performed by: INTERNAL MEDICINE

## 2021-11-27 PROCEDURE — 97110 THERAPEUTIC EXERCISES: CPT | Mod: GP

## 2021-11-27 PROCEDURE — 71045 X-RAY EXAM CHEST 1 VIEW: CPT

## 2021-11-27 RX ADMIN — OMEPRAZOLE 40 MG: 20 CAPSULE, DELAYED RELEASE ORAL at 06:45

## 2021-11-27 RX ADMIN — ACETAMINOPHEN 650 MG: 325 SOLUTION ORAL at 20:37

## 2021-11-27 RX ADMIN — SERTRALINE HYDROCHLORIDE 200 MG: 50 TABLET ORAL at 09:00

## 2021-11-27 RX ADMIN — CEFTRIAXONE SODIUM 1 G: 1 INJECTION, POWDER, FOR SOLUTION INTRAMUSCULAR; INTRAVENOUS at 16:12

## 2021-11-27 RX ADMIN — Medication 1 CAPSULE: at 20:27

## 2021-11-27 RX ADMIN — HYDROMORPHONE HYDROCHLORIDE 0.5 MG: 1 INJECTION, SOLUTION INTRAMUSCULAR; INTRAVENOUS; SUBCUTANEOUS at 20:27

## 2021-11-27 RX ADMIN — LIDOCAINE 3 PATCH: 246 PATCH TOPICAL at 09:01

## 2021-11-27 RX ADMIN — VALACYCLOVIR 1000 MG: 500 TABLET, FILM COATED ORAL at 20:26

## 2021-11-27 RX ADMIN — Medication 250 MG: at 09:00

## 2021-11-27 RX ADMIN — Medication 1 TABLET: at 09:00

## 2021-11-27 RX ADMIN — MAGNESIUM OXIDE TAB 400 MG (241.3 MG ELEMENTAL MG) 200 MG: 400 (241.3 MG) TAB at 09:00

## 2021-11-27 RX ADMIN — ENOXAPARIN SODIUM 40 MG: 40 INJECTION SUBCUTANEOUS at 20:27

## 2021-11-27 RX ADMIN — THERA TABS 1 TABLET: TAB at 09:00

## 2021-11-27 RX ADMIN — HYDROMORPHONE HYDROCHLORIDE 0.5 MG: 1 INJECTION, SOLUTION INTRAMUSCULAR; INTRAVENOUS; SUBCUTANEOUS at 12:31

## 2021-11-27 RX ADMIN — MAGNESIUM OXIDE TAB 400 MG (241.3 MG ELEMENTAL MG) 200 MG: 400 (241.3 MG) TAB at 20:26

## 2021-11-27 RX ADMIN — TRAZODONE HYDROCHLORIDE 150 MG: 50 TABLET ORAL at 20:27

## 2021-11-27 RX ADMIN — Medication 1 TABLET: at 20:27

## 2021-11-27 ASSESSMENT — ACTIVITIES OF DAILY LIVING (ADL)
ADLS_ACUITY_SCORE: 16
ADLS_ACUITY_SCORE: 12
ADLS_ACUITY_SCORE: 16
ADLS_ACUITY_SCORE: 10
ADLS_ACUITY_SCORE: 16
ADLS_ACUITY_SCORE: 16
ADLS_ACUITY_SCORE: 10
ADLS_ACUITY_SCORE: 16
ADLS_ACUITY_SCORE: 10
ADLS_ACUITY_SCORE: 16
ADLS_ACUITY_SCORE: 10
ADLS_ACUITY_SCORE: 10

## 2021-11-27 NOTE — PROGRESS NOTES
Care Management Follow Up    Length of Stay (days): 17    Expected Discharge Date: 11/29/2021     Concerns to be Addressed: discharge planning     Patient plan of care discussed at interdisciplinary rounds: Yes    Anticipated Discharge Disposition: Transitional Care     Anticipated Discharge Services:    Anticipated Discharge DME:      Patient/family educated on Medicare website which has current facility and service quality ratings:  yes  Education Provided on the Discharge Plan:    Patient/Family in Agreement with the Plan: yes    Referrals Placed by CM/SW: Post Acute Facilities  Private pay costs discussed: Not applicable    Additional Information:  [facility] and [facility]. SW left message with return number with request to call regarding referral.      NIKO Jesus

## 2021-11-27 NOTE — PROGRESS NOTES
[facility]    PROGRESS NOTE - Hospitalist Service    Assessment and Plan    Principal Problem:    Abscess of middle lobe of right lung with pneumonia (H)  Active Problems:    Breast cancer, stage 1, right (H)    Pneumonia due to infectious organism, unspecified laterality, unspecified part of lung    Infection due to 2019 novel coronavirus    Acute respiratory failure with hypoxia (H)    Leukocytosis, unspecified type    Salome Maravilla is a 76 year old old female with h/o stage 1 breast cancer s/p lumpectomy and radiation in 2015, depression, GERD, OA, presented with right sided chest pain and dry cough. She was diagnosed with breakthrough covid-19 infection on 10/28 and presented on 11/10 with several days of worsening right-sided pleuritic chest pain, cough, low grade fevers and chills at home.     Pneumonia  Complex parapneumonic effusion  -- Likely due to post viral strep PNA. The right pleural effusion was drained at bedside 11/10  but effusion recurred   -- Chest tube placement 11/13 lytics previously ineffective   - Gen surgery Dr Zuluaga performed thorascopic surgery on 11/23 and new CT placed   - continue IV ceftriaxone   - lovenox ok to start per Dr. Gutierrez  - per surgery note possible CT removal tomorrow patient is hoping for removal      Chest pain - secondary to above  - pain team seen  - pain controlled      Acute resp failure due to above   0 off O2  -- Cxr improving      COVID-19 - since 10/28, breakthrough case  -- Completed remdesivir but no steroids per pulm for now.   -- Recovered covid now. Stopped isolation 11/18.     GERD/Heart burn  -- Patient believes its due to PO oxycodone. Changed to liquid oxycodone per pain team  -- Changed protonix to omeprazole per patient's request. Symptoms improved      Constipation:  - bowel regiment      Anemia:  -- Drop in hgb noted.  - trend labs     COVID-19 PCR Results    COVID-19 PCR Results 10/28/21   SARS CoV2 PCR Positive (A)    (A) Abnormal value       Comments are available for some flowsheets but are not being displayed.         COVID-19 Antibody Results, Testing for Immunity    COVID-19 Antibody Results, Testing for Immunity   No data to display.            VTE prophylaxis:  Pneumatic Compression Devices, Lovenox ok to start per D.r Shearan    DIET: Orders Placed This Encounter      Regular Diet Adult    Drains/Lines: CT   Weight bearing status: WBAT  Disposition/Barriers to discharge: pending CT removal   Code Status: Full Code    Subjective:  Comfortable, in good spirits again today     PHYSICAL EXAM  Temp:  [97.9  F (36.6  C)-99.4  F (37.4  C)] (P) 99.4  F (37.4  C)  Pulse:  [] (P) 91  Resp:  [15-18] (P) 18  BP: (112-138)/(50-71) (P) 128/58  SpO2:  [90 %-96 %] (P) 96 %  Wt Readings from Last 1 Encounters:   11/26/21 61.1 kg (134 lb 11.2 oz)       Intake/Output Summary (Last 24 hours) at 11/24/2021 0846  Last data filed at 11/24/2021 0745  Gross per 24 hour   Intake 5201 ml   Output 1285 ml   Net 3916 ml      Body mass index is 22.07 kg/m .  Physical Exam  Constitutional:       General: She is not in acute distress.     Appearance: Normal appearance. She is not ill-appearing or toxic-appearing.   HENT:      Head: Normocephalic and atraumatic.   Cardiovascular:      Rate and Rhythm: Normal rate and regular rhythm.      Pulses: Normal pulses.      Heart sounds: Normal heart sounds.   Pulmonary:      Effort: Pulmonary effort is normal.      Comments: CT in place  Abdominal:      General: Bowel sounds are normal.   Skin:     General: Skin is warm.   Neurological:      General: No focal deficit present.      Mental Status: She is alert and oriented to person, place, and time. Mental status is at baseline.       PERTINENT LABS/IMAGING:  Results for orders placed or performed during the hospital encounter of 11/10/21   CT Chest Pulmonary Embolism w Contrast    Impression    IMPRESSION:  1.  Above findings most suggestive of  moderately severe bacterial pneumonia right middle lobe with possible associated tiny microabscess within it.    2.  Small right pleural effusion and some focal pleural reaction immediately adjacent to the pneumonia anteriorly.   XR Chest Port 1 View    Impression    IMPRESSION: Infiltrate right lung base by CT mostly in the right middle lobe. Small right pleural effusion. No pneumothorax. Left lung is clear. Scoliosis. Healing left rib fractures.   XR Chest Port 1 View    Impression    IMPRESSION: The right pleural effusion is increasing. Stable heart size. No pulmonary edema. Increasing left medial basilar opacity could represent atelectasis or pneumonia.   IR Chest Tube Place Non Tunneled Right    Impression    IMPRESSION:  Successful right sided 14 [place] chest tube placement under ultrasound guidance.        XR Chest Port 1 View    Impression    IMPRESSION: Interval placement of right pigtail pleural drain. Decreased small to moderate right pleural effusion. Improved aeration of the right mid and lower lung. Decreasing retrocardiac left lower lung atelectasis or infiltrate. Stable borderline   enlarged cardiac silhouette. Normal pulmonary vascularity. No pneumothorax.   XR Chest Port 1 View    Impression    IMPRESSION: Right pigtail pleural drain with trace pneumothorax near tube insertion site but no new significant pneumothorax. Further decrease in the small right pleural effusion and improved aeration of the right mid and lower lung. Decreasing   retrocardiac left lower lung atelectasis or infiltrate. Stable borderline enlarged cardiac silhouette. Normal pulmonary vascularity. Healing fractures left ribs 3-6. Findings discussed with Dr Avitia.   XR Chest Port 1 View    Impression    IMPRESSION: Right chest tube in stable position. No pneumothorax. Tiny right pleural effusion remains. Small left pleural effusion is stable. Atelectasis left lower lobe behind the heart is stable. Minimal diffuse interstitial  infiltrates stable.    No new findings.   CT Chest w/o Contrast    Impression    IMPRESSION:   1.  New right chest tube. There is persistent small loculated pleural effusion at the right lung base which does not communicate with the chest tube. There is additional small loculated hydropneumothorax in the anterior right middle lobe that also does   not communicate with the chest tube. There is atelectasis in both lower lobes and in the right middle lobe.  2.  New small right pleural effusion.  3.  Patchy airspace opacities in the left upper lobe consistent with a small focus of pneumonia.     XR Chest Port 1 View    Impression    IMPRESSION:     A right pleural drain is similarly positioned with pigtail in the upper lateral aspect of the pleural space. There is a crimp in the tube at the level of the insertion projecting at the level of the posterolateral seventh intercostal space. Opacity with   indistinct borders in the lower third of the right chest likely reflects a combination of residual complex pleural fluid and atelectasis. Mild but diffuse thickening of the pleura including the apex and along the mediastinal pleura. Focal lucency lucency   in the medial basal right chest adjacent to the right atrial heart border corresponds to loculated anterior pneumothorax on the prior CT.    There are interstitial opacities in the right mid and upper lung consistent with mild interstitial edema. No progressive airspace opacity.    Pleural fluid and atelectasis atelectasis in the medial left lower lobe obscures the medial 20% of the left hemidiaphragm, not increased.    Unchanged mild enlargement of the cardiac silhouette.   XR Chest Port 1 View    Impression    IMPRESSION: A right lateral approach pigtail catheter is again seen. No significant pneumothorax. Right basilar atelectasis is again seen. Mild interstitial edema has minimally decreased. The cardiomediastinal silhouette is at the upper limits of normal   in size.     XR Chest Port 1 View    Impression    IMPRESSION: The right chest pigtail catheter remains in place. A second loop has developed which could be external to the patient. This could also cause tube kinking. Recommend correlation with tube function. No pneumothorax. Stable moderate right and   mild left basilar opacities.    No significant effusion or pulmonary edema seen.   CT Chest w/o Contrast    Impression    IMPRESSION:     1.  Unchanged position of the right pleural drain. Localized visceral and parietal pleural thickening anterior to the right middle lobe and low anteromedial right upper lobe with adjacent lung consolidation.  2.  New heterogeneous attenuation debris layering dependently in the posterior right pleural space, most likely representing organized blood products.  3.  Unchanged circumferential pericardial effusion and small posteriorly layering left pleural effusion.     XR Chest Port 1 View    Impression    IMPRESSION: No change right chest tube with tip over the lateral right lung at the second intercostal space and a loop outside the patient. There may be a kink in the tube in the loop which is presumably external to the patient. No pneumothorax. No   change bibasilar opacities.   XR Chest Port 1 View    Impression    IMPRESSION: A right lateral approach pigtail catheter has not changed in position. No pneumothorax. Right pulmonary opacities have not significantly changed. Normal size of the heart.    XR Chest 1 View    Impression    IMPRESSION: Interval placement of 3 right-sided chest tubes with removal of the previously seen pigtail catheter on the right. No visible pneumothorax. There is patchy opacity at the right lung base which likely reflects atelectasis. No obvious pleural   effusion. Retrocardiac opacity involving the medial left lower lobe is not significantly change with a trace left pleural effusion. Heart and mediastinal size are normal.     Most Recent 3 CBC's:  Recent Labs   Lab  Test 11/27/21  0619 11/26/21  0927 11/25/21  0455   WBC 11.4* 15.5* 17.0*   HGB 7.7* 7.8* 7.4*   MCV 98 99 101*   * 576* 487*     Most Recent 3 BMP's:  Recent Labs   Lab Test 11/27/21  0619 11/26/21  0927 11/25/21  0455    136 136   POTASSIUM 3.7 3.6 4.0   CHLORIDE 104 103 104   CO2 27 26 25   BUN 7* 8 8   CR 0.53* 0.53* 0.53*   ANIONGAP 8 7 7   EMILIANO 8.5 8.2* 8.2*   GLC 91 129* 94     Most Recent 2 LFT's:  Recent Labs   Lab Test 11/10/21  1733 05/04/21  1350   AST 13 18   ALT <9 19   ALKPHOS 80 52   BILITOTAL 0.4 0.4       Recent Labs   Lab Test 06/21/21  1153   CHOL 284*   HDL 51   *   TRIG 197*     Recent Labs   Lab Test 06/21/21  1153 12/09/20  1039 06/08/20  0913   * 164* 174*     Recent Labs   Lab Test 11/23/21  0509      POTASSIUM 3.8   CHLORIDE 103   CO2 30   GLC 93   BUN 7*   CR 0.59*   GFRESTIMATED 88   EMILIANO 8.3*     No results for input(s): A1C in the last 38266 hours.  Recent Labs   Lab Test 11/23/21  0509 11/22/21  0532 11/21/21  0609   HGB 8.3* 8.4* 9.3*     Recent Labs   Lab Test 11/10/21  1145   TROPONINI 0.01     No results for input(s): BNP, NTBNPI, NTBNP in the last 05698 hours.  Recent Labs   Lab Test 05/04/21  1350   TSH 1.38     Recent Labs   Lab Test 11/10/21  1733   INR 1.44*       Ann Wen MD  [facility]  [phone]

## 2021-11-27 NOTE — PLAN OF CARE
Problem: Adult Inpatient Plan of Care  Goal: Plan of Care Review  Outcome: Improving  Pt tolerating water seal well,  Maintaining O2 sats above 90 on room air.     Problem: Adult Inpatient Plan of Care  Goal: Patient-Specific Goal (Individualized)  Outcome: Improving  Pt up for walk x1 this shift. Declined when approached x2 this shift due to feeling tired, then due to pain. Pt able to tolerate walking entire hallway, farther than she has previously been able to walk.      Problem: Adult Inpatient Plan of Care  Goal: Optimal Comfort and Wellbeing  Outcome: Improving  Problem: Pain Chronic (Persistent)  Goal: Acceptable Pain Control and Functional Ability  Outcome: Improving   Pt c/o pain in back and right side this shift. Requesting IV dilaudid, states she is unable to tolerate oral. Requesting lidocaine patch placement on back and right shoulder.

## 2021-11-27 NOTE — PLAN OF CARE
Problem: Respiratory Compromise (Pneumonia)  Goal: Effective Oxygenation and Ventilation  Intervention: Optimize Oxygenation and Ventilation  Recent Flowsheet Documentation  Taken 11/27/2021 0000 by Edel Duarte RN  Head of Bed (HOB) Positioning: HOB at 15 degrees  Taken 11/26/2021 2312 by Edel Duarte RN  Head of Bed ([facility]) Positioning: HOB at 15 degrees  Taken 11/26/2021 2014 by Edel Duarte RN  Head of Bed ([facility]) Positioning: HOB at 15 degrees   0.5L oxygen via nasal cannula on for pt comfort.  Lung sounds diminished with crackles in posterior right lobes.    Problem: Pain Chronic (Persistent)  Goal: Acceptable Pain Control and Functional Ability  Intervention: Develop Pain Management Plan  Recent Flowsheet Documentation  Taken 11/26/2021 2014 by Edel Duarte, RN  Pain Management Interventions: medication (see MAR)   Pt declines PO pain medication; stating that it gives her indigestion unless it's coated.  IV dilaudid given for right flank pain; pt denies pain post intervention.    Edel Duarte RN

## 2021-11-27 NOTE — PROGRESS NOTES
"Saolme Maravilla is a 76 year old female patient.  1. Abscess of middle lobe of right lung with pneumonia (H)    2. Pneumonia due to infectious organism, unspecified laterality, unspecified part of lung      Past Medical History:   Diagnosis Date     Breast cancer (H) 2010    hx of right lumpectomy for breast      Depression      GERD (gastroesophageal reflux disease)      Hx of radiation therapy right 2010    hx of right lumpectomy for breast cancer     Osteoarthritis      No current outpatient medications on file.     Allergies   Allergen Reactions     Arimidex [Anastrozole] Unknown     Hot flashes     Penicillins Hives     Statins-Hmg-Coa Reductase Inhibitors [Hmg-Coa-R Inhibitors] Muscle Pain (Myalgia)     Fenofibrate Rash     Principal Problem:    Abscess of middle lobe of right lung with pneumonia (H)  Active Problems:    Breast cancer, stage 1, right (H)    Pneumonia due to infectious organism, unspecified laterality, unspecified part of lung    Infection due to 2019 novel coronavirus    Acute respiratory failure with hypoxia (H)    Leukocytosis, unspecified type    Blood pressure (P) 128/58, pulse (P) 91, temperature (P) 99.4  F (37.4  C), temperature source (P) Oral, resp. rate (P) 18, height 1.664 m (5' 5.5\"), weight 61.1 kg (134 lb 11.2 oz), SpO2 (P) 96 %.    Subjective comfortable sitting in bed  Objective breathing well, chest tubes 110 mL.  Assessment & Plan good expansion with no pneumothorax on chest x-ray, WBC 11 and decreasing.  Likely remove chest tubes in a.m.  Up as tolerated  Alex Senior MD, MD  11/27/2021    "

## 2021-11-28 ENCOUNTER — APPOINTMENT (OUTPATIENT)
Dept: RADIOLOGY | Facility: HOSPITAL | Age: 76
DRG: 163 | End: 2021-11-28
Attending: SURGERY
Payer: COMMERCIAL

## 2021-11-28 ENCOUNTER — APPOINTMENT (OUTPATIENT)
Dept: PHYSICAL THERAPY | Facility: HOSPITAL | Age: 76
DRG: 163 | End: 2021-11-28
Payer: COMMERCIAL

## 2021-11-28 LAB
ANION GAP SERPL CALCULATED.3IONS-SCNC: 11 MMOL/L (ref 5–18)
BUN SERPL-MCNC: 6 MG/DL (ref 8–28)
CALCIUM SERPL-MCNC: 8.7 MG/DL (ref 8.5–10.5)
CHLORIDE BLD-SCNC: 104 MMOL/L (ref 98–107)
CO2 SERPL-SCNC: 26 MMOL/L (ref 22–31)
CREAT SERPL-MCNC: 0.53 MG/DL (ref 0.6–1.1)
ERYTHROCYTE [DISTWIDTH] IN BLOOD BY AUTOMATED COUNT: 14 % (ref 10–15)
GFR SERPL CREATININE-BSD FRML MDRD: >90 ML/MIN/1.73M2
GLUCOSE BLD-MCNC: 94 MG/DL (ref 70–125)
HCT VFR BLD AUTO: 25.7 % (ref 35–47)
HGB BLD-MCNC: 7.9 G/DL (ref 11.7–15.7)
MAGNESIUM SERPL-MCNC: 1.9 MG/DL (ref 1.8–2.6)
MCH RBC QN AUTO: 30.5 PG (ref 26.5–33)
MCHC RBC AUTO-ENTMCNC: 30.7 G/DL (ref 31.5–36.5)
MCV RBC AUTO: 99 FL (ref 78–100)
PLATELET # BLD AUTO: 577 10E3/UL (ref 150–450)
POTASSIUM BLD-SCNC: 3.7 MMOL/L (ref 3.5–5)
RBC # BLD AUTO: 2.59 10E6/UL (ref 3.8–5.2)
SODIUM SERPL-SCNC: 141 MMOL/L (ref 136–145)
WBC # BLD AUTO: 10.3 10E3/UL (ref 4–11)

## 2021-11-28 PROCEDURE — 99231 SBSQ HOSP IP/OBS SF/LOW 25: CPT | Performed by: INTERNAL MEDICINE

## 2021-11-28 PROCEDURE — 85048 AUTOMATED LEUKOCYTE COUNT: CPT | Performed by: SURGERY

## 2021-11-28 PROCEDURE — 250N000013 HC RX MED GY IP 250 OP 250 PS 637: Performed by: SURGERY

## 2021-11-28 PROCEDURE — 83735 ASSAY OF MAGNESIUM: CPT | Performed by: INTERNAL MEDICINE

## 2021-11-28 PROCEDURE — 71045 X-RAY EXAM CHEST 1 VIEW: CPT

## 2021-11-28 PROCEDURE — 80048 BASIC METABOLIC PNL TOTAL CA: CPT | Performed by: SURGERY

## 2021-11-28 PROCEDURE — 250N000013 HC RX MED GY IP 250 OP 250 PS 637: Performed by: INTERNAL MEDICINE

## 2021-11-28 PROCEDURE — 36415 COLL VENOUS BLD VENIPUNCTURE: CPT | Performed by: SURGERY

## 2021-11-28 PROCEDURE — 97110 THERAPEUTIC EXERCISES: CPT | Mod: GP

## 2021-11-28 PROCEDURE — 120N000001 HC R&B MED SURG/OB

## 2021-11-28 PROCEDURE — 250N000011 HC RX IP 250 OP 636: Performed by: INTERNAL MEDICINE

## 2021-11-28 RX ORDER — LOPERAMIDE HCL 2 MG
2 CAPSULE ORAL 4 TIMES DAILY PRN
Status: DISCONTINUED | OUTPATIENT
Start: 2021-11-28 | End: 2021-12-02 | Stop reason: HOSPADM

## 2021-11-28 RX ORDER — CEFDINIR 300 MG/1
300 CAPSULE ORAL EVERY 12 HOURS SCHEDULED
Status: DISCONTINUED | OUTPATIENT
Start: 2021-11-28 | End: 2021-12-02 | Stop reason: HOSPADM

## 2021-11-28 RX ADMIN — ACETAMINOPHEN 650 MG: 325 SOLUTION ORAL at 20:52

## 2021-11-28 RX ADMIN — OMEPRAZOLE 40 MG: 20 CAPSULE, DELAYED RELEASE ORAL at 06:54

## 2021-11-28 RX ADMIN — LOPERAMIDE HYDROCHLORIDE 2 MG: 2 CAPSULE ORAL at 12:37

## 2021-11-28 RX ADMIN — THERA TABS 1 TABLET: TAB at 09:42

## 2021-11-28 RX ADMIN — Medication 250 MG: at 09:42

## 2021-11-28 RX ADMIN — TRAZODONE HYDROCHLORIDE 150 MG: 50 TABLET ORAL at 20:45

## 2021-11-28 RX ADMIN — ENOXAPARIN SODIUM 40 MG: 40 INJECTION SUBCUTANEOUS at 20:44

## 2021-11-28 RX ADMIN — CEFDINIR 300 MG: 300 CAPSULE ORAL at 20:45

## 2021-11-28 RX ADMIN — MAGNESIUM OXIDE TAB 400 MG (241.3 MG ELEMENTAL MG) 200 MG: 400 (241.3 MG) TAB at 09:42

## 2021-11-28 RX ADMIN — Medication 1 TABLET: at 20:45

## 2021-11-28 RX ADMIN — VALACYCLOVIR 1000 MG: 500 TABLET, FILM COATED ORAL at 20:45

## 2021-11-28 RX ADMIN — SERTRALINE HYDROCHLORIDE 200 MG: 50 TABLET ORAL at 09:42

## 2021-11-28 RX ADMIN — Medication 1 TABLET: at 09:42

## 2021-11-28 RX ADMIN — CEFDINIR 300 MG: 300 CAPSULE ORAL at 12:06

## 2021-11-28 RX ADMIN — Medication 1 CAPSULE: at 20:45

## 2021-11-28 ASSESSMENT — ACTIVITIES OF DAILY LIVING (ADL)
ADLS_ACUITY_SCORE: 10
ADLS_ACUITY_SCORE: 12
ADLS_ACUITY_SCORE: 10
ADLS_ACUITY_SCORE: 10
ADLS_ACUITY_SCORE: 12
ADLS_ACUITY_SCORE: 12
ADLS_ACUITY_SCORE: 10
ADLS_ACUITY_SCORE: 12
ADLS_ACUITY_SCORE: 10
ADLS_ACUITY_SCORE: 12
ADLS_ACUITY_SCORE: 10
ADLS_ACUITY_SCORE: 12
ADLS_ACUITY_SCORE: 10
ADLS_ACUITY_SCORE: 10
ADLS_ACUITY_SCORE: 12
ADLS_ACUITY_SCORE: 10
ADLS_ACUITY_SCORE: 10
ADLS_ACUITY_SCORE: 12
ADLS_ACUITY_SCORE: 12

## 2021-11-28 NOTE — PROGRESS NOTES
"Salome Maravilla is a 76 year old female patient.  1. Abscess of middle lobe of right lung with pneumonia (H)    2. Pneumonia due to infectious organism, unspecified laterality, unspecified part of lung      Past Medical History:   Diagnosis Date     Breast cancer (H) 2010    hx of right lumpectomy for breast      Depression      GERD (gastroesophageal reflux disease)      Hx of radiation therapy right 2010    hx of right lumpectomy for breast cancer     Osteoarthritis      No current outpatient medications on file.     Allergies   Allergen Reactions     Arimidex [Anastrozole] Unknown     Hot flashes     Penicillins Hives     Statins-Hmg-Coa Reductase Inhibitors [Hmg-Coa-R Inhibitors] Muscle Pain (Myalgia)     Fenofibrate Rash     Principal Problem:    Abscess of middle lobe of right lung with pneumonia (H)  Active Problems:    Breast cancer, stage 1, right (H)    Pneumonia due to infectious organism, unspecified laterality, unspecified part of lung    Infection due to 2019 novel coronavirus    Acute respiratory failure with hypoxia (H)    Leukocytosis, unspecified type    Blood pressure (!) 140/63, pulse 83, temperature 98.8  F (37.1  C), temperature source Oral, resp. rate 16, height 1.664 m (5' 5.5\"), weight 61.1 kg (134 lb 11.2 oz), SpO2 93 %.    Subjective   comfortable without complaints  Objective chest tube 60 mL no air leak.  Assessment & Plan chest tubes removed.  Sterile dressing applied.  WBC normalized   Plan  should  discharge in a day or so    Alex Senior MD, MD  11/28/2021    "

## 2021-11-28 NOTE — PROGRESS NOTES
[facility]    PROGRESS NOTE - Hospitalist Service    Assessment and Plan    Principal Problem:    Abscess of middle lobe of right lung with pneumonia (H)  Active Problems:    Breast cancer, stage 1, right (H)    Pneumonia due to infectious organism, unspecified laterality, unspecified part of lung    Infection due to 2019 novel coronavirus    Acute respiratory failure with hypoxia (H)    Leukocytosis, unspecified type    Salome Maravilla is a 76 year old old female with h/o stage 1 breast cancer s/p lumpectomy and radiation in 2015, depression, GERD, OA, presented with right sided chest pain and dry cough. She was diagnosed with breakthrough covid-19 infection on 10/28 and presented on 11/10 with several days of worsening right-sided pleuritic chest pain, cough, low grade fevers and chills at home.     Pneumonia/Complex parapneumonic effusion  -- Likely due to post viral strep PNA. The right pleural effusion was drained at bedside 11/10  but effusion recurred   -- Chest tube placement 11/13 lytics previously ineffective   - Gen surgery Dr Zuluaga performed thorascopic surgery on 11/23 and new CTs placed   - CT removed today by surgery, monitor overnight, repeat CXR in am and if stable can discharge   - IV ceftriaxone changed to PO discuss with Dr. Gutierrez how long he'd like for her to be on  - lovenox ok  per Dr. Gutierrez     Chest pain - secondary to above  - pain team seen  - pain better after tubes removed, stop IV and continue PO decreased dose      Acute resp failure due to above   -- Cxr improving   - has been off O2     COVID-19 - since 10/28, breakthrough case  -- Completed remdesivir but no steroids per pulm for now.   -- Recovered covid now. Stopped isolation 11/18.     GERD/Heart burn  -- Changed protonix to omeprazole per patient's request. Symptoms improved      Constipation:  - bowel regiment   - resolved now has loose stools      Anemia:  -- Drop in hgb noted.  - trend labs      COVID-19 PCR Results    COVID-19 PCR Results 10/28/21   SARS CoV2 PCR Positive (A)   (A) Abnormal value       Comments are available for some flowsheets but are not being displayed.         COVID-19 Antibody Results, Testing for Immunity    COVID-19 Antibody Results, Testing for Immunity   No data to display.            VTE prophylaxis:  Pneumatic Compression Devices, Lovenox   DIET: Orders Placed This Encounter      Regular Diet Adult    Drains/Lines: CT removed   Weight bearing status: WBAT  Disposition/Barriers to discharge: anticipated discharge tomorrow if TCU bed found   Code Status: Full Code    Subjective:  Feeling better after CT out, complains of loose stools and was previously constipated     PHYSICAL EXAM  Temp:  [98.1  F (36.7  C)-99.7  F (37.6  C)] 99.7  F (37.6  C)  Pulse:  [83-99] 94  Resp:  [16-18] 16  BP: (116-140)/(53-63) 125/58  SpO2:  [92 %-95 %] 92 %  Wt Readings from Last 1 Encounters:   11/26/21 61.1 kg (134 lb 11.2 oz)       Intake/Output Summary (Last 24 hours) at 11/24/2021 0846  Last data filed at 11/24/2021 0745  Gross per 24 hour   Intake 5201 ml   Output 1285 ml   Net 3916 ml      Body mass index is 22.07 kg/m .  Physical Exam  Constitutional:       Appearance: Normal appearance.   HENT:      Head: Normocephalic and atraumatic.   Cardiovascular:      Rate and Rhythm: Normal rate and regular rhythm.      Pulses: Normal pulses.   Pulmonary:      Effort: Pulmonary effort is normal.      Comments: CT removed, CTA at right base no crackles or rhonci   Abdominal:      General: Bowel sounds are normal.      Palpations: Abdomen is soft.   Musculoskeletal:      Right lower leg: No edema.      Left lower leg: No edema.   Skin:     General: Skin is warm and dry.      Capillary Refill: Capillary refill takes less than 2 seconds.   Neurological:      General: No focal deficit present.      Mental Status: She is alert and oriented to person, place, and time. Mental status is at baseline.        PERTINENT LABS/IMAGING:  Results for orders placed or performed during the hospital encounter of 11/10/21   CT Chest Pulmonary Embolism w Contrast    Impression    IMPRESSION:  1.  Above findings most suggestive of moderately severe bacterial pneumonia right middle lobe with possible associated tiny microabscess within it.    2.  Small right pleural effusion and some focal pleural reaction immediately adjacent to the pneumonia anteriorly.   XR Chest Port 1 View    Impression    IMPRESSION: Infiltrate right lung base by CT mostly in the right middle lobe. Small right pleural effusion. No pneumothorax. Left lung is clear. Scoliosis. Healing left rib fractures.   XR Chest Port 1 View    Impression    IMPRESSION: The right pleural effusion is increasing. Stable heart size. No pulmonary edema. Increasing left medial basilar opacity could represent atelectasis or pneumonia.   IR Chest Tube Place Non Tunneled Right    Impression    IMPRESSION:  Successful right sided 14 [place] chest tube placement under ultrasound guidance.        XR Chest Port 1 View    Impression    IMPRESSION: Interval placement of right pigtail pleural drain. Decreased small to moderate right pleural effusion. Improved aeration of the right mid and lower lung. Decreasing retrocardiac left lower lung atelectasis or infiltrate. Stable borderline   enlarged cardiac silhouette. Normal pulmonary vascularity. No pneumothorax.   XR Chest Port 1 View    Impression    IMPRESSION: Right pigtail pleural drain with trace pneumothorax near tube insertion site but no new significant pneumothorax. Further decrease in the small right pleural effusion and improved aeration of the right mid and lower lung. Decreasing   retrocardiac left lower lung atelectasis or infiltrate. Stable borderline enlarged cardiac silhouette. Normal pulmonary vascularity. Healing fractures left ribs 3-6. Findings discussed with Dr Avitia.   XR Chest Port 1 View    Impression     IMPRESSION: Right chest tube in stable position. No pneumothorax. Tiny right pleural effusion remains. Small left pleural effusion is stable. Atelectasis left lower lobe behind the heart is stable. Minimal diffuse interstitial infiltrates stable.    No new findings.   CT Chest w/o Contrast    Impression    IMPRESSION:   1.  New right chest tube. There is persistent small loculated pleural effusion at the right lung base which does not communicate with the chest tube. There is additional small loculated hydropneumothorax in the anterior right middle lobe that also does   not communicate with the chest tube. There is atelectasis in both lower lobes and in the right middle lobe.  2.  New small right pleural effusion.  3.  Patchy airspace opacities in the left upper lobe consistent with a small focus of pneumonia.     XR Chest Port 1 View    Impression    IMPRESSION:     A right pleural drain is similarly positioned with pigtail in the upper lateral aspect of the pleural space. There is a crimp in the tube at the level of the insertion projecting at the level of the posterolateral seventh intercostal space. Opacity with   indistinct borders in the lower third of the right chest likely reflects a combination of residual complex pleural fluid and atelectasis. Mild but diffuse thickening of the pleura including the apex and along the mediastinal pleura. Focal lucency lucency   in the medial basal right chest adjacent to the right atrial heart border corresponds to loculated anterior pneumothorax on the prior CT.    There are interstitial opacities in the right mid and upper lung consistent with mild interstitial edema. No progressive airspace opacity.    Pleural fluid and atelectasis atelectasis in the medial left lower lobe obscures the medial 20% of the left hemidiaphragm, not increased.    Unchanged mild enlargement of the cardiac silhouette.   XR Chest Port 1 View    Impression    IMPRESSION: A right lateral approach  pigtail catheter is again seen. No significant pneumothorax. Right basilar atelectasis is again seen. Mild interstitial edema has minimally decreased. The cardiomediastinal silhouette is at the upper limits of normal   in size.    XR Chest Port 1 View    Impression    IMPRESSION: The right chest pigtail catheter remains in place. A second loop has developed which could be external to the patient. This could also cause tube kinking. Recommend correlation with tube function. No pneumothorax. Stable moderate right and   mild left basilar opacities.    No significant effusion or pulmonary edema seen.   CT Chest w/o Contrast    Impression    IMPRESSION:     1.  Unchanged position of the right pleural drain. Localized visceral and parietal pleural thickening anterior to the right middle lobe and low anteromedial right upper lobe with adjacent lung consolidation.  2.  New heterogeneous attenuation debris layering dependently in the posterior right pleural space, most likely representing organized blood products.  3.  Unchanged circumferential pericardial effusion and small posteriorly layering left pleural effusion.     XR Chest Port 1 View    Impression    IMPRESSION: No change right chest tube with tip over the lateral right lung at the second intercostal space and a loop outside the patient. There may be a kink in the tube in the loop which is presumably external to the patient. No pneumothorax. No   change bibasilar opacities.   XR Chest Port 1 View    Impression    IMPRESSION: A right lateral approach pigtail catheter has not changed in position. No pneumothorax. Right pulmonary opacities have not significantly changed. Normal size of the heart.    XR Chest 1 View    Impression    IMPRESSION: Interval placement of 3 right-sided chest tubes with removal of the previously seen pigtail catheter on the right. No visible pneumothorax. There is patchy opacity at the right lung base which likely reflects atelectasis. No  obvious pleural   effusion. Retrocardiac opacity involving the medial left lower lobe is not significantly change with a trace left pleural effusion. Heart and mediastinal size are normal.     Most Recent 3 CBC's:  Recent Labs   Lab Test 11/28/21  0733 11/27/21  0619 11/26/21  0927   WBC 10.3 11.4* 15.5*   HGB 7.9* 7.7* 7.8*   MCV 99 98 99   * 590* 576*     Most Recent 3 BMP's:  Recent Labs   Lab Test 11/28/21  0733 11/27/21  0619 11/26/21  0927    139 136   POTASSIUM 3.7 3.7 3.6   CHLORIDE 104 104 103   CO2 26 27 26   BUN 6* 7* 8   CR 0.53* 0.53* 0.53*   ANIONGAP 11 8 7   EMIILANO 8.7 8.5 8.2*   GLC 94 91 129*     Most Recent 2 LFT's:  Recent Labs   Lab Test 11/10/21  1733 05/04/21  1350   AST 13 18   ALT <9 19   ALKPHOS 80 52   BILITOTAL 0.4 0.4       Recent Labs   Lab Test 06/21/21  1153   CHOL 284*   HDL 51   *   TRIG 197*     Recent Labs   Lab Test 06/21/21  1153 12/09/20  1039 06/08/20  0913   * 164* 174*     Recent Labs   Lab Test 11/23/21  0509      POTASSIUM 3.8   CHLORIDE 103   CO2 30   GLC 93   BUN 7*   CR 0.59*   GFRESTIMATED 88   EMILIANO 8.3*     No results for input(s): A1C in the last 21494 hours.  Recent Labs   Lab Test 11/23/21  0509 11/22/21  0532 11/21/21  0609   HGB 8.3* 8.4* 9.3*     Recent Labs   Lab Test 11/10/21  1145   TROPONINI 0.01     No results for input(s): BNP, NTBNPI, NTBNP in the last 59834 hours.  Recent Labs   Lab Test 05/04/21  1350   TSH 1.38     Recent Labs   Lab Test 11/10/21  1733   INR 1.44*       Ann Wen MD  [facility] Medicine Service  [phone]

## 2021-11-28 NOTE — PLAN OF CARE
Problem: Adult Inpatient Plan of Care  Goal: Patient-Specific Goal (Individualized)  Outcome: Improving  Pt walking in hallway this shift with physical therapy, tolerated well. 75 feet with walker.     Problem: Adult Inpatient Plan of Care  Goal: Optimal Comfort and Wellbeing  Outcome: Improving   Pt tolerating water seal well, O2 sats above 90 on room air.

## 2021-11-28 NOTE — PLAN OF CARE
Problem: Gas Exchange Impaired (Pulmonary Impairment)  Goal: Optimal Gas Exchange  Outcome: Improving     Problem: Pain Chronic (Persistent)  Goal: Acceptable Pain Control and Functional Ability  Outcome: Improving   Vs stable,Prn IV dilaudid and tylenol given at Hs with relief.slept most of the night.  chest tube site dressing intact.  chest tube #1 had 50 ml serosanguinous output from 7pm -7am.  chest tube #2 canister tipped over and unable to measure output.

## 2021-11-28 NOTE — PLAN OF CARE
Problem: Adult Inpatient Plan of Care  Goal: Patient-Specific Goal (Individualized)  Outcome: Improving  Chest tubes removed this morning by MD. Dressing intact, reinforced as needed.     Problem: Adult Inpatient Plan of Care  Goal: Optimal Comfort and Wellbeing  Outcome: Improving   Pt c/o feeling light headed at 1100 this morning, VS WNL, walking in hallway deferred at this time. Will reapproach later.     Problem: Pain Chronic (Persistent)  Goal: Acceptable Pain Control and Functional Ability  Outcome: Improving  Pt denied any pain this shift. Reported pain in right shoulder and back have improved post chest tube removal.      Problem: Activity Intolerance (Pulmonary Impairment)  Goal: Improved Activity Tolerance  Outcome: Improving   Pt declined walking or working with physical therapy today due to having loose stools. MD updated, new orders, see MAR. Pt later able to tolerate walk with physical therapy near end of shift.

## 2021-11-29 ENCOUNTER — APPOINTMENT (OUTPATIENT)
Dept: OCCUPATIONAL THERAPY | Facility: HOSPITAL | Age: 76
DRG: 163 | End: 2021-11-29
Payer: COMMERCIAL

## 2021-11-29 ENCOUNTER — APPOINTMENT (OUTPATIENT)
Dept: RADIOLOGY | Facility: HOSPITAL | Age: 76
DRG: 163 | End: 2021-11-29
Attending: SURGERY
Payer: COMMERCIAL

## 2021-11-29 LAB
ANION GAP SERPL CALCULATED.3IONS-SCNC: 8 MMOL/L (ref 5–18)
BUN SERPL-MCNC: 5 MG/DL (ref 8–28)
CALCIUM SERPL-MCNC: 8.7 MG/DL (ref 8.5–10.5)
CHLORIDE BLD-SCNC: 107 MMOL/L (ref 98–107)
CO2 SERPL-SCNC: 25 MMOL/L (ref 22–31)
CREAT SERPL-MCNC: 0.56 MG/DL (ref 0.6–1.1)
ERYTHROCYTE [DISTWIDTH] IN BLOOD BY AUTOMATED COUNT: 14.3 % (ref 10–15)
GFR SERPL CREATININE-BSD FRML MDRD: 90 ML/MIN/1.73M2
GLUCOSE BLD-MCNC: 93 MG/DL (ref 70–125)
HCT VFR BLD AUTO: 27 % (ref 35–47)
HGB BLD-MCNC: 8.1 G/DL (ref 11.7–15.7)
MAGNESIUM SERPL-MCNC: 1.9 MG/DL (ref 1.8–2.6)
MCH RBC QN AUTO: 29.6 PG (ref 26.5–33)
MCHC RBC AUTO-ENTMCNC: 30 G/DL (ref 31.5–36.5)
MCV RBC AUTO: 99 FL (ref 78–100)
PLATELET # BLD AUTO: 569 10E3/UL (ref 150–450)
POTASSIUM BLD-SCNC: 3.8 MMOL/L (ref 3.5–5)
RBC # BLD AUTO: 2.74 10E6/UL (ref 3.8–5.2)
SODIUM SERPL-SCNC: 140 MMOL/L (ref 136–145)
WBC # BLD AUTO: 8.3 10E3/UL (ref 4–11)

## 2021-11-29 PROCEDURE — 36415 COLL VENOUS BLD VENIPUNCTURE: CPT | Performed by: SURGERY

## 2021-11-29 PROCEDURE — 250N000011 HC RX IP 250 OP 636: Performed by: INTERNAL MEDICINE

## 2021-11-29 PROCEDURE — 99231 SBSQ HOSP IP/OBS SF/LOW 25: CPT | Performed by: FAMILY MEDICINE

## 2021-11-29 PROCEDURE — 80048 BASIC METABOLIC PNL TOTAL CA: CPT | Performed by: SURGERY

## 2021-11-29 PROCEDURE — 71045 X-RAY EXAM CHEST 1 VIEW: CPT

## 2021-11-29 PROCEDURE — 83735 ASSAY OF MAGNESIUM: CPT | Performed by: INTERNAL MEDICINE

## 2021-11-29 PROCEDURE — 250N000013 HC RX MED GY IP 250 OP 250 PS 637: Performed by: INTERNAL MEDICINE

## 2021-11-29 PROCEDURE — 120N000001 HC R&B MED SURG/OB

## 2021-11-29 PROCEDURE — 97535 SELF CARE MNGMENT TRAINING: CPT | Mod: GO

## 2021-11-29 PROCEDURE — 250N000013 HC RX MED GY IP 250 OP 250 PS 637: Performed by: SURGERY

## 2021-11-29 PROCEDURE — 97110 THERAPEUTIC EXERCISES: CPT | Mod: GO

## 2021-11-29 PROCEDURE — 85027 COMPLETE CBC AUTOMATED: CPT | Performed by: SURGERY

## 2021-11-29 RX ADMIN — OMEPRAZOLE 40 MG: 20 CAPSULE, DELAYED RELEASE ORAL at 06:26

## 2021-11-29 RX ADMIN — TRAZODONE HYDROCHLORIDE 150 MG: 50 TABLET ORAL at 21:39

## 2021-11-29 RX ADMIN — ACETAMINOPHEN 650 MG: 325 SOLUTION ORAL at 21:39

## 2021-11-29 RX ADMIN — VALACYCLOVIR 1000 MG: 500 TABLET, FILM COATED ORAL at 21:39

## 2021-11-29 RX ADMIN — CEFDINIR 300 MG: 300 CAPSULE ORAL at 21:39

## 2021-11-29 RX ADMIN — ENOXAPARIN SODIUM 40 MG: 40 INJECTION SUBCUTANEOUS at 21:40

## 2021-11-29 RX ADMIN — Medication 1 TABLET: at 21:39

## 2021-11-29 RX ADMIN — Medication 250 MG: at 10:17

## 2021-11-29 RX ADMIN — Medication 1 TABLET: at 10:15

## 2021-11-29 RX ADMIN — CEFDINIR 300 MG: 300 CAPSULE ORAL at 10:15

## 2021-11-29 RX ADMIN — Medication 1 CAPSULE: at 21:39

## 2021-11-29 RX ADMIN — THERA TABS 1 TABLET: TAB at 10:15

## 2021-11-29 RX ADMIN — SERTRALINE HYDROCHLORIDE 200 MG: 50 TABLET ORAL at 10:16

## 2021-11-29 RX ADMIN — LOPERAMIDE HYDROCHLORIDE 2 MG: 2 CAPSULE ORAL at 10:16

## 2021-11-29 ASSESSMENT — ACTIVITIES OF DAILY LIVING (ADL)
ADLS_ACUITY_SCORE: 10
ADLS_ACUITY_SCORE: 12
ADLS_ACUITY_SCORE: 10
ADLS_ACUITY_SCORE: 12
ADLS_ACUITY_SCORE: 10
ADLS_ACUITY_SCORE: 12
ADLS_ACUITY_SCORE: 12
ADLS_ACUITY_SCORE: 10
ADLS_ACUITY_SCORE: 12
ADLS_ACUITY_SCORE: 10
ADLS_ACUITY_SCORE: 12

## 2021-11-29 NOTE — PLAN OF CARE
Problem: Adult Inpatient Plan of Care  Goal: Plan of Care Review  Outcome: Improving     Problem: Pain Chronic (Persistent)  Goal: Acceptable Pain Control and Functional Ability  Outcome: Improving     Problem: Gas Exchange Impaired (Pulmonary Impairment)  Goal: Optimal Gas Exchange  Outcome: Improving   Patient reported only mild discomfort at the site of her old chest tube site. She declined medication. Lungs are clear and she tolerated RA

## 2021-11-29 NOTE — PROGRESS NOTES
Progress Note    Assessment/Plan  cxc ok-good resp effort-some loose stool  Principal Problem:    Abscess of middle lobe of right lung with pneumonia (H)  Active Problems:    Breast cancer, stage 1, right (H)    Pneumonia due to infectious organism, unspecified laterality, unspecified part of lung    Infection due to 2019 novel coronavirus    Acute respiratory failure with hypoxia (H)    Leukocytosis, unspecified type      Subjective  aas above  Objective    Vital signs in last 24 hours  Temp:  [98.1  F (36.7  C)-99.7  F (37.6  C)] 99.2  F (37.3  C)  Pulse:  [] 95  Resp:  [16-18] 18  BP: (110-150)/(45-61) 119/48  SpO2:  [92 %-97 %] 92 %  Weight:   [unfilled]    Intake/Output last 3 shifts  No intake/output data recorded.  Intake/Output this shift:  No intake/output data recorded.      Physical Exam  Good resp effort-some rhonchi rt    Pertinent Labs   Lab Results   Component Value Date    WBC 8.3 11/29/2021    HGB 8.1 (L) 11/29/2021    HCT 27.0 (L) 11/29/2021    MCV 99 11/29/2021     (H) 11/29/2021             Pertinent Radiology     [unfilled]        Timbo Zuluaga MD

## 2021-11-29 NOTE — PLAN OF CARE
Problem: Adult Inpatient Plan of Care  Goal: Plan of Care Review  Outcome: Improving  Pt denied any pain this shift. No loose stools reported post immodium administration.     Problem: Adult Inpatient Plan of Care  Goal: Readiness for Transition of Care  Outcome: Improving   Pt tolerating removal of chest tubes. O2 sats WNL on room air.     Problem: Adult Inpatient Plan of Care  Goal: Optimal Comfort and Wellbeing  Outcome: Improving   Pt declined walking this evening, stating she felt tired and warm. Pt encouraged to walk tomorrow. Encouraged to reach goal of walking 3x day

## 2021-11-29 NOTE — PROGRESS NOTES
Care Management Follow Up    Length of Stay (days): 19    Expected Discharge Date: 11/30/2021     Concerns to be Addressed: discharge planning       Patient plan of care discussed at interdisciplinary rounds: Yes    Anticipated Discharge Disposition: Transitional Care     Anticipated Discharge Services:    Anticipated Discharge DME:      Patient/family educated on Medicare website which has current facility and service quality ratings:  Yes    Education Provided on the Discharge Plan:  Yes    Patient/Family in Agreement with the Plan: yes    Referrals Placed by CM/SW: Post Acute Facilities    Private pay costs discussed: Not applicable    Additional Information: DB spoke with pt's SCOT Obrien and updated her regarding search for TCU and anticipated discharge date for pt.  DB spoke with Nahed at [facility] - cannot take admits due to staffing.   DB spoke with Toma from [facility] - an [facility] employee needed TCU bed and gets priority - took only bed - no more openings for one to two weeks.  DB spoke with Yudith at [facility]- no openings until later in week (Thursday/Friday).  DB spoke with [facility] liasion [facility] Dolores regarding referral to [facility] - she will check with facility regarding bed status and get back to SW - thinks maybe they have available bed tomorrow?  SW called [facility] later to check on status and had to leave message for her.  Will likely need more TCU referral choices.  Family to transport.  Needs PAS.      NIKO Davis, JESSICA 11/29/21 6:01 PM

## 2021-11-29 NOTE — PLAN OF CARE
Problem: Adult Inpatient Plan of Care  Goal: Optimal Comfort and Wellbeing  Outcome: Improving  Denies pain, denies shortness of breath, Dressing over old CT insertion site changed per patient request, moderated amount of drainage noted on dressing (serous/pink). Had 3 watery brown stools this shift, Imodium given x 1.

## 2021-11-30 ENCOUNTER — APPOINTMENT (OUTPATIENT)
Dept: PHYSICAL THERAPY | Facility: HOSPITAL | Age: 76
DRG: 163 | End: 2021-11-30
Payer: COMMERCIAL

## 2021-11-30 ENCOUNTER — APPOINTMENT (OUTPATIENT)
Dept: OCCUPATIONAL THERAPY | Facility: HOSPITAL | Age: 76
DRG: 163 | End: 2021-11-30
Payer: COMMERCIAL

## 2021-11-30 LAB
ANION GAP SERPL CALCULATED.3IONS-SCNC: 3 MMOL/L (ref 5–18)
BUN SERPL-MCNC: 6 MG/DL (ref 8–28)
CALCIUM SERPL-MCNC: 8.3 MG/DL (ref 8.5–10.5)
CHLORIDE BLD-SCNC: 110 MMOL/L (ref 98–107)
CO2 SERPL-SCNC: 30 MMOL/L (ref 22–31)
CREAT SERPL-MCNC: 0.63 MG/DL (ref 0.6–1.1)
ERYTHROCYTE [DISTWIDTH] IN BLOOD BY AUTOMATED COUNT: 14.6 % (ref 10–15)
GFR SERPL CREATININE-BSD FRML MDRD: 86 ML/MIN/1.73M2
GLUCOSE BLD-MCNC: 95 MG/DL (ref 70–125)
HCT VFR BLD AUTO: 23.8 % (ref 35–47)
HGB BLD-MCNC: 7.3 G/DL (ref 11.7–15.7)
MAGNESIUM SERPL-MCNC: 1.8 MG/DL (ref 1.8–2.6)
MCH RBC QN AUTO: 30.4 PG (ref 26.5–33)
MCHC RBC AUTO-ENTMCNC: 30.7 G/DL (ref 31.5–36.5)
MCV RBC AUTO: 99 FL (ref 78–100)
PLATELET # BLD AUTO: 501 10E3/UL (ref 150–450)
POTASSIUM BLD-SCNC: 3.6 MMOL/L (ref 3.5–5)
RBC # BLD AUTO: 2.4 10E6/UL (ref 3.8–5.2)
SODIUM SERPL-SCNC: 143 MMOL/L (ref 136–145)
WBC # BLD AUTO: 7.8 10E3/UL (ref 4–11)

## 2021-11-30 PROCEDURE — 120N000001 HC R&B MED SURG/OB

## 2021-11-30 PROCEDURE — 97535 SELF CARE MNGMENT TRAINING: CPT | Mod: GO

## 2021-11-30 PROCEDURE — 250N000013 HC RX MED GY IP 250 OP 250 PS 637: Performed by: SURGERY

## 2021-11-30 PROCEDURE — 250N000013 HC RX MED GY IP 250 OP 250 PS 637: Performed by: INTERNAL MEDICINE

## 2021-11-30 PROCEDURE — 99232 SBSQ HOSP IP/OBS MODERATE 35: CPT | Performed by: FAMILY MEDICINE

## 2021-11-30 PROCEDURE — 97110 THERAPEUTIC EXERCISES: CPT | Mod: GO

## 2021-11-30 PROCEDURE — 250N000011 HC RX IP 250 OP 636: Performed by: INTERNAL MEDICINE

## 2021-11-30 PROCEDURE — 97116 GAIT TRAINING THERAPY: CPT | Mod: GP | Performed by: PHYSICAL THERAPIST

## 2021-11-30 PROCEDURE — 80048 BASIC METABOLIC PNL TOTAL CA: CPT | Performed by: SURGERY

## 2021-11-30 PROCEDURE — 83735 ASSAY OF MAGNESIUM: CPT | Performed by: FAMILY MEDICINE

## 2021-11-30 PROCEDURE — 85027 COMPLETE CBC AUTOMATED: CPT | Performed by: SURGERY

## 2021-11-30 PROCEDURE — 36415 COLL VENOUS BLD VENIPUNCTURE: CPT | Performed by: SURGERY

## 2021-11-30 RX ADMIN — CEFDINIR 300 MG: 300 CAPSULE ORAL at 09:14

## 2021-11-30 RX ADMIN — Medication 1 CAPSULE: at 17:50

## 2021-11-30 RX ADMIN — SERTRALINE HYDROCHLORIDE 200 MG: 50 TABLET ORAL at 09:14

## 2021-11-30 RX ADMIN — ENOXAPARIN SODIUM 40 MG: 40 INJECTION SUBCUTANEOUS at 20:51

## 2021-11-30 RX ADMIN — Medication 1 TABLET: at 20:52

## 2021-11-30 RX ADMIN — THERA TABS 1 TABLET: TAB at 09:14

## 2021-11-30 RX ADMIN — Medication 1 TABLET: at 09:14

## 2021-11-30 RX ADMIN — Medication 250 MG: at 09:14

## 2021-11-30 RX ADMIN — TRAZODONE HYDROCHLORIDE 150 MG: 50 TABLET ORAL at 20:52

## 2021-11-30 RX ADMIN — OMEPRAZOLE 40 MG: 20 CAPSULE, DELAYED RELEASE ORAL at 06:45

## 2021-11-30 RX ADMIN — ACETAMINOPHEN 650 MG: 325 SOLUTION ORAL at 20:52

## 2021-11-30 RX ADMIN — VALACYCLOVIR 1000 MG: 500 TABLET, FILM COATED ORAL at 20:52

## 2021-11-30 RX ADMIN — CEFDINIR 300 MG: 300 CAPSULE ORAL at 20:52

## 2021-11-30 ASSESSMENT — ACTIVITIES OF DAILY LIVING (ADL)
ADLS_ACUITY_SCORE: 12
ADLS_ACUITY_SCORE: 10
ADLS_ACUITY_SCORE: 13
ADLS_ACUITY_SCORE: 10
ADLS_ACUITY_SCORE: 13
ADLS_ACUITY_SCORE: 10
ADLS_ACUITY_SCORE: 10
ADLS_ACUITY_SCORE: 12
ADLS_ACUITY_SCORE: 10
ADLS_ACUITY_SCORE: 13
ADLS_ACUITY_SCORE: 10
ADLS_ACUITY_SCORE: 13
ADLS_ACUITY_SCORE: 10
ADLS_ACUITY_SCORE: 12
ADLS_ACUITY_SCORE: 12
ADLS_ACUITY_SCORE: 10
ADLS_ACUITY_SCORE: 13

## 2021-11-30 NOTE — PLAN OF CARE
Problem: Adult Inpatient Plan of Care  Goal: Plan of Care Review  Outcome: Improving  Goal: Patient-Specific Goal (Individualized)  Outcome: Improving  Goal: Absence of Hospital-Acquired Illness or Injury  Outcome: Improving  Intervention: Identify and Manage Fall Risk  Recent Flowsheet Documentation  Taken 11/29/2021 1805 by July Spann RN  Safety Promotion/Fall Prevention:   nonskid shoes/slippers when out of bed   safety round/check completed   room organization consistent   clutter free environment maintained   room door open   room near nurse's station   mobility aid in reach   assistive device/personal items within reach  Intervention: Prevent and Manage VTE (Venous Thromboembolism) Risk  Recent Flowsheet Documentation  Taken 11/29/2021 1805 by July Spann RN  VTE Prevention/Management: pneumatic compression device  Goal: Optimal Comfort and Wellbeing  Outcome: Improving  Goal: Readiness for Transition of Care  Outcome: Improving     Problem: Fluid Imbalance (Pneumonia)  Goal: Fluid Balance  Outcome: Improving     Problem: Respiratory Compromise (Pneumonia)  Goal: Effective Oxygenation and Ventilation  Outcome: Improving  Intervention: Promote Airway Secretion Clearance  Recent Flowsheet Documentation  Taken 11/29/2021 1805 by July Spann RN  Cough And Deep Breathing: done with encouragement     Problem: Activity Intolerance (Pulmonary Impairment)  Goal: Improved Activity Tolerance  Outcome: Improving     Problem: Airway Clearance Ineffective (Pulmonary Impairment)  Goal: Effective Airway Clearance  Outcome: Improving     Problem: Gas Exchange Impaired (Pulmonary Impairment)  Goal: Optimal Gas Exchange  Outcome: Improving     Problem: Pain Chronic (Persistent)  Goal: Acceptable Pain Control and Functional Ability  Outcome: Improving  Intervention: Develop Pain Management Plan  Recent Flowsheet Documentation  Taken 11/29/2021 1805 by July Spann RN  Pain Management Interventions:  declines  Intervention: Manage Persistent Pain  Recent Flowsheet Documentation  Taken 11/29/2021 1805 by July Spann, RN  Medication Review/Management: medications reviewed     Problem: Infection (Pneumonia)  Goal: Resolution of Infection Signs and Symptoms  Outcome: Declining  Low-grade temp 100.1 on shift, gave PRN Tylenol. Receiving PO ABX. Lidocaine patches discontinued, as patient having minimal pain since chest tube removed.  No further diarrhea reported on shift after Imodium given this AM.

## 2021-11-30 NOTE — PROGRESS NOTES
[facility]    Medicine Progress Note - Hospitalist Service       Date of Admission:  11/10/2021    Assessment & Plan         Patient is a 77-year-old female with a history of stage I breast cancer s/p lumpectomy and radiation on 2015, depression, GERD, OA right-sided chest pain and dry cough and diagnosed with breakthrough COVID-19 infection on 10/28.  Also presented on 11/10 with several days of worsening right-sided pleuritic chest pain cough low-grade fevers chills at home found to have a complex parapneumonic effusion pneumonia with chest tube placement.        Pneumonia/complex parapneumonic effusion  -Likely due to post viral strep pneumonia.  Right pleural effusion was drained at bedside 11/10 but effusion recurred with chest tube placement 11/13.  History of failed lytics.  - performed thoracoscopy surgery on 11/23 and you chest tubes placed  -Chest tubes removed on 11/29 by surgery with monitoring overnight for stability  -IV ceftriaxone was changed to p.o.  -Currents incentive spirometry    Chest pain  -Secondary to above.  Was seen by pain team this admission.  Pain is better after chest tubes removed    Acute respiratory failure secondary to above and has been off oxygen.    Recovered COVID-19  -Recovered since 10/28.  Was a breakthrough case isolation stopped on 11/18  -she can have booster shot of moderna prior to discharge.    GERD/heartburn  -Protonix changed to omeprazole per patient request    Constipation  -Continue bowel regimen prn    Awaiting TCU bed    Normocytic anemia  -Hemoglobin has been between 7 and 8 throughout most of the admission       Diet: Regular Diet Adult  Snacks/Supplements Adult: Ensure Clear; Between Meals    DVT Prophylaxis: Enoxaparin (Lovenox) SQ  Chung Catheter: Not present  Central Lines: None  Code Status: Full Code      Disposition Plan   Expected discharge: 12/1/21  recommended to transitional care unit once bed is obtained     The  patient's care was discussed with the Patient.    Lm Damon MD  Hospitalist Service  [facility]  Securely message with the [employer] Web Console (learn more here)  Text page via [employer] Paging/Directory    This note was written with the Dragon audio recording device, any spelling or grammatical errors are unintentional.      Clinically Significant Risk Factors Present on Admission                ______________________________________________________________________    Interval History   Patient feeling remarkably pretty well today.  Has been using incentive spirometer is gone up to 1000.  She is using it hourly.  We discussed her pain is being well controlled now that the chest tube has been taken out.  She feels tired but no significant shortness of breath at rest.    The 10 point Review of Systems is negative other than noted in the HPI or here.     Data reviewed today: I reviewed all medications, new labs and imaging results over the last 24 hours.     Physical Exam   Vital Signs: Temp: 99.1  F (37.3  C) Temp src: Oral BP: 130/58 Pulse: 91   Resp: 16 SpO2: 93 % O2 Device: None (Room air)    Weight: 134 lbs 11.2 oz    Physical Examination:   General appearance - alert, well appearing, and in no distress  Mental status - alert, oriented to person, place, and time, normal mood, behavior, speech, dress, motor activity, and thought processes  HEENT - sclera anicteric, left eye normal, right eye normal, nares normal and patent, no erythema, discharge or polyps and sinuses normal and nontender, mucous membranes moist, pharynx normal without lesions, dental hygiene good and tongue normal  Neck - supple, no significant adenopathy  Respiratory -some decreased breath sounds over the right lower lobe otherwise clear to auscultation  Cardiac - normal rate, regular rhythm, normal S1, S2, no murmurs  Abdomen - soft, nontender, nondistended,   Neurological - alert, oriented, normal speech, no focal  findings or movement disorder noted  Musculoskeletal - no joint tenderness, deformity or swelling, full range of motion without pain  Extremities - peripheral pulses normal, no pedal edema,   Skin - normal coloration and turgor, no rashes, no suspicious skin lesions noted      Data   Recent Labs   Lab 11/30/21  0557 11/29/21  0557 11/28/21  0733   WBC 7.8 8.3 10.3   HGB 7.3* 8.1* 7.9*   MCV 99 99 99   * 569* 577*    140 141   POTASSIUM 3.6 3.8 3.7   CHLORIDE 110* 107 104   CO2 30 25 26   BUN 6* 5* 6*   CR 0.63 0.56* 0.53*   ANIONGAP 3* 8 11   EMILIANO 8.3* 8.7 8.7   GLC 95 93 94         No results found for this or any previous visit (from the past 24 hour(s)).    Medications     - MEDICATION INSTRUCTIONS -         calcium carbonate-vitamin D  1 tablet Oral BID     cefdinir  300 mg Oral Q12H SRINIVASAN     enoxaparin ANTICOAGULANT  40 mg Subcutaneous Q24H     lactobacillus rhamnosus (GG)  1 capsule Oral Daily     multivitamin, therapeutic  1 tablet Oral Daily     omeprazole  40 mg Oral QAM AC     sertraline  200 mg Oral Daily     sodium chloride (PF)  3 mL Intracatheter Q8H     traZODone  150 mg Oral At Bedtime     valACYclovir  1,000 mg Oral At Bedtime     vitamin C  250 mg Oral Daily

## 2021-11-30 NOTE — PLAN OF CARE
Problem: Adult Inpatient Plan of Care  Goal: Patient-Specific Goal (Individualized)  Outcome: No Change     Pt had uneventful night. Slept well. No pain or discomfort. Anticipated discharge to TCU 11/30 or 12/1.

## 2021-11-30 NOTE — PROGRESS NOTES
Chief Complaint   Patient presents with     Follow Up For     Return HF follow up     Medications reviewed and vitals performed.  Yaneth Amezquita CMA    [facility]    PROGRESS NOTE - Hospitalist Service    Assessment and Plan    Principal Problem:    Abscess of middle lobe of right lung with pneumonia (H)  Active Problems:    Breast cancer, stage 1, right (H)    Pneumonia due to infectious organism, unspecified laterality, unspecified part of lung    Infection due to 2019 novel coronavirus    Acute respiratory failure with hypoxia (H)    Leukocytosis, unspecified type    Salome Maravilla is a 76 year old old female with h/o stage 1 breast cancer s/p lumpectomy and radiation in 2015, depression, GERD, OA, presented with right sided chest pain and dry cough. She was diagnosed with breakthrough covid-19 infection on 10/28 and presented on 11/10 with several days of worsening right-sided pleuritic chest pain, cough, low grade fevers and chills at home.     Pneumonia/Complex parapneumonic effusion  -- Likely due to post viral strep PNA. The right pleural effusion was drained at bedside 11/10  but effusion recurred   -- Chest tube placement 11/13 lytics previously ineffective   - Gen surgery Dr Zuluaga performed thorascopic surgery on 11/23 and new CTs placed   - CT removed today by surgery, monitor overnight, repeat CXR in am and if stable can discharge   - IV ceftriaxone changed to PO   - lovenox ok  per Dr. Gutierrez     Chest pain - secondary to above  - pain team seen  - pain better after tubes removed, stop IV and continue PO decreased dose      Acute resp failure due to above   -- Cxr improving   - has been off O2     Recovered COVID-19 - since 10/28, breakthrough case  -- Completed remdesivir but no steroids per pulm for now.   -- Recovered covid now. Stopped isolation 11/18.     GERD/Heart burn  -- Changed protonix to omeprazole per patient's request. Symptoms improved      Constipation:  - bowel regiment   - resolved now has loose stools, no WBC or abd pain, low volume so doubt infectious and will not test for c.diff at this  point.     Anemia:  -- Drop in hgb noted.  - trend labs     COVID-19 PCR Results    COVID-19 PCR Results 10/28/21   SARS CoV2 PCR Positive (A)   (A) Abnormal value       Comments are available for some flowsheets but are not being displayed.         COVID-19 Antibody Results, Testing for Immunity    COVID-19 Antibody Results, Testing for Immunity   No data to display.            VTE prophylaxis:  Pneumatic Compression Devices, Lovenox   DIET: Orders Placed This Encounter      Regular Diet Adult    Drains/Lines: CT removed   Weight bearing status: WBAT  Disposition/Barriers to discharge: anticipated discharge tomorrow if TCU bed found   Code Status: Full Code    Subjective:  Feeling better after CT out, complains of loose stools and was previously constipated. Bowel regimen held.  Increasing activity level.      PHYSICAL EXAM  Temp:  [98.1  F (36.7  C)-99.2  F (37.3  C)] 98.9  F (37.2  C)  Pulse:  [93-97] 93  Resp:  [18] 18  BP: (102-150)/(48-61) 102/51  SpO2:  [92 %-97 %] 94 %  Wt Readings from Last 1 Encounters:   11/26/21 61.1 kg (134 lb 11.2 oz)       Intake/Output Summary (Last 24 hours) at 11/24/2021 0846  Last data filed at 11/24/2021 0745  Gross per 24 hour   Intake 5201 ml   Output 1285 ml   Net 3916 ml      Body mass index is 22.07 kg/m .  Physical Exam  Constitutional:       Appearance: Normal appearance.   HENT:      Head: Normocephalic and atraumatic.   Cardiovascular:      Rate and Rhythm: Normal rate and regular rhythm.      Pulses: Normal pulses.   Pulmonary:      Effort: Pulmonary effort is normal.      Comments: CT removed, CTA at right base no crackles or rhonci   Abdominal:      General: Bowel sounds are normal.      Palpations: Abdomen is soft.   Musculoskeletal:      Right lower leg: No edema.      Left lower leg: No edema.   Skin:     General: Skin is warm and dry.      Capillary Refill: Capillary refill takes less than 2 seconds.   Neurological:      General: No focal deficit present.       Mental Status: She is alert and oriented to person, place, and time. Mental status is at baseline.       PERTINENT LABS/IMAGING:  Results for orders placed or performed during the hospital encounter of 11/10/21   CT Chest Pulmonary Embolism w Contrast    Impression    IMPRESSION:  1.  Above findings most suggestive of moderately severe bacterial pneumonia right middle lobe with possible associated tiny microabscess within it.    2.  Small right pleural effusion and some focal pleural reaction immediately adjacent to the pneumonia anteriorly.   XR Chest Port 1 View    Impression    IMPRESSION: Infiltrate right lung base by CT mostly in the right middle lobe. Small right pleural effusion. No pneumothorax. Left lung is clear. Scoliosis. Healing left rib fractures.   XR Chest Port 1 View    Impression    IMPRESSION: The right pleural effusion is increasing. Stable heart size. No pulmonary edema. Increasing left medial basilar opacity could represent atelectasis or pneumonia.   IR Chest Tube Place Non Tunneled Right    Impression    IMPRESSION:  Successful right sided 14 [place] chest tube placement under ultrasound guidance.        XR Chest Port 1 View    Impression    IMPRESSION: Interval placement of right pigtail pleural drain. Decreased small to moderate right pleural effusion. Improved aeration of the right mid and lower lung. Decreasing retrocardiac left lower lung atelectasis or infiltrate. Stable borderline   enlarged cardiac silhouette. Normal pulmonary vascularity. No pneumothorax.   XR Chest Port 1 View    Impression    IMPRESSION: Right pigtail pleural drain with trace pneumothorax near tube insertion site but no new significant pneumothorax. Further decrease in the small right pleural effusion and improved aeration of the right mid and lower lung. Decreasing   retrocardiac left lower lung atelectasis or infiltrate. Stable borderline enlarged cardiac silhouette. Normal pulmonary vascularity. Healing fractures  left ribs 3-6. Findings discussed with Dr Avitia.   XR Chest Port 1 View    Impression    IMPRESSION: Right chest tube in stable position. No pneumothorax. Tiny right pleural effusion remains. Small left pleural effusion is stable. Atelectasis left lower lobe behind the heart is stable. Minimal diffuse interstitial infiltrates stable.    No new findings.   CT Chest w/o Contrast    Impression    IMPRESSION:   1.  New right chest tube. There is persistent small loculated pleural effusion at the right lung base which does not communicate with the chest tube. There is additional small loculated hydropneumothorax in the anterior right middle lobe that also does   not communicate with the chest tube. There is atelectasis in both lower lobes and in the right middle lobe.  2.  New small right pleural effusion.  3.  Patchy airspace opacities in the left upper lobe consistent with a small focus of pneumonia.     XR Chest Port 1 View    Impression    IMPRESSION:     A right pleural drain is similarly positioned with pigtail in the upper lateral aspect of the pleural space. There is a crimp in the tube at the level of the insertion projecting at the level of the posterolateral seventh intercostal space. Opacity with   indistinct borders in the lower third of the right chest likely reflects a combination of residual complex pleural fluid and atelectasis. Mild but diffuse thickening of the pleura including the apex and along the mediastinal pleura. Focal lucency lucency   in the medial basal right chest adjacent to the right atrial heart border corresponds to loculated anterior pneumothorax on the prior CT.    There are interstitial opacities in the right mid and upper lung consistent with mild interstitial edema. No progressive airspace opacity.    Pleural fluid and atelectasis atelectasis in the medial left lower lobe obscures the medial 20% of the left hemidiaphragm, not increased.    Unchanged mild enlargement of the  cardiac silhouette.   XR Chest Port 1 View    Impression    IMPRESSION: A right lateral approach pigtail catheter is again seen. No significant pneumothorax. Right basilar atelectasis is again seen. Mild interstitial edema has minimally decreased. The cardiomediastinal silhouette is at the upper limits of normal   in size.    XR Chest Port 1 View    Impression    IMPRESSION: The right chest pigtail catheter remains in place. A second loop has developed which could be external to the patient. This could also cause tube kinking. Recommend correlation with tube function. No pneumothorax. Stable moderate right and   mild left basilar opacities.    No significant effusion or pulmonary edema seen.   CT Chest w/o Contrast    Impression    IMPRESSION:     1.  Unchanged position of the right pleural drain. Localized visceral and parietal pleural thickening anterior to the right middle lobe and low anteromedial right upper lobe with adjacent lung consolidation.  2.  New heterogeneous attenuation debris layering dependently in the posterior right pleural space, most likely representing organized blood products.  3.  Unchanged circumferential pericardial effusion and small posteriorly layering left pleural effusion.     XR Chest Port 1 View    Impression    IMPRESSION: No change right chest tube with tip over the lateral right lung at the second intercostal space and a loop outside the patient. There may be a kink in the tube in the loop which is presumably external to the patient. No pneumothorax. No   change bibasilar opacities.   XR Chest Port 1 View    Impression    IMPRESSION: A right lateral approach pigtail catheter has not changed in position. No pneumothorax. Right pulmonary opacities have not significantly changed. Normal size of the heart.    XR Chest 1 View    Impression    IMPRESSION: Interval placement of 3 right-sided chest tubes with removal of the previously seen pigtail catheter on the right. No visible  pneumothorax. There is patchy opacity at the right lung base which likely reflects atelectasis. No obvious pleural   effusion. Retrocardiac opacity involving the medial left lower lobe is not significantly change with a trace left pleural effusion. Heart and mediastinal size are normal.     Most Recent 3 CBC's:  Recent Labs   Lab Test 11/29/21  0557 11/28/21  0733 11/27/21  0619   WBC 8.3 10.3 11.4*   HGB 8.1* 7.9* 7.7*   MCV 99 99 98   * 577* 590*     Most Recent 3 BMP's:  Recent Labs   Lab Test 11/29/21  0557 11/28/21  0733 11/27/21  0619    141 139   POTASSIUM 3.8 3.7 3.7   CHLORIDE 107 104 104   CO2 25 26 27   BUN 5* 6* 7*   CR 0.56* 0.53* 0.53*   ANIONGAP 8 11 8   EMILIANO 8.7 8.7 8.5   GLC 93 94 91     Most Recent 2 LFT's:  Recent Labs   Lab Test 11/10/21  1733 05/04/21  1350   AST 13 18   ALT <9 19   ALKPHOS 80 52   BILITOTAL 0.4 0.4       Recent Labs   Lab Test 06/21/21  1153   CHOL 284*   HDL 51   *   TRIG 197*     Recent Labs   Lab Test 06/21/21  1153 12/09/20  1039 06/08/20  0913   * 164* 174*     Recent Labs   Lab Test 11/23/21  0509      POTASSIUM 3.8   CHLORIDE 103   CO2 30   GLC 93   BUN 7*   CR 0.59*   GFRESTIMATED 88   EMILIANO 8.3*     No results for input(s): A1C in the last 72728 hours.  Recent Labs   Lab Test 11/23/21  0509 11/22/21  0532 11/21/21  0609   HGB 8.3* 8.4* 9.3*     Recent Labs   Lab Test 11/10/21  1145   TROPONINI 0.01     No results for input(s): BNP, NTBNPI, NTBNP in the last 55205 hours.  Recent Labs   Lab Test 05/04/21  1350   TSH 1.38     Recent Labs   Lab Test 11/10/21  1733   INR 1.44*

## 2021-11-30 NOTE — PLAN OF CARE
Problem: Adult Inpatient Plan of Care  Goal: Optimal Comfort and Wellbeing  Outcome: Improving  Denies pain, no drainage noted on Chest dressing when changed it this afternoon, temps 99.1 and 98.7 orally, Encouraged frequent use of the incentive spirometer. Using up to 750-1000 ml. Lung sounds diminished in the bases. Occasional congested cough, Hgb 7.3 pale and tired today. Up to bathroom for one watery liquid stool. Refused offer of Imodium. Encouraged increased protein and supplement intake. Updated Camille her daughter-in-law

## 2021-11-30 NOTE — PROGRESS NOTES
"CLINICAL NUTRITION SERVICES - REASSESSMENT NOTE     Nutrition Prescription    RECOMMENDATIONS FOR MDs/PROVIDERS TO ORDER:  none    Malnutrition Status:    Severe    Recommendations already ordered by Registered Dietitian (RD):  None    Future/Additional Recommendations:  Continue to offer and encourage supplements     EVALUATION OF THE PROGRESS TOWARD GOALS   Diet: Regular diet  Supplement: Ensure clear (240 kcal, 8 g protein)     Intake:   % of meals documented. Only 1 meal/day documented past 2 days.  Pt ordering 3 meals/day at 975-1823 kcal, 31-45 g protein/day with supplement    Pt c/o diarrhea past couple of days. She states it may be improving today. She has not been drinking the Ensure clear as she did not think it was a good thing to take  When having diarrhea. Encouraged pt to continue Ensure as it can replenish lost nutrients. Discussed inadequate protein intake and protein foods that would be gentle on her stomach. Pt continues to comment on her pickiness with foods.     NEW FINDINGS   Chest tube removed    ANTHROPOMETRICS  Height: 166.4 cm (5' 5.5\")  Most Recent Weight: 61.1 kg (134 lb 11.2 oz) 11/26, up 7 lb from 4 days prior. - up 4 lb from admit  139 lb 11/16  IBW: 56.8 kg  BMI: Normal BMI  Weight History:       Wt Readings from Last 10 Encounters:   11/10/21 59 kg (130 lb) - admit   07/12/21 62.6 kg (138 lb 1.6 oz)   06/10/19 63.1 kg (139 lb 3.2 oz)   06/04/18 66.2 kg (145 lb 14.4 oz)   7.9% weight loss x 1 month  Dosing Weight: 59 kg     ASSESSED NUTRITION NEEDS  Estimated Energy Needs: 6838-4096 kcals/day (25 - 30 kcals/kg)  Justification: Maintenance  Estimated Protein Needs: 71-88 grams protein/day (1.2 - 1.5 grams of pro/kg)  Justification: Hypercatabolism with acute illness  Estimated Fluid Needs: 3198-3955 mL/day (1 mL/kcal)   Justification: Maintenance    PHYSICAL FINDINGS  See malnutrition section below.    GI CONCERNS  3-4 loose BM/day. Constipated prior. No loose BM overnight after " immodium given  Altered taste    MALNUTRITION:   % Weight Loss:  > 2% in 1 week (severe malnutrition)  % Intake:  </= 50% for >/= 5 days (severe malnutrition)  Subcutaneous Fat Loss: None noted  Muscle Loss: Mild temporal, buccal loss. Mild to moderate deltoid. Moderate UE  Fluid Retention: None - pleural effusion    Malnutrition Diagnosis: Severe malnutrition  In Context of:  Acute illness or injury    Previous Goals   Intake > 75%of estimated needs  Maintain weight  Evaluation: Not met, progressing except protein intake    Previous Nutrition Diagnosis  Malnutrition related to covid as evidenced by intake 50% x 3 weeks, 7% weight loss x 2 weeks    Evaluation: Improving    CURRENT NUTRITION DIAGNOSIS  Malnutrition related to covid as evidenced by intake 50% x 3 weeks + intake </= 25% x 5 days hospitalized,  7.9% weight loss 1 month    INTERVENTIONS  Implementation  Encouraged increasing protein intake and reviewed easier to digest protein foods  Encouraged intake of Ensure clear    Goals  Intake > 75%of estimated needs  Maintain weight    Monitoring/Evaluation  Progress toward goals will be monitored and evaluated per protocol.

## 2021-12-01 ENCOUNTER — APPOINTMENT (OUTPATIENT)
Dept: PHYSICAL THERAPY | Facility: HOSPITAL | Age: 76
DRG: 163 | End: 2021-12-01
Payer: COMMERCIAL

## 2021-12-01 ENCOUNTER — APPOINTMENT (OUTPATIENT)
Dept: OCCUPATIONAL THERAPY | Facility: HOSPITAL | Age: 76
DRG: 163 | End: 2021-12-01
Payer: COMMERCIAL

## 2021-12-01 LAB
HGB BLD-MCNC: 7.6 G/DL (ref 11.7–15.7)
MAGNESIUM SERPL-MCNC: 1.7 MG/DL (ref 1.8–2.6)

## 2021-12-01 PROCEDURE — 97530 THERAPEUTIC ACTIVITIES: CPT | Mod: GO

## 2021-12-01 PROCEDURE — 120N000001 HC R&B MED SURG/OB

## 2021-12-01 PROCEDURE — 250N000013 HC RX MED GY IP 250 OP 250 PS 637: Performed by: SURGERY

## 2021-12-01 PROCEDURE — 250N000011 HC RX IP 250 OP 636: Performed by: INTERNAL MEDICINE

## 2021-12-01 PROCEDURE — 83735 ASSAY OF MAGNESIUM: CPT | Performed by: FAMILY MEDICINE

## 2021-12-01 PROCEDURE — 85018 HEMOGLOBIN: CPT | Performed by: FAMILY MEDICINE

## 2021-12-01 PROCEDURE — 97110 THERAPEUTIC EXERCISES: CPT | Mod: GP

## 2021-12-01 PROCEDURE — 250N000013 HC RX MED GY IP 250 OP 250 PS 637: Performed by: INTERNAL MEDICINE

## 2021-12-01 PROCEDURE — 97535 SELF CARE MNGMENT TRAINING: CPT | Mod: GO

## 2021-12-01 PROCEDURE — 97116 GAIT TRAINING THERAPY: CPT | Mod: GP

## 2021-12-01 PROCEDURE — 99231 SBSQ HOSP IP/OBS SF/LOW 25: CPT | Performed by: FAMILY MEDICINE

## 2021-12-01 PROCEDURE — 36415 COLL VENOUS BLD VENIPUNCTURE: CPT | Performed by: FAMILY MEDICINE

## 2021-12-01 RX ADMIN — ENOXAPARIN SODIUM 40 MG: 40 INJECTION SUBCUTANEOUS at 21:04

## 2021-12-01 RX ADMIN — ACETAMINOPHEN 650 MG: 325 SOLUTION ORAL at 12:35

## 2021-12-01 RX ADMIN — CEFDINIR 300 MG: 300 CAPSULE ORAL at 21:02

## 2021-12-01 RX ADMIN — ACETAMINOPHEN 650 MG: 325 SOLUTION ORAL at 21:09

## 2021-12-01 RX ADMIN — THERA TABS 1 TABLET: TAB at 08:27

## 2021-12-01 RX ADMIN — SERTRALINE HYDROCHLORIDE 200 MG: 50 TABLET ORAL at 08:27

## 2021-12-01 RX ADMIN — Medication 1 TABLET: at 08:27

## 2021-12-01 RX ADMIN — CEFDINIR 300 MG: 300 CAPSULE ORAL at 08:27

## 2021-12-01 RX ADMIN — OMEPRAZOLE 40 MG: 20 CAPSULE, DELAYED RELEASE ORAL at 05:58

## 2021-12-01 RX ADMIN — Medication 1 CAPSULE: at 14:29

## 2021-12-01 RX ADMIN — Medication 250 MG: at 08:27

## 2021-12-01 RX ADMIN — VALACYCLOVIR 1000 MG: 500 TABLET, FILM COATED ORAL at 21:02

## 2021-12-01 RX ADMIN — TRAZODONE HYDROCHLORIDE 150 MG: 50 TABLET ORAL at 21:02

## 2021-12-01 RX ADMIN — Medication 1 TABLET: at 21:03

## 2021-12-01 ASSESSMENT — ACTIVITIES OF DAILY LIVING (ADL)
ADLS_ACUITY_SCORE: 10

## 2021-12-01 NOTE — PLAN OF CARE
Problem: Pain Chronic (Persistent)  Goal: Acceptable Pain Control and Functional Ability  Outcome: Improving  Intervention: Develop Pain Management Plan  Recent Flowsheet Documentation  Taken 12/1/2021 0152 by Sofiya Larsen RN  Pain Management Interventions: declines  Patient showed no signs of discomfort or pain. Patient denies being in pain. Patient slept most of the night. Continue to assess for pain and discomfort. Implement nonpharmacologic interventions and pain medications, if needed.

## 2021-12-01 NOTE — PLAN OF CARE
Problem: Respiratory Compromise (Pneumonia)  Goal: Effective Oxygenation and Ventilation  Outcome: Improving   Patients lung sounds are decreased-occ cough noted. RA sasts 95%. Chest tube site noted to have a suture left-suture removed without difficulty and site covered.

## 2021-12-01 NOTE — PROGRESS NOTES
Care Management Follow Up    Length of Stay (days): 20    Expected Discharge Date: 12/01/2021     Concerns to be Addressed: discharge planning       Patient plan of care discussed at interdisciplinary rounds: Yes    Anticipated Discharge Disposition: Transitional Care     Anticipated Discharge Services:    Anticipated Discharge DME:      Patient/family educated on Medicare website which has current facility and service quality ratings:  Yes    Education Provided on the Discharge Plan:  Yes    Patient/Family in Agreement with the Plan: yes    Referrals Placed by CM/SW: Post Acute Facilities    Private pay costs discussed: N/A    Additional Information: DB spoke with Dallas Whitten, about referral to [facility]- no openings right now- maybe one tomorrow but very tentative - they do have one bed for sure on Friday - will have facility review for Friday and will get back to DB.  DB spoke with Jody at [facility] - she is going through the pile and will screen pt - no beds until Friday or Saturday.  DB spoke with pt's daughter-in-law Camille several times today regarding discharge planning.  DB updated Camille regarding TCU referral status - that had sent to [facility] per her request but even they have no beds until Friday.  Camille seemed hesitant for DB to send out additional referrals.  Camille stated that she really likes to get feedback from staff at [facility] regarding facilities before they get sent out. DB explained that we need to keep looking for placement for pt.  Camille agreed for DB to send out additional referrals to facilities near [facility].  She provided DB with number for Sandra or Delia at [facility] so that DB could speak with them as well: [phone].  DB sent additional referrals to [facility], [facility], [facility], [facility], and [facility] in [place] (all pending).  Family to transport.  Needs PAS.        Mile Bianchi, MSW, LGSW  11/30/21 6:21 PM

## 2021-12-01 NOTE — PROGRESS NOTES
[facility]    Medicine Progress Note - Hospitalist Service       Date of Admission:  11/10/2021    Assessment & Plan         Patient is a 76-year-old female with a history of stage I breast cancer s/p lumpectomy and radiation on 2015, depression, GERD, OA right-sided chest pain and dry cough and diagnosed with breakthrough COVID-19 infection on 10/28.  Also presented on 11/10 with several days of worsening right-sided pleuritic chest pain cough low-grade fevers chills at home found to have a complex parapneumonic effusion pneumonia with chest tube placement.      Pneumonia/complex parapneumonic effusion  -Likely due to post viral strep pneumonia.  Right pleural effusion was drained at bedside 11/10 but effusion recurred with chest tube placement 11/13.  History of failed lytics.  - performed thoracoscopy surgery on 11/23 and you chest tubes placed  -Chest tubes removed on 11/29 by surgery with monitoring overnight for stability  -IV ceftriaxone was changed to p.o. (usually 3 weeks of abx therapy sufficiently treats empyema)  -Currents incentive spirometry    Chest pain  -Secondary to above.  Was seen by pain team this admission.  Pain is better after chest tubes removed    Acute respiratory failure secondary to above and has been off oxygen.    Recovered COVID-19  -Recovered since 10/28.  Was a breakthrough case isolation stopped on 11/18  -she can have booster shot of moderna prior to discharge.    GERD/heartburn  -Protonix changed to omeprazole per patient request    Constipation  -Continue bowel regimen prn    Awaiting TCU bed    Normocytic anemia  -Hemoglobin has been between 7 and 8 throughout most of the admission       Diet: Regular Diet Adult  Snacks/Supplements Adult: Ensure Clear; Between Meals    DVT Prophylaxis: Enoxaparin (Lovenox) SQ  Chung Catheter: Not present  Central Lines: None  Code Status: Full Code      Disposition Plan   Expected discharge: 12/2/21   recommended to transitional care unit once bed is obtained vs discharge to home with home care.      The patient's care was discussed with the Patient.    Lm Damon MD  Hospitalist Service  [facility]  Securely message with the Vocera Web Console (learn more here)  Text page via [employer] Paging/Directory    This note was written with the Dragon audio recording device, any spelling or grammatical errors are unintentional.      Clinically Significant Risk Factors Present on Admission                ______________________________________________________________________    Interval History      Feeling fatigued after physical therapy today. No SOB or chest pain.     The 10 point Review of Systems is negative other than noted in the HPI or here.     Data reviewed today: I reviewed all medications, new labs and imaging results over the last 24 hours.     Physical Exam   Vital Signs: Temp: 98.3  F (36.8  C) Temp src: Oral BP: 121/56 Pulse: 95   Resp: 18 SpO2: 95 % O2 Device: None (Room air)    Weight: 134 lbs 11.2 oz    Physical Examination:  unchanged  General appearance - alert, well appearing, and in no distress  Mental status - alert, oriented to person, place, and time, normal mood, behavior, speech, dress, motor activity, and thought processes  HEENT - sclera anicteric, normocephalic  Neck - supple, no significant adenopathy  Respiratory -some decreased breath sounds over the right lower lobe otherwise clear to auscultation  Cardiac - normal rate, regular rhythm, normal S1, S2, no murmurs  Abdomen - soft, nontender, nondistended,   Neurological - alert, oriented, normal speech, no focal findings or movement disorder noted  Musculoskeletal - no joint tenderness, deformity or swelling, full range of motion without pain  Extremities - peripheral pulses normal, no pedal edema,   Skin - normal coloration and turgor, no rashes, no suspicious skin lesions noted      Data   Recent Labs   Lab  12/01/21  0625 11/30/21  0557 11/29/21  0557 11/28/21  0733   WBC  --  7.8 8.3 10.3   HGB 7.6* 7.3* 8.1* 7.9*   MCV  --  99 99 99   PLT  --  501* 569* 577*   NA  --  143 140 141   POTASSIUM  --  3.6 3.8 3.7   CHLORIDE  --  110* 107 104   CO2  --  30 25 26   BUN  --  6* 5* 6*   CR  --  0.63 0.56* 0.53*   ANIONGAP  --  3* 8 11   EMILIANO  --  8.3* 8.7 8.7   GLC  --  95 93 94         No results found for this or any previous visit (from the past 24 hour(s)).    Medications     - MEDICATION INSTRUCTIONS -         calcium carbonate-vitamin D  1 tablet Oral BID     cefdinir  300 mg Oral Q12H SRINIVASAN     enoxaparin ANTICOAGULANT  40 mg Subcutaneous Q24H     lactobacillus rhamnosus (GG)  1 capsule Oral Daily     multivitamin, therapeutic  1 tablet Oral Daily     omeprazole  40 mg Oral QAM AC     sertraline  200 mg Oral Daily     sodium chloride (PF)  3 mL Intracatheter Q8H     traZODone  150 mg Oral At Bedtime     valACYclovir  1,000 mg Oral At Bedtime     vitamin C  250 mg Oral Daily

## 2021-12-01 NOTE — PLAN OF CARE
Problem: Respiratory Compromise (Pneumonia)  Goal: Effective Oxygenation and Ventilation  Intervention: Promote Airway Secretion Clearance  Recent Flowsheet Documentation  Taken 12/1/2021 0152 by Sofiya Larsen RN  Cough And Deep Breathing: done independently per patient  Intervention: Optimize Oxygenation and Ventilation  Recent Flowsheet Documentation  Taken 12/1/2021 0152 by Sofiya Larsen, RN  Head of Bed (HOB) Positioning: HOB at 20 degrees    Patient's O2 sats 95% on room air. Incentive spirometer result 750. HOB is up 15-20 degrees. Continue to monitor respiratory status.

## 2021-12-01 NOTE — PLAN OF CARE
Problem: Adult Inpatient Plan of Care  Goal: Absence of Hospital-Acquired Illness or Injury  Outcome: No Change  Intervention: Identify and Manage Fall Risk  Recent Flowsheet Documentation  Taken 11/30/2021 1750 by July Spann RN  Safety Promotion/Fall Prevention:   nonskid shoes/slippers when out of bed   clutter free environment maintained   assistive device/personal items within reach   room organization consistent   safety round/check completed   room near nurse's station   mobility aid in reach  Intervention: Prevent Skin Injury  Recent Flowsheet Documentation  Taken 11/30/2021 1750 by July Spann RN  Body Position: position changed independently  Intervention: Prevent and Manage VTE (Venous Thromboembolism) Risk  Recent Flowsheet Documentation  Taken 11/30/2021 1750 by July Spann RN  VTE Prevention/Management:   pneumatic compression device   anticoagulant therapy maintained     Problem: Adult Inpatient Plan of Care  Goal: Plan of Care Review  Outcome: Improving  Flowsheets (Taken 11/30/2021 2240)  Progress: improving  Goal: Patient-Specific Goal (Individualized)  Outcome: Improving  Goal: Optimal Comfort and Wellbeing  Outcome: Improving  Intervention: Provide Person-Centered Care  Recent Flowsheet Documentation  Taken 11/30/2021 1750 by July Spann RN  Trust Relationship/Rapport:   care explained   emotional support provided   questions answered   reassurance provided  Goal: Readiness for Transition of Care  Outcome: Improving  Flowsheets (Taken 11/30/2021 2240)  Concerns to be Addressed: discharge planning  Barriers to Discharge: Need TCU bed.  Intervention: Mutually Develop Transition Plan  Recent Flowsheet Documentation  Taken 11/30/2021 2240 by July Spann RN  Concerns to be Addressed: discharge planning     Problem: Fluid Imbalance (Pneumonia)  Goal: Fluid Balance  Outcome: Improving     Problem: Infection (Pneumonia)  Goal: Resolution of Infection Signs and  "Symptoms  Outcome: Improving     Problem: Respiratory Compromise (Pneumonia)  Goal: Effective Oxygenation and Ventilation  Outcome: Improving  Intervention: Promote Airway Secretion Clearance  Recent Flowsheet Documentation  Taken 11/30/2021 1750 by July Spann RN  Cough And Deep Breathing: done independently per patient     Problem: Activity Intolerance (Pulmonary Impairment)  Goal: Improved Activity Tolerance  Outcome: Improving     Problem: Airway Clearance Ineffective (Pulmonary Impairment)  Goal: Effective Airway Clearance  Outcome: Improving     Problem: Gas Exchange Impaired (Pulmonary Impairment)  Goal: Optimal Gas Exchange  Outcome: Improving     Problem: Pain Chronic (Persistent)  Goal: Acceptable Pain Control and Functional Ability  Outcome: Improving  Intervention: Develop Pain Management Plan  Recent Flowsheet Documentation  Taken 11/30/2021 1750 by July Spann RN  Pain Management Interventions: declines  Intervention: Manage Persistent Pain  Recent Flowsheet Documentation  Taken 11/30/2021 1750 by July Spann RN  Complementary Therapy:   essential oils utilized   aromatherapy utilized  Medication Review/Management: medications reviewed  Intervention: Optimize Psychosocial Wellbeing  Flowsheets  Taken 11/30/2021 2240  Spiritual Activities Assistance: hope instilled  Supportive Measures:   verbalization of feelings encouraged   active listening utilized   relaxation techniques promoted  Taken 11/30/2021 1750  Diversional Activities: television  2/10 Right side chest pain (by old chest tube site) tolerated with PRN Tylenol taken at bedtime. Reported \"feeling down\" today regarding having been at the hospital for a while, patient mentioned that she takes an antidepressant. Encouraged patient that TCU will be the next step for patient & that it is only temporary. Gave healing blend essential oil on cotton ball taped to gown to help with mood. Continues on Cefdinir antibiotic.   "

## 2021-12-01 NOTE — CONSULTS
Integrative Therapy Consult    Healing PresenceYes  Essential Oils: Topical (EO/Topical Oil)     Lavender Massage Oil - HC       Healing Music:       Breathwork:       Guided Imagery:       Acupressure:       Oshibori:       Energy Therapy:       Healing Touch:       Reiki:       Qi Gong:     Massage: Foot      Targeted Massage:    Sleep Promotion:       Other Therapy:       Intervention Reason: Anxiety/Stress     Pre and Post Session Scores: Patient Desires Treatment: yes  Pre-Session Anxiety: 5  Post-session Anxiety: 5                     Delivery:         Referrals:      Toma Quinteros

## 2021-12-02 VITALS
OXYGEN SATURATION: 95 % | SYSTOLIC BLOOD PRESSURE: 129 MMHG | HEIGHT: 66 IN | HEART RATE: 92 BPM | RESPIRATION RATE: 16 BRPM | WEIGHT: 134.7 LBS | BODY MASS INDEX: 21.65 KG/M2 | TEMPERATURE: 99.2 F | DIASTOLIC BLOOD PRESSURE: 60 MMHG

## 2021-12-02 LAB
HOLD SPECIMEN: NORMAL
MAGNESIUM SERPL-MCNC: 1.7 MG/DL (ref 1.8–2.6)

## 2021-12-02 PROCEDURE — 250N000013 HC RX MED GY IP 250 OP 250 PS 637: Performed by: SURGERY

## 2021-12-02 PROCEDURE — 83735 ASSAY OF MAGNESIUM: CPT | Performed by: FAMILY MEDICINE

## 2021-12-02 PROCEDURE — 250N000013 HC RX MED GY IP 250 OP 250 PS 637: Performed by: INTERNAL MEDICINE

## 2021-12-02 PROCEDURE — 36415 COLL VENOUS BLD VENIPUNCTURE: CPT | Performed by: FAMILY MEDICINE

## 2021-12-02 PROCEDURE — 99239 HOSP IP/OBS DSCHRG MGMT >30: CPT | Performed by: FAMILY MEDICINE

## 2021-12-02 RX ORDER — CEFDINIR 300 MG/1
300 CAPSULE ORAL EVERY 12 HOURS
DISCHARGE
Start: 2021-12-02 | End: 2021-12-09

## 2021-12-02 RX ORDER — MAGNESIUM HYDROXIDE/ALUMINUM HYDROXICE/SIMETHICONE 120; 1200; 1200 MG/30ML; MG/30ML; MG/30ML
30 SUSPENSION ORAL EVERY 4 HOURS PRN
DISCHARGE
Start: 2021-12-02 | End: 2023-08-11

## 2021-12-02 RX ORDER — LOPERAMIDE HCL 2 MG
2 CAPSULE ORAL 4 TIMES DAILY PRN
DISCHARGE
Start: 2021-12-02 | End: 2023-08-11

## 2021-12-02 RX ORDER — OXYCODONE HCL 5 MG/5 ML
5 SOLUTION, ORAL ORAL
Qty: 50 ML | Refills: 0 | Status: SHIPPED | OUTPATIENT
Start: 2021-12-02 | End: 2021-12-06

## 2021-12-02 RX ORDER — ALBUTEROL SULFATE 0.83 MG/ML
2.5 SOLUTION RESPIRATORY (INHALATION) EVERY 4 HOURS PRN
Qty: 90 ML | DISCHARGE
Start: 2021-12-02 | End: 2023-08-11

## 2021-12-02 RX ADMIN — THERA TABS 1 TABLET: TAB at 08:44

## 2021-12-02 RX ADMIN — SERTRALINE HYDROCHLORIDE 200 MG: 50 TABLET ORAL at 08:44

## 2021-12-02 RX ADMIN — Medication 250 MG: at 08:44

## 2021-12-02 RX ADMIN — ACETAMINOPHEN 650 MG: 325 SOLUTION ORAL at 08:47

## 2021-12-02 RX ADMIN — Medication 1 CAPSULE: at 13:19

## 2021-12-02 RX ADMIN — Medication 1 TABLET: at 08:44

## 2021-12-02 RX ADMIN — OMEPRAZOLE 40 MG: 20 CAPSULE, DELAYED RELEASE ORAL at 06:49

## 2021-12-02 RX ADMIN — ACETAMINOPHEN 650 MG: 325 SOLUTION ORAL at 13:18

## 2021-12-02 RX ADMIN — CEFDINIR 300 MG: 300 CAPSULE ORAL at 08:44

## 2021-12-02 ASSESSMENT — ACTIVITIES OF DAILY LIVING (ADL)
ADLS_ACUITY_SCORE: 10

## 2021-12-02 NOTE — PLAN OF CARE
Physical Therapy Discharge Summary    Reason for therapy discharge:    Discharged to transitional care facility.    Progress towards therapy goal(s). See goals on Care Plan in [facility] electronic health record for goal details.  Goals not met.  Barriers to achieving goals:   discharge from facility.    Therapy recommendation(s):    Continued therapy is recommended.  Rationale/Recommendations:  to improve acvtivity tolerance and strength for functional mobility and ADL at home..

## 2021-12-02 NOTE — PROGRESS NOTES
Care Management Follow Up    Length of Stay (days): 21    Expected Discharge Date: 12/02/2021     Concerns to be Addressed: discharge planning       Patient plan of care discussed at interdisciplinary rounds: Yes    Anticipated Discharge Disposition: Transitional Care     Anticipated Discharge Services:    Anticipated Discharge DME:      Patient/family educated on Medicare website which has current facility and service quality ratings:  Yes    Education Provided on the Discharge Plan:  Yes    Patient/Family in Agreement with the Plan: yes    Referrals Placed by CM/SW: Post Acute Facilities    Private pay costs discussed: N/A    Additional Information: DB spoke with La at [facility] - she will review clinicals for pt but no beds until Friday.      DB spoke with Camille, pt's daughter-in-law, about search for TCU and that pt now stating she wants to go home with home care (per pt's nurse) and is asking to speak with SW.  Camille will speak with spouse to see if they would even be able to take on care of pt at home plus care of pt's spouse.  Pt's spouse has private home care services through [place] and maybe they could also arrange services for pt through [place] in addition to home care services of PT OT for pt that DB could arrange.  She will speak with family and then call DB back.  Plan for DB to go to pt's room now to discuss pt's concerns and questions.  After DB hears back from Camille, DB will go back to pt's room so that pt and SW can speak with Camille over the phone.    DB met with pt and discussed option of home care services versus TCU.  Pt stated that she was feeling down after being in the hospital so long.  DB answered pt's questions about home care services.  Pt has no preference of home care agency and is okay with SW sending home care referrals.  DB informed pt that Camille would be calling DB back shortly and then DB would come back to pt's room so we could call Camille back and  discuss plan together.      SW received voicemail message from Murtaza at [facility] at end of day asking for call back if still looking for bed.  SW left return message for Murtaza letting them know we were still looking for bed.      SW was forwarded a voicemail message from Chante at [facility] at end of day stating that pt accepted for bed.  DB called Chante back and spoke with her.  Pt accepted for private room for admission tomorrow.  Call her at [phone] in the morning after speak with pt and family.  SW to f/u with pt and DIL regarding TCU acceptance in AM.  Family to transport.  Needs PAS.        NIKO Davis, JESSICA 12/01/21 7:15 PM

## 2021-12-02 NOTE — PLAN OF CARE
Problem: Respiratory Compromise (Pneumonia)  Goal: Effective Oxygenation and Ventilation  Outcome: Improving     Problem: Risk for Delirium  Goal: Improved Sleep  Outcome: Improving     Problem: Infection (Pneumonia)  Goal: Resolution of Infection Signs and Symptoms  Outcome: Improving     Vitally stable, remains on room air, denies pain at rest, independent in room.  No further loose stools overnight.  Voiding appropriately.

## 2021-12-02 NOTE — DISCHARGE SUMMARY
[facility]  Hospitalist Discharge Summary      Date of Admission:  11/10/2021  Date of Discharge:  12/2/2021  2:18 PM  Discharging Provider: Lm Damon MD      Discharge Diagnoses   Principal Problem:    Abscess of middle lobe of right lung with pneumonia (H)  Active Problems:    Breast cancer, stage 1, right (H)    Pneumonia due to infectious organism, unspecified laterality, unspecified part of lung    Infection due to 2019 novel coronavirus    Acute respiratory failure with hypoxia (H)    Leukocytosis, unspecified type    Follow-ups Needed After Discharge   Follow-up Appointments     Follow Up and recommended labs and tests      Follow up with Nursing home physician.  No follow up labs or test are   needed. Has general surgery follow up.             Unresulted Labs Ordered in the Past 30 Days of this Admission     Date and Time Order Name Status Description    11/10/2021  6:26 PM Fungal or Yeast Culture Routine Preliminary     11/10/2021  6:26 PM Acid-Fast Bacilli Culture and Stain In process       [place] [facility] [place]position   Discharged to short-term care facility  Condition at discharge: Good      Hospital Course   Patient is a 76-year-old female with a history of stage I breast cancer s/p lumpectomy and radiation on 2015, depression, GERD, OA right-sided chest pain and dry cough and diagnosed with breakthrough COVID-19 infection on 10/28.  Also presented on 11/10 with several days of worsening right-sided pleuritic chest pain cough low-grade fevers chills at home found to have a complex parapneumonic effusion pneumonia with chest tube placement.        Pneumonia/complex parapneumonic effusion  -Likely due to post viral strep pneumonia.  Right pleural effusion was drained at bedside 11/10 but effusion recurred with chest tube placement 11/13.  History of failed lytics.  - performed thoracoscopy surgery on 11/23 and you chest  tubes placed  -Chest tubes removed on 11/29 by surgery with monitoring overnight for stability  -IV ceftriaxone was changed to p.o. cefdinir bid (usually 3 weeks of abx therapy sufficiently treats empyema). Continued cefdinir for 7 more days.  -Currents incentive spirometry     Chest pain  -Secondary to above.  Was seen by pain team this admission.  Pain is better after chest tubes removed     Acute respiratory failure secondary to above and has been off oxygen.     Recovered COVID-19  -Recovered since 11/18.  Was a breakthrough case positive 10/28  -she can have booster shot of moderna prior to discharge.     GERD/heartburn  -Protonix changed to omeprazole per patient request     Constipation  -Continue bowel regimen prn     Normocytic anemia  -Hemoglobin has been between 7 and 8 throughout most of the admission    Consultations This Hospital Stay   SOCIAL WORK IP CONSULT  PHARMACY TO DOSE VANCO  CARE MANAGEMENT / SOCIAL WORK IP CONSULT  PHYSICAL THERAPY ADULT IP CONSULT  OCCUPATIONAL THERAPY ADULT IP CONSULT  PHARMACY TO DOSE VANCO  PULMONARY IP CONSULT  PAIN MANAGEMENT ADULT IP CONSULT  NUTRITION SERVICES ADULT IP CONSULT  SURGERY GENERAL IP CONSULT  PHYSICAL THERAPY ADULT IP CONSULT  OCCUPATIONAL THERAPY ADULT IP CONSULT    Code Status   Full Code    Time Spent on this Encounter   I, Lm Damon MD, personally saw the patient today and spent greater than 30 minutes discharging this patient.       Lm Damon MD  [address] [address]  Phone: [phone]  Fax: [phone]  ______________________________________________________________________    Physical Exam   Vital Signs: Temp: 99.2  F (37.3  C) Temp src: Oral BP: 129/60 Pulse: 92   Resp: 16 SpO2: 95 % O2 Device: None (Room air)    Weight: 134 lbs 11.2 oz  General appearance - alert, well appearing, and in no distress  Mental status - alert, oriented to person, place, and time,  normal mood, behavior, speech, dress, motor activity, and thought processes  HEENT - sclera anicteric, normocephalic  Neck - supple, no significant adenopathy  Respiratory -some decreased breath sounds over the right lower lobe otherwise clear to auscultation  Cardiac - normal rate, regular rhythm, normal S1, S2, no murmurs, has suture over right chest tube site with no cellulitis of the surrounding skin edges are well approximated however not completely together.  Are approximated well enough that they should heal  Abdomen - soft, nontender, nondistended,   Neurological - alert, oriented, normal speech, no focal findings or movement disorder noted  Musculoskeletal - no joint tenderness, deformity or swelling, full range of motion without pain  Extremities - peripheral pulses normal, no pedal edema,   Skin - normal coloration and turgor, no rashes, no suspicious skin lesions noted       Primary Care Physician   Charity Mann    Discharge Orders      General info for SNF    Length of Stay Estimate: Short Term Care: Estimated # of Days <30  Condition at Discharge: Improving  Level of care:skilled   Rehabilitation Potential: Excellent  Admission H&P remains valid and up-to-date: Yes  Recent Chemotherapy: N/A  Use Nursing Home Standing Orders: Yes     Mantoux instructions    Give two-step Mantoux (PPD) Per Facility Policy Yes     Follow Up and recommended labs and tests    Follow up with Nursing home physician.  No follow up labs or test are needed. Has general surgery follow up.     Reason for your hospital stay    Initially breakthrough covid then developed empyema requiring chest tubes.     Activity - Up with nursing assistance     Full Code     Physical Therapy Adult Consult    Evaluate and treat as clinically indicated.    Reason:  weakness     Occupational Therapy Adult Consult    Evaluate and treat as clinically indicated.    Reason:  weakness     Fall precautions     Diet    Follow this diet upon discharge:  Orders Placed This Encounter      Snacks/Supplements Adult: Ensure Clear; Between Meals      Regular Diet Adult       Significant Results and Procedures   Most Recent 3 CBC's:Recent Labs   Lab Test 12/01/21  0625 11/30/21  0557 11/29/21  0557 11/28/21  0733   WBC  --  7.8 8.3 10.3   HGB 7.6* 7.3* 8.1* 7.9*   MCV  --  99 99 99   PLT  --  501* 569* 577*     Most Recent 3 BMP's:Recent Labs   Lab Test 11/30/21  0557 11/29/21  0557 11/28/21  0733    140 141   POTASSIUM 3.6 3.8 3.7   CHLORIDE 110* 107 104   CO2 30 25 26   BUN 6* 5* 6*   CR 0.63 0.56* 0.53*   ANIONGAP 3* 8 11   EMILIANO 8.3* 8.7 8.7   GLC 95 93 94     Most Recent 2 LFT's:Recent Labs   Lab Test 11/10/21  1733 05/04/21  1350   AST 13 18   ALT <9 19   ALKPHOS 80 52   BILITOTAL 0.4 0.4     Most Recent 3 INR's:Recent Labs   Lab Test 11/10/21  1733   INR 1.44*   ,   Results for orders placed or performed during the hospital encounter of 11/10/21   CT Chest Pulmonary Embolism w Contrast    Narrative    EXAM: CT CHEST PULMONARY EMBOLISM W CONTRAST  LOCATION: [facility]  DATE/TIME: 11/10/2021 1:42 PM    INDICATION: Chest pain. Covid positive 10/28/2021. PE suspected, high prob  COMPARISON: 09/20/2021 chest x-ray. CT chest 03/26/2019  TECHNIQUE: CT chest pulmonary angiogram during arterial phase injection of IV contrast. Multiplanar reformats and MIP reconstructions were performed. Dose reduction techniques were used.   CONTRAST: IsoVue 370 100mL    FINDINGS:  ANGIOGRAM CHEST: Pulmonary arteries are normal caliber and negative for pulmonary emboli. Thoracic aorta is negative for dissection. No CT evidence of right heart strain.    LUNGS AND PLEURA: Dense focal infiltrate right middle lobe medial segment and some of the adjacent anterior segment right upper lobe. Tiny less than 1 cm microabscess within this infiltrate. Small right pleural effusion including some focal pleural   reaction immediately adjacent to the pneumonia anteriorly. No  other infiltrates.    Would favor a bacterial pneumonia rather than COVID pneumonia.    MEDIASTINUM/AXILLAE: Small pericardial effusion.    UPPER ABDOMEN: Normal.    MUSCULOSKELETAL: Normal.      Impression    IMPRESSION:  1.  Above findings most suggestive of moderately severe bacterial pneumonia right middle lobe with possible associated tiny microabscess within it.    2.  Small right pleural effusion and some focal pleural reaction immediately adjacent to the pneumonia anteriorly.   XR Chest Port 1 View    Narrative    EXAM: XR CHEST PORT 1 VIEW  LOCATION: [facility]  DATE/TIME: 11/10/2021 6:22 PM    INDICATION: s/p right thoracentesis, eval for PTX, eval for reduction in effusion.  COMPARISON: CT earlier today.      Impression    IMPRESSION: Infiltrate right lung base by CT mostly in the right middle lobe. Small right pleural effusion. No pneumothorax. Left lung is clear. Scoliosis. Healing left rib fractures.   XR Chest Port 1 View    Narrative    EXAM: XR CHEST PORT 1 VIEW  LOCATION: [facility]  DATE/TIME: 11/12/2021 1:51 PM    INDICATION: complicated pleural effusion  COMPARISON: 11/10/2021      Impression    IMPRESSION: The right pleural effusion is increasing. Stable heart size. No pulmonary edema. Increasing left medial basilar opacity could represent atelectasis or pneumonia.   IR Chest Tube Place Non Tunneled Right    Narrative    [facility]  LOCATION: [facility]  DATE: 11/13/2021    PROCEDURE: RIGHT ULTRASOUND GUIDED 14 Moldovan CHEST TUBE PLACEMENT    INTERVENTIONAL RADIOLOGIST: Citlaly Rosales DO  INDICATION: Recurrent pleural effusion, COVID. Effusion concerning for empyema    CONSENT: The risks, benefits and alternatives of right chest tube were discussed with the patient  in detail. All questions were answered. Informed consent was given to proceed with the procedure.    MEDICATIONS: 1% lidocaine and 25 mcg fentanyl  "IV  CONTRAST: None  ANTIBIOTICS: None.      FLUOROSCOPIC TIME: 0 minutes.  RADIATION DOSE: Air Kerma: 0 mGy.    COMPLICATIONS: No immediate complications.    STERILE BARRIER TECHNIQUE: Maximum sterile barrier technique was used. Cutaneous antisepsis was performed at the operative site with application of 2% chlorhexidine and large sterile drape. Prior to the procedure, the  and assistant performed   hand hygiene and wore hat, mask, sterile gown, and sterile gloves during the entire procedure.    PROCEDURE/FINDINGS:    Following a discussion of the risks, benefits, indications and alternatives to treatment, appropriate informed consent was obtained.  The patient was brought to the interventional radiology suite and placed upright on the table. The right hemithorax was   prepped and draped in the usual sterile fashion. A timeout was performed per universal protocol policy to confirm the correct patient, site and procedure to be performed.    A preliminary ultrasound was performed to access for the appropriate site to access the pleural effusion. These images reveal a large right pleural effusion and an ultrasound image was archived.      Once an appropriate site for chest tube placement was localized, the overlying skin was anesthetized with 1% Lidocaine. Under direct ultrasound guidance, a 5 Marshallese Yueh needle was advanced into the pleural space via an intercostal approach. The catheter   was advanced off of the needle. A 0.035\" guidewire was advanced through the micropuncture sheath and the tract was serially dilated.  A 14 Marshallese nonlocking loop chest tube was placed with the tip coiled in the pleural space. The catheter was sutured to   the skin using 2-0 suture. A sterile dressing was applied. Chest tube was connected to a Pleuravac device in the interventional suite.     Throughout the procedure, the patient was monitored by a radiology nurse for cardiac rhythm and oxygen saturation which remained " stable. The patient tolerated the procedure well and left interventional radiology in stable condition.        Impression    IMPRESSION:  Successful right sided 14 Hungarian chest tube placement under ultrasound guidance.        XR Chest Port 1 View    Narrative    EXAM: XR CHEST PORT 1 VIEW  LOCATION: [facility]  DATE/TIME: 11/14/2021 5:59 AM    INDICATION: chest tube in place.  COMPARISON: 11/12/2021.      Impression    IMPRESSION: Interval placement of right pigtail pleural drain. Decreased small to moderate right pleural effusion. Improved aeration of the right mid and lower lung. Decreasing retrocardiac left lower lung atelectasis or infiltrate. Stable borderline   enlarged cardiac silhouette. Normal pulmonary vascularity. No pneumothorax.   XR Chest Port 1 View    Narrative    EXAM: XR CHEST PORT 1 VIEW  LOCATION: [facility]  DATE/TIME: 11/15/2021 6:30 AM    INDICATION: chest tube in place.  COMPARISON: 11/14/2021      Impression    IMPRESSION: Right pigtail pleural drain with trace pneumothorax near tube insertion site but no new significant pneumothorax. Further decrease in the small right pleural effusion and improved aeration of the right mid and lower lung. Decreasing   retrocardiac left lower lung atelectasis or infiltrate. Stable borderline enlarged cardiac silhouette. Normal pulmonary vascularity. Healing fractures left ribs 3-6. Findings discussed with Dr Avitia.   XR Chest Port 1 View    Narrative    EXAM: XR CHEST PORT 1 VIEW  LOCATION: [facility]  DATE/TIME: 11/16/2021 5:39 AM    INDICATION: Follow-up chest tube  COMPARISON: 11/15/2021      Impression    IMPRESSION: Right chest tube in stable position. No pneumothorax. Tiny right pleural effusion remains. Small left pleural effusion is stable. Atelectasis left lower lobe behind the heart is stable. Minimal diffuse interstitial infiltrates stable.    No new findings.   CT  Chest w/o Contrast    Narrative    EXAM: CT CHEST W/O CONTRAST  LOCATION: [facility]  DATE/TIME: 11/16/2021 8:52 AM    INDICATION: Pneumonia, effusion or abscess suspected, x-ray done.  COMPARISON: Chest CTA dated 11/10/2021.  TECHNIQUE: CT chest without IV contrast. Multiplanar reformats were obtained. Dose reduction techniques were used.  CONTRAST: None.    FINDINGS:   LUNGS AND PLEURA: Interval placement of right chest tube with tip along the lateral pleural surface of the right upper lobe. There is a very small amount of loculated air anteriorly in the right middle lobe, no significant pneumothorax. There is   persistent small pleural effusion at the right lung base with right lower lobe atelectasis and right middle lobe atelectasis. There is a small amount of loculated fluid along the right heart border in the inferior right middle lobe. There is a new small   left pleural effusion and there is atelectasis in the left lower lobe. There is bandlike atelectasis or scarring in the posterior right upper lobe. There is minimal airspace consolidation in the left upper lobe on image 19 of series 3.    MEDIASTINUM/AXILLAE: Small to moderate pericardial effusion. Borderline right paratracheal lymph nodes. No thoracic aortic aneurysm.    CORONARY ARTERY CALCIFICATION: Mild.    UPPER ABDOMEN: No significant finding.    MUSCULOSKELETAL: Thoracolumbar scoliosis. No fracture or suspicious bony lesion.      Impression    IMPRESSION:   1.  New right chest tube. There is persistent small loculated pleural effusion at the right lung base which does not communicate with the chest tube. There is additional small loculated hydropneumothorax in the anterior right middle lobe that also does   not communicate with the chest tube. There is atelectasis in both lower lobes and in the right middle lobe.  2.  New small right pleural effusion.  3.  Patchy airspace opacities in the left upper lobe consistent with a  small focus of pneumonia.     XR Chest Port 1 View    Narrative    EXAM: XR CHEST PORT 1 VIEW  LOCATION: [facility]  DATE/TIME: 11/17/2021 6:28 AM    INDICATION: chest tube in place.  COMPARISON: Portable AP view the chest 11/16/2021 at 0606 hours; chest CT 11/16/2021 at 0858 hours      Impression    IMPRESSION:     A right pleural drain is similarly positioned with pigtail in the upper lateral aspect of the pleural space. There is a crimp in the tube at the level of the insertion projecting at the level of the posterolateral seventh intercostal space. Opacity with   indistinct borders in the lower third of the right chest likely reflects a combination of residual complex pleural fluid and atelectasis. Mild but diffuse thickening of the pleura including the apex and along the mediastinal pleura. Focal lucency lucency   in the medial basal right chest adjacent to the right atrial heart border corresponds to loculated anterior pneumothorax on the prior CT.    There are interstitial opacities in the right mid and upper lung consistent with mild interstitial edema. No progressive airspace opacity.    Pleural fluid and atelectasis atelectasis in the medial left lower lobe obscures the medial 20% of the left hemidiaphragm, not increased.    Unchanged mild enlargement of the cardiac silhouette.   XR Chest Port 1 View    Narrative    EXAM: XR CHEST PORT 1 VIEW  LOCATION: [facility]  DATE/TIME: 11/18/2021 5:41 AM    INDICATION: chest tube in place.  COMPARISON: 11/17/2021 at 0638 hours       Impression    IMPRESSION: A right lateral approach pigtail catheter is again seen. No significant pneumothorax. Right basilar atelectasis is again seen. Mild interstitial edema has minimally decreased. The cardiomediastinal silhouette is at the upper limits of normal   in size.    XR Chest Port 1 View    Narrative    EXAM: XR CHEST PORT 1 VIEW  LOCATION: [facility]  DATE/TIME: 11/19/2021 5:44 AM    INDICATION: chest tube in place.  COMPARISON: 11/18/2021      Impression    IMPRESSION: The right chest pigtail catheter remains in place. A second loop has developed which could be external to the patient. This could also cause tube kinking. Recommend correlation with tube function. No pneumothorax. Stable moderate right and   mild left basilar opacities.    No significant effusion or pulmonary edema seen.   CT Chest w/o Contrast    Narrative    EXAM: CT CHEST W/O CONTRAST  LOCATION: [facility]  DATE/TIME: 11/19/2021 9:46 AM    INDICATION: Empyema  COMPARISON: CT chest without contrast 11/16/2021  TECHNIQUE: CT chest without IV contrast. Multiplanar reformats were obtained. Dose reduction techniques were used.  CONTRAST: None.    FINDINGS:   LUNGS AND PLEURA: Right pleural drain passes through the posterolateral right intercostal space with catheter positioned in the upper lateral pleural space, unchanged from 11/16/2021. Heterogeneous attenuation debris layers dependently within the   posterior right pleural space higher attenuation and small gas components with adjacent pleural fluid. Small loculated right anterior pneumothorax with fairly exuberant or surrounding visceral and parietal pleural thickening. Smaller left pleural   effusion is homogeneous attenuation, layering posteriorly.    Unchanged consolidation and volume loss of the caudal and anterior right middle lobe and a limited subsegment of the anterior medial right upper lobe along the mediastinal pleura. Sparse peripheral groundglass airspace opacity in the anterior apical left   upper lobe.    Trachea and central airways are patent.    MEDIASTINUM: Unchanged small circumferential pericardial effusion. Cardiac chambers are not enlarged. Main pulmonary artery is normal in size. Normal caliber thoracic aorta. Decompressed esophagus. No new enlargement of mediastinal or hilar lymph  nodes.    CORONARY ARTERY CALCIFICATION: None.    UPPER ABDOMEN: No new findings in the imaged upper abdomen.    MUSCULOSKELETAL: Medullary lucency and irregular cortex of the right anterior second through seventh ribs in a pattern consistent with previous tangential beam chest wall radiation, unchanged. Bony callus around the left lateral third through sixth ribs   consistent with subacute fractures. No thoracic vertebral compression deformities.      Impression    IMPRESSION:     1.  Unchanged position of the right pleural drain. Localized visceral and parietal pleural thickening anterior to the right middle lobe and low anteromedial right upper lobe with adjacent lung consolidation.  2.  New heterogeneous attenuation debris layering dependently in the posterior right pleural space, most likely representing organized blood products.  3.  Unchanged circumferential pericardial effusion and small posteriorly layering left pleural effusion.     XR Chest Port 1 View    Narrative    EXAM: XR CHEST PORT 1 VIEW  LOCATION: [facility]  DATE/TIME: 11/20/2021 5:48 AM    INDICATION: chest tube in place.  COMPARISON: 11/19/2021.      Impression    IMPRESSION: No change right chest tube with tip over the lateral right lung at the second intercostal space and a loop outside the patient. There may be a kink in the tube in the loop which is presumably external to the patient. No pneumothorax. No   change bibasilar opacities.   XR Chest Port 1 View    Narrative    EXAM: XR CHEST PORT 1 VIEW  LOCATION: [facility]  DATE/TIME: 11/21/2021 5:41 AM    INDICATION: follow up, has right chest tube.  COMPARISON: 11/20/2021 radiograph       Impression    IMPRESSION: A right lateral approach pigtail catheter has not changed in position. No pneumothorax. Right pulmonary opacities have not significantly changed. Normal size of the heart.    XR Chest 1 View    Narrative    EXAM: XR CHEST 1  VIEW  LOCATION: [facility]  DATE/TIME: 11/23/2021 10:31 AM    INDICATION: Empyema.  COMPARISON: 11/21/2021.      Impression    IMPRESSION: Interval placement of 3 right-sided chest tubes with removal of the previously seen pigtail catheter on the right. No visible pneumothorax. There is patchy opacity at the right lung base which likely reflects atelectasis. No obvious pleural   effusion. Retrocardiac opacity involving the medial left lower lobe is not significantly change with a trace left pleural effusion. Heart and mediastinal size are normal.   XR Chest 1 View    Narrative    EXAM: XR CHEST 1 VIEW  LOCATION: [facility]  DATE/TIME: 11/24/2021 10:13 AM    INDICATION: Empyema. Follow-up chest tube.  COMPARISON: 11/19/2021 CT. 11/23/2021 chest x-ray.      Impression    IMPRESSION: 3 right chest tubes. No pneumothorax. No significant remaining pleural fluid. Some mild atelectasis or infiltrates right lung base unchanged. Mild atelectasis left lower lobe behind the heart unchanged.   XR Chest Port 1 View    Narrative    EXAM: XR CHEST PORT 1 VIEW  LOCATION: [facility]  DATE/TIME: 11/25/2021 6:19 AM    INDICATION: Empyema  COMPARISON: 11/24/2021      Impression    IMPRESSION: 3 right chest tubes remain in stable position. No pneumothorax. Minimal atelectasis and pleural fluid right lung base. Minimal atelectasis left lung base. Mild cardiac enlargement. Pulmonary vascularity the upper limits of normal.   Thoracolumbar curvature.   XR Chest Port 1 View    Narrative    EXAM: XR CHEST PORT 1 VIEW  LOCATION: [facility]  DATE/TIME: 11/26/2021 6:15 AM    INDICATION: empyema  COMPARISON: 11/25/2021      Impression    IMPRESSION: 3 right chest tubes in stable position. No pneumothorax. No significant remaining pleural fluid. Mild remaining atelectasis or infiltrates in both lung bases.    No new findings.   XR Chest Port 1 View     Narrative    EXAM: XR CHEST PORT 1 VIEW  LOCATION: [facility]  DATE/TIME: 11/27/2021 5:56 AM    INDICATION: Empyema with indwelling chest tubes.  COMPARISON: Multiple prior chest radiograph with most recent from 11/26/2021.      Impression    IMPRESSION: 3 right-sided chest tube stable position. No pneumothorax. Stable opacities right lung base. Minimal atelectasis left lung base. Heart size at the upper limits of normal normal pulmonary vascularity. Tortuous and calcified thoracic aorta.   Degenerative changes in the spine and shoulders.   XR Chest Port 1 View    Narrative    EXAM: XR CHEST PORT 1 VIEW  LOCATION: [facility]  DATE/TIME: 11/28/2021 5:58 AM    INDICATION: Empyema with indwelling chest tubes.  COMPARISON: Multiple prior chest radiograph with most recent from 11/27/2021.      Impression    IMPRESSION: 3 right-sided chest tubes unchanged in position. No pneumothorax. Stable opacity right lung base likely representing a combination of atelectasis and possible minimal pleural fluid. Minimal left basilar pleural fluid. Normal heart size and   pulmonary vascularity.   XR Chest Port 1 View    Narrative    EXAM: XR CHEST PORT 1 VIEW  LOCATION: [facility]  DATE/TIME: 11/29/2021 5:51 AM    INDICATION: empyema  COMPARISON: 11/28/2021       Impression    IMPRESSION: The right chest tubes have been removed. There is a small volume of pleural fluid and atelectasis at the right lung base. No right pneumothorax. A small left pleural effusion remains. No interstitial edema. The cardiomediastinal silhouette is   normal in size. There is mild lateral curvature of the spine.       Discharge Medications   Discharge Medication List as of 12/2/2021 11:46 AM      START taking these medications    Details   albuterol (PROVENTIL) (2.5 MG/3ML) 0.083% neb solution Take 1 vial (2.5 mg) by nebulization every 4 hours as needed for wheezing, Disp-90 mL,  Transitional      alum & mag hydroxide-simethicone (MAALOX) 200-200-20 MG/5ML SUSP suspension Take 30 mLs by mouth every 4 hours as needed for indigestion, Transitional      cefdinir (OMNICEF) 300 MG capsule Take 1 capsule (300 mg) by mouth every 12 hours for 7 days, Transitional      loperamide (IMODIUM) 2 MG capsule Take 1 capsule (2 mg) by mouth 4 times daily as needed for diarrhea, Transitional      melatonin 1 MG TABS tablet Take 1 tablet (1 mg) by mouth nightly as needed for sleep, Transitional      oxyCODONE (ROXICODONE) 5 MG/5ML solution Take 5 mLs (5 mg) by mouth every 3 hours as needed for moderate to severe pain, Disp-50 mL, R-0, Local Print         CONTINUE these medications which have NOT CHANGED    Details   acetaminophen (TYLENOL) 500 MG tablet Take 1,000 mg by mouth At Bedtime, Historical      ascorbic acid (VITAMIN C) 250 MG tablet Take 250 mg by mouth At Bedtime , Historical      BLACK COHOSH ORAL [BLACK COHOSH ORAL] Take 1 capsule by mouth 2 (two) times a day. , Historical      calcium carbonate 600 mg-vitamin D 400 units (CALTRATE) 600-400 MG-UNIT per tablet Take 1 tablet by mouth 2 times daily, Historical      Lactobacillus rhamnosus GG (CULTURELLE) 10-15 Billion cell capsule Take 1 capsule by mouth At Bedtime , Historical      Magnesium Glycinate 665 MG CAPS Take 1 capsule by mouth 2 times daily, Historical      nabumetone (RELAFEN) 500 MG tablet Take 500 mg by mouth At Bedtime , Historical      omeprazole (PRILOSEC) 20 MG capsule [OMEPRAZOLE (PRILOSEC) 20 MG CAPSULE] Take 20 mg by mouth daily., R-0, Historical      sertraline (ZOLOFT) 100 MG tablet [SERTRALINE (ZOLOFT) 100 MG TABLET] Take 200 mg by mouth daily.       , Historical      traZODone (DESYREL) 150 MG tablet Take 150 mg by mouth At Bedtime, Historical      valACYclovir (VALTREX) 1000 MG tablet Take 1,000 mg by mouth At Bedtime , Historical      vitamin E 400 UNIT capsule [VITAMIN E 400 UNIT CAPSULE] Take 400 Units by mouth daily.,  Historical           Allergies   Allergies   Allergen Reactions     Arimidex [Anastrozole] Unknown     Hot flashes     Penicillins Hives     Statins-Hmg-Coa Reductase Inhibitors [Hmg-Coa-R Inhibitors] Muscle Pain (Myalgia)     Fenofibrate Rash

## 2021-12-02 NOTE — PLAN OF CARE
Problem: Adult Inpatient Plan of Care  Goal: Absence of Hospital-Acquired Illness or Injury  Intervention: Identify and Manage Fall Risk  Recent Flowsheet Documentation  Taken 12/2/2021 0900 by Katerin Roman, RN  Safety Promotion/Fall Prevention:   mobility aid in reach   nonskid shoes/slippers when out of bed  Goal: Optimal Comfort and Wellbeing  Intervention: Provide Person-Centered Care  Recent Flowsheet Documentation  Taken 12/2/2021 0900 by Katerin Roman, RN  Trust Relationship/Rapport: care explained     Problem: Pain Chronic (Persistent)  Goal: Acceptable Pain Control and Functional Ability  Intervention: Optimize Psychosocial Wellbeing  Recent Flowsheet Documentation  Taken 12/2/2021 0900 by Katerin Roman, RN  Diversional Activities: television   Patient doing well still having some discomfort to right flank area where chest tubes were. 2/10, tylenol given. Independent in room. Discharging this afternoon, daughter will  at 2 PM.

## 2021-12-02 NOTE — PLAN OF CARE
Problem: Adult Inpatient Plan of Care  Goal: Plan of Care Review  Outcome: Improving   Educated pt on treatment plan, pt voiced understanding.    Problem: Respiratory Compromise (Pneumonia)  Goal: Effective Oxygenation and Ventilation  Outcome: Improving   Pt on RA. Denies any difficulty breathing.    Problem: Pain Chronic (Persistent)  Goal: Acceptable Pain Control and Functional Ability  Outcome: Improving   Pain to R flank minimal. Pt requested PRN APAP at HS.

## 2021-12-03 ENCOUNTER — PATIENT OUTREACH (OUTPATIENT)
Dept: CARE COORDINATION | Facility: CLINIC | Age: 76
End: 2021-12-03
Payer: COMMERCIAL

## 2021-12-03 DIAGNOSIS — Z71.89 OTHER SPECIFIED COUNSELING: ICD-10-CM

## 2021-12-03 NOTE — PROGRESS NOTES
Clinic Care Coordination Contact  Care Team Conversations    Patient identified for care management outreach, however patient is not on a value based contract so cannot complete outreach. Will escalate to clinic staff if specific needs or resources are indicated.    Rosalinda Jordan [facility]  Clinic Care Coordinator  [facility]   [phone]

## 2021-12-03 NOTE — PROGRESS NOTES
Care Management Discharge Note    Discharge Date: 12/02/2021     Expected Time of Departure: 1400 (on 12/2/21)    Discharge Disposition: Transitional Care    Discharge Services:      Discharge DME:      Discharge Transportation: family or friend will provide    Private pay costs discussed: Not applicable    PAS Confirmation Code: [id number]    Patient/family educated on Medicare website which has current facility and service quality ratings:  yes    Education Provided on the Discharge Plan:  Yes    Persons Notified of Discharge Plans: patient and pt's [employer]    Patient/Family in Agreement with the Plan: yes    Handoff Referral Completed: yes    Additional Information:  DB followed up with [facility] as pt's [facility] left message stating she heard they were screening referrals today and had available beds.  DB spoke with Nahed at [facility] -due to staffing cannot take any more admits until someone discharges next week.  Message from La at [facility] - pt no longer meets skilled need for TCU.  DB spoke with pt's [employer] regarding discharge planning.  DB updated her regarding [facility] having no beds until next week.  DB informed her that pt accepted at [facility].  Camille in agreement for pt to discharge to [facility].  She will transport pt at 2 PM today.  DB spoke with Chante at [facility] to confirm acceptance of bed and that pt would admit to facility today. DB updated pt, doctor, pt's nurse, HUC, charge nurse, and Chante at facility regarding plan.  PAS done.      DB spoke with Malissa at [facility] regarding insurance authorization for pt through pt's [employer] plan.  Authorization number is #[id number], and goes from 12/2/21 to 12/11/21.  DB provided authorization number to facility.      NIKO Davis, JESSICA 12/02/21 6:51 PM

## 2021-12-09 LAB — BACTERIA PLR CULT: NO GROWTH

## 2021-12-15 ENCOUNTER — LAB REQUISITION (OUTPATIENT)
Dept: LAB | Facility: CLINIC | Age: 76
End: 2021-12-15

## 2021-12-15 DIAGNOSIS — U07.1 COVID-19: ICD-10-CM

## 2021-12-15 LAB
ALBUMIN SERPL-MCNC: 2.8 G/DL (ref 3.5–5)
ALP SERPL-CCNC: 87 U/L (ref 45–120)
ALT SERPL W P-5'-P-CCNC: 12 U/L (ref 0–45)
ANION GAP SERPL CALCULATED.3IONS-SCNC: 8 MMOL/L (ref 5–18)
AST SERPL W P-5'-P-CCNC: 18 U/L (ref 0–40)
BILIRUB SERPL-MCNC: 0.2 MG/DL (ref 0–1)
BUN SERPL-MCNC: 14 MG/DL (ref 8–28)
CALCIUM SERPL-MCNC: 9.1 MG/DL (ref 8.5–10.5)
CHLORIDE BLD-SCNC: 108 MMOL/L (ref 98–107)
CO2 SERPL-SCNC: 24 MMOL/L (ref 22–31)
CREAT SERPL-MCNC: 0.83 MG/DL (ref 0.6–1.1)
GFR SERPL CREATININE-BSD FRML MDRD: 68 ML/MIN/1.73M2
GLUCOSE BLD-MCNC: 102 MG/DL (ref 70–125)
POTASSIUM BLD-SCNC: 4.7 MMOL/L (ref 3.5–5)
PROT SERPL-MCNC: 6.5 G/DL (ref 6–8)
SODIUM SERPL-SCNC: 140 MMOL/L (ref 136–145)

## 2021-12-15 PROCEDURE — 80053 COMPREHEN METABOLIC PANEL: CPT | Performed by: NURSE PRACTITIONER

## 2022-02-02 DIAGNOSIS — Z12.31 ENCOUNTER FOR SCREENING MAMMOGRAM FOR MALIGNANT NEOPLASM OF BREAST: Primary | ICD-10-CM

## 2022-02-02 DIAGNOSIS — C50.911 BREAST CANCER, STAGE 1, RIGHT (H): ICD-10-CM

## 2022-02-04 ENCOUNTER — ANCILLARY PROCEDURE (OUTPATIENT)
Dept: MAMMOGRAPHY | Facility: CLINIC | Age: 77
End: 2022-02-04
Attending: NURSE PRACTITIONER
Payer: COMMERCIAL

## 2022-02-04 DIAGNOSIS — C50.911 BREAST CANCER, STAGE 1, RIGHT (H): ICD-10-CM

## 2022-02-04 DIAGNOSIS — Z12.31 ENCOUNTER FOR SCREENING MAMMOGRAM FOR MALIGNANT NEOPLASM OF BREAST: ICD-10-CM

## 2022-02-04 PROCEDURE — 77067 SCR MAMMO BI INCL CAD: CPT

## 2022-03-09 ENCOUNTER — LAB REQUISITION (OUTPATIENT)
Dept: LAB | Facility: CLINIC | Age: 77
End: 2022-03-09

## 2022-03-09 DIAGNOSIS — R53.83 OTHER FATIGUE: ICD-10-CM

## 2022-03-09 DIAGNOSIS — E55.9 VITAMIN D DEFICIENCY, UNSPECIFIED: ICD-10-CM

## 2022-03-09 LAB
ALBUMIN SERPL-MCNC: 3.6 G/DL (ref 3.5–5)
ALP SERPL-CCNC: 58 U/L (ref 45–120)
ALT SERPL W P-5'-P-CCNC: 10 U/L (ref 0–45)
ANION GAP SERPL CALCULATED.3IONS-SCNC: 9 MMOL/L (ref 5–18)
AST SERPL W P-5'-P-CCNC: 16 U/L (ref 0–40)
BILIRUB SERPL-MCNC: 0.2 MG/DL (ref 0–1)
BUN SERPL-MCNC: 18 MG/DL (ref 8–28)
CALCIUM SERPL-MCNC: 9.8 MG/DL (ref 8.5–10.5)
CHLORIDE BLD-SCNC: 102 MMOL/L (ref 98–107)
CO2 SERPL-SCNC: 27 MMOL/L (ref 22–31)
CREAT SERPL-MCNC: 0.77 MG/DL (ref 0.6–1.1)
GFR SERPL CREATININE-BSD FRML MDRD: 79 ML/MIN/1.73M2
GLUCOSE BLD-MCNC: 80 MG/DL (ref 70–125)
POTASSIUM BLD-SCNC: 4.3 MMOL/L (ref 3.5–5)
PROT SERPL-MCNC: 6.9 G/DL (ref 6–8)
SODIUM SERPL-SCNC: 138 MMOL/L (ref 136–145)
TSH SERPL DL<=0.005 MIU/L-ACNC: 2.84 UIU/ML (ref 0.3–5)

## 2022-03-09 PROCEDURE — 82040 ASSAY OF SERUM ALBUMIN: CPT | Performed by: NURSE PRACTITIONER

## 2022-03-09 PROCEDURE — 84443 ASSAY THYROID STIM HORMONE: CPT | Performed by: NURSE PRACTITIONER

## 2022-03-09 PROCEDURE — 80053 COMPREHEN METABOLIC PANEL: CPT | Performed by: NURSE PRACTITIONER

## 2022-03-09 PROCEDURE — 82306 VITAMIN D 25 HYDROXY: CPT | Performed by: NURSE PRACTITIONER

## 2022-03-10 LAB — DEPRECATED CALCIDIOL+CALCIFEROL SERPL-MC: 65 UG/L (ref 30–80)

## 2022-06-06 ENCOUNTER — TRANSCRIBE ORDERS (OUTPATIENT)
Dept: OTHER | Age: 77
End: 2022-06-06
Payer: COMMERCIAL

## 2022-06-06 DIAGNOSIS — R13.10 ODYNOPHAGIA: Primary | ICD-10-CM

## 2022-06-24 ENCOUNTER — HOSPITAL ENCOUNTER (OUTPATIENT)
Dept: RADIOLOGY | Facility: HOSPITAL | Age: 77
Discharge: HOME | End: 2022-06-24
Attending: PHYSICIAN ASSISTANT
Payer: COMMERCIAL

## 2022-06-24 ENCOUNTER — HOSPITAL ENCOUNTER (OUTPATIENT)
Dept: SPEECH THERAPY | Facility: HOSPITAL | Age: 77
Setting detail: THERAPIES SERIES
Discharge: HOME | End: 2022-06-24
Attending: PHYSICIAN ASSISTANT
Payer: COMMERCIAL

## 2022-06-24 DIAGNOSIS — R13.10 ODYNOPHAGIA: ICD-10-CM

## 2022-06-24 PROCEDURE — 92611 MOTION FLUOROSCOPY/SWALLOW: CPT | Mod: GN

## 2022-06-24 PROCEDURE — 74230 X-RAY XM SWLNG FUNCJ C+: CPT

## 2022-06-24 PROCEDURE — 74220 X-RAY XM ESOPHAGUS 1CNTRST: CPT

## 2022-06-24 RX ORDER — BARIUM SULFATE 400 MG/ML
SUSPENSION ORAL ONCE
Status: COMPLETED | OUTPATIENT
Start: 2022-06-24 | End: 2022-06-24

## 2022-06-24 RX ADMIN — BARIUM SULFATE: 400 SUSPENSION ORAL at 11:00

## 2022-06-24 NOTE — PROGRESS NOTES
Speech-Language Pathology: Video Swallow Study     06/24/22 1200   General Information   Type Of Visit Initial   Start Of Care Date 06/24/22   Referring Physician Jose BRAR   Orders Evaluate And Treat   Medical Diagnosis Odynophagia   Onset Of Illness/injury Or Date Of Surgery 06/24/22   Pertinent History of Current Problem/OT: Additional Occupational Profile Info C/o feeling like something poking into her (R) side of her throat with swallow.   VFSS Eval: Radiology   Radiologist Dr. Willoughby   Views Taken left lateral;A/P   Physical Location of Procedure [facility]   VFSS Eval: Thin Liquid Texture Trial   Mode of Presentation, Thin Liquid cup;self-fed   Order of Presentation 1(lateral view) 4(AP)   Preparatory Phase WFL   Oral Phase, Thin Liquid WFL   Pharyngeal Phase, Thin Liquid WFL   Rosenbek's Penetration Aspiration Scale: Thin Liquid Trial Results 1 - no aspiration, contrast does not enter airway   Diagnostic Statement trace (L) pyriform stasis   VFSS Eval: Regular Texture Trial (Solid)   Mode of Presentation fed by clinician   Order of Presentation 2(barium coated cracker)   Preparatory Phase WFL   Oral Phase WFL   Pharyngeal Phase WFL   Rosenbek's Penetration Aspiration Scale 1 - no aspiration, contrast does not enter airway   Esophageal Phase of Swallow   Esophageal comments See results of esophagram that followed this study   Swallow Eval: Clinical Impressions   Skilled Criteria for Therapy Intervention No problems identified which require skilled intervention   Diet texture recommendations Thin liquids (level 0);Regular diet   Clinical Impression Comments Video Swallow Study completed. Patient had no aspiration or penetration. Oropharyngeal swallow function is WNL. Tongue base retraction is WNL. Pharyngeal constriction, hyolaryngeal elevation and excursion were all WNL. Epiglottic inversion is complete. Swallow response is timely. Mastication is safe and complete.  Recommend Regular diet and  Thin liquids.   Total Session Time   SLP Eval: VideoFluoroscopic Swallow function Minutes (70278) 12   Total Evaluation Time 12

## 2022-07-31 ENCOUNTER — HEALTH MAINTENANCE LETTER (OUTPATIENT)
Age: 77
End: 2022-07-31

## 2022-08-09 ENCOUNTER — LAB REQUISITION (OUTPATIENT)
Dept: LAB | Facility: CLINIC | Age: 77
End: 2022-08-09

## 2022-08-09 DIAGNOSIS — E55.9 VITAMIN D DEFICIENCY, UNSPECIFIED: ICD-10-CM

## 2022-08-09 DIAGNOSIS — E78.2 MIXED HYPERLIPIDEMIA: ICD-10-CM

## 2022-08-09 LAB
CHOLEST SERPL-MCNC: 332 MG/DL
HDLC SERPL-MCNC: 45 MG/DL
LDLC SERPL CALC-MCNC: 222 MG/DL
NONHDLC SERPL-MCNC: 287 MG/DL
TRIGL SERPL-MCNC: 323 MG/DL

## 2022-08-09 PROCEDURE — 80061 LIPID PANEL: CPT | Performed by: NURSE PRACTITIONER

## 2022-08-09 PROCEDURE — 82306 VITAMIN D 25 HYDROXY: CPT | Performed by: NURSE PRACTITIONER

## 2022-08-10 LAB — DEPRECATED CALCIDIOL+CALCIFEROL SERPL-MC: 85 UG/L (ref 20–75)

## 2022-09-13 ENCOUNTER — LAB REQUISITION (OUTPATIENT)
Dept: LAB | Facility: CLINIC | Age: 77
End: 2022-09-13

## 2022-09-13 DIAGNOSIS — R19.7 DIARRHEA, UNSPECIFIED: ICD-10-CM

## 2022-09-13 LAB
ALBUMIN SERPL BCG-MCNC: 4.3 G/DL (ref 3.5–5.2)
ALP SERPL-CCNC: 56 U/L (ref 35–104)
ALT SERPL W P-5'-P-CCNC: 16 U/L (ref 10–35)
ANION GAP SERPL CALCULATED.3IONS-SCNC: 12 MMOL/L (ref 7–15)
AST SERPL W P-5'-P-CCNC: 19 U/L (ref 10–35)
BILIRUB SERPL-MCNC: 0.3 MG/DL
BUN SERPL-MCNC: 14.9 MG/DL (ref 8–23)
CALCIUM SERPL-MCNC: 9.2 MG/DL (ref 8.8–10.2)
CHLORIDE SERPL-SCNC: 107 MMOL/L (ref 98–107)
CREAT SERPL-MCNC: 0.74 MG/DL (ref 0.51–0.95)
DEPRECATED HCO3 PLAS-SCNC: 23 MMOL/L (ref 22–29)
GFR SERPL CREATININE-BSD FRML MDRD: 82 ML/MIN/1.73M2
GLUCOSE SERPL-MCNC: 101 MG/DL (ref 70–99)
POTASSIUM SERPL-SCNC: 4 MMOL/L (ref 3.4–5.3)
PROT SERPL-MCNC: 6.6 G/DL (ref 6.4–8.3)
SODIUM SERPL-SCNC: 142 MMOL/L (ref 136–145)

## 2022-09-13 PROCEDURE — 80053 COMPREHEN METABOLIC PANEL: CPT | Performed by: NURSE PRACTITIONER

## 2022-09-14 ENCOUNTER — LAB REQUISITION (OUTPATIENT)
Dept: LAB | Facility: CLINIC | Age: 77
End: 2022-09-14

## 2022-09-14 DIAGNOSIS — R19.7 DIARRHEA, UNSPECIFIED: ICD-10-CM

## 2022-09-14 PROCEDURE — 87493 C DIFF AMPLIFIED PROBE: CPT | Performed by: NURSE PRACTITIONER

## 2022-09-14 PROCEDURE — 87506 IADNA-DNA/RNA PROBE TQ 6-11: CPT | Performed by: NURSE PRACTITIONER

## 2022-09-14 PROCEDURE — 87209 SMEAR COMPLEX STAIN: CPT | Performed by: NURSE PRACTITIONER

## 2022-09-15 LAB
C COLI+JEJUNI+LARI FUSA STL QL NAA+PROBE: NOT DETECTED
C DIFF TOX B STL QL: NEGATIVE
EC STX1 GENE STL QL NAA+PROBE: NOT DETECTED
EC STX2 GENE STL QL NAA+PROBE: NOT DETECTED
NOROV GI+II ORF1-ORF2 JNC STL QL NAA+PR: NOT DETECTED
O+P STL MICRO: NEGATIVE
RVA NSP5 STL QL NAA+PROBE: NOT DETECTED
SALMONELLA SP RPOD STL QL NAA+PROBE: NOT DETECTED
SHIGELLA SP+EIEC IPAH STL QL NAA+PROBE: NOT DETECTED
TRI STN SPEC: NORMAL
V CHOL+PARA RFBL+TRKH+TNAA STL QL NAA+PR: NOT DETECTED
Y ENTERO RECN STL QL NAA+PROBE: NOT DETECTED

## 2022-09-28 ENCOUNTER — LAB REQUISITION (OUTPATIENT)
Dept: LAB | Facility: CLINIC | Age: 77
End: 2022-09-28

## 2022-09-28 DIAGNOSIS — R60.0 LOCALIZED EDEMA: ICD-10-CM

## 2022-09-28 LAB
ANION GAP SERPL CALCULATED.3IONS-SCNC: 10 MMOL/L (ref 7–15)
BUN SERPL-MCNC: 9.2 MG/DL (ref 8–23)
CALCIUM SERPL-MCNC: 9.2 MG/DL (ref 8.8–10.2)
CHLORIDE SERPL-SCNC: 103 MMOL/L (ref 98–107)
CREAT SERPL-MCNC: 0.73 MG/DL (ref 0.51–0.95)
DEPRECATED HCO3 PLAS-SCNC: 26 MMOL/L (ref 22–29)
GFR SERPL CREATININE-BSD FRML MDRD: 84 ML/MIN/1.73M2
GLUCOSE SERPL-MCNC: 100 MG/DL (ref 70–99)
POTASSIUM SERPL-SCNC: 4.4 MMOL/L (ref 3.4–5.3)
SODIUM SERPL-SCNC: 139 MMOL/L (ref 136–145)

## 2022-09-28 PROCEDURE — 80048 BASIC METABOLIC PNL TOTAL CA: CPT | Performed by: NURSE PRACTITIONER

## 2022-10-05 ENCOUNTER — LAB REQUISITION (OUTPATIENT)
Dept: LAB | Facility: CLINIC | Age: 77
End: 2022-10-05

## 2022-10-05 DIAGNOSIS — E55.9 VITAMIN D DEFICIENCY, UNSPECIFIED: ICD-10-CM

## 2022-10-05 PROCEDURE — 82306 VITAMIN D 25 HYDROXY: CPT | Performed by: NURSE PRACTITIONER

## 2022-10-06 LAB — DEPRECATED CALCIDIOL+CALCIFEROL SERPL-MC: 99 UG/L (ref 20–75)

## 2022-10-15 ENCOUNTER — HEALTH MAINTENANCE LETTER (OUTPATIENT)
Age: 77
End: 2022-10-15

## 2023-02-07 ENCOUNTER — ANCILLARY PROCEDURE (OUTPATIENT)
Dept: MAMMOGRAPHY | Facility: CLINIC | Age: 78
End: 2023-02-07
Attending: NURSE PRACTITIONER
Payer: COMMERCIAL

## 2023-02-07 DIAGNOSIS — Z12.31 VISIT FOR SCREENING MAMMOGRAM: ICD-10-CM

## 2023-02-07 PROCEDURE — 77067 SCR MAMMO BI INCL CAD: CPT

## 2023-02-13 ENCOUNTER — LAB REQUISITION (OUTPATIENT)
Dept: LAB | Facility: CLINIC | Age: 78
End: 2023-02-13

## 2023-02-13 DIAGNOSIS — E55.9 VITAMIN D DEFICIENCY, UNSPECIFIED: ICD-10-CM

## 2023-02-13 DIAGNOSIS — E78.2 MIXED HYPERLIPIDEMIA: ICD-10-CM

## 2023-02-13 DIAGNOSIS — K21.9 GASTRO-ESOPHAGEAL REFLUX DISEASE WITHOUT ESOPHAGITIS: ICD-10-CM

## 2023-02-13 LAB
ALBUMIN SERPL BCG-MCNC: 4.5 G/DL (ref 3.5–5.2)
ALP SERPL-CCNC: 61 U/L (ref 35–104)
ALT SERPL W P-5'-P-CCNC: 13 U/L (ref 10–35)
ANION GAP SERPL CALCULATED.3IONS-SCNC: 13 MMOL/L (ref 7–15)
AST SERPL W P-5'-P-CCNC: 18 U/L (ref 10–35)
BILIRUB SERPL-MCNC: 0.3 MG/DL
BUN SERPL-MCNC: 16.8 MG/DL (ref 8–23)
CALCIUM SERPL-MCNC: 9.7 MG/DL (ref 8.8–10.2)
CHLORIDE SERPL-SCNC: 102 MMOL/L (ref 98–107)
CHOLEST SERPL-MCNC: 292 MG/DL
CREAT SERPL-MCNC: 0.83 MG/DL (ref 0.51–0.95)
DEPRECATED HCO3 PLAS-SCNC: 25 MMOL/L (ref 22–29)
GFR SERPL CREATININE-BSD FRML MDRD: 71 ML/MIN/1.73M2
GLUCOSE SERPL-MCNC: 101 MG/DL (ref 70–99)
HDLC SERPL-MCNC: 59 MG/DL
LDLC SERPL CALC-MCNC: 194 MG/DL
MAGNESIUM SERPL-MCNC: 2.3 MG/DL (ref 1.7–2.3)
NONHDLC SERPL-MCNC: 233 MG/DL
POTASSIUM SERPL-SCNC: 4.5 MMOL/L (ref 3.4–5.3)
PROT SERPL-MCNC: 7 G/DL (ref 6.4–8.3)
SODIUM SERPL-SCNC: 140 MMOL/L (ref 136–145)
TRIGL SERPL-MCNC: 196 MG/DL
VIT B12 SERPL-MCNC: 859 PG/ML (ref 232–1245)

## 2023-02-13 PROCEDURE — 80061 LIPID PANEL: CPT | Performed by: NURSE PRACTITIONER

## 2023-02-13 PROCEDURE — 83735 ASSAY OF MAGNESIUM: CPT | Performed by: NURSE PRACTITIONER

## 2023-02-13 PROCEDURE — 82607 VITAMIN B-12: CPT | Performed by: NURSE PRACTITIONER

## 2023-02-13 PROCEDURE — 82306 VITAMIN D 25 HYDROXY: CPT | Performed by: NURSE PRACTITIONER

## 2023-02-13 PROCEDURE — 80053 COMPREHEN METABOLIC PANEL: CPT | Performed by: NURSE PRACTITIONER

## 2023-02-14 LAB — DEPRECATED CALCIDIOL+CALCIFEROL SERPL-MC: 83 UG/L (ref 20–75)

## 2023-07-07 ENCOUNTER — TRANSFERRED RECORDS (OUTPATIENT)
Dept: HEALTH INFORMATION MANAGEMENT | Facility: CLINIC | Age: 78
End: 2023-07-07

## 2023-07-12 ENCOUNTER — TRANSFERRED RECORDS (OUTPATIENT)
Dept: HEALTH INFORMATION MANAGEMENT | Facility: CLINIC | Age: 78
End: 2023-07-12

## 2023-07-14 ENCOUNTER — TRANSFERRED RECORDS (OUTPATIENT)
Dept: HEALTH INFORMATION MANAGEMENT | Facility: CLINIC | Age: 78
End: 2023-07-14
Payer: COMMERCIAL

## 2023-07-25 ENCOUNTER — TRANSFERRED RECORDS (OUTPATIENT)
Dept: HEALTH INFORMATION MANAGEMENT | Facility: CLINIC | Age: 78
End: 2023-07-25
Payer: COMMERCIAL

## 2023-07-25 ENCOUNTER — MEDICAL CORRESPONDENCE (OUTPATIENT)
Dept: HEALTH INFORMATION MANAGEMENT | Facility: CLINIC | Age: 78
End: 2023-07-25
Payer: COMMERCIAL

## 2023-07-25 LAB — PHQ9 SCORE: 10

## 2023-07-28 ENCOUNTER — MEDICAL CORRESPONDENCE (OUTPATIENT)
Dept: HEALTH INFORMATION MANAGEMENT | Facility: CLINIC | Age: 78
End: 2023-07-28
Payer: COMMERCIAL

## 2023-07-28 DIAGNOSIS — R05.3 CHRONIC COUGH: Primary | ICD-10-CM

## 2023-07-31 ENCOUNTER — TRANSCRIBE ORDERS (OUTPATIENT)
Dept: OTHER | Age: 78
End: 2023-07-31

## 2023-07-31 DIAGNOSIS — R05.3 CHRONIC COUGH: Primary | ICD-10-CM

## 2023-08-11 ENCOUNTER — OFFICE VISIT (OUTPATIENT)
Dept: PULMONOLOGY | Facility: CLINIC | Age: 78
End: 2023-08-11
Payer: COMMERCIAL

## 2023-08-11 VITALS
HEART RATE: 88 BPM | DIASTOLIC BLOOD PRESSURE: 68 MMHG | OXYGEN SATURATION: 99 % | WEIGHT: 141 LBS | HEIGHT: 64 IN | SYSTOLIC BLOOD PRESSURE: 116 MMHG | BODY MASS INDEX: 24.07 KG/M2

## 2023-08-11 DIAGNOSIS — J30.89 SEASONAL ALLERGIC RHINITIS DUE TO OTHER ALLERGIC TRIGGER: ICD-10-CM

## 2023-08-11 DIAGNOSIS — R05.3 CHRONIC COUGH: Primary | ICD-10-CM

## 2023-08-11 DIAGNOSIS — R05.3 CHRONIC COUGH: ICD-10-CM

## 2023-08-11 DIAGNOSIS — R91.8 PULMONARY NODULES: ICD-10-CM

## 2023-08-11 LAB
DLCOCOR-%PRED-PRE: 73 %
DLCOCOR-PRE: 13.54 ML/MIN/MMHG
DLCOUNC-%PRED-PRE: 71 %
DLCOUNC-PRE: 13.1 ML/MIN/MMHG
DLCOUNC-PRED: 18.38 ML/MIN/MMHG
ERV-%PRED-PRE: 59 %
ERV-PRE: 0.39 L
ERV-PRED: 0.64 L
EXPTIME-PRE: 7.98 SEC
FEF2575-%PRED-POST: 130 %
FEF2575-%PRED-PRE: 70 %
FEF2575-POST: 1.96 L/SEC
FEF2575-PRE: 1.06 L/SEC
FEF2575-PRED: 1.5 L/SEC
FEFMAX-%PRED-PRE: 82 %
FEFMAX-PRE: 4.04 L/SEC
FEFMAX-PRED: 4.89 L/SEC
FEV1-%PRED-PRE: 76 %
FEV1-PRE: 1.39 L
FEV1FEV6-PRE: 76 %
FEV1FEV6-PRED: 78 %
FEV1FVC-PRE: 74 %
FEV1FVC-PRED: 78 %
FEV1SVC-PRE: 75 %
FEV1SVC-PRED: 69 %
FIFMAX-PRE: 3.4 L/SEC
FRCPLETH-%PRED-PRE: 91 %
FRCPLETH-PRE: 2.6 L
FRCPLETH-PRED: 2.85 L
FVC-%PRED-PRE: 79 %
FVC-PRE: 1.89 L
FVC-PRED: 2.37 L
HGB BLD-MCNC: 12.4 G/DL
IC-%PRED-PRE: 76 %
IC-PRE: 1.48 L
IC-PRED: 1.94 L
RVPLETH-%PRED-PRE: 102 %
RVPLETH-PRE: 2.21 L
RVPLETH-PRED: 2.16 L
TLCPLETH-%PRED-PRE: 83 %
TLCPLETH-PRE: 4.08 L
TLCPLETH-PRED: 4.87 L
VA-%PRED-PRE: 70 %
VA-PRE: 3.15 L
VC-%PRED-PRE: 70 %
VC-PRE: 1.86 L
VC-PRED: 2.63 L

## 2023-08-11 PROCEDURE — 99204 OFFICE O/P NEW MOD 45 MIN: CPT | Performed by: NURSE PRACTITIONER

## 2023-08-11 PROCEDURE — 94726 PLETHYSMOGRAPHY LUNG VOLUMES: CPT | Performed by: INTERNAL MEDICINE

## 2023-08-11 PROCEDURE — 85018 HEMOGLOBIN: CPT | Performed by: INTERNAL MEDICINE

## 2023-08-11 PROCEDURE — 94060 EVALUATION OF WHEEZING: CPT | Performed by: INTERNAL MEDICINE

## 2023-08-11 PROCEDURE — 94729 DIFFUSING CAPACITY: CPT | Performed by: INTERNAL MEDICINE

## 2023-08-11 RX ORDER — ALBUTEROL SULFATE 90 UG/1
2 AEROSOL, METERED RESPIRATORY (INHALATION) EVERY 6 HOURS PRN
Qty: 18 G | Refills: 3 | Status: SHIPPED | OUTPATIENT
Start: 2023-08-11 | End: 2024-09-25

## 2023-08-11 RX ORDER — BUDESONIDE 3 MG/1
6 CAPSULE, COATED PELLETS ORAL EVERY MORNING
COMMUNITY
End: 2024-09-25

## 2023-08-11 RX ORDER — IBUPROFEN 200 MG
1 CAPSULE ORAL 2 TIMES DAILY
COMMUNITY

## 2023-08-11 NOTE — PROGRESS NOTES
Pulmonary Outpatient Consult Note  August 11, 2023    Assessment and Plan:   Salome Maravilla is a 77 year old female with history of COVID (hospitalization with acute respiratory failure in 2021), arthritis, HLD, GERD, breast cancer (s/p lumpectomy and radiation on 2015), and depression, presenting today for evaluation of incidental pulmonary nodule and chronic cough.   Her PFTs show normal spirometry with a mildly reduced diffusion capacity.    #.  Cough, ? reactive airways disease versus intermittent asthma --started after trip to [place] months ago with no obvious acute illness at the time.  History of seasonal allergies and dysphagia with reflux.  Recent ENT work-up for dysphagia unremarkable.   IgE and [place] allergy panel to assess for any environmental triggers.  CBC with differential to check eos  Trial of Dulera 200-5 BID.  If insurance coverage is not good we can substitute for another ICS/LABA.  She was instructed to rinse and gargle after each use.  Albuterol HFA every 4 hours as needed for cough, shortness of breath, chest tightness, or wheeze.  #. Pulmonary nodule --not noted on imaging from 11/2021 due to effusion in her right lower lobe.  Will monitor to ensure stability.  CT is 6 months   #.  GERD -- Normal video swallow study and esophagram in 2022.   Continue omeprazole 20 mg daily.  Consider GI referral if continued breakthrough reflux symptoms.      RTC 6 weeks to assess inhaler use    Chandni Fonseca CNP  Pulmonary Medicine  ______________________________________________________________________________    CC:   Chief Complaint   Patient presents with    Consult     PFT results, chronic cough       HPI:   Felicity presents today with her daughter to discuss incidental nodule on CT scan that was done recently at her ENT appointment.  She also reports a chronic cough for many months following a trip to [place].  She does not remember an illness that triggered her symptoms but has continued to have  "a productive cough.  She has had 2 different rounds of antibiotics that have not been helpful.  Previous smoker, quit in 1988.    VSS and esophagram in 06/2022 were normal   Has reflux symptoms while on omeprazole 20mg, worsening symptoms in the past when this was increased to 40mg.  ENT work-up for dysphagia she describes as \"food rubbing in her throat\". This only happens when she is eating.     Admits to mild postnasal drip. Has adult onset seasonal allergies. She noticed this first around 20 years ago when they would winter in [place]. She would use Claritin and Nasacort with relief. When she had allergy symptoms starting this past May these were no longer helpful and she has stopped using them.     During hospitalization for COVID pneumonia in 11/2021 she developed a right pleural effusion that was drained at bedside but effusion recurred with chest tube placement.  Imaging during hospital stay unfortunately does not reveal if nodule noted above was present, area is obscured by large effusion.    PMH:  Patient Active Problem List    Diagnosis Date Noted    Acute respiratory failure with hypoxia (H) 11/24/2021     Priority: Medium    Leukocytosis, unspecified type 11/24/2021     Priority: Medium    Pneumonia due to infectious organism, unspecified laterality, unspecified part of lung 11/10/2021     Priority: Medium    Abscess of middle lobe of right lung with pneumonia (H) 11/10/2021     Priority: Medium    Infection due to 2019 novel coronavirus 11/10/2021     Priority: Medium    Osteopenia determined by x-ray 06/04/2018     Priority: Medium    Breast cancer, stage 1, right (H) 01/01/2010     Priority: Medium     hx of right lumpectomy for breast            PSH:  Past Surgical History:   Procedure Laterality Date    APPENDECTOMY      ARTHROSCOPY SHOULDER ROTATOR CUFF REPAIR      BIOPSY BREAST Right 2010    hx of right lumpectomy    COLONOSCOPY      HYSTERECTOMY  1982    IR CHEST TUBE PLACEMENT NON-TUNNELED RIGHT "  2021    LUMPECTOMY BREAST Right     right lumpectomy    OOPHORECTOMY      RELEASE CARPAL TUNNEL Right     THORACOSCOPY Right 2021    Procedure: THORACOSCOPY, PARIETAL PLEURECTOMY;  Surgeon: Timbo Zuluaga MD;  Location: [facility] Main OR       Current Meds:  Current Outpatient Medications   Medication Sig Dispense Refill    acetaminophen (TYLENOL) 500 MG tablet Take 1,000 mg by mouth At Bedtime      ascorbic acid (VITAMIN C) 250 MG tablet Take 250 mg by mouth At Bedtime       BLACK COHOSH ORAL [BLACK COHOSH ORAL] Take 1 capsule by mouth 2 (two) times a day.       budesonide (ENTOCORT EC) 3 MG EC capsule Take 6 mg by mouth every morning      calcium citrate (CITRACAL) 950 (200 Ca) MG tablet Take 1 tablet by mouth 2 times daily      Lactobacillus rhamnosus GG (CULTURELLE) 10-15 Billion cell capsule Take 1 capsule by mouth At Bedtime       melatonin 1 MG TABS tablet Take 1 tablet (1 mg) by mouth nightly as needed for sleep      omeprazole (PRILOSEC) 20 MG capsule [OMEPRAZOLE (PRILOSEC) 20 MG CAPSULE] Take 20 mg by mouth daily.  0    sertraline (ZOLOFT) 100 MG tablet [SERTRALINE (ZOLOFT) 100 MG TABLET] Take 200 mg by mouth daily.             traZODone (DESYREL) 150 MG tablet Take 150 mg by mouth At Bedtime      valACYclovir (VALTREX) 1000 MG tablet Take 1,000 mg by mouth At Bedtime       vitamin E 400 UNIT capsule [VITAMIN E 400 UNIT CAPSULE] Take 400 Units by mouth daily.           Allergies:  Allergies   Allergen Reactions    Arimidex [Anastrozole] Unknown     Hot flashes    Penicillins Hives    Statins-Hmg-Coa Reductase Inhibitors [Statins] Muscle Pain (Myalgia)    Fenofibrate Rash       Social Hx:  Social History     Tobacco Use    Smoking status: Former     Types: Cigarettes     Quit date: 1988     Years since quittin.2    Smokeless tobacco: Never   Substance Use Topics    Alcohol use: Yes     Comment: Alcoholic Drinks/day: social    Drug use: No       Family HX: family history includes  "Atrial fibrillation in her sister; Breast Cancer (age of onset: 71.00) in her sister; Cancer in her sister; Cancer (age of onset: 19.00) in her brother; Chronic Obstructive Pulmonary Disease in her father; No Known Problems in her son and son; Testicular cancer in her brother.    ROS:   ROS: 10-point review performed and notable for the above mentioned symptoms. The remainder reviewed and negative.     Physical Exam:  /68 (BP Location: Left arm, Patient Position: Sitting, Cuff Size: Adult Regular)   Pulse 88   Ht 1.626 m (5' 4\")   Wt 64 kg (141 lb)   SpO2 99%   BMI 24.20 kg/m      Physical Exam  Constitutional:       General: She is not in acute distress.     Appearance: She is not ill-appearing or diaphoretic.   Cardiovascular:      Rate and Rhythm: Normal rate and regular rhythm.      Pulses: Normal pulses.      Heart sounds: Normal heart sounds.   Pulmonary:      Effort: Pulmonary effort is normal. No respiratory distress.      Breath sounds: No wheezing or rhonchi.   Musculoskeletal:      Right lower leg: No edema.      Left lower leg: No edema.   Skin:     General: Skin is warm and dry.      Findings: No rash.   Neurological:      Mental Status: She is alert.   Psychiatric:         Behavior: Behavior normal.         PFT's 8/11/2023      Impression:  Full Pulmonary Function Test is abnormal.  PFTs are consistent with  no  obstructive disease.  Spirometry is consistent with reversibility.  There is no hyperinflation.  There is no air-trapping.  Diffusion capacity when corrected for hemoglobin mildly reduced.    Labs:   personally reviewed in the EMR.    Imaging studies: personally reviewed and interpreted. Below are the Radiology interpretations.    CT chest from [facility] 7/14/2023  6mm RLL nodule (image 58)    CT CHEST W/O CONTRAST, DATE/TIME: 11/19/2021 9:46 AM  INDICATION: Empyema  COMPARISON: CT chest without contrast 11/16/2021  FINDINGS:   LUNGS AND PLEURA: Right pleural drain passes through the " posterolateral right intercostal space with catheter positioned in the upper lateral pleural space, unchanged from 11/16/2021. Heterogeneous attenuation debris layers dependently within the posterior right pleural space higher attenuation and small gas components with adjacent pleural fluid. Small loculated right anterior pneumothorax with fairly exuberant or surrounding visceral and parietal pleural thickening. Smaller left pleural   effusion is homogeneous attenuation, layering posteriorly.Unchanged consolidation and volume loss of the caudal and anterior right middle lobe and a limited subsegment of the anterior medial right upper lobe along the mediastinal pleura. Sparse peripheral groundglass airspace opacity in the anterior apical left upper lobe.  Trachea and central airways are patent.  MEDIASTINUM: Unchanged small circumferential pericardial effusion. Cardiac chambers are not enlarged. Main pulmonary artery is normal in size. Normal caliber thoracic aorta. Decompressed esophagus. No new enlargement of mediastinal or hilar lymph nodes.  IMPRESSION:   1.  Unchanged position of the right pleural drain. Localized visceral and parietal pleural thickening anterior to the right middle lobe and low anteromedial right upper lobe with adjacent lung consolidation.  2.  New heterogeneous attenuation debris layering dependently in the posterior right pleural space, most likely representing organized blood products.  3.  Unchanged circumferential pericardial effusion and small posteriorly layering left pleural effusion.

## 2023-08-11 NOTE — PATIENT INSTRUCTIONS
- if Dulera is too expensive, check with your insurance company to see what ICS/LABA inhaler is covered.     - use the inhaler twice daily no matter what until we meet again. Do not forgot to rinse your mouth and gargle after each use.     - we will have you get another CT scan in 6 months.     If you have worsening symptoms, questions, or need to speak with the nurse please call [phone].

## 2023-08-20 ENCOUNTER — HEALTH MAINTENANCE LETTER (OUTPATIENT)
Age: 78
End: 2023-08-20

## 2023-09-22 ENCOUNTER — LAB (OUTPATIENT)
Dept: LAB | Facility: HOSPITAL | Age: 78
End: 2023-09-22
Payer: COMMERCIAL

## 2023-09-22 ENCOUNTER — OFFICE VISIT (OUTPATIENT)
Dept: PULMONOLOGY | Facility: CLINIC | Age: 78
End: 2023-09-22
Attending: NURSE PRACTITIONER
Payer: COMMERCIAL

## 2023-09-22 VITALS
BODY MASS INDEX: 24.44 KG/M2 | DIASTOLIC BLOOD PRESSURE: 65 MMHG | WEIGHT: 142.4 LBS | OXYGEN SATURATION: 98 % | SYSTOLIC BLOOD PRESSURE: 113 MMHG | HEART RATE: 70 BPM

## 2023-09-22 DIAGNOSIS — R91.8 PULMONARY NODULES: ICD-10-CM

## 2023-09-22 DIAGNOSIS — R05.3 CHRONIC COUGH: Primary | ICD-10-CM

## 2023-09-22 DIAGNOSIS — J30.89 SEASONAL ALLERGIC RHINITIS DUE TO OTHER ALLERGIC TRIGGER: ICD-10-CM

## 2023-09-22 LAB
BASOPHILS # BLD AUTO: 0 10E3/UL (ref 0–0.2)
BASOPHILS NFR BLD AUTO: 1 %
EOSINOPHIL # BLD AUTO: 0 10E3/UL (ref 0–0.7)
EOSINOPHIL NFR BLD AUTO: 1 %
ERYTHROCYTE [DISTWIDTH] IN BLOOD BY AUTOMATED COUNT: 14.4 % (ref 10–15)
HCT VFR BLD AUTO: 37.1 % (ref 35–47)
HGB BLD-MCNC: 12 G/DL (ref 11.7–15.7)
IMM GRANULOCYTES # BLD: 0.1 10E3/UL
IMM GRANULOCYTES NFR BLD: 1 %
LYMPHOCYTES # BLD AUTO: 0.9 10E3/UL (ref 0.8–5.3)
LYMPHOCYTES NFR BLD AUTO: 14 %
MCH RBC QN AUTO: 30.4 PG (ref 26.5–33)
MCHC RBC AUTO-ENTMCNC: 32.3 G/DL (ref 31.5–36.5)
MCV RBC AUTO: 94 FL (ref 78–100)
MONOCYTES # BLD AUTO: 0.6 10E3/UL (ref 0–1.3)
MONOCYTES NFR BLD AUTO: 9 %
NEUTROPHILS # BLD AUTO: 5 10E3/UL (ref 1.6–8.3)
NEUTROPHILS NFR BLD AUTO: 74 %
NRBC # BLD AUTO: 0 10E3/UL
NRBC BLD AUTO-RTO: 0 /100
PLATELET # BLD AUTO: 187 10E3/UL (ref 150–450)
RBC # BLD AUTO: 3.95 10E6/UL (ref 3.8–5.2)
WBC # BLD AUTO: 6.6 10E3/UL (ref 4–11)

## 2023-09-22 PROCEDURE — 85025 COMPLETE CBC W/AUTO DIFF WBC: CPT

## 2023-09-22 PROCEDURE — 82785 ASSAY OF IGE: CPT

## 2023-09-22 PROCEDURE — 99214 OFFICE O/P EST MOD 30 MIN: CPT | Performed by: NURSE PRACTITIONER

## 2023-09-22 PROCEDURE — 36415 COLL VENOUS BLD VENIPUNCTURE: CPT

## 2023-09-22 RX ORDER — GEMFIBROZIL 600 MG/1
TABLET, FILM COATED ORAL 2 TIMES DAILY
COMMUNITY
Start: 2007-06-15

## 2023-09-22 RX ORDER — FAMOTIDINE 20 MG/1
TABLET, FILM COATED ORAL
COMMUNITY
Start: 2023-04-19

## 2023-09-22 RX ORDER — ESTRADIOL 0.1 MG/G
CREAM VAGINAL
COMMUNITY
Start: 2023-09-16

## 2023-09-22 RX ORDER — BACILLUS COAGULANS/INULIN 1B-250 MG
CAPSULE ORAL
COMMUNITY

## 2023-09-22 RX ORDER — TRIAMCINOLONE ACETONIDE 55 UG/1
SPRAY, METERED NASAL
COMMUNITY

## 2023-09-22 RX ORDER — QUINIDINE SULFATE 200 MG
TABLET ORAL
COMMUNITY

## 2023-09-22 NOTE — PATIENT INSTRUCTIONS
- use the Nasacort nasal spray 1-2 times daily no matter what.     - continue the Dulera daily but cut the dose in half.   - if you want to change inhalers or get a spacer let me know.     If you have worsening symptoms, questions, or need to speak with the nurse please call [phone].

## 2023-09-22 NOTE — PROGRESS NOTES
Pulmonary Outpatient Consult Note  September 22, 2023    Assessment and Plan:   Salome Maravilla is a 77 year old female with history of COVID (hospitalization with acute respiratory failure in 2021), arthritis, HLD, GERD, breast cancer (s/p lumpectomy and radiation on 2015), and depression, presenting today for follow up of incidental pulmonary nodule and chronic cough.   Her PFTs show normal spirometry with a mildly reduced diffusion capacity.    #.  Cough, ? reactive airways disease versus intermittent asthma versus upper airway cough syndrome --started after trip to [place] months ago with no obvious acute illness at the time.  History of seasonal allergies and dysphagia with reflux. Almost complete resolution with Dulera use. PND continue and will occasionally trigger cough.  Recent ENT work-up for dysphagia unremarkable.   IgE and [place] allergy panel to assess for any environmental triggers.  CBC with differential to check eos  Continue Dulera 200-5 BID for now. She was instructed to trial decreasing use to once daily to see if she has less taste side effect with continued control.  If she is interested, we can substitute for another ICS/LABA.  She was reminded to rinse and gargle after each use.  Once stable for a few months we can trial coming off daily inhaler.   Continue to try and mange congestion.   Albuterol HFA every 4 hours as needed for cough, shortness of breath, chest tightness, or wheeze.  #. Pulmonary nodule --not noted on imaging from 11/2021 due to effusion in her right lower lobe.  Will monitor to ensure stability.  CT is 6 months   #.  GERD -- Normal video swallow study and esophagram in 2022.   Continue omeprazole 20 mg daily.  Consider GI referral if continued breakthrough reflux symptoms. We will [place] back to this after trial of increased nasal spray.       RTC 3 months    Chandni Fonseca, YASEMIN  Pulmonary  "Medicine  ______________________________________________________________________________    CC:   Chief Complaint   Patient presents with    Follow Up     6 WEEK MEDICATION FOLLOW UP: (Inhalers)  Chronic cough  Seasonal allergic rhinitis due to other allergic trigger  Pulmonary nodules          HPI:   Felicity was last seen in 08/2023 for an incidental pulmonary nodule evaluation. At that time we started a trial of Dulera BID to help with chronic cough for many months following a trip to [place].    Since her last visit she has been using Dulera BID consistently and reports resolution of productive cough. Unfortunately, the inhaler has caused a metallic taste in her mouth that is bothersome, even with good rinsing after use. She continues to have some PND, it is thin and clear and occasionally causes cough.     She does not remember an illness that triggered her symptoms. Antibiotics were not helpful.   Previous smoker, quit in 1988.    VSS and esophagram in 06/2022 were normal   Has reflux symptoms while on omeprazole 20mg, worsening symptoms in the past when this was increased to 40mg.  ENT work-up for dysphagia she describes as \"food rubbing in her throat\". This only happens when she is eating.     Admits to mild postnasal drip. Has adult onset seasonal allergies. She noticed this first around 20 years ago when they would winter in [place]. She would use Claritin and Nasacort with relief. When she had allergy symptoms starting this past May these were no longer helpful and she has stopped using them.     During hospitalization for COVID pneumonia in 11/2021 she developed a right pleural effusion that was drained at bedside but effusion recurred with chest tube placement.  Imaging during hospital stay unfortunately does not reveal if nodule found recently was present, area is obscured by large effusion.    PMH:  Patient Active Problem List    Diagnosis Date Noted    Acute respiratory failure with hypoxia (H) " 11/24/2021     Priority: Medium    Leukocytosis, unspecified type 11/24/2021     Priority: Medium    Pneumonia due to infectious organism, unspecified laterality, unspecified part of lung 11/10/2021     Priority: Medium    Abscess of middle lobe of right lung with pneumonia (H) 11/10/2021     Priority: Medium    Infection due to 2019 novel coronavirus 11/10/2021     Priority: Medium    Osteopenia determined by x-ray 06/04/2018     Priority: Medium    Breast cancer, stage 1, right (H) 01/01/2010     Priority: Medium     hx of right lumpectomy for breast            PSH:  Past Surgical History:   Procedure Laterality Date    APPENDECTOMY      ARTHROSCOPY SHOULDER ROTATOR CUFF REPAIR      BIOPSY BREAST Right 2010    hx of right lumpectomy    COLONOSCOPY      HYSTERECTOMY  1982    IR CHEST TUBE PLACEMENT NON-TUNNELED RIGHT  11/13/2021    LUMPECTOMY BREAST Right 2010    right lumpectomy    OOPHORECTOMY      RELEASE CARPAL TUNNEL Right     THORACOSCOPY Right 11/23/2021    Procedure: THORACOSCOPY, PARIETAL PLEURECTOMY;  Surgeon: Timbo Zuluaga MD;  Location: [facility] [place] OR       Current Meds:  Current Outpatient Medications   Medication Sig Dispense Refill    acetaminophen (TYLENOL) 500 MG tablet Take 1,000 mg by mouth At Bedtime      albuterol (PROAIR HFA/PROVENTIL HFA/VENTOLIN HFA) 108 (90 Base) MCG/ACT inhaler Inhale 2 puffs into the lungs every 6 hours as needed for shortness of breath, wheezing or cough 18 g 3    ascorbic acid (VITAMIN C) 250 MG tablet Take 250 mg by mouth At Bedtime       Bacillus Coagulans-Inulin (PROBIOTIC) 1-250 BILLION-MG CAPS 2 caps orally once a day      BLACK COHOSH ORAL [BLACK COHOSH ORAL] Take 1 capsule by mouth 2 (two) times a day.       budesonide (ENTOCORT EC) 3 MG EC capsule Take 6 mg by mouth every morning      calcium citrate (CITRACAL) 950 (200 Ca) MG tablet Take 1 tablet by mouth 2 times daily      Coenzyme Q10 (CO Q-10) 400 MG CAPS 1 cap(s) Orally once a day      estradiol  (ESTRACE) 0.1 MG/GM vaginal cream INSERT 1 GRAM OF CREAM INTO VAGINA USING THE APPLICATORY NIGHTLY.      famotidine (PEPCID) 20 MG tablet TAKE ONE TAB BY MOUTH ONCE A DAY AS NEEDED FOR BREATHTHRU GERD SYMTOMS      gemfibrozil (LOPID) 600 MG tablet 2 times daily 1/2 hour before breakfast; 1/2 hour before dinner      Lactobacillus rhamnosus GG (CULTURELLE) 10-15 Billion cell capsule Take 1 capsule by mouth At Bedtime       MAGNESIUM GLYCINATE  mg 2 times daily      melatonin 1 MG TABS tablet Take 1 tablet (1 mg) by mouth nightly as needed for sleep      mometasone-formoterol (DULERA) 200-5 MCG/ACT inhaler Inhale 2 puffs into the lungs 2 times daily 13 g 3    omeprazole (PRILOSEC) 20 MG capsule [OMEPRAZOLE (PRILOSEC) 20 MG CAPSULE] Take 20 mg by mouth daily.  0    PSYLLIUM HUSK PO Take 500 mg by mouth daily 3 capsules daily      sertraline (ZOLOFT) 100 MG tablet [SERTRALINE (ZOLOFT) 100 MG TABLET] Take 200 mg by mouth daily.             traZODone (DESYREL) 150 MG tablet Take 150 mg by mouth At Bedtime      triamcinolone (NASACORT ALLERGY 24HR) 55 MCG/ACT nasal aerosol Nasacort Allergy 24HR 55 MCG/ACT      valACYclovir (VALTREX) 1000 MG tablet Take 1,000 mg by mouth At Bedtime       vitamin E 400 UNIT capsule [VITAMIN E 400 UNIT CAPSULE] Take 400 Units by mouth daily.           Allergies:  Allergies   Allergen Reactions    Arimidex [Anastrozole] Unknown     Hot flashes    Penicillins Hives    Statins-Hmg-Coa Reductase Inhibitors [Statins] Muscle Pain (Myalgia)    Fenofibrate Rash       Social Hx:  Social History     Tobacco Use    Smoking status: Former     Packs/day: 1.00     Types: Cigarettes     Quit date: 1988     Years since quittin.4    Smokeless tobacco: Never   Vaping Use    Vaping Use: Never used   Substance Use Topics    Alcohol use: Yes     Comment: Alcoholic Drinks/day: social    Drug use: No       Family HX: family history includes Atrial fibrillation in her sister; Breast Cancer (age of  onset: 71.00) in her sister; Cancer in her sister; Cancer (age of onset: 19.00) in her brother; Chronic Obstructive Pulmonary Disease in her father; No Known Problems in her son and son; Testicular cancer in her brother.    ROS:   ROS: 10-point review performed and notable for the above mentioned symptoms. The remainder reviewed and negative.     Physical Exam:  /65 (BP Location: Left arm, Patient Position: Sitting, Cuff Size: Adult Regular)   Pulse 70   Wt 64.6 kg (142 lb 6.4 oz)   SpO2 98%   BMI 24.44 kg/m      Physical Exam  Constitutional:       General: She is not in acute distress.     Appearance: She is not ill-appearing or diaphoretic.   Cardiovascular:      Rate and Rhythm: Normal rate and regular rhythm.      Pulses: Normal pulses.      Heart sounds: Normal heart sounds.   Pulmonary:      Effort: Pulmonary effort is normal. No respiratory distress.      Breath sounds: No wheezing or rhonchi.   Musculoskeletal:      Right lower leg: No edema.      Left lower leg: No edema.   Skin:     General: Skin is warm and dry.      Findings: No rash.   Neurological:      Mental Status: She is alert.   Psychiatric:         Behavior: Behavior normal.         PFT's 8/11/2023      Impression:  Full Pulmonary Function Test is abnormal.  PFTs are consistent with  no  obstructive disease.  Spirometry is consistent with reversibility.  There is no hyperinflation.  There is no air-trapping.  Diffusion capacity when corrected for hemoglobin mildly reduced.    Labs:   personally reviewed in the EMR.    Imaging studies: personally reviewed and interpreted. Below are the Radiology interpretations.    CT chest from [facility] 7/14/2023  6mm RLL nodule (image 58)    CT CHEST W/O CONTRAST, DATE/TIME: 11/19/2021 9:46 AM  INDICATION: Empyema  COMPARISON: CT chest without contrast 11/16/2021  FINDINGS:   LUNGS AND PLEURA: Right pleural drain passes through the posterolateral right intercostal space with catheter positioned in the  upper lateral pleural space, unchanged from 11/16/2021. Heterogeneous attenuation debris layers dependently within the posterior right pleural space higher attenuation and small gas components with adjacent pleural fluid. Small loculated right anterior pneumothorax with fairly exuberant or surrounding visceral and parietal pleural thickening. Smaller left pleural   effusion is homogeneous attenuation, layering posteriorly.Unchanged consolidation and volume loss of the caudal and anterior right middle lobe and a limited subsegment of the anterior medial right upper lobe along the mediastinal pleura. Sparse peripheral groundglass airspace opacity in the anterior apical left upper lobe.  Trachea and central airways are patent.  MEDIASTINUM: Unchanged small circumferential pericardial effusion. Cardiac chambers are not enlarged. Main pulmonary artery is normal in size. Normal caliber thoracic aorta. Decompressed esophagus. No new enlargement of mediastinal or hilar lymph nodes.  IMPRESSION:   1.  Unchanged position of the right pleural drain. Localized visceral and parietal pleural thickening anterior to the right middle lobe and low anteromedial right upper lobe with adjacent lung consolidation.  2.  New heterogeneous attenuation debris layering dependently in the posterior right pleural space, most likely representing organized blood products.  3.  Unchanged circumferential pericardial effusion and small posteriorly layering left pleural effusion.

## 2023-09-25 LAB

## 2023-10-02 ENCOUNTER — LAB REQUISITION (OUTPATIENT)
Dept: LAB | Facility: CLINIC | Age: 78
End: 2023-10-02

## 2023-10-02 DIAGNOSIS — R21 RASH AND OTHER NONSPECIFIC SKIN ERUPTION: ICD-10-CM

## 2023-10-02 DIAGNOSIS — R19.7 DIARRHEA, UNSPECIFIED: ICD-10-CM

## 2023-10-02 PROCEDURE — 86140 C-REACTIVE PROTEIN: CPT | Performed by: FAMILY MEDICINE

## 2023-10-02 PROCEDURE — 80053 COMPREHEN METABOLIC PANEL: CPT | Performed by: FAMILY MEDICINE

## 2023-10-03 ENCOUNTER — LAB REQUISITION (OUTPATIENT)
Dept: LAB | Facility: CLINIC | Age: 78
End: 2023-10-03

## 2023-10-03 DIAGNOSIS — Z87.440 PERSONAL HISTORY OF URINARY (TRACT) INFECTIONS: ICD-10-CM

## 2023-10-03 PROCEDURE — 87086 URINE CULTURE/COLONY COUNT: CPT | Performed by: FAMILY MEDICINE

## 2023-10-04 LAB
ALBUMIN SERPL BCG-MCNC: 4.1 G/DL (ref 3.5–5.2)
ALP SERPL-CCNC: 58 U/L (ref 35–104)
ALT SERPL W P-5'-P-CCNC: 19 U/L (ref 0–50)
ANION GAP SERPL CALCULATED.3IONS-SCNC: 13 MMOL/L (ref 7–15)
AST SERPL W P-5'-P-CCNC: 22 U/L (ref 0–45)
BILIRUB SERPL-MCNC: 0.3 MG/DL
BUN SERPL-MCNC: 21.1 MG/DL (ref 8–23)
CALCIUM SERPL-MCNC: 10 MG/DL (ref 8.8–10.2)
CHLORIDE SERPL-SCNC: 102 MMOL/L (ref 98–107)
CREAT SERPL-MCNC: 0.78 MG/DL (ref 0.51–0.95)
CRP SERPL-MCNC: 53.3 MG/L
DEPRECATED HCO3 PLAS-SCNC: 23 MMOL/L (ref 22–29)
EGFRCR SERPLBLD CKD-EPI 2021: 77 ML/MIN/1.73M2
GLUCOSE SERPL-MCNC: 96 MG/DL (ref 70–99)
POTASSIUM SERPL-SCNC: 4.7 MMOL/L (ref 3.4–5.3)
PROT SERPL-MCNC: 7.2 G/DL (ref 6.4–8.3)
SODIUM SERPL-SCNC: 138 MMOL/L (ref 135–145)

## 2023-10-05 LAB — BACTERIA UR CULT: NORMAL

## 2023-10-09 ENCOUNTER — LAB REQUISITION (OUTPATIENT)
Dept: LAB | Facility: CLINIC | Age: 78
End: 2023-10-09

## 2023-10-09 DIAGNOSIS — R21 RASH AND OTHER NONSPECIFIC SKIN ERUPTION: ICD-10-CM

## 2023-10-09 LAB — CRP SERPL-MCNC: 3.29 MG/L

## 2023-10-09 PROCEDURE — 86140 C-REACTIVE PROTEIN: CPT | Performed by: FAMILY MEDICINE

## 2023-10-17 ENCOUNTER — LAB REQUISITION (OUTPATIENT)
Dept: LAB | Facility: CLINIC | Age: 78
End: 2023-10-17

## 2023-10-17 DIAGNOSIS — R53.82 CHRONIC FATIGUE, UNSPECIFIED: ICD-10-CM

## 2023-10-17 PROCEDURE — 84443 ASSAY THYROID STIM HORMONE: CPT | Performed by: NURSE PRACTITIONER

## 2023-10-18 LAB — TSH SERPL DL<=0.005 MIU/L-ACNC: 2.84 UIU/ML (ref 0.3–4.2)

## 2023-11-07 ENCOUNTER — TRANSFERRED RECORDS (OUTPATIENT)
Dept: HEALTH INFORMATION MANAGEMENT | Facility: CLINIC | Age: 78
End: 2023-11-07

## 2023-11-14 ENCOUNTER — HOSPITAL ENCOUNTER (OUTPATIENT)
Dept: BONE DENSITY | Facility: HOSPITAL | Age: 78
Discharge: HOME | End: 2023-11-14
Attending: NURSE PRACTITIONER | Admitting: NURSE PRACTITIONER
Payer: COMMERCIAL

## 2023-11-14 DIAGNOSIS — E89.40 SURGICAL MENOPAUSE, ASYMPTOMATIC: ICD-10-CM

## 2023-11-14 PROCEDURE — 77080 DXA BONE DENSITY AXIAL: CPT

## 2023-11-15 ENCOUNTER — TRANSFERRED RECORDS (OUTPATIENT)
Dept: HEALTH INFORMATION MANAGEMENT | Facility: CLINIC | Age: 78
End: 2023-11-15

## 2023-12-08 ENCOUNTER — LAB REQUISITION (OUTPATIENT)
Dept: LAB | Facility: CLINIC | Age: 78
End: 2023-12-08

## 2023-12-08 DIAGNOSIS — E55.9 VITAMIN D DEFICIENCY, UNSPECIFIED: ICD-10-CM

## 2023-12-08 LAB — VIT D+METAB SERPL-MCNC: 68 NG/ML (ref 20–50)

## 2023-12-08 PROCEDURE — 82306 VITAMIN D 25 HYDROXY: CPT | Performed by: NURSE PRACTITIONER

## 2023-12-18 ENCOUNTER — OFFICE VISIT (OUTPATIENT)
Dept: PULMONOLOGY | Facility: CLINIC | Age: 78
End: 2023-12-18
Payer: COMMERCIAL

## 2023-12-18 VITALS
OXYGEN SATURATION: 99 % | DIASTOLIC BLOOD PRESSURE: 64 MMHG | BODY MASS INDEX: 24.13 KG/M2 | WEIGHT: 140.6 LBS | SYSTOLIC BLOOD PRESSURE: 118 MMHG | HEART RATE: 82 BPM

## 2023-12-18 DIAGNOSIS — R05.3 CHRONIC COUGH: Primary | ICD-10-CM

## 2023-12-18 DIAGNOSIS — R91.8 PULMONARY NODULES: ICD-10-CM

## 2023-12-18 DIAGNOSIS — J30.89 SEASONAL ALLERGIC RHINITIS DUE TO OTHER ALLERGIC TRIGGER: ICD-10-CM

## 2023-12-18 PROCEDURE — 99214 OFFICE O/P EST MOD 30 MIN: CPT | Performed by: NURSE PRACTITIONER

## 2023-12-18 NOTE — PROGRESS NOTES
Pulmonary Outpatient Consult Note  December 18, 2023      Assessment and Plan:   Salome Maravilla is a 78 year old female with history of COVID (hospitalization with acute respiratory failure in 2021), arthritis, HLD, GERD, breast cancer (s/p lumpectomy and radiation on 2015), and depression, presenting today for follow up of incidental pulmonary nodule and chronic cough.   Her PFTs show normal spirometry with a mildly reduced diffusion capacity.  IgE, [place] allergy [place], and eos WNL.     #.  Cough, ? reactive airways disease versus post viral inflammatory process -- Started after trip to [place] months ago with no obvious acute illness at the time.  History of seasonal allergies and dysphagia with reflux. Complete resolution with Dulera use. Trial off inhaler x 1 week with no return of symptoms. PND continues and will occasionally trigger cough.  Recent ENT work-up for dysphagia unremarkable.   STOP Dulera 200-5 BID for now. If cough returns she was encouraged to try albuterol prn and if cough persists she may need ICS or ICS/LABA on a prn basis.   Continue to try and manage congestion.   Albuterol HFA every 4 hours as needed for cough, shortness of breath, chest tightness, or wheeze.  #. Pulmonary nodule --not noted on imaging from 11/2021 due to effusion in her right lower lobe.  Will monitor to ensure stability.  CT is 6 months. Scheduled for Feb 2024.  #.  GERD -- Normal video swallow study and esophagram in 2022.   Continue omeprazole 20 mg daily.    RTC prn    Chandni Fonseca, CNP  Pulmonary Medicine  ______________________________________________________________________________    CC:   Chief Complaint   Patient presents with    Follow Up     Cough        HPI:   Felicity was last seen in 09/2023. She had been started a trial of Dulera BID to help with chronic cough for many months following a trip to [place]. The cough resolved with consistent Dulera use.. She stopped use for 1 week over Thanksgiving as the  "inhaler affects her taste and did not notice a return of cough.   She continues to have some PND, it is thin and clear and occasionally causes cough.     She does not remember an illness that triggered her symptoms. Antibiotics were not helpful.   Previous smoker, quit in 1988.    VSS and esophagram in 06/2022 were normal   Has reflux symptoms while on omeprazole 20mg, worsening symptoms in the past when this was increased to 40mg.  ENT work-up for dysphagia she describes as \"food rubbing in her throat\". This only happens when she is eating.     Admits to mild postnasal drip. Has adult onset seasonal allergies. She noticed this first around 20 years ago when they would winter in [place]. She would use Claritin and Nasacort with relief. When she had allergy symptoms starting this past May these were no longer helpful and she has stopped using them.     During hospitalization for COVID pneumonia in 11/2021 she developed a right pleural effusion that was drained at bedside but effusion recurred with chest tube placement.  Imaging during hospital stay unfortunately does not reveal if nodule found recently was present, area is obscured by large effusion.    PMH:  Patient Active Problem List    Diagnosis Date Noted    Acute respiratory failure with hypoxia (H) 11/24/2021     Priority: Medium    Leukocytosis, unspecified type 11/24/2021     Priority: Medium    Pneumonia due to infectious organism, unspecified laterality, unspecified part of lung 11/10/2021     Priority: Medium    Abscess of middle lobe of right lung with pneumonia (H) 11/10/2021     Priority: Medium    Infection due to 2019 novel coronavirus 11/10/2021     Priority: Medium    Osteopenia determined by x-ray 06/04/2018     Priority: Medium    Breast cancer, stage 1, right (H) 01/01/2010     Priority: Medium     hx of right lumpectomy for breast          PSH:  Past Surgical History:   Procedure Laterality Date    APPENDECTOMY      ARTHROSCOPY SHOULDER ROTATOR " CUFF REPAIR      BIOPSY BREAST Right 2010    hx of right lumpectomy    COLONOSCOPY      HYSTERECTOMY  1982    IR CHEST TUBE PLACEMENT NON-TUNNELED RIGHT  11/13/2021    LUMPECTOMY BREAST Right 2010    right lumpectomy    OOPHORECTOMY      RELEASE CARPAL TUNNEL Right     THORACOSCOPY Right 11/23/2021    Procedure: THORACOSCOPY, PARIETAL PLEURECTOMY;  Surgeon: Timbo Zuluaga MD;  Location: [facility]     Current Meds:  Current Outpatient Medications   Medication Sig Dispense Refill    acetaminophen (TYLENOL) 500 MG tablet Take 1,000 mg by mouth At Bedtime      albuterol (PROAIR HFA/PROVENTIL HFA/VENTOLIN HFA) 108 (90 Base) MCG/ACT inhaler Inhale 2 puffs into the lungs every 6 hours as needed for shortness of breath, wheezing or cough 18 g 3    ascorbic acid (VITAMIN C) 250 MG tablet Take 250 mg by mouth At Bedtime       Bacillus Coagulans-Inulin (PROBIOTIC) 1-250 BILLION-MG CAPS 2 caps orally once a day      BLACK COHOSH ORAL [BLACK COHOSH ORAL] Take 1 capsule by mouth 2 (two) times a day.       budesonide (ENTOCORT EC) 3 MG EC capsule Take 6 mg by mouth every morning      calcium citrate (CITRACAL) 950 (200 Ca) MG tablet Take 1 tablet by mouth 2 times daily      Coenzyme Q10 (CO Q-10) 400 MG CAPS 1 cap(s) Orally once a day      estradiol (ESTRACE) 0.1 MG/GM vaginal cream INSERT 1 GRAM OF CREAM INTO VAGINA USING THE APPLICATORY NIGHTLY.      famotidine (PEPCID) 20 MG tablet TAKE ONE TAB BY MOUTH ONCE A DAY AS NEEDED FOR BREATHTHRU GERD SYMTOMS      gemfibrozil (LOPID) 600 MG tablet 2 times daily 1/2 hour before breakfast; 1/2 hour before dinner      MAGNESIUM GLYCINATE  mg 2 times daily      melatonin 1 MG TABS tablet Take 1 tablet (1 mg) by mouth nightly as needed for sleep      mometasone-formoterol (DULERA) 200-5 MCG/ACT inhaler Inhale 2 puffs into the lungs 2 times daily 13 g 3    omeprazole (PRILOSEC) 20 MG capsule [OMEPRAZOLE (PRILOSEC) 20 MG CAPSULE] Take 20 mg by mouth daily.  0    PSYLLIUM HUSK  PO Take 500 mg by mouth daily 3 capsules daily      sertraline (ZOLOFT) 100 MG tablet [SERTRALINE (ZOLOFT) 100 MG TABLET] Take 200 mg by mouth daily.             traZODone (DESYREL) 150 MG tablet Take 150 mg by mouth At Bedtime      triamcinolone (NASACORT ALLERGY 24HR) 55 MCG/ACT nasal aerosol Nasacort Allergy 24HR 55 MCG/ACT      valACYclovir (VALTREX) 1000 MG tablet Take 1,000 mg by mouth At Bedtime       vitamin E 400 UNIT capsule [VITAMIN E 400 UNIT CAPSULE] Take 400 Units by mouth daily.      Lactobacillus rhamnosus GG (CULTURELLE) 10-15 Billion cell capsule Take 1 capsule by mouth At Bedtime        Allergies:  Allergies   Allergen Reactions    Arimidex [Anastrozole] Unknown     Hot flashes    Penicillins Hives    Statins-Hmg-Coa Reductase Inhibitors [Statins] Muscle Pain (Myalgia)    Fenofibrate Rash     Social Hx:  Social History     Tobacco Use    Smoking status: Former     Packs/day: 1     Types: Cigarettes     Quit date: 1988     Years since quittin.6    Smokeless tobacco: Never   Vaping Use    Vaping Use: Never used   Substance Use Topics    Alcohol use: Yes     Comment: Alcoholic Drinks/day: social    Drug use: No     Family HX: family history includes Atrial fibrillation in her sister; Breast Cancer (age of onset: 71.00) in her sister; Cancer in her sister; Cancer (age of onset: 19.00) in her brother; Chronic Obstructive Pulmonary Disease in her father; No Known Problems in her son and son; Testicular cancer in her brother.    ROS:   ROS: 10-point review performed and notable for the above mentioned symptoms. The remainder reviewed and negative.     Physical Exam:  /64 (BP Location: Left arm, Patient Position: Chair, Cuff Size: Adult Regular)   Pulse 82   Wt 63.8 kg (140 lb 9.6 oz)   SpO2 99%   BMI 24.13 kg/m      Physical Exam  Constitutional:       General: She is not in acute distress.     Appearance: She is not ill-appearing or diaphoretic.   Cardiovascular:      Rate and Rhythm:  Normal rate and regular rhythm.      Pulses: Normal pulses.      Heart sounds: Normal heart sounds.   Pulmonary:      Effort: Pulmonary effort is normal. No respiratory distress.      Breath sounds: No wheezing or rhonchi.   Musculoskeletal:      Right lower leg: No edema.      Left lower leg: No edema.   Skin:     General: Skin is warm and dry.      Findings: No rash.   Neurological:      Mental Status: She is alert.   Psychiatric:         Behavior: Behavior normal.       PFT's 8/11/2023      Impression:  Full Pulmonary Function Test is abnormal.  PFTs are consistent with  no  obstructive disease.  Spirometry is consistent with reversibility.  There is no hyperinflation.  There is no air-trapping.  Diffusion capacity when corrected for hemoglobin mildly reduced.    Labs:   personally reviewed in the EMR.     Latest Reference Range & Units 09/22/23 12:11   Allergen A alternata <0.10 KU(A)/L <0.10   Allergen A fumigatus <0.10 KU(A)/L <0.10   Allergen, [place] Grass <0.10 KU(A)/L <0.10   Allergen C herbarum <0.10 KU(A)/L <0.10   Allergen Cat Dander <0.10 KU(A)/L <0.10   Allergen Cockroach <0.10 KU(A)/L <0.10   Allergen D farinae <0.10 KU(A)/L <0.10   Allergen, D Pteronyssinus <0.10 KU(A)/L <0.10   Allergen Dog Dander <0.10 KU(A)/L <0.10   Allergen Elm <0.10 KU(A)/L <0.10   Allergen Maple <0.10 KU(A)/L <0.10   Allergen, Mtn [place] <0.10 KU(A)/L <0.10   Allergen Oak(white) <0.10 KU(A)/L <0.10   Allergen P notatum <0.10 KU(A)/L <0.10   Allergen Mikel <0.10 KU(A)/L <0.10   Allergen Tree White Solomon IgE <0.10 KU(A)/L <0.10   Allergen Weed Nettle IgE <0.10 KU(A)/L <0.10   Allergen [place] <0.10 KU(A)/L <0.10   Allergen [place]er <0.10 KU(A)/L <0.10   Allergen, Mouse Urine <0.10 KU(A)/L <0.10   Allergen, Ragweed Short <0.10 KU(A)/L <0.10   Allergen Russian Thistle <0.10 KU(A)/L <0.10   Allergen, Silver Birch <0.10 KU(A)/L <0.10   Allergen White Lc <0.10 KU(A)/L <0.10   IGE 0 - 114 kU/L  0 - 114 kU/L 19  19       Latest Reference Range & Units 09/22/23 12:11   Absolute Eosinophils 0.0 - 0.7 10e3/uL 0.0     Imaging studies: personally reviewed and interpreted. Below are the Radiology interpretations.    CT chest from [facility] 7/14/2023  6mm RLL nodule (image 58)    CT CHEST W/O CONTRAST, DATE/TIME: 11/19/2021 9:46 AM  INDICATION: Empyema  COMPARISON: CT chest without contrast 11/16/2021  FINDINGS:   LUNGS AND PLEURA: Right pleural drain passes through the posterolateral right intercostal space with catheter positioned in the upper lateral pleural space, unchanged from 11/16/2021. Heterogeneous attenuation debris layers dependently within the posterior right pleural space higher attenuation and small gas components with adjacent pleural fluid. Small loculated right anterior pneumothorax with fairly exuberant or surrounding visceral and parietal pleural thickening. Smaller left pleural   effusion is homogeneous attenuation, layering posteriorly.Unchanged consolidation and volume loss of the caudal and anterior right middle lobe and a limited subsegment of the anterior medial right upper lobe along the mediastinal pleura. Sparse peripheral groundglass airspace opacity in the anterior apical left upper lobe.  Trachea and central airways are patent.  MEDIASTINUM: Unchanged small circumferential pericardial effusion. Cardiac chambers are not enlarged. Main pulmonary artery is normal in size. Normal caliber thoracic aorta. Decompressed esophagus. No new enlargement of mediastinal or hilar lymph nodes.  IMPRESSION:   1.  Unchanged position of the right pleural drain. Localized visceral and parietal pleural thickening anterior to the right middle lobe and low anteromedial right upper lobe with adjacent lung consolidation.  2.  New heterogeneous attenuation debris layering dependently in the posterior right pleural space, most likely representing organized blood products.  3.  Unchanged circumferential pericardial effusion and small  posteriorly layering left pleural effusion.

## 2023-12-18 NOTE — PATIENT INSTRUCTIONS
It was a pleasure seeing you in clinic today. This is what we discussed:    I'm glad your breathing is still stable! Let's see if you do OK of the Dulera. If you notice return of any symptoms try the albuterol first. If that is not enough we can have you try the Dulera as needed.   Use your albuterol every 4 hours as needed for cough, shortness of breath, wheeze, or chest tightness.   Follow up as needed. I will contact you with the CT scan results in Feb.   If you have worsening symptoms, questions, or need to speak with the nurse please call [phone].

## 2024-02-08 ENCOUNTER — ANCILLARY PROCEDURE (OUTPATIENT)
Dept: MAMMOGRAPHY | Facility: CLINIC | Age: 79
End: 2024-02-08
Attending: NURSE PRACTITIONER
Payer: COMMERCIAL

## 2024-02-08 DIAGNOSIS — Z12.31 VISIT FOR SCREENING MAMMOGRAM: ICD-10-CM

## 2024-02-08 PROCEDURE — 77063 BREAST TOMOSYNTHESIS BI: CPT

## 2024-02-12 ENCOUNTER — HOSPITAL ENCOUNTER (OUTPATIENT)
Dept: CT IMAGING | Facility: HOSPITAL | Age: 79
Discharge: HOME | End: 2024-02-12
Attending: NURSE PRACTITIONER | Admitting: NURSE PRACTITIONER
Payer: COMMERCIAL

## 2024-02-12 DIAGNOSIS — R91.8 PULMONARY NODULES: ICD-10-CM

## 2024-02-12 PROCEDURE — 71250 CT THORAX DX C-: CPT

## 2024-02-20 ENCOUNTER — TELEPHONE (OUTPATIENT)
Dept: PULMONOLOGY | Facility: CLINIC | Age: 79
End: 2024-02-20
Payer: COMMERCIAL

## 2024-02-20 NOTE — TELEPHONE ENCOUNTER
Called and informed Felicity of her CT results per Chandni Fonseca CNP. Relayed Chandni's MyChart message with  her. She has no further questions.

## 2024-02-22 ENCOUNTER — ANCILLARY PROCEDURE (OUTPATIENT)
Dept: MAMMOGRAPHY | Facility: CLINIC | Age: 79
End: 2024-02-22
Attending: NURSE PRACTITIONER
Payer: COMMERCIAL

## 2024-02-22 DIAGNOSIS — N64.89 BREAST ASYMMETRY: ICD-10-CM

## 2024-02-22 PROCEDURE — 77065 DX MAMMO INCL CAD UNI: CPT | Mod: LT

## 2024-04-15 ENCOUNTER — LAB REQUISITION (OUTPATIENT)
Dept: LAB | Facility: CLINIC | Age: 79
End: 2024-04-15

## 2024-04-15 DIAGNOSIS — E78.2 MIXED HYPERLIPIDEMIA: ICD-10-CM

## 2024-04-15 PROCEDURE — 80061 LIPID PANEL: CPT | Performed by: NURSE PRACTITIONER

## 2024-04-15 PROCEDURE — 82306 VITAMIN D 25 HYDROXY: CPT | Performed by: NURSE PRACTITIONER

## 2024-04-16 LAB
CHOLEST SERPL-MCNC: 277 MG/DL
FASTING STATUS PATIENT QL REPORTED: ABNORMAL
HDLC SERPL-MCNC: 56 MG/DL
LDLC SERPL CALC-MCNC: 187 MG/DL
NONHDLC SERPL-MCNC: 221 MG/DL
TRIGL SERPL-MCNC: 172 MG/DL

## 2024-04-17 ENCOUNTER — LAB REQUISITION (OUTPATIENT)
Dept: LAB | Facility: CLINIC | Age: 79
End: 2024-04-17

## 2024-04-17 DIAGNOSIS — E55.9 VITAMIN D DEFICIENCY, UNSPECIFIED: ICD-10-CM

## 2024-04-17 LAB — VIT D+METAB SERPL-MCNC: 64 NG/ML (ref 20–50)

## 2024-05-30 ENCOUNTER — TRANSFERRED RECORDS (OUTPATIENT)
Dept: HEALTH INFORMATION MANAGEMENT | Facility: CLINIC | Age: 79
End: 2024-05-30

## 2024-06-14 ENCOUNTER — HOSPITAL ENCOUNTER (EMERGENCY)
Facility: HOSPITAL | Age: 79
Discharge: HOME | End: 2024-06-14
Attending: EMERGENCY MEDICINE | Admitting: EMERGENCY MEDICINE
Payer: COMMERCIAL

## 2024-06-14 ENCOUNTER — APPOINTMENT (OUTPATIENT)
Dept: RADIOLOGY | Facility: HOSPITAL | Age: 79
End: 2024-06-14
Attending: EMERGENCY MEDICINE
Payer: COMMERCIAL

## 2024-06-14 VITALS
HEART RATE: 79 BPM | SYSTOLIC BLOOD PRESSURE: 129 MMHG | RESPIRATION RATE: 20 BRPM | WEIGHT: 137 LBS | DIASTOLIC BLOOD PRESSURE: 60 MMHG | BODY MASS INDEX: 22.02 KG/M2 | TEMPERATURE: 98.4 F | OXYGEN SATURATION: 98 % | HEIGHT: 66 IN

## 2024-06-14 DIAGNOSIS — M25.551 RIGHT HIP PAIN: ICD-10-CM

## 2024-06-14 PROCEDURE — 99284 EMERGENCY DEPT VISIT MOD MDM: CPT

## 2024-06-14 PROCEDURE — 250N000011 HC RX IP 250 OP 636: Mod: JZ | Performed by: EMERGENCY MEDICINE

## 2024-06-14 PROCEDURE — 73502 X-RAY EXAM HIP UNI 2-3 VIEWS: CPT

## 2024-06-14 PROCEDURE — 96372 THER/PROPH/DIAG INJ SC/IM: CPT | Performed by: EMERGENCY MEDICINE

## 2024-06-14 RX ORDER — KETOROLAC TROMETHAMINE 15 MG/ML
15 INJECTION, SOLUTION INTRAMUSCULAR; INTRAVENOUS ONCE
Status: COMPLETED | OUTPATIENT
Start: 2024-06-14 | End: 2024-06-14

## 2024-06-14 RX ADMIN — KETOROLAC TROMETHAMINE 15 MG: 15 INJECTION, SOLUTION INTRAMUSCULAR; INTRAVENOUS at 11:41

## 2024-06-14 ASSESSMENT — COLUMBIA-SUICIDE SEVERITY RATING SCALE - C-SSRS
6. HAVE YOU EVER DONE ANYTHING, STARTED TO DO ANYTHING, OR PREPARED TO DO ANYTHING TO END YOUR LIFE?: NO
2. HAVE YOU ACTUALLY HAD ANY THOUGHTS OF KILLING YOURSELF IN THE PAST MONTH?: NO
1. IN THE PAST MONTH, HAVE YOU WISHED YOU WERE DEAD OR WISHED YOU COULD GO TO SLEEP AND NOT WAKE UP?: NO

## 2024-06-14 ASSESSMENT — ACTIVITIES OF DAILY LIVING (ADL)
ADLS_ACUITY_SCORE: 37
ADLS_ACUITY_SCORE: 35

## 2024-06-14 NOTE — ED PROVIDER NOTES
EMERGENCY DEPARTMENT ENCOUNTER      NAME: Salome Maravilla  AGE: 78 year old female  YOB: 1945  MRN: [id number]  EVALUATION DATE & TIME: 6/14/2024 10:34 AM    PCP: Charity Dahl    ED PROVIDER: Cathy Nagel MD      Chief Complaint   Patient presents with    Leg Pain         FINAL IMPRESSION:  No diagnosis found.      ED COURSE & MEDICAL DECISION MAKING:    Pertinent Labs & Imaging studies reviewed. (See chart for details)  78 year old female presents to the Emergency Department for evaluation of right hip and leg pain.   10:50 AM went to evaluate patient.  11:15 AM discussed with attending Dr. Pittman.  Patient is presenting sudden onset with right hip and leg pain that started one day ago after gardening. She has history of arthritis and follow with [facility] and get regular low back injection and had a nerve ablation. Patient notes her most recent injection was in the spring. She notes pain started after sitting and trying to get up, felt pain in R hip radiating down to her leg and foot. Currently pain is 2/10. Exam notable for tenderness along left lumbar paraspinal area, no tenderness along the lower spine, tenderness in Rt groin, hip ROM is slightly limited due to pain. Straight leg negative. LE Normal strength and intact sensation B/L. Differential diagnosis includes, hip arthritis, lumbar radiculopathy, paraspinal muscle strain, hip muscle strain, less likely cauda equina or hip or pelvic fracture. However will await imaging.   Otherwise feels well, no fever, no red flag symptoms.   11:20 AM will give toradol and get xray of right hip and pelvis to r/o fracture.   12:13 PM right hip xray showed no signs of fracture but showed signs of osteoarthritis bilaterally. Pain is most likely related to osteoarthritis and muscle strain from recent activity. Discussed results with patient and daughter in law. Advised patient to take aleve over the counter for the next 5-7 days as needed and follow  up with primary care provider within one week.           At the conclusion of the encounter I discussed the results of all of the tests and the disposition. The questions were answered. The patient or family acknowledged understanding and was agreeable with the care plan.       MEDICATIONS GIVEN IN THE EMERGENCY:  Medications   ketorolac (TORADOL) injection 15 mg (15 mg Intramuscular $Given 6/14/24 1665)       NEW PRESCRIPTIONS STARTED AT TODAY'S ER VISIT  New Prescriptions    No medications on file          =================================================================    HPI    Patient information was obtained from: patient and her daughter in law    Use of : N/A        Salome Maravilla is a 78 year old female with a pertinent history of arthritis, breast cancer s/p (lumpectomy and radiation in 2015), GERD who presents to this ED for evaluation of sudden onset right hip and leg pain 1 day ago.  Patient notes that she was gardening at home yesterday and when she returned home and sat down for a few hours she noticed sudden onset of sharp pain 8/10. Pain improves with resting, worsened with standing up and walking. Notes some intermittent tingling in her right foot, not sure if she felt weak at the onset of pain due to pain but had to use a walker around the house. No fever, no recent trauma or fall and no red flag symptoms. Patient usually walks using a cane. Patient lives alone and is independent with her ADLs. Her daughter in law is her care take who checks in on her and helps her as needed.     REVIEW OF SYSTEMS   Review of Systems    Review Of Systems  Skin: negative  Eyes: negative  Ears/Nose/Throat: negative  Respiratory: No shortness of breath, dyspnea on exertion, cough, or hemoptysis  Cardiovascular: negative  Gastrointestinal: negative  Genitourinary: negative  Musculoskeletal: negative  Neurologic: tingling Rt foot  Psychiatric: negative  Hematologic/Lymphatic/Immunologic:  negative  Endocrine: negative   ROS: 10 point ROS neg other than the symptoms noted above in the HPI.     PAST MEDICAL HISTORY:  Past Medical History:   Diagnosis Date    Breast cancer (H) 2010    hx of right lumpectomy for breast     Depression     GERD (gastroesophageal reflux disease)     Hx of radiation therapy right 2010    hx of right lumpectomy for breast cancer    Osteoarthritis        PAST SURGICAL HISTORY:  Past Surgical History:   Procedure Laterality Date    APPENDECTOMY      ARTHROSCOPY SHOULDER ROTATOR CUFF REPAIR      BIOPSY BREAST Right 2010    hx of right lumpectomy    COLONOSCOPY      HYSTERECTOMY  1982    IR CHEST TUBE PLACEMENT NON-TUNNELED RIGHT  11/13/2021    LUMPECTOMY BREAST Right 2010    right lumpectomy    OOPHORECTOMY      RELEASE CARPAL TUNNEL Right     THORACOSCOPY Right 11/23/2021    Procedure: THORACOSCOPY, PARIETAL PLEURECTOMY;  Surgeon: Timbo Zuluaga MD;  Location: [facility] Main OR           CURRENT MEDICATIONS:    acetaminophen (TYLENOL) 500 MG tablet  albuterol (PROAIR HFA/PROVENTIL HFA/VENTOLIN HFA) 108 (90 Base) MCG/ACT inhaler  ascorbic acid (VITAMIN C) 250 MG tablet  Bacillus Coagulans-Inulin (PROBIOTIC) 1-250 BILLION-MG CAPS  BLACK COHOSH ORAL  budesonide (ENTOCORT EC) 3 MG EC capsule  calcium citrate (CITRACAL) 950 (200 Ca) MG tablet  Coenzyme Q10 (CO Q-10) 400 MG CAPS  estradiol (ESTRACE) 0.1 MG/GM vaginal cream  famotidine (PEPCID) 20 MG tablet  gemfibrozil (LOPID) 600 MG tablet  MAGNESIUM GLYCINATE PO  melatonin 1 MG TABS tablet  mometasone-formoterol (DULERA) 200-5 MCG/ACT inhaler  omeprazole (PRILOSEC) 20 MG capsule  PSYLLIUM HUSK PO  sertraline (ZOLOFT) 100 MG tablet  traZODone (DESYREL) 150 MG tablet  triamcinolone (NASACORT ALLERGY 24HR) 55 MCG/ACT nasal aerosol  valACYclovir (VALTREX) 1000 MG tablet  vitamin E 400 UNIT capsule        ALLERGIES:  Allergies   Allergen Reactions    Arimidex [Anastrozole] Unknown     Hot flashes    Penicillins Hives    Statins-Hmg-Coa  "Reductase Inhibitors [Statins] Muscle Pain (Myalgia)    Fenofibrate Rash       FAMILY HISTORY:  Family History   Problem Relation Age of Onset    Chronic Obstructive Pulmonary Disease Father     Breast Cancer Sister 71.00    Cancer Sister     Atrial fibrillation Sister     Cancer Brother 19.00    Testicular cancer Brother     No Known Problems Son     No Known Problems Son        SOCIAL HISTORY:   Social History     Socioeconomic History    Marital status:    Tobacco Use    Smoking status: Former     Current packs/day: 0.00     Types: Cigarettes     Quit date: 1988     Years since quittin.1    Smokeless tobacco: Never   Vaping Use    Vaping status: Never Used   Substance and Sexual Activity    Alcohol use: Yes     Comment: Alcoholic Drinks/day: social    Drug use: No    Sexual activity: Never       VITALS:  /60   Pulse 79   Temp 98.4  F (36.9  C)   Resp 20   Ht 1.676 m (5' 6\")   Wt 62.1 kg (137 lb)   SpO2 98%   BMI 22.11 kg/m      PHYSICAL EXAM    Constitutional: Well developed, Well nourished, NAD, GCS    HENT: Normocephalic, Atraumatic, mucous membranes moist, No stridor.    Eyes: PERRL, EOMI, Conjunctiva normal, No discharge.   Respiratory: Normal breath sounds, No respiratory distress, No wheezing, Speaks full sentences easily. No cough.    Cardiovascular: Normal heart rate, Regular rhythm,  No murmurs.  GI: No excessive obesity. Soft, No tenderness, No masses, No flank tenderness. No rebound or guarding.    Musculoskeletal: left lumbar paraspinal tenderness and right groin tenderness. right hip ROM somewhat limited due pain, negative straight leg raise test. Normal and equal sensation and strength.  No edema.  Integument: Warm, Dry, No erythema, No rash. No petechiae.   Neurologic: Alert & oriented x 3, Normal motor function, Normal sensory function, No focal deficits noted.   Psychiatric: Affect normal, Judgment normal, Mood normal. Cooperative.      LAB:  All pertinent labs " reviewed and interpreted.       RADIOLOGY:  Reviewed all pertinent imaging. Please see official radiology report.  XR Pelvis and Hip Right 2 Views    (Results Pending)       EKG:    none    I have independently reviewed and interpreted the EKG(s) documented above.    PROCEDURES:   none      [employer] System Documentation:   CMS Diagnoses: Right hip and leg pain.    BRANDI Juarez  [facility] PGY2  [facility] EMERGENCY DEPARTMENT  [address] [place] [id number] [phone]       Cathy Nagel MD  06/14/24 1310

## 2024-06-14 NOTE — ED PROVIDER NOTES
"Emergency Department Midlevel Supervisory Note     I had a face to face encounter with this patient seen by the Advanced Practice Provider (COURTNEY). I personally made/approved the management plan and take responsibility for the patient management. I personally saw patient and performed a substantive portion of the visit including all aspects of the medical decision making.     ED Course:  11:20 AM  resident Dr. Nagel  staffed patient with me. I agree with their assessment and plan of management, and I will see the patient.  11:23 AM  I met with the patient to introduce myself, gather additional history, perform my initial exam, and discuss the plan.     Brief HPI:     Salome Maravilla is a 78 year old female who presents for evaluation of Right upper thigh/hip/groin pain since yesterday. Started after she was picking up sticks around yard, doing some minor yard work.  Denies any direct fall/trauma and pain started after she was resting in home.  Hx of arthritis with treatment of low back and left knee with [facility].  Took Tylenol for pain.  No fevers, new back pain, distal weakness.        Brief Physical Exam: /60   Pulse 79   Temp 98.4  F (36.9  C)   Resp 20   Ht 1.676 m (5' 6\")   Wt 62.1 kg (137 lb)   SpO2 98%   BMI 22.11 kg/m    Physical Exam  Vitals and nursing note reviewed.   Constitutional:       General: She is not in acute distress.     Appearance: Normal appearance.   HENT:      Head: Normocephalic and atraumatic.      Nose: Nose normal.      Mouth/Throat:      Mouth: Mucous membranes are moist.   Eyes:      Pupils: Pupils are equal, round, and reactive to light.   Cardiovascular:      Rate and Rhythm: Normal rate and regular rhythm.      Pulses: Normal pulses.           Radial pulses are 2+ on the right side and 2+ on the left side.        Dorsalis pedis pulses are 2+ on the right side and 2+ on the left side.   Pulmonary:      Effort: Pulmonary effort is normal. No respiratory distress.     "  Breath sounds: Normal breath sounds.   Abdominal:      Palpations: Abdomen is soft.      Tenderness: There is no abdominal tenderness.   Musculoskeletal:      Cervical back: Full passive range of motion without pain and neck supple.      Comments: Mild right hip pain with active/passive ROM with groin tenderness. No rash, no swelling, no effusion, no warmth/erythema.  5/5 strength distally with overall intact sensation  No calf tenderness or swelling b/l   Skin:     General: Skin is warm.      Findings: No rash.   Neurological:      General: No focal deficit present.      Mental Status: She is alert. Mental status is at baseline.      Comments: Fluent speech, no acute lateralizing deficits   Psychiatric:         Mood and Affect: Mood normal.         Behavior: Behavior normal.            MDM:  Pt seen in conjunction with resident, Dr. Nagel.  Pt with a history of arthritis and breast cancer in remission, receives regular injections for low back and knee pain with last ones in March, as well as nerve ablation.  Pt gardening yesterday, started to have right hip/groin pain radiating primarily into right thigh.  No direct trauma/fall and no new back ain. No fevers, no signs of effusion/infection or rash.  Suspect this is arthritis/inflammatory, but age warrants Xrays to rule out occult fracture. Pt is able to ambulate, intact pulses, normal compartments with no swelling to lower leg to suggest DVT and do not feel US warranted.  Pt already follows with [facility] for other joint problems and will likely refer her back to there with low dose aleve for next 5-7 parson.         1. Right hip pain          Labs and Imaging:  Results for orders placed or performed during the hospital encounter of 06/14/24   XR Pelvis and Hip Right 2 Views    Impression    IMPRESSION: Both hips are negative for fracture. Pelvis negative for fracture. Mild degenerative change at both hip joints.       I have reviewed the relevant laboratory  studies above.    I independently interpreted the following imaging study(s):   Right hip and pelvis xrays (independent interp): no acute fracture/dislocation.  Changes of arthritis to right hip      Procedures:  I was present for the key portions of procedures documented in COURTNEY/midlevel note, see midlevel note for further details.    DO PAULINE Tellez [facility] EMERGENCY DEPARTMENT  [address] [place] [id number]  [phone]       Edmund Pittman MD  06/14/24 4467

## 2024-06-14 NOTE — ED NOTES
She was able to transfer from the wheelchair to the bed with assistance. She states for me it is her entire right leg that is painful from from foot to hip. No visible injury or skin issues. She states the pain is relieved with her knees bent up on pillows. Her pain is generalized. No apparent tenderness anywhere.

## 2024-06-14 NOTE — DISCHARGE INSTRUCTIONS
Take over the counter Aleve every 8-12 hours as needed. Follow up with your primary care provider within 7 days.

## 2024-06-14 NOTE — ED TRIAGE NOTES
Patient here with upper right leg pain since early evening last night. No trauma. Pain anterior and posterior. Has not travelled recently

## 2024-07-25 ENCOUNTER — TRANSFERRED RECORDS (OUTPATIENT)
Dept: HEALTH INFORMATION MANAGEMENT | Facility: CLINIC | Age: 79
End: 2024-07-25

## 2024-09-04 DIAGNOSIS — R05.3 CHRONIC COUGH: ICD-10-CM

## 2024-09-04 RX ORDER — MOMETASONE FUROATE AND FORMOTEROL FUMARATE DIHYDRATE 200; 5 UG/1; UG/1
2 AEROSOL RESPIRATORY (INHALATION) 2 TIMES DAILY
Qty: 13 G | Refills: 2 | Status: SHIPPED | OUTPATIENT
Start: 2024-09-04

## 2024-09-25 ENCOUNTER — OFFICE VISIT (OUTPATIENT)
Dept: INTERNAL MEDICINE | Facility: CLINIC | Age: 79
End: 2024-09-25
Payer: COMMERCIAL

## 2024-09-25 VITALS
OXYGEN SATURATION: 99 % | BODY MASS INDEX: 23.63 KG/M2 | SYSTOLIC BLOOD PRESSURE: 138 MMHG | RESPIRATION RATE: 12 BRPM | DIASTOLIC BLOOD PRESSURE: 72 MMHG | HEART RATE: 69 BPM | WEIGHT: 138.4 LBS | TEMPERATURE: 97.8 F | HEIGHT: 64 IN

## 2024-09-25 DIAGNOSIS — F33.41 RECURRENT MAJOR DEPRESSIVE DISORDER, IN PARTIAL REMISSION (H): ICD-10-CM

## 2024-09-25 DIAGNOSIS — M81.0 AGE-RELATED OSTEOPOROSIS WITHOUT CURRENT PATHOLOGICAL FRACTURE: Primary | ICD-10-CM

## 2024-09-25 DIAGNOSIS — Z23 ENCOUNTER FOR VACCINATION: ICD-10-CM

## 2024-09-25 DIAGNOSIS — R93.1 ELEVATED CORONARY ARTERY CALCIUM SCORE: ICD-10-CM

## 2024-09-25 DIAGNOSIS — Z92.29 PERSONAL HISTORY OF OTHER DRUG THERAPY: ICD-10-CM

## 2024-09-25 DIAGNOSIS — K52.832 LYMPHOCYTIC COLITIS: ICD-10-CM

## 2024-09-25 DIAGNOSIS — A60.00 GENITAL HERPES SIMPLEX, UNSPECIFIED SITE: ICD-10-CM

## 2024-09-25 DIAGNOSIS — Z85.3 HISTORY OF BREAST CANCER: ICD-10-CM

## 2024-09-25 DIAGNOSIS — N95.2 ATROPHIC VAGINITIS: ICD-10-CM

## 2024-09-25 DIAGNOSIS — K21.9 GASTROESOPHAGEAL REFLUX DISEASE WITHOUT ESOPHAGITIS: ICD-10-CM

## 2024-09-25 DIAGNOSIS — E78.2 MIXED HYPERLIPIDEMIA: ICD-10-CM

## 2024-09-25 DIAGNOSIS — F51.04 PSYCHOPHYSIOLOGICAL INSOMNIA: ICD-10-CM

## 2024-09-25 DIAGNOSIS — M15.0 PRIMARY OSTEOARTHRITIS INVOLVING MULTIPLE JOINTS: ICD-10-CM

## 2024-09-25 PROBLEM — M85.80 OSTEOPENIA DETERMINED BY X-RAY: Status: RESOLVED | Noted: 2018-06-04 | Resolved: 2024-09-25

## 2024-09-25 LAB
ALBUMIN SERPL BCG-MCNC: 4.4 G/DL (ref 3.5–5.2)
ALP SERPL-CCNC: 40 U/L (ref 40–150)
ALT SERPL W P-5'-P-CCNC: 13 U/L (ref 0–50)
ANION GAP SERPL CALCULATED.3IONS-SCNC: 6 MMOL/L (ref 7–15)
AST SERPL W P-5'-P-CCNC: 22 U/L (ref 0–45)
BILIRUB SERPL-MCNC: 0.3 MG/DL
BUN SERPL-MCNC: 18.6 MG/DL (ref 8–23)
CALCIUM SERPL-MCNC: 9.7 MG/DL (ref 8.8–10.4)
CHLORIDE SERPL-SCNC: 104 MMOL/L (ref 98–107)
CREAT SERPL-MCNC: 0.71 MG/DL (ref 0.51–0.95)
EGFRCR SERPLBLD CKD-EPI 2021: 85 ML/MIN/1.73M2
ERYTHROCYTE [DISTWIDTH] IN BLOOD BY AUTOMATED COUNT: 13.6 % (ref 10–15)
GLUCOSE SERPL-MCNC: 91 MG/DL (ref 70–99)
HCO3 SERPL-SCNC: 28 MMOL/L (ref 22–29)
HCT VFR BLD AUTO: 38.1 % (ref 35–47)
HGB BLD-MCNC: 12.3 G/DL (ref 11.7–15.7)
MCH RBC QN AUTO: 29.9 PG (ref 26.5–33)
MCHC RBC AUTO-ENTMCNC: 32.3 G/DL (ref 31.5–36.5)
MCV RBC AUTO: 93 FL (ref 78–100)
PLATELET # BLD AUTO: 223 10E3/UL (ref 150–450)
POTASSIUM SERPL-SCNC: 4.3 MMOL/L (ref 3.4–5.3)
PROT SERPL-MCNC: 7.2 G/DL (ref 6.4–8.3)
RBC # BLD AUTO: 4.11 10E6/UL (ref 3.8–5.2)
SODIUM SERPL-SCNC: 138 MMOL/L (ref 135–145)
VIT D+METAB SERPL-MCNC: 95 NG/ML (ref 20–50)
WBC # BLD AUTO: 5.8 10E3/UL (ref 4–11)

## 2024-09-25 PROCEDURE — 82306 VITAMIN D 25 HYDROXY: CPT | Performed by: INTERNAL MEDICINE

## 2024-09-25 PROCEDURE — 36415 COLL VENOUS BLD VENIPUNCTURE: CPT | Performed by: INTERNAL MEDICINE

## 2024-09-25 PROCEDURE — G0008 ADMIN INFLUENZA VIRUS VAC: HCPCS | Performed by: INTERNAL MEDICINE

## 2024-09-25 PROCEDURE — 90662 IIV NO PRSV INCREASED AG IM: CPT | Performed by: INTERNAL MEDICINE

## 2024-09-25 PROCEDURE — 80053 COMPREHEN METABOLIC PANEL: CPT | Performed by: INTERNAL MEDICINE

## 2024-09-25 PROCEDURE — 85027 COMPLETE CBC AUTOMATED: CPT | Mod: GZ | Performed by: INTERNAL MEDICINE

## 2024-09-25 PROCEDURE — 90480 ADMN SARSCOV2 VAC 1/ONLY CMP: CPT | Performed by: INTERNAL MEDICINE

## 2024-09-25 PROCEDURE — 99204 OFFICE O/P NEW MOD 45 MIN: CPT | Mod: 25 | Performed by: INTERNAL MEDICINE

## 2024-09-25 PROCEDURE — 91320 SARSCV2 VAC 30MCG TRS-SUC IM: CPT | Performed by: INTERNAL MEDICINE

## 2024-09-25 RX ORDER — BUDESONIDE 3 MG/1
3 CAPSULE, COATED PELLETS ORAL EVERY OTHER DAY
COMMUNITY

## 2024-09-25 RX ORDER — VALACYCLOVIR HYDROCHLORIDE 500 MG/1
500 TABLET, FILM COATED ORAL DAILY
Qty: 90 TABLET | Refills: 3 | Status: SHIPPED | OUTPATIENT
Start: 2024-09-25

## 2024-09-25 ASSESSMENT — PAIN SCALES - GENERAL: PAINLEVEL: MODERATE PAIN (5)

## 2024-09-25 NOTE — PATIENT INSTRUCTIONS
Get f/up scheduled with [facility]    [phone] - phone number for pulmonology clinic, follow up sometime this winter, last seen 12/2023    Decrease valtrex to 500 mg daily, just cut your current tablets in half.

## 2024-09-25 NOTE — PROGRESS NOTES
Assessment & Plan   Problem List Items Addressed This Visit          Digestive    Lymphocytic colitis     Dx 2 years ago. Follows with [facility]. Current regimen on budesonide EC 3 mg every other day. Having loose stools more frequently.   - KALEIGH for [facility] today  - She will call GI for follow up given increased symptoms         Gastroesophageal reflux disease without esophagitis     Current regimen omeprazole 20 mg daily and famotidine 20 mg PRN.   - Consider trying to taper in the future, for now continue             Endocrine    Mixed hyperlipidemia     As per CAC, will be getting updated calcium score.   - For now, continue gemfibrozil 500 mg BID         Relevant Orders    CT Coronary Calcium Scan       Circulatory    Elevated coronary artery calcium score     CAC 42 (2017). Unable to tolerate statins.   - Will update CAC, if significantly increased, will refer to cardiology to discuss PCSK9 inhibitor         Relevant Orders    CT Coronary Calcium Scan       Musculoskeletal and Integumentary    Age-related osteoporosis without current pathological fracture - Primary     Dx on last DEXA 11/2023 with decrease in BMD, lowest T score of -2.6 in L hip. Taking calcium and vitamin D. Started prolia 12/2023. Last injection 6/24/2024.   - She would like to transition prolia to our clinic, note sent to our RN team with order today, next due 12/2024  - Check CMP and vitamin D today  - Encouraged regular weight bearing exercise  - Would plan to repeat DEXA 1-2 years after starting Prolia, so sometime in 2025         Relevant Medications    budesonide (ENTOCORT EC) 3 MG EC capsule    denosumab (PROLIA) injection 60 mg (Start on 10/9/2024 12:00 AM)    Other Relevant Orders    Comprehensive metabolic panel (Completed)    Vitamin D Deficiency (Completed)    CBC with platelets (Completed)    Primary osteoarthritis involving multiple joints     Diffuse OA and follows with multiple providers at [facility] for knees, back, R shoulder, wrists  and hands.   - KALEIGH for [facility] today          Relevant Medications    budesonide (ENTOCORT EC) 3 MG EC capsule    denosumab (PROLIA) injection 60 mg (Start on 10/9/2024 12:00 AM)       Urinary    Genital herpes simplex, unspecified site     Long history, on suppressive tx with valtrex 1 g daily. Had too many outbreaks when trying to take just with symptoms previously.   - Discussed for suppressive tx 500 mg daily is sufficient, updated script sent to pharmacy         Relevant Medications    valACYclovir (VALTREX) 500 MG tablet    Atrophic vaginitis     Well controlled with estrace cream            Behavioral    Recurrent major depressive disorder, in partial remission (H24)     Dx in setting of life stressors in 1996. Current regimen is zoloft 200 mg daily, this is only medication she has been on. Mood is okay, understandably lower with grief after  passed in July.   - For now, continue sertraline 200 mg daily  - Consider augment in the future vs adding therapy/grief groups         Psychophysiological insomnia     Improved with trazodone 150 mg at bedtime.             Other    History of breast cancer     S/p lumpectomy and radiation in 2015          Other Visit Diagnoses       Personal history of other drug therapy        Relevant Medications    denosumab (PROLIA) injection 60 mg (Start on 10/9/2024 12:00 AM)    Encounter for vaccination        Relevant Orders    INFLUENZA HIGH DOSE, TRIVALENT, PF (FLUZONE) (Completed)    COVID-19 12+ (PFIZER) (Completed)           Ordering of each unique test  Prescription drug management     FUTURE APPOINTMENTS:       - Follow-up visit in 6 months      Jazz Blevins is a 78 year old, presenting for the following health issues:  Establish Care        9/25/2024     2:17 PM   Additional Questions   Roomed by MIKE Douglas   Accompanied by TRINITY       History of Present Illness     Reason for visit:  I want to have an Internist who can connect the dots on my health     Felicity  "is here to establish care, we reviewed her history as below.     HLD: CAC 42 (10/2017). Lipids 2024 Tchol 277, , HDL 56,   - Gebfibrozil 500 mg BID  - Tells me she got myalgais or rash with all statins (simvastatin, crestor, atorvastatin)   - No personal ASCVD history     GERD: omeprazole 20 mg daily and famotidine 20 mg PRN. On PPI for a very long time, 10+ years     Hx breast cancer: S/p lumpectomy and radiation in     Pulm nodule: last pulm visit 2023, plan for repeat CT 2024 which was stable. No further imaging needed.     Cough: Resumed dulera with pulm, this did seem to help and she having less phlegm     Osteoporosis: Last DEXA 2023 with decrease in BMD, lowest T score of -2.6 in L hip. Taking calcium and vitamin D. Started prolia 2023. Last injection 2024.   - Says she could do better with exercise, back and knees hurt     MDD: sertraline 200 mg since .   in July. No other medications.     Insomnia: trazodone 150 mg nightly     Lymphocytic colitis: Dx 2 years ago. Follows with [facility]. Current regimen on budesonide EC 3 mg every other day. Having loose stools more frequently.     Atrophic vaginitis: estrace started a year ago, follows with Dr. Rosario     Genital HSV: valtrex 1g daily for suppression    Back Pain: Dr. Barber has had nerve root injections and RFA, possibly getting a some sort of implant     Knees: also [place]    R Shoulder pain: s/p repair , has needed injections here as well. Dr. Amaral at [facility].     She is taking medications regularly.     Review of Systems  Constitutional, neuro, ENT, endocrine, pulmonary, cardiac, gastrointestinal, genitourinary, musculoskeletal, integument and psychiatric systems are negative, except as otherwise noted.      Objective    /72 (BP Location: Right arm, Patient Position: Sitting, Cuff Size: Adult Regular)   Pulse 69   Temp 97.8  F (36.6  C) (Oral)   Resp 12   Ht 1.632 m (5' 4.25\")   Wt 62.8 kg (138 " lb 6.4 oz)   LMP  (LMP Unknown)   SpO2 99%   BMI 23.57 kg/m    Body mass index is 23.57 kg/m .  Physical Exam   GENERAL: alert and no distress  EYES: Eyes grossly normal to inspection and conjunctivae and sclerae normal  HENT: nose and mouth without ulcers or lesions  NECK: no adenopathy, no asymmetry, masses, or scars  RESP: lungs clear to auscultation - no rales, rhonchi or wheezes  CV: regular rate and rhythm, normal S1 S2, no S3 or S4, no murmur, click or rub, no peripheral edema  ABDOMEN: soft, nontender  MS: no gross musculoskeletal defects noted, no edema  SKIN: no suspicious lesions or rashes on exposed skin   NEURO: No focal deficits, mentation intact and speech normal  PSYCH: mentation appears normal, affect normal/bright    Results for orders placed or performed in visit on 09/25/24   Comprehensive metabolic panel     Status: Abnormal   Result Value Ref Range    Sodium 138 135 - 145 mmol/L    Potassium 4.3 3.4 - 5.3 mmol/L    Carbon Dioxide (CO2) 28 22 - 29 mmol/L    Anion Gap 6 (L) 7 - 15 mmol/L    Urea Nitrogen 18.6 8.0 - 23.0 mg/dL    Creatinine 0.71 0.51 - 0.95 mg/dL    GFR Estimate 85 >60 mL/min/1.73m2    Calcium 9.7 8.8 - 10.4 mg/dL    Chloride 104 98 - 107 mmol/L    Glucose 91 70 - 99 mg/dL    Alkaline Phosphatase 40 40 - 150 U/L    AST 22 0 - 45 U/L    ALT 13 0 - 50 U/L    Protein Total 7.2 6.4 - 8.3 g/dL    Albumin 4.4 3.5 - 5.2 g/dL    Bilirubin Total 0.3 <=1.2 mg/dL   Vitamin D Deficiency     Status: Abnormal   Result Value Ref Range    Vitamin D, Total (25-Hydroxy) 95 (H) 20 - 50 ng/mL    Narrative    Season, race, dietary intake, and treatment affect the concentration of 25-hydroxy-Vitamin D. Values may decrease during winter months and increase during summer months.    Vitamin D determination is routinely performed by an immunoassay specific for 25 hydroxyvitamin D3.  If an individual is on vitamin D2(ergocalciferol) supplementation, please specify 25 OH vitamin D2 and D3 level  determination by LCMSMS test VITD23.     CBC with platelets     Status: Normal   Result Value Ref Range    WBC Count 5.8 4.0 - 11.0 10e3/uL    RBC Count 4.11 3.80 - 5.20 10e6/uL    Hemoglobin 12.3 11.7 - 15.7 g/dL    Hematocrit 38.1 35.0 - 47.0 %    MCV 93 78 - 100 fL    MCH 29.9 26.5 - 33.0 pg    MCHC 32.3 31.5 - 36.5 g/dL    RDW 13.6 10.0 - 15.0 %    Platelet Count 223 150 - 450 10e3/uL           Signed Electronically by: Brenna Lindsey MD    The following steps were completed to comply with the REMS program for Prolia:  Reviewed the serious risks of Prolia  and the symptoms of each risk.  Advised patient to seek prompt medical attention if they have signs or symptoms of any of the serious risks.  Patient will be provided a copy of the Medication Guide and Patient Brochure prior to first injection.    Brenna Lindsey MD

## 2024-09-26 PROBLEM — U07.1 INFECTION DUE TO 2019 NOVEL CORONAVIRUS: Status: RESOLVED | Noted: 2021-11-10 | Resolved: 2024-09-26

## 2024-09-26 PROBLEM — A60.00 GENITAL HERPES SIMPLEX, UNSPECIFIED SITE: Status: ACTIVE | Noted: 2024-09-26

## 2024-09-26 PROBLEM — E78.2 MIXED HYPERLIPIDEMIA: Status: ACTIVE | Noted: 2024-09-26

## 2024-09-26 PROBLEM — R93.1 ELEVATED CORONARY ARTERY CALCIUM SCORE: Status: ACTIVE | Noted: 2024-09-26

## 2024-09-26 PROBLEM — M15.0 PRIMARY OSTEOARTHRITIS INVOLVING MULTIPLE JOINTS: Status: ACTIVE | Noted: 2024-09-26

## 2024-09-26 PROBLEM — D72.829 LEUKOCYTOSIS, UNSPECIFIED TYPE: Status: RESOLVED | Noted: 2021-11-24 | Resolved: 2024-09-26

## 2024-09-26 PROBLEM — F51.04 PSYCHOPHYSIOLOGICAL INSOMNIA: Status: ACTIVE | Noted: 2024-09-26

## 2024-09-26 PROBLEM — Z86.0100 HISTORY OF COLONIC POLYPS: Status: ACTIVE | Noted: 2021-05-27

## 2024-09-26 PROBLEM — F33.41 RECURRENT MAJOR DEPRESSIVE DISORDER, IN PARTIAL REMISSION (H): Status: ACTIVE | Noted: 2024-09-26

## 2024-09-26 PROBLEM — N95.2 ATROPHIC VAGINITIS: Status: ACTIVE | Noted: 2024-09-26

## 2024-09-26 NOTE — ASSESSMENT & PLAN NOTE
Diffuse OA and follows with multiple providers at [facility] for knees, back, R shoulder, wrists and hands.   - KALEIGH for [facility] today

## 2024-09-26 NOTE — ASSESSMENT & PLAN NOTE
Current regimen omeprazole 20 mg daily and famotidine 20 mg PRN.   - Consider trying to taper in the future, for now continue

## 2024-09-26 NOTE — ASSESSMENT & PLAN NOTE
CAC 42 (2017). Unable to tolerate statins.   - Will update CAC, if significantly increased, will refer to cardiology to discuss PCSK9 inhibitor

## 2024-09-26 NOTE — ASSESSMENT & PLAN NOTE
Dx 2 years ago. Follows with [facility]. Current regimen on budesonide EC 3 mg every other day. Having loose stools more frequently.   - KALEIGH for [facility] today  - She will call GI for follow up given increased symptoms

## 2024-09-26 NOTE — ASSESSMENT & PLAN NOTE
Dx in setting of life stressors in 1996. Current regimen is zoloft 200 mg daily, this is only medication she has been on. Mood is okay, understandably lower with grief after  passed in July.   - For now, continue sertraline 200 mg daily  - Consider augment in the future vs adding therapy/grief groups

## 2024-09-26 NOTE — ASSESSMENT & PLAN NOTE
Dx on last DEXA 11/2023 with decrease in BMD, lowest T score of -2.6 in L hip. Taking calcium and vitamin D. Started prolia 12/2023. Last injection 6/24/2024.   - She would like to transition prolia to our clinic, note sent to our RN team with order today, next due 12/2024  - Check CMP and vitamin D today  - Encouraged regular weight bearing exercise  - Would plan to repeat DEXA 1-2 years after starting Prolia, so sometime in 2025

## 2024-09-26 NOTE — ASSESSMENT & PLAN NOTE
Long history, on suppressive tx with valtrex 1 g daily. Had too many outbreaks when trying to take just with symptoms previously.   - Discussed for suppressive tx 500 mg daily is sufficient, updated script sent to pharmacy

## 2024-10-13 ENCOUNTER — HEALTH MAINTENANCE LETTER (OUTPATIENT)
Age: 79
End: 2024-10-13

## 2024-10-19 ENCOUNTER — HOSPITAL ENCOUNTER (OUTPATIENT)
Dept: CT IMAGING | Facility: CLINIC | Age: 79
Discharge: HOME | End: 2024-10-19
Attending: INTERNAL MEDICINE | Admitting: INTERNAL MEDICINE
Payer: COMMERCIAL

## 2024-10-19 DIAGNOSIS — E78.2 MIXED HYPERLIPIDEMIA: ICD-10-CM

## 2024-10-19 DIAGNOSIS — R93.1 ELEVATED CORONARY ARTERY CALCIUM SCORE: ICD-10-CM

## 2024-10-19 PROCEDURE — 75571 CT HRT W/O DYE W/CA TEST: CPT | Mod: 26 | Performed by: INTERNAL MEDICINE

## 2024-10-19 PROCEDURE — 75571 CT HRT W/O DYE W/CA TEST: CPT | Mod: GZ

## 2024-10-21 LAB
CV CALCIUM SCORE AGATSTON LM: 0
CV CALCIUM SCORING AGATSON LAD: 0
CV CALCIUM SCORING AGATSTON CX: 0
CV CALCIUM SCORING AGATSTON RCA: 81
CV CALCIUM SCORING AGATSTON TOTAL: 81

## 2024-10-22 ENCOUNTER — MYC MEDICAL ADVICE (OUTPATIENT)
Dept: INTERNAL MEDICINE | Facility: CLINIC | Age: 79
End: 2024-10-22
Payer: COMMERCIAL

## 2024-10-22 NOTE — TELEPHONE ENCOUNTER
Needs phone visit with other providers within the department of primary care to review and discussed the coronary calcium score with the patient.  MJB   Written by Alex Martínez MD on 10/21/2024  3:12 PM CDT

## 2024-10-28 ENCOUNTER — VIRTUAL VISIT (OUTPATIENT)
Dept: FAMILY MEDICINE | Facility: CLINIC | Age: 79
End: 2024-10-28
Payer: COMMERCIAL

## 2024-10-28 DIAGNOSIS — E78.2 MIXED HYPERLIPIDEMIA: Primary | ICD-10-CM

## 2024-10-28 DIAGNOSIS — Z78.9 STATIN INTOLERANCE: ICD-10-CM

## 2024-10-28 DIAGNOSIS — R93.1 ELEVATED CORONARY ARTERY CALCIUM SCORE: ICD-10-CM

## 2024-10-28 PROCEDURE — 99442 PR PHYSICIAN TELEPHONE EVALUATION 11-20 MIN: CPT | Mod: 93 | Performed by: FAMILY MEDICINE

## 2024-10-28 NOTE — PROGRESS NOTES
Felicity is a 78 year old who is being evaluated via a billable telephone visit.    What phone number would you like to be contacted at? [phone]  How would you like to obtain your AVS? Genethart  Originating Location (pt. Location): Home    Distant Location (provider location):  On-site    Assessment & Plan     Mixed hyperlipidemia  Elevated coronary artery calcium score  Statin intolerance    - Adult Cardiology Eval  Referral; Future            The longitudinal plan of care for the diagnosis(es)/condition(s) as documented were addressed during this visit. Due to the added complexity in care, I will continue to support Felicity in the subsequent management and with ongoing continuity of care.    Subjective   Felicity is a 78 year old, presenting for the following health issues:  Results (CT coronary calcium)      10/28/2024     4:38 PM   Additional Questions   Roomed by Grecia WHITTEN CMA     History of Present Illness       Reason for visit:  Getting results for my MRI Calcium Level  Symptom onset:  3-7 days ago  Symptoms include:  I don't have any symptoms that I am aware of.  Symptom intensity:  Mild  Symptom progression:  Staying the same  Had these symptoms before:  No  What makes it worse:  No  What makes it better:  No   She is taking medications regularly.                   Objective           Vitals:  No vitals were obtained today due to virtual visit.    Physical Exam   General: Alert and no distress //Respiratory: No audible wheeze, cough, or shortness of breath // Psychiatric:  Appropriate affect, tone, and pace of words            Phone call duration: 17 minutes  Signed Electronically by: Watson Bone MD

## 2024-10-31 ENCOUNTER — TELEPHONE (OUTPATIENT)
Dept: CARDIOLOGY | Facility: CLINIC | Age: 79
End: 2024-10-31

## 2024-10-31 ENCOUNTER — OFFICE VISIT (OUTPATIENT)
Dept: CARDIOLOGY | Facility: CLINIC | Age: 79
End: 2024-10-31
Attending: FAMILY MEDICINE
Payer: COMMERCIAL

## 2024-10-31 VITALS
RESPIRATION RATE: 16 BRPM | WEIGHT: 138 LBS | OXYGEN SATURATION: 98 % | BODY MASS INDEX: 23.5 KG/M2 | HEART RATE: 76 BPM | DIASTOLIC BLOOD PRESSURE: 69 MMHG | SYSTOLIC BLOOD PRESSURE: 130 MMHG

## 2024-10-31 DIAGNOSIS — I25.10 CORONARY ARTERY CALCIFICATION: ICD-10-CM

## 2024-10-31 DIAGNOSIS — Z78.9 STATIN INTOLERANCE: ICD-10-CM

## 2024-10-31 DIAGNOSIS — E78.2 MIXED HYPERLIPIDEMIA: Primary | ICD-10-CM

## 2024-10-31 DIAGNOSIS — R93.1 ELEVATED CORONARY ARTERY CALCIUM SCORE: ICD-10-CM

## 2024-10-31 PROCEDURE — 99204 OFFICE O/P NEW MOD 45 MIN: CPT | Performed by: INTERNAL MEDICINE

## 2024-10-31 NOTE — PROGRESS NOTES
Thank you, Dr. Brenna Lindsey, for asking the [facility] team to see Ms. Salome Maravilla to evaluate mixed hyperlipidemia.    Assessment/Recommendations   Assessment:    1.  Mixed hyperlipidemia with markedly elevated LDL of 187 despite gemfibrozil therapy.  This is resulted in improvement in her triglycerides but no benefit with her LDL.  She does have evidence of coronary artery calcification with an increase in her calcium score from 42-81 over the past 6 years.  Unfortunately, she has been intolerant to multiple statins.  Would favor initiation of a PCSK9 inhibitor.  We talked about the 3 that are currently available and she would like to try inclisiran.  2.  Coronary artery calcification with score of 81, all within the right coronary artery.  She reports no clear symptoms of exertional chest discomfort or dyspnea; however, activity is currently limited due to hip and leg pain.  Recommended pursuing pharmacologic nuclear stress test to evaluate for any evidence of ischemia.  3.  Lymphocytic colitis on Entocort      Plan:  1.  Discontinue gemfibrozil  2.  Initiate prior authorization for inclisiran  3.  Would plan for lipid profile in about 4 months to reassess lipids  4.  Schedule pharmacologic nuclear stress test to rule out ischemia in the inferior wall       History of Present Illness    Ms. Salome Maravilla is a 78 year old female with history of severe mixed hyperlipidemia, intolerant of statin but on gemfibrozil therapy, GERD, lymphocytic colitis who presents today for evaluation of treatment options for hyperlipidemia.  She has been tried on statins over the past years but has developed myalgias or rash  with each of the ones she has been tried on.  Currently on gemfibrozil which has resulted in a decline in her triglycerides but no improvement in her LDL.  Recently underwent coronary artery calcium scan which was mildly elevated at 42 in 2017 which is risen further to 81.  Now  here for further recommendations.  Denies any symptoms of exertional chest discomfort or dyspnea however activity has been limited by hip pain.  Is able to walk about 5 minutes on a treadmill currently.  No chest discomfort or shortness of breath.    ECG (personally reviewed): No ECG today    Cardiac Imaging Studies (personally reviewed): Results of coronary calcium score is reported     Physical Examination Review of Systems   /69   Pulse 76   Resp 16   Wt 62.6 kg (138 lb)   LMP  (LMP Unknown)   SpO2 98%   BMI 23.50 kg/m    Body mass index is 23.5 kg/m .  Wt Readings from Last 3 Encounters:   10/31/24 62.6 kg (138 lb)   09/25/24 62.8 kg (138 lb 6.4 oz)   06/14/24 62.1 kg (137 lb)     General Appearance:   Awake, Alert, No acute distress.   HEENT:  No scleral icterus; the mucous membranes were pink and moist.   Neck: No cervical bruits or jugular venous distention    Chest: The spine was straight. The chest was symmetric.   Lungs:   Respirations unlabored; the lungs are clear to auscultation. No wheezing   Cardiovascular:   Regular rate and rhythm.  S1, S2 normal.  No murmur or gallop   Abdomen:  No organomegaly, masses, bruits, or tenderness. Bowels sounds are present   Extremities: No peripheral edema bilaterally   Skin: No xanthelasma. Warm, Dry.   Musculoskeletal: No tenderness.   Neurologic: Mood and affect are appropriate.    Encounter Vitals  BP: 130/69  Pulse: 76  Resp: 16  SpO2: 98 %  Weight: 62.6 kg (138 lb)                                         Medical History  Surgical History Family History Social History   Past Medical History:   Diagnosis Date    Breast cancer (H) 01/01/2010    hx of right lumpectomy for breast     Depression     GERD (gastroesophageal reflux disease)     Hx of radiation therapy right 2010    hx of right lumpectomy for breast cancer    Infection due to 2019 novel coronavirus 11/10/2021    Osteoarthritis     Past Surgical History:   Procedure Laterality Date     APPENDECTOMY      ARTHROSCOPY SHOULDER ROTATOR CUFF REPAIR      BIOPSY BREAST Right 2010    hx of right lumpectomy    COLONOSCOPY      HYSTERECTOMY  1982    IR CHEST TUBE PLACEMENT NON-TUNNELED RIGHT  2021    LUMPECTOMY BREAST Right 2010    right lumpectomy    OOPHORECTOMY      RELEASE CARPAL TUNNEL Right     THORACOSCOPY Right 2021    Procedure: THORACOSCOPY, PARIETAL PLEURECTOMY;  Surgeon: Timbo Zuluaga MD;  Location: [facility] Main OR    Family History   Problem Relation Age of Onset    Chronic Obstructive Pulmonary Disease Father     Breast Cancer Sister 71.00    Cancer Sister     Atrial fibrillation Sister     Cancer Brother 19.00    Testicular cancer Brother     No Known Problems Son     No Known Problems Son     Social History     Socioeconomic History    Marital status:      Spouse name: Not on file    Number of children: Not on file    Years of education: Not on file    Highest education level: Not on file   Occupational History    Not on file   Tobacco Use    Smoking status: Former     Current packs/day: 0.00     Types: Cigarettes     Quit date: 1988     Years since quittin.5    Smokeless tobacco: Never   Vaping Use    Vaping status: Never Used   Substance and Sexual Activity    Alcohol use: Yes     Comment: Alcoholic Drinks/day: social    Drug use: No    Sexual activity: Never   Other Topics Concern    Not on file   Social History Narrative    Not on file     Social Drivers of Health     Financial Resource Strain: Not on file   Food Insecurity: Not on file   Transportation Needs: Not on file   Physical Activity: Not on file   Stress: Not on file   Social Connections: Not on file   Interpersonal Safety: Low Risk  (2024)    Interpersonal Safety     Do you feel physically and emotionally safe where you currently live?: Yes     Within the past 12 months, have you been hit, slapped, kicked or otherwise physically hurt by someone?: No     Within the past 12 months, have you  been humiliated or emotionally abused in other ways by your partner or ex-partner?: No   Housing Stability: Not on file          Medications  Allergies   Current Outpatient Medications   Medication Sig Dispense Refill    acetaminophen (TYLENOL) 500 MG tablet Take 1,000 mg by mouth At Bedtime      ascorbic acid (VITAMIN C) 250 MG tablet Take 250 mg by mouth At Bedtime       Bacillus Coagulans-Inulin (PROBIOTIC) 1-250 BILLION-MG CAPS 2 caps orally once a day      BLACK COHOSH ORAL [BLACK COHOSH ORAL] Take 1 capsule by mouth 2 (two) times a day.       budesonide (ENTOCORT EC) 3 MG EC capsule Take 3 mg by mouth every other day.      calcium citrate (CITRACAL) 950 (200 Ca) MG tablet Take 1 tablet by mouth 2 times daily      Coenzyme Q10 (CO Q-10) 400 MG CAPS 1 cap(s) Orally once a day      estradiol (ESTRACE) 0.1 MG/GM vaginal cream INSERT 1 GRAM OF CREAM INTO VAGINA USING THE APPLICATORY NIGHTLY.      famotidine (PEPCID) 20 MG tablet TAKE ONE TAB BY MOUTH ONCE A DAY AS NEEDED FOR BREATHTHRU GERD SYMTOMS      gemfibrozil (LOPID) 600 MG tablet 2 times daily 1/2 hour before breakfast; 1/2 hour before dinner      MAGNESIUM GLYCINATE  mg 2 times daily      mometasone-formoterol (DULERA) 200-5 MCG/ACT inhaler INHALE 2 PUFFS INTO THE LUNGS TWICE A DAY 13 g 2    omeprazole (PRILOSEC) 20 MG capsule [OMEPRAZOLE (PRILOSEC) 20 MG CAPSULE] Take 20 mg by mouth daily.  0    PSYLLIUM HUSK PO Take 500 mg by mouth daily 3 capsules daily      sertraline (ZOLOFT) 100 MG tablet [SERTRALINE (ZOLOFT) 100 MG TABLET] Take 200 mg by mouth daily.             traZODone (DESYREL) 150 MG tablet Take 150 mg by mouth At Bedtime      triamcinolone (NASACORT ALLERGY 24HR) 55 MCG/ACT nasal aerosol Nasacort Allergy 24HR 55 MCG/ACT      valACYclovir (VALTREX) 1000 MG tablet Take 500 mg by mouth at bedtime.      valACYclovir (VALTREX) 500 MG tablet Take 1 tablet (500 mg) by mouth daily. 90 tablet 3    vitamin E 400 UNIT capsule [VITAMIN E 400 UNIT  CAPSULE] Take 400 Units by mouth daily.        Allergies   Allergen Reactions    Arimidex [Anastrozole] Unknown     Hot flashes    Penicillins Hives    Statins-Hmg-Coa Reductase Inhibitors [Statins] Muscle Pain (Myalgia)    Fenofibrate Rash         Lab Results    Chemistry/lipid CBC Cardiac Enzymes/BNP/TSH/INR   Recent Labs   Lab Test 09/25/24  1538 04/15/24  1207   TRIG  --  172*   LDL  --  187*   BUN 18.6  --      --    CO2 28  --     Recent Labs   Lab Test 09/25/24  1538   WBC 5.8   HGB 12.3   HCT 38.1   MCV 93       Recent Labs   Lab Test 10/17/23  1112 03/09/22  1319 11/10/21  1733 11/10/21  1145   TROPONINI  --   --   --  0.01   TSH 2.84   < >  --   --    INR  --   --  1.44*  --     < > = values in this interval not displayed.        A total of 45 minutes was spent reviewing patient's medical records, obtaining history and performing examination, as well as discussing diagnoses/ recommendations with patient and answering all questions.

## 2024-10-31 NOTE — TELEPHONE ENCOUNTER
Noted Ns visit appt sched on 11-14-24 - msg sent to [facility]  to initiate PA process and sent to Lead CMA to confirm med availability.  mg

## 2024-10-31 NOTE — TELEPHONE ENCOUNTER
Informed by Dr. Golden that she placed orders for [facility] Leqvio injections - Ns visit order placed - msg sent to sched team with instructions to sched future appt w/ Ns in 2wks (T or Th) to allow time for PA to be completed.  mg

## 2024-10-31 NOTE — LETTER
10/31/2024    Brenna Lindsey MD  [address] [address]    RE: Salome Maravilla       Dear Colleague,     I had the pleasure of seeing Salome Maravilla in the [facility].      Thank you, Dr. Brenna Lindsey, for asking the [facility] team to see Ms. Salome Maravilla to evaluate mixed hyperlipidemia.    Assessment/Recommendations   Assessment:    1.  Mixed hyperlipidemia with markedly elevated LDL of 187 despite gemfibrozil therapy.  This is resulted in improvement in her triglycerides but no benefit with her LDL.  She does have evidence of coronary artery calcification with an increase in her calcium score from 42-81 over the past 6 years.  Unfortunately, she has been intolerant to multiple statins.  Would favor initiation of a PCSK9 inhibitor.  We talked about the 3 that are currently available and she would like to try inclisiran.  2.  Coronary artery calcification with score of 81, all within the right coronary artery.  She reports no clear symptoms of exertional chest discomfort or dyspnea; however, activity is currently limited due to hip and leg pain.  Recommended pursuing pharmacologic nuclear stress test to evaluate for any evidence of ischemia.  3.  Lymphocytic colitis on Entocort      Plan:  1.  Discontinue gemfibrozil  2.  Initiate prior authorization for inclisiran  3.  Would plan for lipid profile in about 4 months to reassess lipids  4.  Schedule pharmacologic nuclear stress test to rule out ischemia in the inferior wall       History of Present Illness    Ms. Salome Maravilla is a 78 year old female with history of severe mixed hyperlipidemia, intolerant of statin but on gemfibrozil therapy, GERD, lymphocytic colitis who presents today for evaluation of treatment options for hyperlipidemia.  She has been tried on statins over the past years but has developed myalgias or rash  with each of the ones she has been tried on.  Currently on  gemfibrozil which has resulted in a decline in her triglycerides but no improvement in her LDL.  Recently underwent coronary artery calcium scan which was mildly elevated at 42 in 2017 which is risen further to 81.  Now here for further recommendations.  Denies any symptoms of exertional chest discomfort or dyspnea however activity has been limited by hip pain.  Is able to walk about 5 minutes on a treadmill currently.  No chest discomfort or shortness of breath.    ECG (personally reviewed): No ECG today    Cardiac Imaging Studies (personally reviewed): Results of coronary calcium score is reported     Physical Examination Review of Systems   /69   Pulse 76   Resp 16   Wt 62.6 kg (138 lb)   LMP  (LMP Unknown)   SpO2 98%   BMI 23.50 kg/m    Body mass index is 23.5 kg/m .  Wt Readings from Last 3 Encounters:   10/31/24 62.6 kg (138 lb)   09/25/24 62.8 kg (138 lb 6.4 oz)   06/14/24 62.1 kg (137 lb)     General Appearance:   Awake, Alert, No acute distress.   HEENT:  No scleral icterus; the mucous membranes were pink and moist.   Neck: No cervical bruits or jugular venous distention    Chest: The spine was straight. The chest was symmetric.   Lungs:   Respirations unlabored; the lungs are clear to auscultation. No wheezing   Cardiovascular:   Regular rate and rhythm.  S1, S2 normal.  No murmur or gallop   Abdomen:  No organomegaly, masses, bruits, or tenderness. Bowels sounds are present   Extremities: No peripheral edema bilaterally   Skin: No xanthelasma. Warm, Dry.   Musculoskeletal: No tenderness.   Neurologic: Mood and affect are appropriate.    Encounter Vitals  BP: 130/69  Pulse: 76  Resp: 16  SpO2: 98 %  Weight: 62.6 kg (138 lb)                                         Medical History  Surgical History Family History Social History   Past Medical History:   Diagnosis Date     Breast cancer (H) 01/01/2010    hx of right lumpectomy for breast      Depression      GERD (gastroesophageal reflux disease)       Hx of radiation therapy right 2010    hx of right lumpectomy for breast cancer     Infection due to 2019 novel coronavirus 11/10/2021     Osteoarthritis     Past Surgical History:   Procedure Laterality Date     APPENDECTOMY       ARTHROSCOPY SHOULDER ROTATOR CUFF REPAIR       BIOPSY BREAST Right 2010    hx of right lumpectomy     COLONOSCOPY       HYSTERECTOMY  1982     IR CHEST TUBE PLACEMENT NON-TUNNELED RIGHT  2021     LUMPECTOMY BREAST Right 2010    right lumpectomy     OOPHORECTOMY       RELEASE CARPAL TUNNEL Right      THORACOSCOPY Right 2021    Procedure: THORACOSCOPY, PARIETAL PLEURECTOMY;  Surgeon: Timbo Zuluaga MD;  Location: [facility] Main OR    Family History   Problem Relation Age of Onset     Chronic Obstructive Pulmonary Disease Father      Breast Cancer Sister 71.00     Cancer Sister      Atrial fibrillation Sister      Cancer Brother 19.00     Testicular cancer Brother      No Known Problems Son      No Known Problems Son     Social History     Socioeconomic History     Marital status:      Spouse name: Not on file     Number of children: Not on file     Years of education: Not on file     Highest education level: Not on file   Occupational History     Not on file   Tobacco Use     Smoking status: Former     Current packs/day: 0.00     Types: Cigarettes     Quit date: 1988     Years since quittin.5     Smokeless tobacco: Never   Vaping Use     Vaping status: Never Used   Substance and Sexual Activity     Alcohol use: Yes     Comment: Alcoholic Drinks/day: social     Drug use: No     Sexual activity: Never   Other Topics Concern     Not on file   Social History Narrative     Not on file     Social Drivers of Health     Financial Resource Strain: Not on file   Food Insecurity: Not on file   Transportation Needs: Not on file   Physical Activity: Not on file   Stress: Not on file   Social Connections: Not on file   Interpersonal Safety: Low Risk  (2024)     Interpersonal Safety      Do you feel physically and emotionally safe where you currently live?: Yes      Within the past 12 months, have you been hit, slapped, kicked or otherwise physically hurt by someone?: No      Within the past 12 months, have you been humiliated or emotionally abused in other ways by your partner or ex-partner?: No   Housing Stability: Not on file          Medications  Allergies   Current Outpatient Medications   Medication Sig Dispense Refill     acetaminophen (TYLENOL) 500 MG tablet Take 1,000 mg by mouth At Bedtime       ascorbic acid (VITAMIN C) 250 MG tablet Take 250 mg by mouth At Bedtime        Bacillus Coagulans-Inulin (PROBIOTIC) 1-250 BILLION-MG CAPS 2 caps orally once a day       BLACK COHOSH ORAL [BLACK COHOSH ORAL] Take 1 capsule by mouth 2 (two) times a day.        budesonide (ENTOCORT EC) 3 MG EC capsule Take 3 mg by mouth every other day.       calcium citrate (CITRACAL) 950 (200 Ca) MG tablet Take 1 tablet by mouth 2 times daily       Coenzyme Q10 (CO Q-10) 400 MG CAPS 1 cap(s) Orally once a day       estradiol (ESTRACE) 0.1 MG/GM vaginal cream INSERT 1 GRAM OF CREAM INTO VAGINA USING THE APPLICATORY NIGHTLY.       famotidine (PEPCID) 20 MG tablet TAKE ONE TAB BY MOUTH ONCE A DAY AS NEEDED FOR BREATHTHRU GERD SYMTOMS       gemfibrozil (LOPID) 600 MG tablet 2 times daily 1/2 hour before breakfast; 1/2 hour before dinner       MAGNESIUM GLYCINATE  mg 2 times daily       mometasone-formoterol (DULERA) 200-5 MCG/ACT inhaler INHALE 2 PUFFS INTO THE LUNGS TWICE A DAY 13 g 2     omeprazole (PRILOSEC) 20 MG capsule [OMEPRAZOLE (PRILOSEC) 20 MG CAPSULE] Take 20 mg by mouth daily.  0     PSYLLIUM HUSK PO Take 500 mg by mouth daily 3 capsules daily       sertraline (ZOLOFT) 100 MG tablet [SERTRALINE (ZOLOFT) 100 MG TABLET] Take 200 mg by mouth daily.              traZODone (DESYREL) 150 MG tablet Take 150 mg by mouth At Bedtime       triamcinolone (NASACORT ALLERGY 24HR) 55  MCG/ACT nasal aerosol Nasacort Allergy 24HR 55 MCG/ACT       valACYclovir (VALTREX) 1000 MG tablet Take 500 mg by mouth at bedtime.       valACYclovir (VALTREX) 500 MG tablet Take 1 tablet (500 mg) by mouth daily. 90 tablet 3     vitamin E 400 UNIT capsule [VITAMIN E 400 UNIT CAPSULE] Take 400 Units by mouth daily.        Allergies   Allergen Reactions     Arimidex [Anastrozole] Unknown     Hot flashes     Penicillins Hives     Statins-Hmg-Coa Reductase Inhibitors [Statins] Muscle Pain (Myalgia)     Fenofibrate Rash         Lab Results    Chemistry/lipid CBC Cardiac Enzymes/BNP/TSH/INR   Recent Labs   Lab Test 09/25/24  1538 04/15/24  1207   TRIG  --  172*   LDL  --  187*   BUN 18.6  --      --    CO2 28  --     Recent Labs   Lab Test 09/25/24  1538   WBC 5.8   HGB 12.3   HCT 38.1   MCV 93       Recent Labs   Lab Test 10/17/23  1112 03/09/22  1319 11/10/21  1733 11/10/21  1145   TROPONINI  --   --   --  0.01   TSH 2.84   < >  --   --    INR  --   --  1.44*  --     < > = values in this interval not displayed.        A total of 45 minutes was spent reviewing patient's medical records, obtaining history and performing examination, as well as discussing diagnoses/ recommendations with patient and answering all questions.                        Thank you for allowing me to participate in the care of your patient.      Sincerely,     Jody Golden MD     [facility]  cc:   Watson Bone MD  [address] [address]

## 2024-10-31 NOTE — PATIENT INSTRUCTIONS
Discontinue gemfibrozil at this point  Initiate authorization for Inclisiran to try and lower cholesterol  Set up nuclear stress test with further recommendations to follow

## 2024-11-07 ENCOUNTER — HOSPITAL ENCOUNTER (OUTPATIENT)
Dept: NUCLEAR MEDICINE | Facility: HOSPITAL | Age: 79
Discharge: HOME | End: 2024-11-07
Attending: INTERNAL MEDICINE
Payer: COMMERCIAL

## 2024-11-07 ENCOUNTER — HOSPITAL ENCOUNTER (OUTPATIENT)
Dept: CARDIOLOGY | Facility: HOSPITAL | Age: 79
Discharge: HOME | End: 2024-11-07
Attending: INTERNAL MEDICINE
Payer: COMMERCIAL

## 2024-11-07 ENCOUNTER — TELEPHONE (OUTPATIENT)
Dept: PULMONOLOGY | Facility: CLINIC | Age: 79
End: 2024-11-07
Payer: COMMERCIAL

## 2024-11-07 DIAGNOSIS — R05.3 CHRONIC COUGH: ICD-10-CM

## 2024-11-07 DIAGNOSIS — E78.2 MIXED HYPERLIPIDEMIA: ICD-10-CM

## 2024-11-07 DIAGNOSIS — I25.10 CORONARY ARTERY CALCIFICATION: ICD-10-CM

## 2024-11-07 DIAGNOSIS — R93.1 ELEVATED CORONARY ARTERY CALCIUM SCORE: ICD-10-CM

## 2024-11-07 LAB
CV STRESS CURRENT BP HE: NORMAL
CV STRESS CURRENT HR HE: 62
CV STRESS CURRENT HR HE: 65
CV STRESS CURRENT HR HE: 66
CV STRESS CURRENT HR HE: 83
CV STRESS CURRENT HR HE: 84
CV STRESS CURRENT HR HE: 85
CV STRESS CURRENT HR HE: 86
CV STRESS CURRENT HR HE: 86
CV STRESS CURRENT HR HE: 87
CV STRESS CURRENT HR HE: 88
CV STRESS CURRENT HR HE: 89
CV STRESS CURRENT HR HE: 89
CV STRESS DEVIATION TIME HE: NORMAL
CV STRESS ECHO PERCENT HR HE: NORMAL
CV STRESS EXERCISE STAGE HE: NORMAL
CV STRESS FINAL RESTING BP HE: NORMAL
CV STRESS FINAL RESTING HR HE: 85
CV STRESS MAX HR HE: 91
CV STRESS MAX TREADMILL GRADE HE: 0
CV STRESS MAX TREADMILL SPEED HE: 0
CV STRESS PEAK DIA BP HE: NORMAL
CV STRESS PEAK SYS BP HE: NORMAL
CV STRESS PHASE HE: NORMAL
CV STRESS PROTOCOL HE: NORMAL
CV STRESS RESTING PT POSITION HE: NORMAL
CV STRESS ST DEVIATION AMOUNT HE: NORMAL
CV STRESS ST DEVIATION ELEVATION HE: NORMAL
CV STRESS ST EVELATION AMOUNT HE: NORMAL
CV STRESS TEST TYPE HE: NORMAL
CV STRESS TOTAL STAGE TIME MIN 1 HE: NORMAL
RATE PRESSURE PRODUCT: NORMAL
STRESS ECHO BASELINE DIASTOLIC HE: 70
STRESS ECHO BASELINE HR: 65
STRESS ECHO BASELINE SYSTOLIC BP: 166
STRESS ECHO CALCULATED PERCENT HR: 65 %
STRESS ECHO LAST STRESS DIASTOLIC BP: 64
STRESS ECHO LAST STRESS HR: 88
STRESS ECHO LAST STRESS SYSTOLIC BP: 148
STRESS ECHO TARGET HR: 139

## 2024-11-07 PROCEDURE — 343N000001 HC RX 343 MED OP 636: Performed by: INTERNAL MEDICINE

## 2024-11-07 PROCEDURE — 93018 CV STRESS TEST I&R ONLY: CPT | Performed by: INTERNAL MEDICINE

## 2024-11-07 PROCEDURE — 93016 CV STRESS TEST SUPVJ ONLY: CPT | Performed by: INTERNAL MEDICINE

## 2024-11-07 PROCEDURE — 250N000011 HC RX IP 250 OP 636: Performed by: INTERNAL MEDICINE

## 2024-11-07 PROCEDURE — A9500 TC99M SESTAMIBI: HCPCS | Performed by: INTERNAL MEDICINE

## 2024-11-07 PROCEDURE — 78452 HT MUSCLE IMAGE SPECT MULT: CPT | Mod: 26 | Performed by: INTERNAL MEDICINE

## 2024-11-07 PROCEDURE — 93017 CV STRESS TEST TRACING ONLY: CPT

## 2024-11-07 PROCEDURE — 78452 HT MUSCLE IMAGE SPECT MULT: CPT

## 2024-11-07 RX ORDER — MOMETASONE FUROATE AND FORMOTEROL FUMARATE DIHYDRATE 200; 5 UG/1; UG/1
2 AEROSOL RESPIRATORY (INHALATION) 2 TIMES DAILY
Qty: 13 G | Refills: 0 | Status: SHIPPED | OUTPATIENT
Start: 2024-11-07

## 2024-11-07 RX ORDER — REGADENOSON 0.08 MG/ML
0.4 INJECTION, SOLUTION INTRAVENOUS ONCE
Status: COMPLETED | OUTPATIENT
Start: 2024-11-07 | End: 2024-11-07

## 2024-11-07 RX ORDER — AMINOPHYLLINE 25 MG/ML
50-100 INJECTION, SOLUTION INTRAVENOUS
Status: DISCONTINUED | OUTPATIENT
Start: 2024-11-07 | End: 2024-11-08 | Stop reason: HOSPADM

## 2024-11-07 RX ADMIN — Medication 23.7 MILLICURIE: at 14:01

## 2024-11-07 RX ADMIN — Medication 8.5 MILLICURIE: at 11:19

## 2024-11-07 RX ADMIN — REGADENOSON 0.4 MG: 0.08 INJECTION, SOLUTION INTRAVENOUS at 12:56

## 2024-11-07 NOTE — TELEPHONE ENCOUNTER
[facility] Call Center    Phone Message    May a detailed message be left on voicemail: yes     Reason for Call: Medication Refill Request  (pt emailed request)   Has the patient contacted the pharmacy for the refill? Yes   Name of medication being requested: Dulera  Provider who prescribed the medication: Raymundo   Pharmacy:   [facility] [address] [place] [address] [address]     Date medication is needed: asap    Action Taken: Message routed to:  [facility] ([facility] pulm     Travel Screening: Not Applicable     Date of Service:

## 2024-11-11 DIAGNOSIS — E78.2 MIXED HYPERLIPIDEMIA: Primary | ICD-10-CM

## 2024-11-14 ENCOUNTER — DOCUMENTATION ONLY (OUTPATIENT)
Dept: CARDIOLOGY | Facility: CLINIC | Age: 79
End: 2024-11-14

## 2024-11-14 ENCOUNTER — ALLIED HEALTH/NURSE VISIT (OUTPATIENT)
Dept: CARDIOLOGY | Facility: CLINIC | Age: 79
End: 2024-11-14
Payer: COMMERCIAL

## 2024-11-14 DIAGNOSIS — Z78.9 STATIN INTOLERANCE: ICD-10-CM

## 2024-11-14 DIAGNOSIS — E78.2 MIXED HYPERLIPIDEMIA: Primary | ICD-10-CM

## 2024-11-14 DIAGNOSIS — I25.10 CORONARY ARTERY CALCIFICATION: ICD-10-CM

## 2024-11-14 PROCEDURE — 96372 THER/PROPH/DIAG INJ SC/IM: CPT | Performed by: INTERNAL MEDICINE

## 2024-11-14 NOTE — TELEPHONE ENCOUNTER
----- Message -----  From: Akiko Spears  Sent: 11/14/2024  12:35 PM CST  To: Svitlana Al RN  Subject: RE: Leqvio                                       This has already been approved. OK to proceed.     Thanks,   Akiko Spears     [facility]  HE Clinically Administered Medications    ----- Message -----  From: Svitlana Al RN  Sent: 11/14/2024  10:20 AM CST  To: Nicolasa Monzon RN; Cam   Subject: Leqvio                                           Hi,     Reaching out for a status update on the authorization of Leqvio injections for this patient. Unfortunately the 1st dose was given.     Thanks,  KELSIE Shane

## 2024-11-14 NOTE — TELEPHONE ENCOUNTER
Noted Leqvio approved. Message delayed to send to Team X 1 week (2/3/25) prior to 2/14/25 to prepare for 2nd dose. LMS

## 2024-11-14 NOTE — PROGRESS NOTES
Clinic Administered Medication Documentation  Patient was given 1st Leqvio injection. Prior to medication administration, verified patient's identity using patient s name and date of birth. Please see MAR and medication order for additional information. Patient instructed to remain in clinic for 15 minutes and report any adverse reaction to staff immediately.    Vial/Syringe: Single dose vial. Was entire vial of medication used? Yes    Name of provider who requested the medication administration: Dr. Golden  Name of provider on site (faculty or community preceptor) at the time of performing the medication administration: CASSIA Wong   Date of next administration: 2/14/25  Date of next office visit with provider to renew medication plan (must be seen annually): pending 10/2025.

## 2024-12-04 ENCOUNTER — TRANSFERRED RECORDS (OUTPATIENT)
Dept: HEALTH INFORMATION MANAGEMENT | Facility: CLINIC | Age: 79
End: 2024-12-04
Payer: COMMERCIAL

## 2024-12-06 ENCOUNTER — TRANSFERRED RECORDS (OUTPATIENT)
Dept: HEALTH INFORMATION MANAGEMENT | Facility: CLINIC | Age: 79
End: 2024-12-06
Payer: COMMERCIAL

## 2024-12-26 ENCOUNTER — ALLIED HEALTH/NURSE VISIT (OUTPATIENT)
Dept: FAMILY MEDICINE | Facility: CLINIC | Age: 79
End: 2024-12-26
Payer: COMMERCIAL

## 2024-12-26 DIAGNOSIS — M81.0 AGE-RELATED OSTEOPOROSIS WITHOUT CURRENT PATHOLOGICAL FRACTURE: Primary | ICD-10-CM

## 2024-12-26 NOTE — CONFIDENTIAL NOTE
Clinic Administered Medication Documentation      Prolia Documentation    Indication: Prolia  (denosumab) is a prescription medicine used to treat osteoporosis in patients who:   Are at high risk for fracture, meaning patients who have had a fracture related to osteoporosis, or who have multiple risk factors for fracture.  Cannot use another osteoporosis medicine or other osteoporosis medicines did not work well.  The timeline for early/late injections would be 4 weeks early and any time after the 6 month enrrique. If a patient receives their injection late, then the subsequent injection would be 6 months from the date that they actually received the injection.    When was the last injection?  24  Was the last injection at least 6 months ago? Yes  Has the prior authorization been completed?  Yes  Is there an active order (written within the past 365 days, with administrations remaining, not ) in the chart?  Yes   GFR Estimate   Date Value Ref Range Status   2024 85 >60 mL/min/1.73m2 Final     Comment:     eGFR calculated using  CKD-EPI equation.   2021 >60 >60 mL/min/1.73m2 Final     Has patient had a GFR within the last 12 months? Yes   Is GFR under 30, or patient has a diagnosis of CKD4 or CKD5? No   Patient denies gastric bypass or parathyroid surgery in past 6 months? Yes - patient denies.   Patient denies dental work in the past two months involving drilling into the bone, such as implants/extractions, oral surgery or a tooth extraction that has not healed yet?  Yes  Patient denies plans for an emergency tooth extraction within the next week? Yes    The following steps were completed to comply with the REMS program for Prolia:  Reviewed information in the Medication Guide, including the serious risks of Prolia  and the symptoms of each risk.  Advised patient to seek prompt medical attention if they have signs or symptoms of any of the serious risks.  Provided each patient a copy of the  Medication Guide and Patient Guide.    Prior to injection, verified patient identity using patient's name and date of birth. Medication was administered. Please see MAR and medication order for additional information. Patient instructed to remain in clinic for 15 minutes and report any adverse reaction to staff immediately.    Vial/Syringe: Syringe  Was this medication supplied by the patient? No  Patient has no administrations remaining.

## 2025-01-16 ENCOUNTER — OFFICE VISIT (OUTPATIENT)
Dept: FAMILY MEDICINE | Facility: CLINIC | Age: 80
End: 2025-01-16
Payer: COMMERCIAL

## 2025-01-16 ENCOUNTER — TELEPHONE (OUTPATIENT)
Dept: VASCULAR SURGERY | Facility: CLINIC | Age: 80
End: 2025-01-16

## 2025-01-16 VITALS
RESPIRATION RATE: 16 BRPM | OXYGEN SATURATION: 99 % | BODY MASS INDEX: 23.34 KG/M2 | SYSTOLIC BLOOD PRESSURE: 110 MMHG | WEIGHT: 136.7 LBS | DIASTOLIC BLOOD PRESSURE: 68 MMHG | HEART RATE: 78 BPM | TEMPERATURE: 98.1 F | HEIGHT: 64 IN

## 2025-01-16 DIAGNOSIS — M67.449 DIGITAL MUCOUS CYST: ICD-10-CM

## 2025-01-16 DIAGNOSIS — I83.813 VARICOSE VEINS OF BOTH LOWER EXTREMITIES WITH PAIN: Primary | ICD-10-CM

## 2025-01-16 ASSESSMENT — PATIENT HEALTH QUESTIONNAIRE - PHQ9
10. IF YOU CHECKED OFF ANY PROBLEMS, HOW DIFFICULT HAVE THESE PROBLEMS MADE IT FOR YOU TO DO YOUR WORK, TAKE CARE OF THINGS AT HOME, OR GET ALONG WITH OTHER PEOPLE: SOMEWHAT DIFFICULT
SUM OF ALL RESPONSES TO PHQ QUESTIONS 1-9: 6
SUM OF ALL RESPONSES TO PHQ QUESTIONS 1-9: 6

## 2025-01-16 NOTE — LETTER
My Depression Action Plan  Name: Salome Maravilla   Date of Birth 1945  Date: 1/16/2025    My doctor: Brenna Lindsey   My clinic: [address] [address]  [phone]            GREEN    ZONE   Good Control    What it looks like:   Things are going generally well. You have normal ups and downs. You may even feel depressed from time to time, but bad moods usually last less than a day.   What you need to do:  Continue to care for yourself (see self care plan)  Check your depression survival kit and update it as needed  Follow your physician s recommendations including any medication.  Do not stop taking medication unless you consult with your physician first.             YELLOW         ZONE Getting Worse    What it looks like:   Depression is starting to interfere with your life.   It may be hard to get out of bed; you may be starting to isolate yourself from others.  Symptoms of depression are starting to last most all day and this has happened for several days.   You may have suicidal thoughts but they are not constant.   What you need to do:     Call your care team. Your response to treatment will improve if you keep your care team informed of your progress. Yellow periods are signs an adjustment may need to be made.     Continue your self-care.  Just get dressed and ready for the day.  Don't give yourself time to talk yourself out of it.    Talk to someone in your support network.    Open up your Depression Self-Care Plan/Wellness Kit.             RED    ZONE Medical Alert - Get Help    What it looks like:   Depression is seriously interfering with your life.   You may experience these or other symptoms: You can t get out of bed most days, can t work or engage in other necessary activities, you have trouble taking care of basic hygiene, or basic responsibilities, thoughts of suicide or death that will not go away,  self-injurious behavior.     What you need to do:  Call your care team and request a same-day appointment. If they are not available (weekends or after hours) call your local crisis line, emergency room or 911.          Depression Self-Care Plan / Wellness Kit    Many people find that medication and therapy are helpful treatments for managing depression. In addition, making small changes to your everyday life can help to boost your mood and improve your wellbeing. Below are some tips for you to consider. Be sure to talk with your medical provider and/or behavioral health consultant if your symptoms are worsening or not improving.     Sleep   Sleep hygiene  means all of the habits that support good, restful sleep. It includes maintaining a consistent bedtime and wake time, using your bedroom only for sleeping or sex, and keeping the bedroom dark and free of distractions like a computer, smartphone, or television.     Develop a Healthy Routine  Maintain good hygiene. Get out of bed in the morning, make your bed, brush your teeth, take a shower, and get dressed. Don t spend too much time viewing media that makes you feel stressed. Find time to relax each day.    Exercise  Get some form of exercise every day. This will help reduce pain and release endorphins, the  feel good  chemicals in your brain. It can be as simple as just going for a walk or doing some gardening, anything that will get you moving.      Diet  Strive to eat healthy foods, including fruits and vegetables. Drink plenty of water. Avoid excessive sugar, caffeine, alcohol, and other mood-altering substances.     Stay Connected with Others  Stay in touch with friends and family members.    Manage Your Mood  Try deep breathing, massage therapy, biofeedback, or meditation. Take part in fun activities when you can. Try to find something to smile about each day.     Psychotherapy  Be open to working with a therapist if your provider recommends it.      Medication  Be sure to take your medication as prescribed. Most anti-depressants need to be taken every day. It usually takes several weeks for medications to work. Not all medicines work for all people. It is important to follow-up with your provider to make sure you have a treatment plan that is working for you. Do not stop your medication abruptly without first discussing it with your provider.    Crisis Resources   These hotlines are for both adults and children. They and are open 24 hours a day, 7 days a week unless noted otherwise.    National Suicide Prevention Lifeline   988 or 0-886-903-ATJU (3787)    Crisis Text Line    www.crisistextline.org  Text HOME to 243671 from anywhere in the [place], anytime, about any type of crisis. A live, trained crisis counselor will receive the text and respond quickly.    [facility] Lifeline for LGBTQ Youth  A national crisis intervention and suicide lifeline for LGBTQ youth under 25. Provides a safe place to talk without judgement. Call 1-970.723.5216; text START to 987287 or visit www.thetrevorproject.org to talk to a trained counselor.    For [facility] crisis numbers, visit the [facility] website at:  https://[place].gov/dhs/people-we-serve/adults/health-care/mental-health/resources/crisis-contacts.jsp

## 2025-01-16 NOTE — TELEPHONE ENCOUNTER
Vascular Referral Intake    Appointment note (to be pasted into appt note. Also add where additional info is located ie: outside images pushed to PACS, in Epic, sent to HIM, etc): Varicose veins of both lower extremities with pain  Painful varicose veins.    Referred by Watson Bone MD  for Varicose veins of both lower extremities with pain     Specialty: Vein Clinic    Specific Provider if Necessary:  MD Nani Amor, MD Tiffany Mistry, or MD Bethel Diaz    Visit Type: New Vein- Schedule in vein appt blocks only    Time Frame: Next Available    Testing/Imaging Needed Before Consult: none    *Schedulers: Please send welcome letter to patient after appointment(s) have been scheduled*

## 2025-02-17 NOTE — PATIENT INSTRUCTIONS
Salome    Thank you for entrusting your care with us at the [facility].      We are prescribing some compression stockings for you. I have included different suppliers that should help you get measured and fitting to ensure proper fitting socks. You should wear these stockings as much as you can. It is especially important to wear them with long periods of standing, sitting, long car rides or if you will be flying. Compression socks should get refilled every 4-6 months. They do not need to be worn at night while in bed. It is recommended to wear compression level of 20-30mmhg or higher from toes to knees.      We will perform an ultrasound today or prior to your appointment with us in 3 months time to evaluate the valves in your veins.              Varicose Veins    Varicose veins are twisted, enlarged veins near the surface of the skin. They develop most often in the legs and ankles.    Some people may be more likely than others to get varicose veins because of several things. These include aging, pregnancy, being overweight, or because a parent has them. Standing or sitting for long periods of time can also increase risk of varicose veins.    Follow-up care is a key part of your treatment and safety. Be sure to make and go to all appointments, and call your doctor if you are having problems. It's also a good idea to know your test results and keep a list of the medicines you take.      Varicose veins are caused by weakened valves and veins in your legs. Normally, one-way valves in your veins keep blood flowing from your legs up toward your heart. When these valves don't work as they should, blood collects in your legs, and pressure builds up. The veins become weak, large, and twisted.    How can you care for yourself at home?  Wear compression stockings during the day to help relieve symptoms and improve blood flow. Talk to your doctor about which ones to get and where to get them.  Prop  up your legs at or above the level of your heart when possible. Try to do this for about 30 minutes at a time, about 3 times a day. This helps keep the blood from pooling in your lower legs and improves blood flow to the rest of your body.  Avoid sitting and standing for long periods. This puts added stress on your veins.  Stay at a healthy weight. Lose weight if you need to.  Try to take several short walks every day.  Get at least 30 minutes of exercise on most days of the week. Walking is a good choice. You also may want to do other activities, such as running, swimming, cycling, or playing tennis or team sports.  Do calf muscle exercises every day. When you are sitting down, rotate your feet at the ankles in both directions, making small circles. Extend your legs, and point and flex your feet.  Avoid crossing your legs at the knees when sitting.  Take good care of your skin. Treat cuts and scrapes on your legs right away. Keep your legs clean and moisturized to prevent drying and cracking. Prevent sunburns.  Do not smoke. Smoking can make varicose veins worse. If you need help quitting, talk to your doctor about stop-smoking programs and medicines. These can increase your chances of quitting for good.  If you bump your leg so hard that you know it is likely to bruise, prop up your leg and apply ice or cold packs right away. Apply the ice or cold pack for 10 to 20 minutes, 3 or more times a day. Put a thin cloth between the ice and your skin.  If you cut or scratch the skin over a vein, it may bleed a lot. Prop up your leg and apply firm pressure for a full 15 minutes.  If you have a blood clot in a varicose vein, you may have tenderness and swelling over the vein. The vein may feel firm. Be sure to call your doctor right away if you have these symptoms. If your doctor has told you how to care for the clot, follow the instructions.     Care may include the following:    Prop up your leg and apply a damp cloth  that is warm or cool.  Ask your doctor if you can take an over-the-counter pain medicine, such as acetaminophen (Tylenol), ibuprofen (Advil, Motrin), or naproxen (Aleve). Be safe with medicines. Read and follow all instructions on the label.    When should you call for help?     Call 911 anytime you think you may need emergency care. For example, call if:    You have sudden chest pain and shortness of breath, or you cough up blood.    Call your doctor now or seek immediate medical care if:    You have signs of a blood clot in your leg (called a deep vein thrombosis), such as:  Pain in your calf, back of the knee, thigh, or groin.  Swelling in the leg or groin.  A color change on the leg or groin. The skin may be reddish or purplish, depending on your usual skin color.  A varicose vein begins to bleed and you cannot stop it.  You have a tender lump in your leg.  You get an open sore.    Watch closely for changes in your health, and be sure to contact your doctor if:    Your varicose vein symptoms do not improve with home treatment.    Current as of: December 19, 2022  Author: [employer]wise Staff  Medical Review:Jim Hernández MD - Family Medicine & PIPER Knight MD - Internal Medicine & Cabrera Butler MD - Family Medicine & Seamus Gutierrez MD - Family Medicine & Zane Snider MD - Diagnostic Radiology    Please bring your compression prescription to a home medical supply store. Here is a list of locations but not limited to.     [facility]  [address] [place] [address]  Phone: [phone]  Fax: [phone] [facility]  [address] [place] [address]  Phone: [phone]  Fax: [phone]   [facility]  [address] [place] [address]  Phone: [phone]  Fax: [phone]  [facility]  [address] [place] [address]  Phone: [phone]  Fax: [phone]   [facility]  [address] [place] [address]  Phone: [phone] [employer]   [address] ([facility][note 3])  [place] [address]  Phone: [phone]   [place]  [address] [place] [address]      Phone: [phone]  Fax: [phone] [place]  [address] [place] [address]      Phone: [phone]  Fax: [phone]     [facility] [email or web address]  [address], [place] [address]  [phone]    [facility]  [email or web address]  [address] [place] [address]  Phone: [phone]     [address] [place] [address]  Phone: [phone]    [address] [place] [address]  Phone: [phone]    [address], [place] [address]  Phone: [phone] [address], [place] [address]  Phone: [phone]    [facility] [email or web address]  [address], [place] [address]  Phone: [phone]    [address], [place] [address]  Phone: [phone]    [address], [place] [address]  Phone: [phone]    [facility]  [email or web address]  [phone]

## 2025-02-25 ENCOUNTER — OFFICE VISIT (OUTPATIENT)
Dept: VASCULAR SURGERY | Facility: CLINIC | Age: 80
End: 2025-02-25
Attending: FAMILY MEDICINE
Payer: COMMERCIAL

## 2025-02-25 ENCOUNTER — ANCILLARY PROCEDURE (OUTPATIENT)
Dept: MAMMOGRAPHY | Facility: CLINIC | Age: 80
End: 2025-02-25
Attending: INTERNAL MEDICINE
Payer: COMMERCIAL

## 2025-02-25 ENCOUNTER — ANCILLARY PROCEDURE (OUTPATIENT)
Dept: VASCULAR ULTRASOUND | Facility: CLINIC | Age: 80
End: 2025-02-25
Attending: SPECIALIST
Payer: COMMERCIAL

## 2025-02-25 VITALS — DIASTOLIC BLOOD PRESSURE: 69 MMHG | HEART RATE: 76 BPM | SYSTOLIC BLOOD PRESSURE: 112 MMHG | OXYGEN SATURATION: 99 %

## 2025-02-25 DIAGNOSIS — I83.813 VARICOSE VEINS OF BOTH LOWER EXTREMITIES WITH PAIN: ICD-10-CM

## 2025-02-25 DIAGNOSIS — Z12.31 VISIT FOR SCREENING MAMMOGRAM: ICD-10-CM

## 2025-02-25 DIAGNOSIS — I83.893 SYMPTOMATIC VARICOSE VEINS OF BOTH LOWER EXTREMITIES: Primary | ICD-10-CM

## 2025-02-25 DIAGNOSIS — I83.893 SYMPTOMATIC VARICOSE VEINS OF BOTH LOWER EXTREMITIES: ICD-10-CM

## 2025-02-25 PROCEDURE — 3074F SYST BP LT 130 MM HG: CPT | Performed by: SPECIALIST

## 2025-02-25 PROCEDURE — 3078F DIAST BP <80 MM HG: CPT | Performed by: SPECIALIST

## 2025-02-25 PROCEDURE — G0463 HOSPITAL OUTPT CLINIC VISIT: HCPCS | Performed by: SPECIALIST

## 2025-02-25 PROCEDURE — 77063 BREAST TOMOSYNTHESIS BI: CPT

## 2025-02-25 PROCEDURE — 1125F AMNT PAIN NOTED PAIN PRSNT: CPT | Performed by: SPECIALIST

## 2025-02-25 PROCEDURE — 99203 OFFICE O/P NEW LOW 30 MIN: CPT | Performed by: SPECIALIST

## 2025-02-25 PROCEDURE — 93970 EXTREMITY STUDY: CPT

## 2025-02-25 ASSESSMENT — PAIN SCALES - GENERAL: PAINLEVEL_OUTOF10: MODERATE PAIN (5)

## 2025-02-25 NOTE — PROGRESS NOTES
[facility]        Patient is here for a consult to discuss Varicose vein(s). The patient has varicose veins that are problematic in bilateral legs. Patient states their varicose veins are bothersome when standing, sitting, working, and household chores.     Patient has been using pain medication or anti-inflammatory's. Patient has not had recent imaging on legs done. Patient has done conservative measures which include: compression stockings and exercise. Treatment has been unsuccessful .     Educated patient to work on conservative treatments: .      Pt is currently taking .    LMP  (LMP Unknown)     The provider has been notified that the patient .     Questions patient would like addressed today are: .    Refills are needed:     Has homecare services and agency name:

## 2025-03-06 NOTE — PROGRESS NOTES
[facility] Vein Consult      Assessment:     1. varicose veins, bilateral   2. spider veins, bilateral   3.  No major insufficiencies noted bilaterally on ultrasound workup today  Plan:     1. Treatment options of conservative therapy of stockings use, exercise, weight loss, elevating legs when possible.    2. Script for compression stockings 20-30 mm hg  3. Ultrasound to evaluate legs for incompetency of both deep and superficial system .   4. Surgical treatment   Stab avulsions of bilateral lower extremities, discussed today risks and benefits at this time point we will continue conservative management compression socks exercise elevation and weight control  5. Follow up:  As needed .   6. Call for any questions concerns or issues    Subjective:      Salome Maravilla is a 79 year old female  who was referred by Brenna Lindsey  for evaluation of varicose veins. Symptoms include pain, aching, fatigue, burning, edema, and dermatitis. Patient has history of leg swelling, pain and vein issues that have progressed. Pain and symptoms have affected daily living and work activities needing medications. Here for evaluation today. Stocking use with compression stockings of 20-30 mm hg or greater for greater then 3 months    Allergies:Arimidex [anastrozole], Penicillins, Statins-hmg-coa reductase inhibitors [statins], and Fenofibrate    Past Medical History:   Diagnosis Date    Breast cancer (H) 01/01/2010    hx of right lumpectomy for breast     Depression     GERD (gastroesophageal reflux disease)     Hx of radiation therapy right 2010    hx of right lumpectomy for breast cancer    Infection due to 2019 novel coronavirus 11/10/2021    Osteoarthritis        Past Surgical History:   Procedure Laterality Date    APPENDECTOMY      ARTHROSCOPY SHOULDER ROTATOR CUFF REPAIR      BIOPSY BREAST Right 2010    hx of right lumpectomy    COLONOSCOPY      HYSTERECTOMY  1982    IR CHEST TUBE PLACEMENT NON-TUNNELED  RIGHT  11/13/2021    LUMPECTOMY BREAST Right 2010    right lumpectomy    OOPHORECTOMY      RELEASE CARPAL TUNNEL Right     THORACOSCOPY Right 11/23/2021    Procedure: THORACOSCOPY, PARIETAL PLEURECTOMY;  Surgeon: Timbo Zuluaga MD;  Location: [facility] Main OR         Current Outpatient Medications:     acetaminophen (TYLENOL) 500 MG tablet, Take 1,000 mg by mouth At Bedtime, Disp: , Rfl:     ascorbic acid (VITAMIN C) 250 MG tablet, Take 250 mg by mouth At Bedtime , Disp: , Rfl:     Bacillus Coagulans-Inulin (PROBIOTIC) 1-250 BILLION-MG CAPS, 2 caps orally once a day, Disp: , Rfl:     BLACK COHOSH ORAL, [BLACK COHOSH ORAL] Take 1 capsule by mouth 2 (two) times a day. , Disp: , Rfl:     budesonide (ENTOCORT EC) 3 MG EC capsule, Take 3 mg by mouth every other day., Disp: , Rfl:     calcium citrate (CITRACAL) 950 (200 Ca) MG tablet, Take 1 tablet by mouth 2 times daily, Disp: , Rfl:     Coenzyme Q10 (CO Q-10) 400 MG CAPS, 1 cap(s) Orally once a day, Disp: , Rfl:     estradiol (ESTRACE) 0.1 MG/GM vaginal cream, INSERT 1 GRAM OF CREAM INTO VAGINA USING THE APPLICATORY NIGHTLY., Disp: , Rfl:     famotidine (PEPCID) 20 MG tablet, TAKE ONE TAB BY MOUTH ONCE A DAY AS NEEDED FOR BREATHTHRU GERD SYMTOMS, Disp: , Rfl:     MAGNESIUM GLYCINATE PO, 200 mg 2 times daily, Disp: , Rfl:     mometasone-formoterol (DULERA) 200-5 MCG/ACT inhaler, Inhale 2 puffs into the lungs 2 times daily. OFFICE VISIT NEEDED FOR ANY FURTHER REFILLS, Disp: 13 g, Rfl: 11    omeprazole (PRILOSEC) 20 MG capsule, [OMEPRAZOLE (PRILOSEC) 20 MG CAPSULE] Take 20 mg by mouth daily., Disp: , Rfl: 0    PSYLLIUM HUSK PO, Take 500 mg by mouth daily 3 capsules daily, Disp: , Rfl:     sertraline (ZOLOFT) 100 MG tablet, Take 2 tablets (200 mg) by mouth daily., Disp: 90 tablet, Rfl: 1    traZODone (DESYREL) 150 MG tablet, Take 1 tablet (150 mg) by mouth at bedtime., Disp: 90 tablet, Rfl: 1    triamcinolone (NASACORT ALLERGY 24HR) 55 MCG/ACT nasal aerosol, Nasacort  Allergy 24HR 55 MCG/ACT, Disp: , Rfl:     valACYclovir (VALTREX) 500 MG tablet, Take 1 tablet (500 mg) by mouth daily., Disp: 90 tablet, Rfl: 3    vitamin E 400 UNIT capsule, [VITAMIN E 400 UNIT CAPSULE] Take 400 Units by mouth daily., Disp: , Rfl:     azithromycin (ZITHROMAX) 250 MG tablet, TAKE 2 TABLETS BY MOUTH TODAY, THEN TAKE 1 TABLET DAILY FOR 4 DAYS AS DIRECTED, Disp: , Rfl:     gemfibrozil (LOPID) 600 MG tablet, 2 times daily 1/2 hour before breakfast; 1/2 hour before dinner, Disp: , Rfl:     Current Facility-Administered Medications:     denosumab (PROLIA) injection 60 mg, 60 mg, Subcutaneous, Q6 Months, , 60 mg at 12/26/24 1002     Family History   Problem Relation Age of Onset    Chronic Obstructive Pulmonary Disease Father     Breast Cancer Sister 71.00    Cancer Sister     Atrial fibrillation Sister     Cancer Brother 19.00    Testicular cancer Brother     No Known Problems Son     No Known Problems Son         reports that she quit smoking about 36 years ago. Her smoking use included cigarettes. She has been exposed to tobacco smoke. She has never used smokeless tobacco. She reports current alcohol use. She reports that she does not use drugs.      Review of Systems:    Pertinent items are noted in HPI.  Patient has symptomatic veins and changes of bilateral legs. These have progressed to the point of causing symptoms on a daily basis. This causes issues with daily activities and chores such as washing dishes, vacuuming, outdoor upkeep, and standing for long lengths of time       Objective:     Vitals:    02/25/25 0929   BP: 112/69   Pulse: 76   SpO2: 99%     There is no height or weight on file to calculate BMI.    EXAM:  GENERAL: This is a well-developed 79 year old female who appears her stated age  HEAD: normocephalic  HEENT: Pupils equal and reactive bilaterally  MOUTH: mucus membranes intact. Normal dentation  CARDIAC: RRR without murmur  CHEST/LUNG:  Clear to auscultation bilaterally  ABDOMEN:  Soft, nontender, nondistended, no masses noted   NEUROLOGIC: Focally intact, nonfocal, alert and oriented x 3  INTEGUMENT: No open lesions or ulcers  VASCULAR: Pulses intact, symmetrical upper and lower extremities. There areskin changes consistent with chronic venous insufficiency. Varicose veins present in bilateral greater saphenous distribution. Spider veins present bilateral.                      Patient presents with mild, bilateral  lower extremity secondary lymphedema.     Patient requires a standard-fit knee high compression stocking     Side:: Bilateral  VCSS  PAIN:: Moderate: Daily moderate activity limitation, occasional pain medication  Varicose Veins:: Moderate: Multiple: great saphenous confined to calf and thigh  Venous Edema:: Mild: Evening ankle swelling only  Skin Pigmentation:: Moderate: Diffuse over most of gaiter distribution (lower third) or recent pigmentation (purple)  Inflamation:: Absent: None  Induration:: Absent: None  Number of active ulcers:: 0  Active ulcer duration:: None  Active ulcer diameter:: None  Compression Therapy:: Intermittent use of stockings  VCSS Score:: 8  CEAP:: Simple varicose veins only  Patient reported symptoms  Vein Appearance: Very noticeable  Heaviness: none of the time  Achiness: most of the time  Swelling: all of the time  Throbbing: all of the time  Itching: none of the time  Impact on work/activity: Severely reduced work/activity    Imaging:          Exam Information    Exam Date Exam Time Accession # Performing Department Results    2/25/25 11:20 AM [id number] [facility] [place]      PACS Images     Show images for US Venous Competency Bilateral     Study Result    Narrative & Impression   BILATERAL Venous Insufficiency Ultrasound (Date: 02/25/25)    BILATERAL Lower Extremity          Indication: Symptomatic spider veins/pain, lower extremity pain/swelling     Previous: None     Patient History: Swelling and  Stasis     Presenting Symptoms:  Pain and Stasis     Technique:   Supine and Reverse Trendelenburg Ultrasound of the Deep and Superficial Veins with Valsalva and Compression Augmentation Maneuvers. Duplex Imaging is performed utilizing gray-scale, Two-dimensional images, color-flow imaging, Doppler waveform analysis, and Spectral doppler imaging done with provacative maneuvers.      Incompetency Criteria:  Deep vein reflux reported when greater than 1000 msec flow reversal. Superficial vein reflux reported when greater than 500 msec flow reversal.  vein reflux reported as greater than 350 msec flow reversal.      Right  Leg Deep Veins    CFV SFJ DFV PROX FV   PROX FV MID FV DIST POP V. PERON.   V. PTV'S   Compressibility  (FC,PC,NC) FC FC FC FC FC FC FC FC FC   Reflux -   - - - - -   -         Right Leg Superficial Veins  Location SFJ PROX THIGH MID THIGH KNEE MID CALF   GSV (mm) 8 6 5 4 2   Reflux - - - - -   AASV (mm) 3           Reflux -           PASV (mm) NV           Reflux NV              Location SPJ PROX CALF MID CALF   SSV (mm) 4 3 4   Reflux - - -      Left  Leg Deep Veins    CFV SFJ DFV PROX FV   PROX FV MID FV DIST POP V. PERON.   V. PTV'S   Compressibility  (FC,PC,NC) FC FC FC FC FC FC FC FC FC   Reflux +   - + - - -   -         Left Leg Superficial Veins  Location SFJ PROX THIGH MID THIGH KNEE MID CALF   GSV (mm) 7 8 5 4 1   Reflux + - - - -   AASV (mm) 3           Reflux -           PASV (mm) 4           Reflux -              Location SPJ PROX CALF MID CALF   SSV (mm) 3 2 2   Reflux - - -      Comments: No incompetent  veins visualized.     Impression:       Right Deep Vein Findings: Patent deep venous system with no evidence of DVT and no reflux     Left Deep Vein Findings: Patent deep venous system with no evidence of DVT and reflux in the common femoral and proximal femoral vein     Superficial Vein Findings:      Right Greater Saphenous Vein: Patent Greater Saphenous Vein  without evidence of reflux.     Right Small Saphenous Vein: Patent Small Saphenous Vein without evidence of reflux.     Left Greater Saphenous Vein: Patent Greater Saphenous vein with Reflux noted at the saphenofemoral junction with a Maximum diameter of 7 mm .     Left Small Saphenous Vein: Patent Small Saphenous Vein without evidence of reflux.     Perforating and Accessory Veins: N/A     Reference:     Compressibility: FC= Fully compressible, PC= Partially compressible, NC= Non-compressible, NV= Not Visualized     Reflux: (+) Incompetent  (-) Competent, (NV) = Not Visualized     Interpretation criteria:          Duration of Retrograde flow (milliseconds)  Category Deep Veins Superficial Veins  veins   Competent < 1000ms < 500ms < 350ms   Incompetent > 1000ms > 500ms > 350ms           Bethel Diaz MD  General Surgery [phone]  Vascular Surgery [phone]

## 2025-03-07 ENCOUNTER — TRANSFERRED RECORDS (OUTPATIENT)
Dept: HEALTH INFORMATION MANAGEMENT | Facility: CLINIC | Age: 80
End: 2025-03-07
Payer: COMMERCIAL

## 2025-03-10 DIAGNOSIS — F33.41 RECURRENT MAJOR DEPRESSIVE DISORDER, IN PARTIAL REMISSION: ICD-10-CM

## 2025-03-10 NOTE — TELEPHONE ENCOUNTER
FAX [facility] Medication Refill Request    Contacts       Contact Date/Time Type Contact Phone/Fax    03/10/2025 02:38 PM CDT Fax (Incoming) [facility] [id number] [place] [facility] [place] - [address] (Pharmacy) [phone]            What medication are you calling about (include dose and sig)?:       Disp Refills Start End GREGORY    sertraline (ZOLOFT) 100 MG tablet 90 tablet 1 12/13/2024 -- No   Sig - Route: Take 2 tablets (200 mg) by mouth daily. - Oral       Preferred Pharmacy:    [facility] [id number] [place] [facility] - [place] - [address]  [address]  [place] [facility] [address]  Phone: [phone] Fax: [phone]      Controlled Substance Agreement on file:   CSA -- Patient Level:    CSA: None found at the patient level.

## 2025-03-11 RX ORDER — SERTRALINE HYDROCHLORIDE 100 MG/1
200 TABLET, FILM COATED ORAL DAILY
Qty: 90 TABLET | Refills: 1 | Status: SHIPPED | OUTPATIENT
Start: 2025-03-11

## 2025-03-17 ENCOUNTER — LAB (OUTPATIENT)
Dept: CARDIOLOGY | Facility: CLINIC | Age: 80
End: 2025-03-17
Payer: COMMERCIAL

## 2025-03-17 DIAGNOSIS — Z78.9 STATIN INTOLERANCE: ICD-10-CM

## 2025-03-17 DIAGNOSIS — I25.10 CORONARY ARTERY CALCIFICATION: ICD-10-CM

## 2025-03-17 DIAGNOSIS — E78.2 MIXED HYPERLIPIDEMIA: ICD-10-CM

## 2025-03-17 DIAGNOSIS — E78.2 MIXED HYPERLIPIDEMIA: Primary | ICD-10-CM

## 2025-03-17 LAB
CHOLEST SERPL-MCNC: 193 MG/DL
FASTING STATUS PATIENT QL REPORTED: YES
HDLC SERPL-MCNC: 56 MG/DL
LDLC SERPL CALC-MCNC: 86 MG/DL
NONHDLC SERPL-MCNC: 137 MG/DL
TRIGL SERPL-MCNC: 253 MG/DL

## 2025-03-17 PROCEDURE — 36415 COLL VENOUS BLD VENIPUNCTURE: CPT

## 2025-03-17 PROCEDURE — 80061 LIPID PANEL: CPT

## 2025-03-19 ENCOUNTER — MYC REFILL (OUTPATIENT)
Dept: INTERNAL MEDICINE | Facility: CLINIC | Age: 80
End: 2025-03-19
Payer: COMMERCIAL

## 2025-03-19 DIAGNOSIS — K21.9 GASTROESOPHAGEAL REFLUX DISEASE WITHOUT ESOPHAGITIS: Primary | ICD-10-CM

## 2025-03-19 RX ORDER — OMEPRAZOLE 20 MG/1
20 CAPSULE, DELAYED RELEASE ORAL DAILY
Qty: 90 CAPSULE | Refills: 1 | Status: SHIPPED | OUTPATIENT
Start: 2025-03-19

## 2025-03-22 ENCOUNTER — HEALTH MAINTENANCE LETTER (OUTPATIENT)
Age: 80
End: 2025-03-22

## 2025-03-26 SDOH — HEALTH STABILITY: PHYSICAL HEALTH: ON AVERAGE, HOW MANY DAYS PER WEEK DO YOU ENGAGE IN MODERATE TO STRENUOUS EXERCISE (LIKE A BRISK WALK)?: 3 DAYS

## 2025-03-26 SDOH — HEALTH STABILITY: PHYSICAL HEALTH: ON AVERAGE, HOW MANY MINUTES DO YOU ENGAGE IN EXERCISE AT THIS LEVEL?: 10 MIN

## 2025-03-26 ASSESSMENT — SOCIAL DETERMINANTS OF HEALTH (SDOH): HOW OFTEN DO YOU GET TOGETHER WITH FRIENDS OR RELATIVES?: THREE TIMES A WEEK

## 2025-03-31 ENCOUNTER — OFFICE VISIT (OUTPATIENT)
Dept: INTERNAL MEDICINE | Facility: CLINIC | Age: 80
End: 2025-03-31
Payer: COMMERCIAL

## 2025-03-31 ENCOUNTER — HOSPITAL ENCOUNTER (OUTPATIENT)
Dept: GENERAL RADIOLOGY | Facility: HOSPITAL | Age: 80
Discharge: HOME | End: 2025-03-31
Attending: INTERNAL MEDICINE | Admitting: INTERNAL MEDICINE
Payer: COMMERCIAL

## 2025-03-31 VITALS
DIASTOLIC BLOOD PRESSURE: 64 MMHG | TEMPERATURE: 98.1 F | WEIGHT: 138 LBS | OXYGEN SATURATION: 99 % | HEART RATE: 72 BPM | HEIGHT: 64 IN | SYSTOLIC BLOOD PRESSURE: 124 MMHG | RESPIRATION RATE: 16 BRPM | BODY MASS INDEX: 23.56 KG/M2

## 2025-03-31 DIAGNOSIS — K21.9 GASTROESOPHAGEAL REFLUX DISEASE WITHOUT ESOPHAGITIS: ICD-10-CM

## 2025-03-31 DIAGNOSIS — N64.4 BREAST PAIN, RIGHT: ICD-10-CM

## 2025-03-31 DIAGNOSIS — E78.2 MIXED HYPERLIPIDEMIA: ICD-10-CM

## 2025-03-31 DIAGNOSIS — M81.0 AGE-RELATED OSTEOPOROSIS WITHOUT CURRENT PATHOLOGICAL FRACTURE: ICD-10-CM

## 2025-03-31 DIAGNOSIS — A60.00 GENITAL HERPES SIMPLEX, UNSPECIFIED SITE: ICD-10-CM

## 2025-03-31 DIAGNOSIS — R61 NIGHT SWEATS: ICD-10-CM

## 2025-03-31 DIAGNOSIS — M20.41 HAMMERTOE OF RIGHT FOOT: ICD-10-CM

## 2025-03-31 DIAGNOSIS — M15.0 PRIMARY OSTEOARTHRITIS INVOLVING MULTIPLE JOINTS: ICD-10-CM

## 2025-03-31 DIAGNOSIS — Z85.3 HISTORY OF BREAST CANCER: ICD-10-CM

## 2025-03-31 DIAGNOSIS — K52.832 LYMPHOCYTIC COLITIS: ICD-10-CM

## 2025-03-31 DIAGNOSIS — N95.2 ATROPHIC VAGINITIS: ICD-10-CM

## 2025-03-31 DIAGNOSIS — F51.04 PSYCHOPHYSIOLOGICAL INSOMNIA: ICD-10-CM

## 2025-03-31 DIAGNOSIS — D64.9 NORMOCYTIC ANEMIA: ICD-10-CM

## 2025-03-31 DIAGNOSIS — F33.41 RECURRENT MAJOR DEPRESSIVE DISORDER, IN PARTIAL REMISSION: ICD-10-CM

## 2025-03-31 DIAGNOSIS — R93.1 ELEVATED CORONARY ARTERY CALCIUM SCORE: ICD-10-CM

## 2025-03-31 DIAGNOSIS — Z00.00 ENCOUNTER FOR MEDICARE ANNUAL WELLNESS EXAM: Primary | ICD-10-CM

## 2025-03-31 PROBLEM — J85.1: Status: RESOLVED | Noted: 2021-11-10 | Resolved: 2025-03-31

## 2025-03-31 PROBLEM — J18.9 PNEUMONIA DUE TO INFECTIOUS ORGANISM, UNSPECIFIED LATERALITY, UNSPECIFIED PART OF LUNG: Status: RESOLVED | Noted: 2021-11-10 | Resolved: 2025-03-31

## 2025-03-31 PROBLEM — J96.01 ACUTE RESPIRATORY FAILURE WITH HYPOXIA (H): Status: RESOLVED | Noted: 2021-11-24 | Resolved: 2025-03-31

## 2025-03-31 LAB
ALBUMIN SERPL BCG-MCNC: 4.2 G/DL (ref 3.5–5.2)
ALP SERPL-CCNC: 37 U/L (ref 40–150)
ALT SERPL W P-5'-P-CCNC: 17 U/L (ref 0–50)
ANION GAP SERPL CALCULATED.3IONS-SCNC: 8 MMOL/L (ref 7–15)
AST SERPL W P-5'-P-CCNC: 22 U/L (ref 0–45)
BASOPHILS # BLD AUTO: 0 10E3/UL (ref 0–0.2)
BASOPHILS NFR BLD AUTO: 0 %
BILIRUB SERPL-MCNC: 0.3 MG/DL
BUN SERPL-MCNC: 20.6 MG/DL (ref 8–23)
CALCIUM SERPL-MCNC: 9.1 MG/DL (ref 8.8–10.4)
CHLORIDE SERPL-SCNC: 105 MMOL/L (ref 98–107)
CREAT SERPL-MCNC: 0.7 MG/DL (ref 0.51–0.95)
CRP SERPL-MCNC: <3 MG/L
EGFRCR SERPLBLD CKD-EPI 2021: 86 ML/MIN/1.73M2
EOSINOPHIL # BLD AUTO: 0.1 10E3/UL (ref 0–0.7)
EOSINOPHIL NFR BLD AUTO: 1 %
ERYTHROCYTE [DISTWIDTH] IN BLOOD BY AUTOMATED COUNT: 14 % (ref 10–15)
GLUCOSE SERPL-MCNC: 97 MG/DL (ref 70–99)
HCO3 SERPL-SCNC: 25 MMOL/L (ref 22–29)
HCT VFR BLD AUTO: 34.4 % (ref 35–47)
HGB BLD-MCNC: 10.3 G/DL (ref 11.7–15.7)
HIV 1+2 AB+HIV1 P24 AG SERPL QL IA: NONREACTIVE
IMM GRANULOCYTES # BLD: 0 10E3/UL
IMM GRANULOCYTES NFR BLD: 0 %
LYMPHOCYTES # BLD AUTO: 0.9 10E3/UL (ref 0.8–5.3)
LYMPHOCYTES NFR BLD AUTO: 13 %
MCH RBC QN AUTO: 25.9 PG (ref 26.5–33)
MCHC RBC AUTO-ENTMCNC: 29.9 G/DL (ref 31.5–36.5)
MCV RBC AUTO: 87 FL (ref 78–100)
MONOCYTES # BLD AUTO: 0.5 10E3/UL (ref 0–1.3)
MONOCYTES NFR BLD AUTO: 7 %
NEUTROPHILS # BLD AUTO: 5.9 10E3/UL (ref 1.6–8.3)
NEUTROPHILS NFR BLD AUTO: 80 %
PLATELET # BLD AUTO: 202 10E3/UL (ref 150–450)
POTASSIUM SERPL-SCNC: 4.3 MMOL/L (ref 3.4–5.3)
PROT SERPL-MCNC: 6.7 G/DL (ref 6.4–8.3)
RBC # BLD AUTO: 3.97 10E6/UL (ref 3.8–5.2)
SODIUM SERPL-SCNC: 138 MMOL/L (ref 135–145)
TSH SERPL DL<=0.005 MIU/L-ACNC: 3.07 UIU/ML (ref 0.3–4.2)
VIT D+METAB SERPL-MCNC: 41 NG/ML (ref 20–50)
WBC # BLD AUTO: 7.4 10E3/UL (ref 4–11)

## 2025-03-31 PROCEDURE — 36415 COLL VENOUS BLD VENIPUNCTURE: CPT | Performed by: INTERNAL MEDICINE

## 2025-03-31 PROCEDURE — 71046 X-RAY EXAM CHEST 2 VIEWS: CPT

## 2025-03-31 PROCEDURE — G2211 COMPLEX E/M VISIT ADD ON: HCPCS | Performed by: INTERNAL MEDICINE

## 2025-03-31 PROCEDURE — 85045 AUTOMATED RETICULOCYTE COUNT: CPT | Performed by: INTERNAL MEDICINE

## 2025-03-31 PROCEDURE — 84443 ASSAY THYROID STIM HORMONE: CPT | Performed by: INTERNAL MEDICINE

## 2025-03-31 PROCEDURE — 3074F SYST BP LT 130 MM HG: CPT | Performed by: INTERNAL MEDICINE

## 2025-03-31 PROCEDURE — 83550 IRON BINDING TEST: CPT | Performed by: INTERNAL MEDICINE

## 2025-03-31 PROCEDURE — 82607 VITAMIN B-12: CPT | Performed by: INTERNAL MEDICINE

## 2025-03-31 PROCEDURE — 86140 C-REACTIVE PROTEIN: CPT | Performed by: INTERNAL MEDICINE

## 2025-03-31 PROCEDURE — G0439 PPPS, SUBSEQ VISIT: HCPCS | Performed by: INTERNAL MEDICINE

## 2025-03-31 PROCEDURE — 3078F DIAST BP <80 MM HG: CPT | Performed by: INTERNAL MEDICINE

## 2025-03-31 PROCEDURE — 80053 COMPREHEN METABOLIC PANEL: CPT | Performed by: INTERNAL MEDICINE

## 2025-03-31 PROCEDURE — 82728 ASSAY OF FERRITIN: CPT | Performed by: INTERNAL MEDICINE

## 2025-03-31 PROCEDURE — 82306 VITAMIN D 25 HYDROXY: CPT | Performed by: INTERNAL MEDICINE

## 2025-03-31 PROCEDURE — 87040 BLOOD CULTURE FOR BACTERIA: CPT | Performed by: INTERNAL MEDICINE

## 2025-03-31 PROCEDURE — 87389 HIV-1 AG W/HIV-1&-2 AB AG IA: CPT | Performed by: INTERNAL MEDICINE

## 2025-03-31 PROCEDURE — 1125F AMNT PAIN NOTED PAIN PRSNT: CPT | Performed by: INTERNAL MEDICINE

## 2025-03-31 PROCEDURE — 85025 COMPLETE CBC W/AUTO DIFF WBC: CPT | Performed by: INTERNAL MEDICINE

## 2025-03-31 PROCEDURE — 99214 OFFICE O/P EST MOD 30 MIN: CPT | Mod: 25 | Performed by: INTERNAL MEDICINE

## 2025-03-31 PROCEDURE — 83540 ASSAY OF IRON: CPT | Performed by: INTERNAL MEDICINE

## 2025-03-31 ASSESSMENT — PATIENT HEALTH QUESTIONNAIRE - PHQ9
SUM OF ALL RESPONSES TO PHQ QUESTIONS 1-9: 6
10. IF YOU CHECKED OFF ANY PROBLEMS, HOW DIFFICULT HAVE THESE PROBLEMS MADE IT FOR YOU TO DO YOUR WORK, TAKE CARE OF THINGS AT HOME, OR GET ALONG WITH OTHER PEOPLE: SOMEWHAT DIFFICULT
SUM OF ALL RESPONSES TO PHQ QUESTIONS 1-9: 6

## 2025-03-31 ASSESSMENT — PAIN SCALES - GENERAL: PAINLEVEL_OUTOF10: MODERATE PAIN (5)

## 2025-03-31 NOTE — ASSESSMENT & PLAN NOTE
Diffuse OA and follows with multiple providers at [facility] for knees, back, R shoulder, wrists and hands.

## 2025-03-31 NOTE — ASSESSMENT & PLAN NOTE
Dx in setting of life stressors in 1996. Current regimen is zoloft 200 mg daily, this is only medication she has been on. Mood is okay, understandably lower with grief after  passed in July.   - For now, continue sertraline 200 mg daily

## 2025-03-31 NOTE — PATIENT INSTRUCTIONS
You will get a call from podiatry.     I will send a message with lab and imaging results.     Patient Education   Preventive Care Advice   This is general advice given by our system to help you stay healthy. However, your care team may have specific advice just for you. Please talk to your care team about your preventive care needs.  Nutrition  Eat 5 or more servings of fruits and vegetables each day.  Try wheat bread, brown rice and whole grain pasta (instead of white bread, rice, and pasta).  Get enough calcium and vitamin D. Check the label on foods and aim for 100% of the RDA (recommended daily allowance).  Lifestyle  Exercise at least 150 minutes each week  (30 minutes a day, 5 days a week).  Do muscle strengthening activities 2 days a week. These help control your weight and prevent disease.  No smoking.  Wear sunscreen to prevent skin cancer.  Have a dental exam and cleaning every 6 months.  Yearly exams  See your health care team every year to talk about:  Any changes in your health.  Any medicines your care team has prescribed.  Preventive care, family planning, and ways to prevent chronic diseases.  Shots (vaccines)   HPV shots (up to age 26), if you've never had them before.  Hepatitis B shots (up to age 59), if you've never had them before.  COVID-19 shot: Get this shot when it's due.  Flu shot: Get a flu shot every year.  Tetanus shot: Get a tetanus shot every 10 years.  Pneumococcal, hepatitis A, and RSV shots: Ask your care team if you need these based on your risk.  Shingles shot (for age 50 and up)  General health tests  Diabetes screening:  Starting at age 35, Get screened for diabetes at least every 3 years.  If you are younger than age 35, ask your care team if you should be screened for diabetes.  Cholesterol test: At age 39, start having a cholesterol test every 5 years, or more often if advised.  Bone density scan (DEXA): At age 50, ask your care team if you should have this scan for  osteoporosis (brittle bones).  Hepatitis C: Get tested at least once in your life.  STIs (sexually transmitted infections)  Before age 24: Ask your care team if you should be screened for STIs.  After age 24: Get screened for STIs if you're at risk. You are at risk for STIs (including HIV) if:  You are sexually active with more than one person.  You don't use condoms every time.  You or a partner was diagnosed with a sexually transmitted infection.  If you are at risk for HIV, ask about PrEP medicine to prevent HIV.  Get tested for HIV at least once in your life, whether you are at risk for HIV or not.  Cancer screening tests  Cervical cancer screening: If you have a cervix, begin getting regular cervical cancer screening tests starting at age 21.  Breast cancer scan (mammogram): If you've ever had breasts, begin having regular mammograms starting at age 40. This is a scan to check for breast cancer.  Colon cancer screening: It is important to start screening for colon cancer at age 45.  Have a colonoscopy test every 10 years (or more often if you're at risk) Or, ask your provider about stool tests like a FIT test every year or Cologuard test every 3 years.  To learn more about your testing options, visit:   .  For help making a decision, visit:   https://bit.ly/jw17015.  Prostate cancer screening test: If you have a prostate, ask your care team if a prostate cancer screening test (PSA) at age 55 is right for you.  Lung cancer screening: If you are a current or former smoker ages 50 to 80, ask your care team if ongoing lung cancer screenings are right for you.  For informational purposes only. Not to replace the advice of your health care provider. Copyright   2023 [employer]. All rights reserved. Clinically reviewed by the [facility]. gokit [id number] - REV 01/24.  Learning About Activities of Daily Living  What are activities of daily living?     Activities of daily living  (ADLs) are the basic self-care tasks you do every day. These include eating, bathing, dressing, and moving around.  As you age, and if you have health problems, you may find that it's harder to do some of these tasks. If so, your doctor can suggest ideas that may help.  To measure what kind of help you may need, your doctor will ask how well you are able to do ADLs. Let your doctor know if there are any tasks that you are having trouble doing. This is an important first step to getting help. And when you have the help you need, you can stay as independent as possible.  How will a doctor assess your ADLs?  Asking about ADLs is part of a routine health checkup your doctor will likely do as you age. Your health check might be done in a doctor's office, in your home, or at a hospital. The goal is to find out if you are having any problems that could make it hard to care for yourself or that make it unsafe for you to be on your own.  To measure your ADLs, your doctor will ask how hard it is for you to do routine tasks. Your doctor may also want to know if you have changed the way you do a task because of a health problem. Your doctor may watch how you:  Walk back and forth.  Keep your balance while you stand or walk.  Move from sitting to standing or from a bed to a chair.  Button or unbutton a shirt or sweater.  Remove and put on your shoes.  It's common to feel a little worried or anxious if you find you can't do all the things you used to be able to do. Talking with your doctor about ADLs is a way to make sure you're as safe as possible and able to care for yourself as well as you can. You may want to bring a caregiver, friend, or family member to your checkup. They can help you talk to your doctor.  Follow-up care is a key part of your treatment and safety. Be sure to make and go to all appointments, and call your doctor if you are having problems. It's also a good idea to know your test results and keep a list of  the medicines you take.  Current as of: October 24, 2024  Content Version: 14.4    5239-1532 [employer].   Care instructions adapted under license by your healthcare professional. If you have questions about a medical condition or this instruction, always ask your healthcare professional. [employer] disclaims any warranty or liability for your use of this information.    Hearing Loss: Care Instructions  Overview     Hearing loss is a sudden or slow decrease in how well you hear. It can range from slight to profound. Permanent hearing loss can occur with aging. It also can happen when you are exposed long-term to loud noise. Examples include listening to loud music, riding motorcycles, or being around other loud machines.  Hearing loss can affect your work and home life. It can make you feel lonely or depressed. You may feel that you have lost your independence. But hearing aids and other devices can help you hear better and feel connected to others.  Follow-up care is a key part of your treatment and safety. Be sure to make and go to all appointments, and call your doctor if you are having problems. It's also a good idea to know your test results and keep a list of the medicines you take.  How can you care for yourself at home?  Avoid loud noises whenever possible. This helps keep your hearing from getting worse.  Always wear hearing protection around loud noises.  Wear a hearing aid as directed.  A professional can help you pick a hearing aid that will work best for you.  You can also get hearing aids over the counter for mild to moderate hearing loss.  Have hearing tests as your doctor suggests. They can show whether your hearing has changed. Your hearing aid may need to be adjusted.  Use other devices as needed. These may include:  Telephone amplifiers and hearing aids that can connect to a television, stereo, radio, or microphone.  Devices that use lights or vibrations. These alert you to  "the doorbell, a ringing telephone, or a baby monitor.  Television closed-captioning. This shows the words at the bottom of the screen. Most new TVs can do this.  TTY (text telephone). This lets you type messages back and forth on the telephone instead of talking or listening. These devices are also called TDD. When messages are typed on the keyboard, they are sent over the phone line to a receiving TTY. The message is shown on a monitor.  Use text messaging, social media, and email if it is hard for you to communicate by telephone.  Try to learn a listening technique called speechreading. It is not lipreading. You pay attention to people's gestures, expressions, posture, and tone of voice. These clues can help you understand what a person is saying. Face the person you are talking to, and have them face you. Make sure the lighting is good. You need to see the other person's face clearly.  Think about counseling if you need help to adjust to your hearing loss.  When should you call for help?  Watch closely for changes in your health, and be sure to contact your doctor if:    You think your hearing is getting worse.     You have new symptoms, such as dizziness or nausea.   Where can you learn more?  Go to https://www.[employer]ve.net/patiented  Enter R798 in the search box to learn more about \"Hearing Loss: Care Instructions.\"  Current as of: October 27, 2024  Content Version: 14.4    1262-2489 [employer].   Care instructions adapted under license by your healthcare professional. If you have questions about a medical condition or this instruction, always ask your healthcare professional. [employer] disclaims any warranty or liability for your use of this information.    Learning About Sleeping Well  What does sleeping well mean?     Sleeping well means getting enough sleep to feel good and stay healthy. How much sleep is enough varies among people.  The number of hours you sleep and how you feel " when you wake up are both important. If you do not feel refreshed, you probably need more sleep. Another sign of not getting enough sleep is feeling tired during the day.  Experts recommend that adults get at least 7 or more hours of sleep per day. Children and older adults need more sleep.  Why is getting enough sleep important?  Getting enough quality sleep is a basic part of good health. When your sleep suffers, your physical health, mood, and your thoughts can suffer too. You may find yourself feeling more grumpy or stressed. Not getting enough sleep also can lead to serious problems, including injury, accidents, anxiety, and depression.  What might cause poor sleeping?  Many things can cause sleep problems, including:  Changes to your sleep schedule.  Stress. Stress can be caused by fear about a single event, such as giving a speech. Or you may have ongoing stress, such as worry about work or school.  Depression, anxiety, and other mental or emotional conditions.  Changes in your sleep habits or surroundings. This includes changes that happen where you sleep, such as noise, light, or sleeping in a different bed. It also includes changes in your sleep pattern, such as having jet lag or working a late shift.  Health problems, such as pain, breathing problems, and restless legs syndrome.  Lack of regular exercise.  Using alcohol, nicotine, or caffeine before bed.  How can you help yourself?  Here are some tips that may help you sleep more soundly and wake up feeling more refreshed.  Your sleeping area   Use your bedroom only for sleeping and sex. A bit of light reading may help you fall asleep. But if it doesn't, do your reading elsewhere in the house. Try not to use your TV, computer, smartphone, or tablet while you are in bed.  Be sure your bed is big enough to stretch out comfortably, especially if you have a sleep partner.  Keep your bedroom quiet, dark, and cool. Use curtains, blinds, or a sleep mask to block  "out light. To block out noise, use earplugs, soothing music, or a \"white noise\" machine.  Your evening and bedtime routine   Create a relaxing bedtime routine. You might want to take a warm shower or bath, or listen to soothing music.  Go to bed at the same time every night. And get up at the same time every morning, even if you feel tired.  What to avoid   Limit caffeine (coffee, tea, caffeinated sodas) during the day, and don't have any for at least 6 hours before bedtime.  Avoid drinking alcohol before bedtime. Alcohol can cause you to wake up more often during the night.  Try not to smoke or use tobacco, especially in the evening. Nicotine can keep you awake.  Limit naps during the day, especially close to bedtime.  Avoid lying in bed awake for too long. If you can't fall asleep or if you wake up in the middle of the night and can't get back to sleep within about 20 minutes, get out of bed and go to another room until you feel sleepy.  Avoid taking medicine right before bed that may keep you awake or make you feel hyper or energized. Your doctor can tell you if your medicine may do this and if you can take it earlier in the day.  If you can't sleep   Imagine yourself in a peaceful, pleasant scene. Focus on the details and feelings of being in a place that is relaxing.  Get up and do a quiet or boring activity until you feel sleepy.  Avoid drinking any liquids before going to bed to help prevent waking up often to use the bathroom.  Where can you learn more?  Go to https://www.[employer]ETiiN.net/patiented  Enter J942 in the search box to learn more about \"Learning About Sleeping Well.\"  Current as of: July 31, 2024  Content Version: 14.4 2024-2025 [employer].   Care instructions adapted under license by your healthcare professional. If you have questions about a medical condition or this instruction, always ask your healthcare professional. [employer] disclaims any warranty or liability for " your use of this information.    Learning About Depression Screening  What is depression screening?  Depression screening is a way to see if you have depression symptoms. It may be done by a doctor or counselor. It's often part of a routine checkup. That's because your mental health is just as important as your physical health.  Depression is a mental health condition that affects how you feel, think, and act. You may:  Have less energy.  Lose interest in your daily activities.  Feel sad and grouchy for a long time.  Depression is very common. It affects people of all ages.  Many things can lead to depression. Some people become depressed after they have a stroke or find out they have a major illness like cancer or heart disease. The death of a loved one or a breakup may lead to depression. It can run in families. Most experts believe that a combination of inherited genes and stressful life events can cause it.  What happens during screening?  You may be asked to fill out a form about your depression symptoms. You and the doctor will discuss your answers. The doctor may ask you more questions to learn more about how you think, act, and feel.  What happens after screening?  If you have symptoms of depression, your doctor will talk to you about your options.  Doctors usually treat depression with medicines or counseling. Often, combining the two works best. Many people don't get help because they think that they'll get over the depression on their own. But people with depression may not get better unless they get treatment.  The cause of depression is not well understood. There may be many factors involved. But if you have depression, it's not your fault.  A serious symptom of depression is thinking about death or suicide. If you or someone you care about talks about this or about feeling hopeless, get help right away.  It's important to know that depression can be treated. Medicine, counseling, and self-care may  "help.  Where can you learn more?  Go to https://www.[employer].net/patiented  Enter T185 in the search box to learn more about \"Learning About Depression Screening.\"  Current as of: July 31, 2024  Content Version: 14.4    9779-0028 [employer].   Care instructions adapted under license by your healthcare professional. If you have questions about a medical condition or this instruction, always ask your healthcare professional. [employer] disclaims any warranty or liability for your use of this information.       "

## 2025-03-31 NOTE — ASSESSMENT & PLAN NOTE
We discussed healthy lifestyle, nutrition, cardiovascular risk reduction, self care, safety, sunscreen, and timing of cancer screening.  Health maintenance screening and immunizations reviewed with the patient.    - We will update labs today  - Mammogram done 2/2025, as noted elsewhere will be getting diagnostic of R breast now  - Colonoscopy 12/2024, no repeat needed   - Up to date on vaccines aside from COVID, she will get after diagnostic mammo

## 2025-03-31 NOTE — ASSESSMENT & PLAN NOTE
CAC 81 (10/2024) from 42 (2017). Met with cardiology and started on inclisiran.   - Repeat lipids due in May  - Cont inclisiran

## 2025-03-31 NOTE — ASSESSMENT & PLAN NOTE
Current regimen omeprazole 20 mg daily and famotidine 20 mg PRN.   - Consider trying to taper in the future, for now continue    No

## 2025-03-31 NOTE — ASSESSMENT & PLAN NOTE
New in last 6 weeks and there is a palpable thickening of inferior R breast, possibly scar tissue but unclear.   - Diagnostic R breast US and mammogram ordered

## 2025-03-31 NOTE — PROGRESS NOTES
Preventive Care Visit  [facility] Keysha Lindsey MD, Internal Medicine  Mar 31, 2025      Assessment & Plan   Problem List Items Addressed This Visit          Nervous and Auditory    Breast pain, right     New in last 6 weeks and there is a palpable thickening of inferior R breast, possibly scar tissue but unclear.   - Diagnostic R breast US and mammogram ordered          Relevant Orders    US Breast Right Limited 1-3 Quadrants    MA Diagnostic Right w/ Albino       Digestive    Lymphocytic colitis     Dx 2 years ago. Follows with [facility]. Current regimen on budesonide EC 3 mg every other day. This is working well. Tried stopping but had increase in loose stools.   - Continue budesonide 3 mg every other day  - F/up with [facility] in April as scheduled          Gastroesophageal reflux disease without esophagitis     Current regimen omeprazole 20 mg daily and famotidine 20 mg PRN.   - Consider trying to taper in the future, for now continue             Endocrine    Mixed hyperlipidemia     As per CAC.            Circulatory    Elevated coronary artery calcium score     CAC 81 (10/2024) from 42 (2017). Met with cardiology and started on inclisiran.   - Repeat lipids due in May  - Cont inclisiran             Musculoskeletal and Integumentary    Age-related osteoporosis without current pathological fracture     Dx on last DEXA 11/2023 with decrease in BMD, lowest T score of -2.6 in L hip. Taking calcium and vitamin D. Started prolia 12/2023.   - Continue prolia  - Plan repeat DEXA 12/2025  - Repeat vitamin D today as it was quite high in September   - Continue great work on regular walking/weight bearing exercise          Relevant Orders    Vitamin D Deficiency    Primary osteoarthritis involving multiple joints     Diffuse OA and follows with multiple providers at [place] for knees, back, R shoulder, wrists and hands.          Hammertoe of right foot     Causing more pain recently. Podiatry  referral placed.          Relevant Orders    Orthopedic  Referral    Night sweats     Soaking night sweats every day for past month. No weight loss but has been fatigued. No other localizing symptoms. Will start with basic work-up and f/up in 3 months.   - CXR and diagnostic mammo (R breast pain)  - CBC with diff, CMP, HIV, CRP, TSH, UA , blood culture         Relevant Orders    TSH with free T4 reflex    Comprehensive metabolic panel    CBC with platelets and differential    CRP, inflammation    UA Macroscopic with reflex to Microscopic and Culture - Lab Collect    XR Chest 2 Views    Blood Culture Peripheral Blood    HIV Antigen Antibody Combo       Urinary    Genital herpes simplex, unspecified site     Chronic. No flare with decreasing suppressive dose of valtrex to 500 mg daily. Will continue.          Atrophic vaginitis     Well controlled with estrace cream            Behavioral    Recurrent major depressive disorder, in partial remission     Dx in setting of life stressors in 1996. Current regimen is zoloft 200 mg daily, this is only medication she has been on. Mood is okay, understandably lower with grief after  passed in July.   - For now, continue sertraline 200 mg daily         Psychophysiological insomnia     Improved with trazodone 150 mg at bedtime.             Other    History of breast cancer     S/p lumpectomy and radiation in 2015 on R.          Encounter for Medicare annual wellness exam - Primary     We discussed healthy lifestyle, nutrition, cardiovascular risk reduction, self care, safety, sunscreen, and timing of cancer screening.  Health maintenance screening and immunizations reviewed with the patient.    - We will update labs today  - Mammogram done 2/2025, as noted elsewhere will be getting diagnostic of R breast now  - Colonoscopy 12/2024, no repeat needed   - Up to date on vaccines aside from COVID, she will get after diagnostic mammo         Relevant Orders    TSH with  free T4 reflex    Comprehensive metabolic panel    CBC with platelets and differential        Patient has been advised of split billing requirements and indicates understanding: Yes       Counseling  Appropriate preventive services were addressed with this patient via screening, questionnaire, or discussion as appropriate for fall prevention, nutrition, physical activity, Tobacco-use cessation, social engagement, weight loss and cognition.  Checklist reviewing preventive services available has been given to the patient.  Reviewed patient's diet, addressing concerns and/or questions.   She is at risk for lack of exercise and has been provided with information to increase physical activity for the benefit of her well-being.   Discussed possible causes of fatigue. Updated plan of care.  Patient reported difficulty with activities of daily living were addressed today.The patient was provided with written information regarding signs of hearing loss.   The patient's PHQ-9 score is consistent with mild depression. She was provided with information regarding depression.     The longitudinal plan of care for the diagnosis(es)/condition(s) as documented were addressed during this visit. Due to the added complexity in care, I will continue to support Felicity in the subsequent management and with ongoing continuity of care.    FUTURE APPOINTMENTS:       - Follow-up visit in 3 months       Subjective   Felicity is a 79 year old, presenting for the following:  Wellness Visit (Patient has a list of things she would like to discuss with the provider. Top two: Right Breast (Breast cancer) and feeling tired all the time. )        3/31/2025    10:44 AM   Additional Questions   Roomed by LILLIANA Roe    Felicity is here for AWV today, we established care 9/25/24. Few questions to review along with chronic conditions.     Fatigue / Night sweats in the last month, every night. Pajama top is wet, not as much sheets and pillow. Tired  all the time (years, not necessarily worse). Sleeps fine but feels tired. Naps don't make her feel more energized.   Wt Readings from Last 4 Encounters:   25 62.6 kg (138 lb)   25 64 kg (141 lb)   25 62 kg (136 lb 11.2 oz)   10/31/24 62.6 kg (138 lb)     R breast: Mammo BIRADS 2 25. Noticed maybe a small lump underneath R breast 6 weeks ago. No change since then. Hurts if she presses on it.     R rib pain worse recently (in last 6 months). Started after admission for COVID requiring chest tubes . Sometimes sitting certain ways makes it worse. Tylenol she takes for joints doesn't seem to change this. No rash. Wonders if something is wrong. No cough.     Hammertoe on R foot bothers sometimes.     Chronic cough: pulm 25, cont dulera 200/5 1-2 times dailyPRN, albuterol PRN    HLD / CAC: Intolerant of statins. CAC 81 (10/2024). . Saw cardiology 10/31/24, stop gemfibrozil, PA for inclisiran. Negative stress 24.   - Repeat lipids 3/17/24 LDL down to 86 (although only after first dose)    GERD: omeprazole 20 mg daily and famotidine 20 mg PRN    Back: [place] 25, medrol dose pack and diclofenac. Had nerve root injection last week.     Varicose veins: had consult with Dr. Diaz 25. Nothing to do surgically, did get compression stockings. Still having some pain in legs at night.     B/l Knee OA: going to [facility] ([place] 3/7/25). Next injection scheduled tomorrow.     Colitis: budesonide 3 mg every other day is working well. Tried stopping, didn't go well. F/up in April is scheduled.     Hx breast cancer: S/p lumpectomy and radiation in      Osteoporosis: Prolia 2023.   - vitamin D 95 (2024), did stop vitamin D after this and switched to only calcium no D    MDD: sertraline 200 mg since .   in July. No other medications.   - Still doesn't feel like doing much most days, does get out and does enjoy things once she is there      Insomnia: trazodone  150 mg nightly, working well     Atrophic vaginitis: estrace started a year ago, follows with Dr. Rosario      Genital HSV: valtrex 500g daily for suppression (no outbreak with decreased dose)     HCM: Colonoscopy 12/16/24, some polyps, don't need repeat    Advance Care Planning  Patient does not have a Health Care Directive: Discussed advance care planning with patient; information given to patient to review.      3/26/2025   General Health   How would you rate your overall physical health? Good   Feel stress (tense, anxious, or unable to sleep) Only a little   (!) STRESS CONCERN      3/26/2025   Nutrition   Diet: Regular (no restrictions)         3/26/2025   Exercise   Days per week of moderate/strenous exercise 3 days   Average minutes spent exercising at this level 10 min         3/26/2025   Social Factors   Frequency of gathering with friends or relatives Three times a week   Worry food won't last until get money to buy more No   Food not last or not have enough money for food? No   Do you have housing? (Housing is defined as stable permanent housing and does not include staying ouside in a car, in a tent, in an abandoned building, in an overnight shelter, or couch-surfing.) Yes   Are you worried about losing your housing? No   Lack of transportation? No   Unable to get utilities (heat,electricity)? No         3/26/2025   Fall Risk   Fallen 2 or more times in the past year? No   Trouble with walking or balance? No          3/26/2025   Activities of Daily Living- Home Safety   Needs help with the following daily activites Housework    None of the above   Safety concerns in the home None of the above       Multiple values from one day are sorted in reverse-chronological order         3/26/2025   Dental   Dentist two times every year? Yes         3/26/2025   Hearing Screening   Hearing concerns? (!) IT'S HARD TO FOLLOW A CONVERSATION IN A NOISY RESTAURANT OR CROWDED ROOM.         3/26/2025   Driving Risk Screening    Patient/family members have concerns about driving No         3/26/2025   General Alertness/Fatigue Screening   Have you been more tired than usual lately? (!) YES         3/26/2025   Urinary Incontinence Screening   Bothered by leaking urine in past 6 months No         Today's PHQ-9 Score:       3/31/2025    10:38 AM   PHQ-9 SCORE   PHQ-9 Total Score MyChart 6 (Mild depression)   PHQ-9 Total Score 6        Patient-reported         3/26/2025   Substance Use   Alcohol more than 3/day or more than 7/wk No   Do you have a current opioid prescription? No   How severe/bad is pain from 1 to 10? 4/10   Do you use any other substances recreationally? No     Social History     Tobacco Use    Smoking status: Former     Current packs/day: 0.00     Types: Cigarettes     Quit date: 1961     Years since quittin.6     Passive exposure: Past (Dad was a smoker but quit when patient was 10-11yo)    Smokeless tobacco: Never   Vaping Use    Vaping status: Never Used   Substance Use Topics    Alcohol use: Not Currently     Comment: Alcoholic Drinks/day: social    Drug use: No           2025   LAST FHS-7 RESULTS   1st degree relative breast or ovarian cancer Yes   Any relative bilateral breast cancer No   Any male have breast cancer No   Any ONE woman have BOTH breast AND ovarian cancer No   Any woman with breast cancer before 50yrs No   2 or more relatives with breast AND/OR ovarian cancer No   2 or more relatives with breast AND/OR bowel cancer No        Mammogram Screening - After age 74- determine frequency with patient based on health status, life expectancy and patient goals            Reviewed and updated as needed this visit by Provider   Tobacco  Allergies  Meds  Problems  Med Hx  Surg Hx  Fam Hx     Sexual Activity          Past Medical History:   Diagnosis Date    Abscess of middle lobe of right lung with pneumonia (H) 11/10/2021    Acute respiratory failure with hypoxia (H) 2021    Breast cancer  (H) 01/01/2010    hx of right lumpectomy for breast     Depression     GERD (gastroesophageal reflux disease)     Hx of radiation therapy right 2010    hx of right lumpectomy for breast cancer    Infection due to 2019 novel coronavirus 11/10/2021    Osteoarthritis     Pneumonia due to infectious organism, unspecified laterality, unspecified part of lung 11/10/2021     Past Surgical History:   Procedure Laterality Date    APPENDECTOMY      ARTHROSCOPY SHOULDER ROTATOR CUFF REPAIR      BIOPSY BREAST Right 2010    hx of right lumpectomy    COLONOSCOPY      HYSTERECTOMY  1982    IR CHEST TUBE PLACEMENT NON-TUNNELED RIGHT  11/13/2021    LUMPECTOMY BREAST Right 2010    right lumpectomy    OOPHORECTOMY      RELEASE CARPAL TUNNEL Right     THORACOSCOPY Right 11/23/2021    Procedure: THORACOSCOPY, PARIETAL PLEURECTOMY;  Surgeon: Timbo Zuluaga MD;  [place] [facility] [place]     Current providers sharing in care for this patient include:  Patient Care Team:  Brenna Lindsey MD as PCP - General (Internal Medicine)  Chandni Fonseca APRN CNP as Assigned Pulmonology Provider  Watson Bone MD as MD (Family Medicine)  Jody Golden MD as MD (Cardiology)  Jody Golden MD as Assigned Heart and Vascular Provider  Watson Bone MD as Assigned PCP  Bethel Diaz MD as Assigned Surgical Provider    The following health maintenance items are reviewed in Epic and correct as of today:  Health Maintenance   Topic Date Due    COVID-19 Vaccine (7 - 2024-25 season) 03/25/2025    PHQ-9  09/30/2025    ANNUAL REVIEW OF HM ORDERS  01/16/2026    MAMMO SCREENING  02/25/2026    LIPID  03/17/2026    MEDICARE ANNUAL WELLNESS VISIT  03/31/2026    FALL RISK ASSESSMENT  03/31/2026    ADVANCE CARE PLANNING  03/31/2030    DTAP/TDAP/TD IMMUNIZATION (2 - Td or Tdap) 02/20/2033    DEXA  11/14/2038    DEPRESSION ACTION PLAN  Completed    INFLUENZA VACCINE  Completed    Pneumococcal Vaccine: 50+ Years  Completed    ZOSTER  "IMMUNIZATION  Completed    RSV VACCINE  Completed    HPV IMMUNIZATION  Aged Out    MENINGITIS IMMUNIZATION  Aged Out    LUNG CANCER SCREENING  Discontinued         Review of Systems  Constitutional, neuro, ENT, endocrine, pulmonary, cardiac, gastrointestinal, genitourinary, musculoskeletal, integument and psychiatric systems are negative, except as otherwise noted.     Objective    Exam  /64   Pulse 72   Temp 98.1  F (36.7  C) (Oral)   Resp 16   Ht 1.632 m (5' 4.25\")   Wt 62.6 kg (138 lb)   LMP  (LMP Unknown)   SpO2 99%   BMI 23.50 kg/m     Estimated body mass index is 23.5 kg/m  as calculated from the following:    Height as of this encounter: 1.632 m (5' 4.25\").    Weight as of this encounter: 62.6 kg (138 lb).    Physical Exam  GENERAL: alert and no distress  EYES: Eyes grossly normal to inspection and conjunctivae and sclerae normal  HENT: ear canals and TM's normal, nose and mouth without ulcers or lesions  NECK: no adenopathy, no asymmetry, masses, or scars  RESP: lungs clear to auscultation - no rales, rhonchi or wheezes  CV: regular rate and rhythm, normal S1 S2, no S3 or S4, no murmur, click or rub, no peripheral edema  BREAST: L breast without visual or palpable abnormality. R breast - there is thickening/nodularity of inferior breast, telangiectasias around the nipple  ABDOMEN: soft, nontender, no hepatosplenomegaly, no masses and bowel sounds normal  MS: no gross musculoskeletal defects noted, no edema  SKIN: no suspicious lesions or rashes on exposed skin  NEURO: Normal strength and tone, mentation intact and speech normal  PSYCH: mentation appears normal, affect normal/bright         3/31/2025   Mini Cog   Clock Draw Score 2 Normal   3 Item Recall 3 objects recalled   Mini Cog Total Score 5              Signed Electronically by: Brenna Lindsey MD    Answers submitted by the patient for this visit:  Patient Health Questionnaire (Submitted on 3/31/2025)  If you checked off any " problems, how difficult have these problems made it for you to do your work, take care of things at home, or get along with other people?: Somewhat difficult  PHQ9 TOTAL SCORE: 6

## 2025-03-31 NOTE — ASSESSMENT & PLAN NOTE
Dx 2 years ago. Follows with [facility]. Current regimen on budesonide EC 3 mg every other day. This is working well. Tried stopping but had increase in loose stools.   - Continue budesonide 3 mg every other day  - F/up with [facility] in April as scheduled

## 2025-03-31 NOTE — ASSESSMENT & PLAN NOTE
Dx on last DEXA 11/2023 with decrease in BMD, lowest T score of -2.6 in L hip. Taking calcium and vitamin D. Started prolia 12/2023.   - Continue prolia  - Plan repeat DEXA 12/2025  - Repeat vitamin D today as it was quite high in September   - Continue great work on regular walking/weight bearing exercise

## 2025-03-31 NOTE — ASSESSMENT & PLAN NOTE
Soaking night sweats every day for past month. No weight loss but has been fatigued. No other localizing symptoms. Will start with basic work-up and f/up in 3 months.   - CXR and diagnostic mammo (R breast pain)  - CBC with diff, CMP, HIV, CRP, TSH, UA , blood culture

## 2025-04-01 ENCOUNTER — TRANSFERRED RECORDS (OUTPATIENT)
Dept: HEALTH INFORMATION MANAGEMENT | Facility: CLINIC | Age: 80
End: 2025-04-01
Payer: COMMERCIAL

## 2025-04-01 ENCOUNTER — ANCILLARY PROCEDURE (OUTPATIENT)
Dept: MAMMOGRAPHY | Facility: CLINIC | Age: 80
End: 2025-04-01
Attending: INTERNAL MEDICINE
Payer: COMMERCIAL

## 2025-04-01 DIAGNOSIS — N64.4 BREAST PAIN, RIGHT: ICD-10-CM

## 2025-04-01 LAB
ALBUMIN UR-MCNC: NEGATIVE MG/DL
APPEARANCE UR: CLEAR
BILIRUB UR QL STRIP: NEGATIVE
COLOR UR AUTO: YELLOW
FERRITIN SERPL-MCNC: 14 NG/ML (ref 11–328)
GLUCOSE UR STRIP-MCNC: NEGATIVE MG/DL
HGB UR QL STRIP: NEGATIVE
IRON BINDING CAPACITY (ROCHE): 344 UG/DL (ref 240–430)
IRON SATN MFR SERPL: 8 % (ref 15–46)
IRON SERPL-MCNC: 26 UG/DL (ref 37–145)
KETONES UR STRIP-MCNC: NEGATIVE MG/DL
LEUKOCYTE ESTERASE UR QL STRIP: NEGATIVE
NITRATE UR QL: NEGATIVE
PH UR STRIP: 6.5 [PH] (ref 5–8)
RETICS # AUTO: 0.05 10E6/UL (ref 0.03–0.1)
RETICS/RBC NFR AUTO: 1.2 % (ref 0.5–2)
SP GR UR STRIP: 1.02 (ref 1–1.03)
UROBILINOGEN UR STRIP-ACNC: 0.2 E.U./DL
VIT B12 SERPL-MCNC: 597 PG/ML (ref 232–1245)

## 2025-04-01 PROCEDURE — 81003 URINALYSIS AUTO W/O SCOPE: CPT | Performed by: INTERNAL MEDICINE

## 2025-04-01 PROCEDURE — 76642 ULTRASOUND BREAST LIMITED: CPT | Mod: RT

## 2025-04-02 RX ORDER — FERROUS SULFATE 325(65) MG
325 TABLET ORAL
Qty: 90 TABLET | Refills: 1 | Status: SHIPPED | OUTPATIENT
Start: 2025-04-02

## 2025-04-03 LAB — BACTERIA BLD CULT: NORMAL

## 2025-04-05 LAB — BACTERIA BLD CULT: NO GROWTH

## 2025-04-14 ENCOUNTER — TRANSFERRED RECORDS (OUTPATIENT)
Dept: HEALTH INFORMATION MANAGEMENT | Facility: CLINIC | Age: 80
End: 2025-04-14
Payer: COMMERCIAL

## 2025-04-21 ENCOUNTER — OFFICE VISIT (OUTPATIENT)
Dept: PODIATRY | Facility: CLINIC | Age: 80
End: 2025-04-21
Attending: INTERNAL MEDICINE
Payer: COMMERCIAL

## 2025-04-21 ENCOUNTER — TRANSFERRED RECORDS (OUTPATIENT)
Dept: ADMINISTRATIVE | Facility: CLINIC | Age: 80
End: 2025-04-21

## 2025-04-21 DIAGNOSIS — M20.41 HAMMERTOE OF RIGHT FOOT: ICD-10-CM

## 2025-04-21 DIAGNOSIS — M20.42 HAMMER TOE OF LEFT FOOT: Primary | ICD-10-CM

## 2025-04-21 RX ORDER — FAMOTIDINE 20 MG
TABLET ORAL
COMMUNITY
Start: 2025-04-02

## 2025-04-21 ASSESSMENT — PAIN SCALES - GENERAL: PAINLEVEL_OUTOF10: MILD PAIN (2)

## 2025-04-21 NOTE — PROGRESS NOTES
CC: Fourth digit hammertoe, bilaterally     HPI: Salome Maravilla is a 79 year old female who presents to clinic for chief concern of the hammertoe to their fourth digit, bilaterally, right more concerning than left.  Patient states that she has had a rotation to her fifth digits on both sides since she was born but now she is having irritation to her fourth toes on both sides.  She states that the right fourth toe is now curling under her third toe.  She states that she tried a toe sleeve but this continue to fall off.  She states that she is not using spacers and this is working significantly well.  She is worried that this will require surgical intervention if she does not slow down the progression.  These do not cause her any pain or callusing.  She would like to know through anything she needs to have done to these fourth toes or if she is okay leaving them alone.    Past Surgical History:   Procedure Laterality Date    APPENDECTOMY      ARTHROSCOPY SHOULDER ROTATOR CUFF REPAIR      BIOPSY BREAST Right 2010    hx of right lumpectomy    COLONOSCOPY      HYSTERECTOMY  1982    IR CHEST TUBE PLACEMENT NON-TUNNELED RIGHT  11/13/2021    LUMPECTOMY BREAST Right 2010    right lumpectomy    OOPHORECTOMY      RELEASE CARPAL TUNNEL Right     THORACOSCOPY Right 11/23/2021    Procedure: THORACOSCOPY, PARIETAL PLEURECTOMY;  Surgeon: Timbo Zuluaga MD;  Location: [facility] [place] OR     Past Medical History:   Diagnosis Date    Abscess of middle lobe of right lung with pneumonia (H) 11/10/2021    Acute respiratory failure with hypoxia (H) 11/24/2021    Breast cancer (H) 01/01/2010    hx of right lumpectomy for breast     Depression     GERD (gastroesophageal reflux disease)     Hx of radiation therapy right 2010    hx of right lumpectomy for breast cancer    Infection due to 2019 novel coronavirus 11/10/2021    Osteoarthritis     Pneumonia due to infectious organism, unspecified laterality, unspecified part of lung 11/10/2021      Medications:  Current Outpatient Medications   Medication Sig Dispense Refill    acetaminophen (TYLENOL) 500 MG tablet Take 1,000 mg by mouth At Bedtime      ascorbic acid (VITAMIN C) 250 MG tablet Take 250 mg by mouth At Bedtime       Bacillus Coagulans-Inulin (PROBIOTIC) 1-250 BILLION-MG CAPS 2 caps orally once a day      BLACK COHOSH ORAL [BLACK COHOSH ORAL] Take 1 capsule by mouth 2 (two) times a day.       budesonide (ENTOCORT EC) 3 MG EC capsule Take 3 mg by mouth every other day.      calcium citrate (CITRACAL) 950 (200 Ca) MG tablet Take 1 tablet by mouth 2 times daily      Coenzyme Q10 (CO Q-10) 400 MG CAPS 1 cap(s) Orally once a day      estradiol (ESTRACE) 0.1 MG/GM vaginal cream INSERT 1 GRAM OF CREAM INTO VAGINA USING THE APPLICATORY NIGHTLY.      famotidine (PEPCID) 20 MG tablet TAKE ONE TAB BY MOUTH ONCE A DAY AS NEEDED FOR BREATHTHRU GERD SYMTOMS      ferrous sulfate (FEROSUL) 325 (65 Fe) MG tablet Take 1 tablet (325 mg) by mouth daily (with breakfast). 90 tablet 1    MAGNESIUM GLYCINATE  mg 2 times daily      mometasone-formoterol (DULERA) 200-5 MCG/ACT inhaler Inhale 2 puffs into the lungs 2 times daily. OFFICE VISIT NEEDED FOR ANY FURTHER REFILLS 13 g 11    omeprazole (PRILOSEC) 20 MG DR capsule Take 1 capsule (20 mg) by mouth daily. 90 capsule 1    PSYLLIUM HUSK PO Take 500 mg by mouth daily 3 capsules daily      sertraline (ZOLOFT) 100 MG tablet Take 2 tablets (200 mg) by mouth daily. 90 tablet 1    traZODone (DESYREL) 150 MG tablet Take 1 tablet (150 mg) by mouth at bedtime. 90 tablet 1    triamcinolone (NASACORT ALLERGY 24HR) 55 MCG/ACT nasal aerosol Nasacort Allergy 24HR 55 MCG/ACT      valACYclovir (VALTREX) 500 MG tablet Take 1 tablet (500 mg) by mouth daily. 90 tablet 3    Vitamin D, Cholecalciferol, 25 MCG (1000 UT) CAPS       vitamin E 400 UNIT capsule [VITAMIN E 400 UNIT CAPSULE] Take 400 Units by mouth daily.       Allergies:  Arimidex [anastrozole], Penicillins,  Statins-hmg-coa reductase inhibitors [statins], and Fenofibrate  Social History     Socioeconomic History    Marital status:      Spouse name: Not on file    Number of children: Not on file    Years of education: Not on file    Highest education level: Not on file   Occupational History    Not on file   Tobacco Use    Smoking status: Former     Current packs/day: 0.00     Types: Cigarettes     Quit date: 1961     Years since quittin.7     Passive exposure: Past (Dad was a smoker but quit when patient was 10-11yo)    Smokeless tobacco: Never   Vaping Use    Vaping status: Never Used   Substance and Sexual Activity    Alcohol use: Not Currently     Comment: Alcoholic Drinks/day: social    Drug use: No    Sexual activity: Not Currently     Partners: Male     Birth control/protection: Post-menopausal, Female Surgical   Other Topics Concern    Not on file   Social History Narrative    Not on file     Social Drivers of Health     Financial Resource Strain: Low Risk  (3/26/2025)    Financial Resource Strain     Within the past 12 months, have you or your family members you live with been unable to get utilities (heat, electricity) when it was really needed?: No   Food Insecurity: Low Risk  (3/26/2025)    Food Insecurity     Within the past 12 months, did you worry that your food would run out before you got money to buy more?: No     Within the past 12 months, did the food you bought just not last and you didn t have money to get more?: No   Transportation Needs: Low Risk  (3/26/2025)    Transportation Needs     Within the past 12 months, has lack of transportation kept you from medical appointments, getting your medicines, non-medical meetings or appointments, work, or from getting things that you need?: No   Physical Activity: Insufficiently Active (3/26/2025)    Exercise Vital Sign     Days of Exercise per Week: 3 days     Minutes of Exercise per Session: 10 min   Stress: No Stress Concern Present  (3/26/2025)    [place] [employer] - Occupational Stress Questionnaire     Feeling of Stress : Only a little   Social Connections: Unknown (3/26/2025)    Social Connection and Isolation Panel [NHANES]     Frequency of Communication with Friends and Family: Not on file     Frequency of Social Gatherings with Friends and Family: Three times a week     Attends [employer] Services: Not on file     Active Member of Clubs or Organizations: Not on file     Attends Club or Organization Meetings: Not on file     Marital Status: Not on file   Interpersonal Safety: Low Risk  (3/31/2025)    Interpersonal Safety     Do you feel physically and emotionally safe where you currently live?: Yes     Within the past 12 months, have you been hit, slapped, kicked or otherwise physically hurt by someone?: No     Within the past 12 months, have you been humiliated or emotionally abused in other ways by your partner or ex-partner?: No   Housing Stability: Low Risk  (3/26/2025)    Housing Stability     Do you have housing? : Yes     Are you worried about losing your housing?: No     Family History   Problem Relation Age of Onset    Chronic Obstructive Pulmonary Disease Father     Breast Cancer Sister 71.00    Cancer Sister     Atrial fibrillation Sister     Cancer Brother 19.00    Testicular cancer Brother     No Known Problems Son     No Known Problems Son        Medical records were reviewed and are summarized above.    Review of Systems    PHYSICAL EXAM:  Vital signs:LMP  (LMP Unknown)      Focused lower extremity physical exam:     Derm: Skin is warm, dry, intact.  No callusing or heloma molle formation between the 5th and 4th toes or 3rd and 4th toes.  No open wounds, laceration or abrasions noted. Nails are well trimmed. No ecchymosis or erythema noted.     Vasc: Dorsalis pedis pulses, 2/4 bilateral. Posterior tibial pulses 2/4 bilateral. Cap fill time < 3 seconds to the digits. No edema is present. Hair growth  present to the toes.     Neuro: Protective sensation intact via light touch to the feet. Gross sensation intact.     MSK:   - Flexible varus and flexion contracture to the hammer digits of the fourth toes and fifth toes bilaterally.  No pain upon straightening of the toes.  No pain upon relieving the rotation of the toe.  - Muscle strength 5/5 with dorsiflexion, plantarflexion, eversion, inversion of the feet. Compartments soft and compressible.    Assessment:   Patient Active Problem List   Diagnosis    History of breast cancer    Lymphocytic colitis    Age-related osteoporosis without current pathological fracture    Elevated coronary artery calcium score    Mixed hyperlipidemia    Genital herpes simplex, unspecified site    Gastroesophageal reflux disease without esophagitis    History of colonic polyps    Recurrent major depressive disorder, in partial remission    Psychophysiological insomnia    Atrophic vaginitis    Primary osteoarthritis involving multiple joints    Encounter for Medicare annual wellness exam    Hammertoe of right foot    Night sweats    Breast pain, right     - Flexible hammer digit contracture, fourth digits bilaterally  - Digiti quinti varus, bilaterally    Plan:  - Patient was seen and evaluated in clinic by myself.     - Flexible hammer digit contracture and digiti quinti varus contracture, bilaterally:  Discussed the hammer digit contractures.  Discussed conservative treatment options including toe, toe sleeve, toe spacer, wider shoes, taller toe box, and surgical intervention including flexor tenotomy versus toe amputation versus arthroplasty versus releases.  Discussed derotational skin plasty.  Discussed that since she is in no pain and has no skin changes or formation, there is nothing that is required to be done for these.  Discussed simply monitoring it and continued using the spacer.  Patient is amenable to this plan and verbalized understanding.    -Patient to follow-up as  needed    Geovany Platt DPM

## 2025-04-21 NOTE — LETTER
4/21/2025      Salome Maravilla  [address]  [place] [address]      Dear Colleague,    Thank you for referring your patient, Salome Maravilla, to the [facility]. Please see a copy of my visit note below.    CC: Fourth digit hammertoe, bilaterally     HPI: Salome Maravilla is a 79 year old female who presents to clinic for chief concern of the hammertoe to their fourth digit, bilaterally, right more concerning than left.  Patient states that she has had a rotation to her fifth digits on both sides since she was born but now she is having irritation to her fourth toes on both sides.  She states that the right fourth toe is now curling under her third toe.  She states that she tried a toe sleeve but this continue to fall off.  She states that she is not using spacers and this is working significantly well.  She is worried that this will require surgical intervention if she does not slow down the progression.  These do not cause her any pain or callusing.  She would like to know through anything she needs to have done to these fourth toes or if she is okay leaving them alone.    Past Surgical History:   Procedure Laterality Date     APPENDECTOMY       ARTHROSCOPY SHOULDER ROTATOR CUFF REPAIR       BIOPSY BREAST Right 2010    hx of right lumpectomy     COLONOSCOPY       HYSTERECTOMY  1982     IR CHEST TUBE PLACEMENT NON-TUNNELED RIGHT  11/13/2021     LUMPECTOMY BREAST Right 2010    right lumpectomy     OOPHORECTOMY       RELEASE CARPAL TUNNEL Right      THORACOSCOPY Right 11/23/2021    Procedure: THORACOSCOPY, PARIETAL PLEURECTOMY;  Surgeon: Timbo Zuluaga MD;  Location: [facility] - [place] OR     Past Medical History:   Diagnosis Date     Abscess of middle lobe of right lung with pneumonia (H) 11/10/2021     Acute respiratory failure with hypoxia (H) 11/24/2021     Breast cancer (H) 01/01/2010    hx of right lumpectomy for breast      Depression      GERD (gastroesophageal reflux disease)       Hx of radiation therapy right 2010    hx of right lumpectomy for breast cancer     Infection due to 2019 novel coronavirus 11/10/2021     Osteoarthritis      Pneumonia due to infectious organism, unspecified laterality, unspecified part of lung 11/10/2021     Medications:  Current Outpatient Medications   Medication Sig Dispense Refill     acetaminophen (TYLENOL) 500 MG tablet Take 1,000 mg by mouth At Bedtime       ascorbic acid (VITAMIN C) 250 MG tablet Take 250 mg by mouth At Bedtime        Bacillus Coagulans-Inulin (PROBIOTIC) 1-250 BILLION-MG CAPS 2 caps orally once a day       BLACK COHOSH ORAL [BLACK COHOSH ORAL] Take 1 capsule by mouth 2 (two) times a day.        budesonide (ENTOCORT EC) 3 MG EC capsule Take 3 mg by mouth every other day.       calcium citrate (CITRACAL) 950 (200 Ca) MG tablet Take 1 tablet by mouth 2 times daily       Coenzyme Q10 (CO Q-10) 400 MG CAPS 1 cap(s) Orally once a day       estradiol (ESTRACE) 0.1 MG/GM vaginal cream INSERT 1 GRAM OF CREAM INTO VAGINA USING THE APPLICATORY NIGHTLY.       famotidine (PEPCID) 20 MG tablet TAKE ONE TAB BY MOUTH ONCE A DAY AS NEEDED FOR BREATHTHRU GERD SYMTOMS       ferrous sulfate (FEROSUL) 325 (65 Fe) MG tablet Take 1 tablet (325 mg) by mouth daily (with breakfast). 90 tablet 1     MAGNESIUM GLYCINATE  mg 2 times daily       mometasone-formoterol (DULERA) 200-5 MCG/ACT inhaler Inhale 2 puffs into the lungs 2 times daily. OFFICE VISIT NEEDED FOR ANY FURTHER REFILLS 13 g 11     omeprazole (PRILOSEC) 20 MG DR capsule Take 1 capsule (20 mg) by mouth daily. 90 capsule 1     PSYLLIUM HUSK PO Take 500 mg by mouth daily 3 capsules daily       sertraline (ZOLOFT) 100 MG tablet Take 2 tablets (200 mg) by mouth daily. 90 tablet 1     traZODone (DESYREL) 150 MG tablet Take 1 tablet (150 mg) by mouth at bedtime. 90 tablet 1     triamcinolone (NASACORT ALLERGY 24HR) 55 MCG/ACT nasal aerosol Nasacort Allergy 24HR 55 MCG/ACT       valACYclovir (VALTREX)  500 MG tablet Take 1 tablet (500 mg) by mouth daily. 90 tablet 3     Vitamin D, Cholecalciferol, 25 MCG (1000 UT) CAPS        vitamin E 400 UNIT capsule [VITAMIN E 400 UNIT CAPSULE] Take 400 Units by mouth daily.       Allergies:  Arimidex [anastrozole], Penicillins, Statins-hmg-coa reductase inhibitors [statins], and Fenofibrate  Social History     Socioeconomic History     Marital status:      Spouse name: Not on file     Number of children: Not on file     Years of education: Not on file     Highest education level: Not on file   Occupational History     Not on file   Tobacco Use     Smoking status: Former     Current packs/day: 0.00     Types: Cigarettes     Quit date: 1961     Years since quittin.7     Passive exposure: Past (Dad was a smoker but quit when patient was 10-9yo)     Smokeless tobacco: Never   Vaping Use     Vaping status: Never Used   Substance and Sexual Activity     Alcohol use: Not Currently     Comment: Alcoholic Drinks/day: social     Drug use: No     Sexual activity: Not Currently     Partners: Male     Birth control/protection: Post-menopausal, Female Surgical   Other Topics Concern     Not on file   Social History Narrative     Not on file     Social Drivers of Health     Financial Resource Strain: Low Risk  (3/26/2025)    Financial Resource Strain      Within the past 12 months, have you or your family members you live with been unable to get utilities (heat, electricity) when it was really needed?: No   Food Insecurity: Low Risk  (3/26/2025)    Food Insecurity      Within the past 12 months, did you worry that your food would run out before you got money to buy more?: No      Within the past 12 months, did the food you bought just not last and you didn t have money to get more?: No   Transportation Needs: Low Risk  (3/26/2025)    Transportation Needs      Within the past 12 months, has lack of transportation kept you from medical appointments, getting your medicines,  non-medical meetings or appointments, work, or from getting things that you need?: No   Physical Activity: Insufficiently Active (3/26/2025)    Exercise Vital Sign      Days of Exercise per Week: 3 days      Minutes of Exercise per Session: 10 min   Stress: No Stress Concern Present (3/26/2025)    [place] [employer] - Occupational Stress Questionnaire      Feeling of Stress : Only a little   Social Connections: Unknown (3/26/2025)    Social Connection and Isolation Panel [NHANES]      Frequency of Communication with Friends and Family: Not on file      Frequency of Social Gatherings with Friends and Family: Three times a week      Attends Zoroastrian Services: Not on file      Active Member of Clubs or Organizations: Not on file      Attends Club or Organization Meetings: Not on file      Marital Status: Not on file   Interpersonal Safety: Low Risk  (3/31/2025)    Interpersonal Safety      Do you feel physically and emotionally safe where you currently live?: Yes      Within the past 12 months, have you been hit, slapped, kicked or otherwise physically hurt by someone?: No      Within the past 12 months, have you been humiliated or emotionally abused in other ways by your partner or ex-partner?: No   Housing Stability: Low Risk  (3/26/2025)    Housing Stability      Do you have housing? : Yes      Are you worried about losing your housing?: No     Family History   Problem Relation Age of Onset     Chronic Obstructive Pulmonary Disease Father      Breast Cancer Sister 71.00     Cancer Sister      Atrial fibrillation Sister      Cancer Brother 19.00     Testicular cancer Brother      No Known Problems Son      No Known Problems Son        Medical records were reviewed and are summarized above.    Review of Systems    PHYSICAL EXAM:  Vital signs:LMP  (LMP Unknown)      Focused lower extremity physical exam:     Derm: Skin is warm, dry, intact.  No callusing or heloma molle formation between the 5th  and 4th toes or 3rd and 4th toes.  No open wounds, laceration or abrasions noted. Nails are well trimmed. No ecchymosis or erythema noted.     Vasc: Dorsalis pedis pulses, 2/4 bilateral. Posterior tibial pulses 2/4 bilateral. Cap fill time < 3 seconds to the digits. No edema is present. Hair growth present to the toes.     Neuro: Protective sensation intact via light touch to the feet. Gross sensation intact.     MSK:   - Flexible varus and flexion contracture to the hammer digits of the fourth toes and fifth toes bilaterally.  No pain upon straightening of the toes.  No pain upon relieving the rotation of the toe.  - Muscle strength 5/5 with dorsiflexion, plantarflexion, eversion, inversion of the feet. Compartments soft and compressible.    Assessment:   Patient Active Problem List   Diagnosis     History of breast cancer     Lymphocytic colitis     Age-related osteoporosis without current pathological fracture     Elevated coronary artery calcium score     Mixed hyperlipidemia     Genital herpes simplex, unspecified site     Gastroesophageal reflux disease without esophagitis     History of colonic polyps     Recurrent major depressive disorder, in partial remission     Psychophysiological insomnia     Atrophic vaginitis     Primary osteoarthritis involving multiple joints     Encounter for Medicare annual wellness exam     Hammertoe of right foot     Night sweats     Breast pain, right     - Flexible hammer digit contracture, fourth digits bilaterally  - Digiti quinti varus, bilaterally    Plan:  - Patient was seen and evaluated in clinic by myself.     - Flexible hammer digit contracture and digiti quinti varus contracture, bilaterally:  Discussed the hammer digit contractures.  Discussed conservative treatment options including toe, toe sleeve, toe spacer, wider shoes, taller toe box, and surgical intervention including flexor tenotomy versus toe amputation versus arthroplasty versus releases.  Discussed  derotational skin plasty.  Discussed that since she is in no pain and has no skin changes or formation, there is nothing that is required to be done for these.  Discussed simply monitoring it and continued using the spacer.  Patient is amenable to this plan and verbalized understanding.    -Patient to follow-up as needed    Geovany Platt DPM      Again, thank you for allowing me to participate in the care of your patient.        Sincerely,        Geovany lPatt DPM    Electronically signed

## 2025-04-22 NOTE — PROCEDURES
2025        Salome (Felicity) Brand   [address] [place] [address]-      Salome (Felicity) Brand,  :  1945    Hemoglobin is stable but low at 10.3. Plan for endoscopic evaluation as discussed at your visit 25.  CBC With Differential/Platelet 2025 15:22   Description Result Units Flags Range   Hemoglobin 10.3 g/dL L 11.1-15.9   Hematocrit 33.6 % L 34.0-46.6   MCV 86 fL  79-97   MCHC 30.7 g/dL L 31.5-35.7   MCH 26.4 pg L 26.6-33.0   RDW 15.7 % H 11.7-15.4   Platelets 195 x10E3/uL  150-450   Neutrophils 75 %  Not Estab.   Lymphs 13 %  Not Estab.   Monocytes 10 %  Not Estab.   Eos 1 %  Not Estab.   Basos 1 %  Not Estab.   Neutrophils (Absolute) 4.7 x10E3/uL  1.4-7.0   Lymphs (Absolute) 0.8 x10E3/uL  0.7-3.1   Monocytes(Absolute) 0.6 x10E3/uL  0.1-0.9   Eos (Absolute) 0.1 x10E3/uL  0.0-0.4   Baso (Absolute) 0.1 x10E3/uL  0.0-0.2   Immature Grans (Abs) 0.0 x10E3/uL  0.0-0.1   Immature Granulocytes 0 %  Not Estab.   RBC 3.90 x10E6/uL  3.77-5.28   WBC 6.2 x10E3/uL  3.4-10.8   Comments   Performed At: [facility], [facility]  [address], [place], [place], [id number]  Kesha Singh MD, Phone: [phone]           Thank you.    Electronically signed by:  Ara MCKEON 2025 11:52 AM  Document generated by:  Ara MCKEON  2025  If your provider ordered multiple tests; the results may not become available at the same time.  If multiple test results are received within 14 days of one another, you may receive a duplicate.  cc:  Brenna Lindsey MD

## 2025-05-15 DIAGNOSIS — F51.04 PSYCHOPHYSIOLOGICAL INSOMNIA: ICD-10-CM

## 2025-05-15 DIAGNOSIS — F33.41 RECURRENT MAJOR DEPRESSIVE DISORDER, IN PARTIAL REMISSION: ICD-10-CM

## 2025-05-15 RX ORDER — SERTRALINE HYDROCHLORIDE 100 MG/1
200 TABLET, FILM COATED ORAL DAILY
Qty: 180 TABLET | Refills: 1 | Status: SHIPPED | OUTPATIENT
Start: 2025-05-15

## 2025-05-15 RX ORDER — TRAZODONE HYDROCHLORIDE 150 MG/1
150 TABLET ORAL AT BEDTIME
Qty: 90 TABLET | Refills: 2 | Status: SHIPPED | OUTPATIENT
Start: 2025-05-15

## 2025-05-15 NOTE — TELEPHONE ENCOUNTER
Medication Refill Request       Pet pt, she does leave next Tuesday 05/20 for 2 wks to [facility]

## 2025-05-16 ENCOUNTER — LAB (OUTPATIENT)
Dept: CARDIOLOGY | Facility: CLINIC | Age: 80
End: 2025-05-16
Payer: COMMERCIAL

## 2025-05-16 ENCOUNTER — RESULTS FOLLOW-UP (OUTPATIENT)
Dept: CARDIOLOGY | Facility: CLINIC | Age: 80
End: 2025-05-16

## 2025-05-16 DIAGNOSIS — E78.2 MIXED HYPERLIPIDEMIA: ICD-10-CM

## 2025-05-16 DIAGNOSIS — E78.2 MIXED HYPERLIPIDEMIA: Primary | ICD-10-CM

## 2025-05-16 DIAGNOSIS — I25.10 CORONARY ARTERY CALCIFICATION: ICD-10-CM

## 2025-05-16 DIAGNOSIS — R93.1 ELEVATED CORONARY ARTERY CALCIUM SCORE: ICD-10-CM

## 2025-05-16 DIAGNOSIS — Z78.9 STATIN INTOLERANCE: ICD-10-CM

## 2025-05-16 LAB
CHOLEST SERPL-MCNC: 170 MG/DL
FASTING STATUS PATIENT QL REPORTED: YES
HDLC SERPL-MCNC: 53 MG/DL
LDLC SERPL CALC-MCNC: 79 MG/DL
NONHDLC SERPL-MCNC: 117 MG/DL
TRIGL SERPL-MCNC: 189 MG/DL

## 2025-05-16 PROCEDURE — 80061 LIPID PANEL: CPT

## 2025-05-16 PROCEDURE — 36415 COLL VENOUS BLD VENIPUNCTURE: CPT

## 2025-05-16 PROCEDURE — 3048F LDL-C <100 MG/DL: CPT

## 2025-05-19 ENCOUNTER — TRANSFERRED RECORDS (OUTPATIENT)
Dept: HEALTH INFORMATION MANAGEMENT | Facility: CLINIC | Age: 80
End: 2025-05-19
Payer: COMMERCIAL

## 2025-06-05 ENCOUNTER — TELEPHONE (OUTPATIENT)
Dept: INTERNAL MEDICINE | Facility: CLINIC | Age: 80
End: 2025-06-05
Payer: COMMERCIAL

## 2025-06-05 DIAGNOSIS — Z92.29 PERSONAL HISTORY OF OTHER DRUG THERAPY: ICD-10-CM

## 2025-06-05 DIAGNOSIS — M81.0 AGE-RELATED OSTEOPOROSIS WITHOUT CURRENT PATHOLOGICAL FRACTURE: Primary | ICD-10-CM

## 2025-06-05 NOTE — TELEPHONE ENCOUNTER
Patient's last prolia was 2024, pt has an upcoming prolia injection appointment on 2025 at 11am.     If this patient is to continue with Prolia injection therapy a new, active prolia order is needed for the upcoming administration.     A Prolia order has been pended with the previously administered order's associated diagnoses; signing provider to review diagnoses to ensure these still apply to patient.    Diagnoses from order used during 2024 administration:   Age-related osteoporosis without current pathological fracture [M81.0]  - Primary      Personal history of other drug therapy [Z92.29]        Have previous orders been discontinued due to being ? No - no  orders to be discontinued    Patient's most recent labs within the past year (Calcium, GFR, Creatinine, Vitamin D):   Latest Reference Range & Units 25 12:56   Creatinine 0.51 - 0.95 mg/dL 0.70   GFR Estimate >60 mL/min/1.73m2 86   Calcium 8.8 - 10.4 mg/dL 9.1   Vitamin D, Total (25-Hydroxy) 20 - 50 ng/mL 41     Labs were completed within the past 6 months , labs were completed within last six months, no labs are pended for provider review. .     Prolia provider information with link to prescribing information: [email or web address]  Note: Per Prolia ordering smartset, an albumin level is recommended if Calcium level is < 8.5.     Provider action needed: please review/sign prolia order, review associated diagnoses, review/sign recent labs (if needed); please remove lab orders if not needed.  If labs are ordered, please route to NA so that patient can be scheduled for lab draw and labs can be followed for results prior to prolia administration.

## 2025-06-10 ENCOUNTER — TRANSFERRED RECORDS (OUTPATIENT)
Dept: HEALTH INFORMATION MANAGEMENT | Facility: CLINIC | Age: 80
End: 2025-06-10
Payer: COMMERCIAL

## 2025-06-23 ENCOUNTER — TRANSFERRED RECORDS (OUTPATIENT)
Dept: ADMINISTRATIVE | Facility: CLINIC | Age: 80
End: 2025-06-23
Payer: COMMERCIAL

## 2025-06-24 NOTE — PROCEDURES
[facility]   [address] [place] [address], [address], [place] [address]     Patient Name: Salome Maravilla  Gender:  Female  Exam Date: 06/23/2025 Visit Number:  [id number]  Age: 79 Years YOB: 1945  Attending MD: Deonte Ruby MD Medical Record#:  [id number]  -----------------------------------------------------------------------------------------------------------------------------   Procedure:    Upper GI Endoscopy   Indications:    Iron Deficiency Anemia  Provider:        Deonte Ruby MD   Referring MD: Referral Self   Primary MD:      Brenna Lindsey MD  Medications:   Admitting Medication:   0.9% Normal Saline at [facility]   Intra Procedure Medications:   Patient received monitored anesthesia care.     Complications: No immediate complications  ___________________________________________________________________________________________  Procedure:   An examination of the heart and lungs was performed within acceptable limits.  . The patient was therefore deemed a reasonable candidate for sedation.   The risks and benefits were explained to the patient, who appeared to understand. After obtaining informed consent, the scope was passed under direct vision. Throughout the procedure the patient's blood pressure, pulse and oxygen saturations were monitored.  The scope was introduced through the mouth and advanced to the second portion of duodenum.         Findings:   Esophagus:    Normal esophagus.   The z-line is 35 centimeters from the incisors.  Top of the gastric folds is 35 centimeters from the incisors.  *Esophagus Comments:  Mildly irregular Z-line, but no endoscopic evidence of Walton's esophagus.  Stomach:    Normal stomach.  The diaphragm hiatus is at 36 centimeters from the incisors.     Small Hiatal Hernia. Normal mucosa.  *Stomach Comments:  A few small sessile polyp less than 5 mm identified in the fundus, consistent endoscopically with benign fundic gland  "polyps of the stomach.  Duodenum:  A large sessile/polypoid duodenal mass concerning for malignancy. Location - second portion, possibly at the ampulla. Biopsy taken. Maneuver - cold biopsy forceps. Scope was passed without resistance.  Impression:   Duodenal mass  Hiatal hernia  Irregular Z line of esophagus  Benign fundic gland polyps of stomach  Pathology Results:  A: DUODENUM, SECOND PORTION, MASS, BIOPSY:           1. Tubulovillous adenoma consistent with an advanced adenoma (see comment)           2. Negative for high grade dysplasia           3. Per the colonoscopy report:               a. Polyp size: \"Large sessile/polypoid mass\"               b. Resection: No removal (biopsy only)      COMMENTS  A. This lesion is, at a minimum, an advanced adenoma (see below). Complete removal is advised to exclude a more clinically significant (invasive) component in the remaining unsampled polyp.    Advanced adenoma of the colorectum is defined by the American College of Gastroenterology (ACG) as an adenoma that is 1 cm or more in size, contains an appreciable villous component, or has high grade dysplasia (Henry SHARMA; Polyp Guideline: Diagnosis, Treatment, and Surveillance for Patients with Colorectal Polyps. Am J Gastroenterol 2000;95(11):3785-8601).  This polyp qualifies as such.  Patients with advanced adenomas are at increased risk for synchronous and metachronous additional advanced adenomas.  Appropriate follow-up is suggested.    Case seen with Dr. Davies.      MICROSCOPIC  A: Performed     Electronically signed by: Ila Trinh DO    Interpreted at [facility], [address] [place] [id number]  Additional Comments:  As we discussed over the phone, I recommend repeat upper endoscopy in the hospital to remove the large polyp from the duodenum.      _Electronically signed by:___________________  Deonte Ruby MD                 06/23/2025    cc: Brenna Lindsey MD        "

## 2025-06-27 ENCOUNTER — ALLIED HEALTH/NURSE VISIT (OUTPATIENT)
Dept: FAMILY MEDICINE | Facility: CLINIC | Age: 80
End: 2025-06-27
Payer: COMMERCIAL

## 2025-06-27 DIAGNOSIS — M81.0 AGE-RELATED OSTEOPOROSIS WITHOUT CURRENT PATHOLOGICAL FRACTURE: ICD-10-CM

## 2025-06-27 DIAGNOSIS — Z92.29 PERSONAL HISTORY OF OTHER DRUG THERAPY: ICD-10-CM

## 2025-06-27 PROCEDURE — 99207 PR NO CHARGE NURSE ONLY: CPT

## 2025-06-27 PROCEDURE — 96372 THER/PROPH/DIAG INJ SC/IM: CPT | Performed by: INTERNAL MEDICINE

## 2025-06-27 NOTE — PROGRESS NOTES
Clinic Administered Medication Documentation      Prolia Documentation    Indication: Prolia  (denosumab) is a prescription medicine used to treat osteoporosis in patients who:   Are at high risk for fracture, meaning patients who have had a fracture related to osteoporosis, or who have multiple risk factors for fracture.  Cannot use another osteoporosis medicine or other osteoporosis medicines did not work well.  The timeline for early/late injections would be 4 weeks early and any time after the 6 month enrrique. If a patient receives their injection late, then the subsequent injection would be 6 months from the date that they actually received the injection.    When was the last injection?  2024  Was the last injection at least 6 months ago? Yes  Has the prior authorization been completed?  Yes  Is there an active order (written within the past 365 days, with administrations remaining, not ) in the chart?  Yes   Calcium   Date Value Ref Range Status   2025 9.1 8.8 - 10.4 mg/dL Final     Has patient had a Calcium test in the last 12 months? Yes   Is the calcium result 8.8 or above? Yes   GFR Estimate   Date Value Ref Range Status   2025 86 >60 mL/min/1.73m2 Final     Comment:     eGFR calculated using  CKD-EPI equation.   2021 >60 >60 mL/min/1.73m2 Final     Has patient had a GFR within the last 12 months? Yes   Is GFR under 30, or patient has a diagnosis of CKD4 or CKD5? Yes   Was the Calcium test done after last Prolia injection? Yes   Is there a calcium order for 1 month post Prolia injection? Yes. Schedule patient for Calcium lab in next month.   Patient denies gastric bypass or parathyroid surgery in past 6 months? Yes - patient denies.   Patient denies undergoing any dental procedures involving drilling into the bone, such as implants, extractions, or oral surgery, within the past two months that have not yet healed?  Yes - patient denies  Patient denies plans for an emergency  tooth extraction within the next week? Yes    The following steps were completed to comply with the REMS program for Prolia:  Reviewed information in the Medication Guide, including the serious risks of Prolia  and the symptoms of each risk.  Advised patient to seek prompt medical attention if they have signs or symptoms of any of the serious risks.  Provided each patient a copy of the Medication Guide and Patient Guide.    Prior to injection, verified patient identity using patient's name and date of birth. Medication was administered. Please see MAR and medication order for additional information. Patient instructed to remain in clinic for 15 minutes and report any adverse reaction to staff immediately.    Vial/Syringe: Syringe  Was this medication supplied by the patient? No  Patient has no administrations remaining. Pend an order for Prolia and send to the Provider.

## 2025-07-07 ENCOUNTER — OFFICE VISIT (OUTPATIENT)
Dept: INTERNAL MEDICINE | Facility: CLINIC | Age: 80
End: 2025-07-07
Payer: COMMERCIAL

## 2025-07-07 ENCOUNTER — TELEPHONE (OUTPATIENT)
Dept: INTERNAL MEDICINE | Facility: CLINIC | Age: 80
End: 2025-07-07

## 2025-07-07 VITALS
BODY MASS INDEX: 23.31 KG/M2 | HEART RATE: 81 BPM | DIASTOLIC BLOOD PRESSURE: 50 MMHG | TEMPERATURE: 98.1 F | RESPIRATION RATE: 18 BRPM | WEIGHT: 136.5 LBS | OXYGEN SATURATION: 98 % | SYSTOLIC BLOOD PRESSURE: 112 MMHG | HEIGHT: 64 IN

## 2025-07-07 DIAGNOSIS — K31.89 DUODENAL MASS: ICD-10-CM

## 2025-07-07 DIAGNOSIS — R93.1 ELEVATED CORONARY ARTERY CALCIUM SCORE: ICD-10-CM

## 2025-07-07 DIAGNOSIS — Z01.818 PREOP GENERAL PHYSICAL EXAM: Primary | ICD-10-CM

## 2025-07-07 DIAGNOSIS — M20.41 HAMMERTOE OF RIGHT FOOT: ICD-10-CM

## 2025-07-07 DIAGNOSIS — M81.0 AGE-RELATED OSTEOPOROSIS WITHOUT CURRENT PATHOLOGICAL FRACTURE: ICD-10-CM

## 2025-07-07 DIAGNOSIS — E78.2 MIXED HYPERLIPIDEMIA: ICD-10-CM

## 2025-07-07 DIAGNOSIS — A60.00 GENITAL HERPES SIMPLEX, UNSPECIFIED SITE: ICD-10-CM

## 2025-07-07 DIAGNOSIS — K52.832 LYMPHOCYTIC COLITIS: ICD-10-CM

## 2025-07-07 DIAGNOSIS — D50.0 IRON DEFICIENCY ANEMIA DUE TO CHRONIC BLOOD LOSS: ICD-10-CM

## 2025-07-07 DIAGNOSIS — N95.2 ATROPHIC VAGINITIS: ICD-10-CM

## 2025-07-07 DIAGNOSIS — K21.9 GASTROESOPHAGEAL REFLUX DISEASE WITHOUT ESOPHAGITIS: ICD-10-CM

## 2025-07-07 PROBLEM — D64.9 NORMOCYTIC ANEMIA: Status: ACTIVE | Noted: 2025-07-07

## 2025-07-07 PROBLEM — N64.4 BREAST PAIN, RIGHT: Status: RESOLVED | Noted: 2025-03-31 | Resolved: 2025-07-07

## 2025-07-07 LAB
ANION GAP SERPL CALCULATED.3IONS-SCNC: 9 MMOL/L (ref 7–15)
BUN SERPL-MCNC: 14.6 MG/DL (ref 8–23)
CALCIUM SERPL-MCNC: 9.5 MG/DL (ref 8.8–10.4)
CHLORIDE SERPL-SCNC: 103 MMOL/L (ref 98–107)
CREAT SERPL-MCNC: 0.77 MG/DL (ref 0.51–0.95)
EGFRCR SERPLBLD CKD-EPI 2021: 77 ML/MIN/1.73M2
ERYTHROCYTE [DISTWIDTH] IN BLOOD BY AUTOMATED COUNT: 14.3 % (ref 10–15)
FERRITIN SERPL-MCNC: 41 NG/ML (ref 11–328)
GLUCOSE SERPL-MCNC: 95 MG/DL (ref 70–99)
HCO3 SERPL-SCNC: 28 MMOL/L (ref 22–29)
HCT VFR BLD AUTO: 41.5 % (ref 35–47)
HGB BLD-MCNC: 13.4 G/DL (ref 11.7–15.7)
IRON BINDING CAPACITY (ROCHE): 274 UG/DL (ref 240–430)
IRON SATN MFR SERPL: 40 % (ref 15–46)
IRON SERPL-MCNC: 110 UG/DL (ref 37–145)
MCH RBC QN AUTO: 30.7 PG (ref 26.5–33)
MCHC RBC AUTO-ENTMCNC: 32.3 G/DL (ref 31.5–36.5)
MCV RBC AUTO: 95 FL (ref 78–100)
PLATELET # BLD AUTO: 179 10E3/UL (ref 150–450)
POTASSIUM SERPL-SCNC: 4.7 MMOL/L (ref 3.4–5.3)
RBC # BLD AUTO: 4.36 10E6/UL (ref 3.8–5.2)
SODIUM SERPL-SCNC: 140 MMOL/L (ref 135–145)
WBC # BLD AUTO: 6.8 10E3/UL (ref 4–11)

## 2025-07-07 PROCEDURE — 83550 IRON BINDING TEST: CPT | Performed by: INTERNAL MEDICINE

## 2025-07-07 PROCEDURE — G2211 COMPLEX E/M VISIT ADD ON: HCPCS | Performed by: INTERNAL MEDICINE

## 2025-07-07 PROCEDURE — 82728 ASSAY OF FERRITIN: CPT | Performed by: INTERNAL MEDICINE

## 2025-07-07 PROCEDURE — 36415 COLL VENOUS BLD VENIPUNCTURE: CPT | Performed by: INTERNAL MEDICINE

## 2025-07-07 PROCEDURE — 99214 OFFICE O/P EST MOD 30 MIN: CPT | Performed by: INTERNAL MEDICINE

## 2025-07-07 PROCEDURE — 85027 COMPLETE CBC AUTOMATED: CPT | Performed by: INTERNAL MEDICINE

## 2025-07-07 PROCEDURE — 3078F DIAST BP <80 MM HG: CPT | Performed by: INTERNAL MEDICINE

## 2025-07-07 PROCEDURE — 83540 ASSAY OF IRON: CPT | Performed by: INTERNAL MEDICINE

## 2025-07-07 PROCEDURE — 3074F SYST BP LT 130 MM HG: CPT | Performed by: INTERNAL MEDICINE

## 2025-07-07 PROCEDURE — 80048 BASIC METABOLIC PNL TOTAL CA: CPT | Performed by: INTERNAL MEDICINE

## 2025-07-07 ASSESSMENT — PATIENT HEALTH QUESTIONNAIRE - PHQ9
10. IF YOU CHECKED OFF ANY PROBLEMS, HOW DIFFICULT HAVE THESE PROBLEMS MADE IT FOR YOU TO DO YOUR WORK, TAKE CARE OF THINGS AT HOME, OR GET ALONG WITH OTHER PEOPLE: SOMEWHAT DIFFICULT
SUM OF ALL RESPONSES TO PHQ QUESTIONS 1-9: 8
SUM OF ALL RESPONSES TO PHQ QUESTIONS 1-9: 8

## 2025-07-07 NOTE — TELEPHONE ENCOUNTER
FYI - Status Update    Who is Calling: patient    Update: Please fax notes from appt/preop today, along with history to Dr Winter Marcial, fax #[phone]     Does caller want a call/response back: No

## 2025-07-07 NOTE — ASSESSMENT & PLAN NOTE
New dx on labs  3/31/25 in s/o black stools and increased fatigue. Started on oral iron. Since then colonoscopies unrevealing but EGD showed duodenal mass.   - Repeat iron studies today show improvement  - Continue oral iron supplement  - Mgmt of mass noted elsewhere

## 2025-07-07 NOTE — PROGRESS NOTES
Answers submitted by the patient for this visit:  Patient Health Questionnaire (Submitted on 7/7/2025)  If you checked off any problems, how difficult have these problems made it for you to do your work, take care of things at home, or get along with other people?: Somewhat difficult  PHQ9 TOTAL SCORE: 8  Preoperative Evaluation  [address] [address]  Phone: [phone]  Fax: [phone]  Primary Provider: Brenna Lindsey MD  Pre-op Performing Provider: Brenna Lindsey MD  Jul 7, 2025 7/2/2025   Surgical Information   What procedure is being done? Prop physical   Facility or Hospital where procedure/surgery will be performed: [facility]   Who is doing the procedure / surgery? Dr. Max   Date of surgery / procedure: 7/17/2025   Time of surgery / procedure: 10:00   Where do you plan to recover after surgery? at home with family     Fax number for surgical facility: [phone]    Assessment & Plan     The proposed surgical procedure is considered LOW risk.    Problem List Items Addressed This Visit          Cardiovascular/Peripheral Vascular    Elevated coronary artery calcium score    CAC 81 (10/2024) from 42 (2017). Met with cardiology and started on inclisiran. Lipids trending down on recheck in May.  - Cont inclisiran             Endocrine    Age-related osteoporosis without current pathological fracture    Dx on last DEXA 11/2023 with decrease in BMD, lowest T score of -2.6 in L hip. Taking calcium and vitamin D. Started prolia 12/2023.   - Continue prolia  - Plan repeat DEXA 12/2025  - Continue great work on regular walking/weight bearing exercise          Mixed hyperlipidemia    As per CAC.            Blood    Iron deficiency anemia due to chronic blood loss    New dx on labs  3/31/25 in s/o black stools and increased fatigue. Started on oral iron. Since then colonoscopies unrevealing but  EGD showed duodenal mass.   - Repeat iron studies today show improvement  - Continue oral iron supplement  - Mgmt of mass noted elsewhere         Relevant Orders    Iron and iron binding capacity (Completed)    Ferritin (Completed)       FEN/Gastrointestinal    Lymphocytic colitis    Dx 2 years ago. Follows with [facility]. Current regimen on budesonide EC 3 mg every other day. This is working well. Tried stopping but had increase in loose stools.   - Continue budesonide 3 mg every other day  - Continue regular [facility] f/up         Gastroesophageal reflux disease without esophagitis    Current regimen omeprazole 20 mg daily and famotidine 20 mg PRN.   - Consider trying to taper in the future, for now continue          Duodenal mass    Per patient, found on EGD in May. Biopsies non-malignant however, provider would like her to get EUS for further evaluation, this is scheduled 7/17/25.  - [facility]IGH for [facility] today            Obstetrics/Gynecology    Atrophic vaginitis    Well controlled with estrace cream            Genitourinary/Renal    Genital herpes simplex, unspecified site    Chronic. No flare with decreasing suppressive dose of valtrex to 500 mg daily. Will continue.             Orthopedic/Musculoskeletal    Hammertoe of right foot    Saw podiatry 4/21/25, nothing to do for now, CTM.            Surgery/Wound/Pain    Preop general physical exam - Primary    Here for preop prior to EUS scheduled 7/17/2025.  As tolerated EGD and colonoscopy in the last year without issue.   - BMP and CBC stable today  - Medication hold times below  - Approval given         Relevant Orders    CBC with platelets (Completed)    Basic metabolic panel  (Ca, Cl, CO2, Creat, Gluc, K, Na, BUN) (Completed)          - No identified additional risk factors other than previously addressed    Preoperative Medication Instructions  - Okay for morning meds day of surgery with small sip of water   - skip vitamins 7 days before     Recommendation  Approval given  to proceed with proposed procedure, without further diagnostic evaluation.    The longitudinal plan of care for the diagnosis(es)/condition(s) as documented were addressed during this visit. Due to the added complexity in care, I will continue to support Felicity in the subsequent management and with ongoing continuity of care.    Follow-up 3-4 months     Subjective   Felicity is a 79 year old, presenting for the following:  Pre-Op Exam (07/17/2025  - ENDOSCOPIC ULTRASOUND UPPER at [facility] w/ Dr. Max )          7/7/2025     2:18 PM   Additional Questions   Roomed by Maria E Vora CMA   Accompanied by Self     HPI: Black stools since March. [place] 4/2025 (found with symptom of fatigue), started oral iron      EGD initially scheduled in April but got a cold over Easter and had 15 day river cruise. Did EGD 6/2025 and he found a mass and did biopsies, nothing cancerous (however could turn in to cancer?). He referred her to get EUS for further testing.         7/2/2025   Pre-Op Questionnaire   Have you ever had a heart attack or stroke? No   Have you ever had surgery on your heart or blood vessels, such as a stent placement, a coronary artery bypass, or surgery on an artery in your head, neck, heart, or legs? No   Do you have chest pain with activity? No   Do you have a history of heart failure? No   Do you currently have a cold, bronchitis or symptoms of other infection? No   Do you have a cough, shortness of breath, or wheezing? No   Do you or anyone in your family have previous history of blood clots? No   Do you or does anyone in your family have a serious bleeding problem such as prolonged bleeding following surgeries or cuts? No   Have you ever had problems with anemia or been told to take iron pills? (!) YES as above   Have you had any abnormal blood loss such as black, tarry or bloody stools, or abnormal vaginal bleeding? (!) YES as above   Have you ever had a blood transfusion? No   Are you willing  to have a blood transfusion if it is medically needed before, during, or after your surgery? Yes   Have you or any of your relatives ever had problems with anesthesia? (!) YES remote - no issues with colonoscopies and EGD in last year    Do you have sleep apnea, excessive snoring or daytime drowsiness? No   Do you have any artifical heart valves or other implanted medical devices like a pacemaker, defibrillator, or continuous glucose monitor? No   Do you have artificial joints? No   Are you allergic to latex? No     Advance Care Planning  Discussed advance care planning with patient; informed AVS has link to [employer] [employer].    Preoperative Review of    reviewed - no record of controlled substances prescribed.    Chronic cough: pulm 25, cont dulera 200/5 1-2 times dailyPRN, albuterol PRN     HLD / CAC: Intolerant of statins. CAC 81 (10/2024). . Saw cardiology 10/31/24, stop gemfibrozil, PA for inclisiran. Negative stress 24.   - Repeat lipids 3/17/24 LDL down to 86 (although only after first dose)     GERD: omeprazole 20 mg daily and famotidine 20 mg PRN    Back: [place] 4/15/25, going to do SCS     Varicose veins: had consult with Dr. Diaz 25. Nothing to do surgically, did get compression stockings. Still having some pain in legs at night.      B/l Knee OA: s/p R knee Synvisc 25 and b/l knee steroid injections 6/10/25     Colitis: budesonide 3 mg every other day     Hx breast cancer: S/p lumpectomy and radiation in       Osteoporosis: Prolia started 2023.   - vitamin D 95 (2024), did stop vitamin D after this and switched to only calcium no D     MDD: sertraline 200 mg since .   in July. No other medications.   - Still doesn't feel like doing much most days, does get out and does enjoy things once she is there      Insomnia: trazodone 150 mg nightly, working well      Atrophic vaginitis: estrace started a year ago, follows with Dr. Marlene Sanchez  HSV: valtrex 500g daily for suppression (no outbreak with decreased dose)    Patient Active Problem List    Diagnosis Date Noted    Preop general physical exam 07/07/2025     Priority: Medium    Normocytic anemia 07/07/2025     Priority: Medium    Iron deficiency anemia due to chronic blood loss 07/07/2025     Priority: Medium    Duodenal mass 07/07/2025     Priority: Medium    Encounter for Medicare annual wellness exam 03/31/2025     Priority: Medium    Hammertoe of right foot 03/31/2025     Priority: Medium    Night sweats 03/31/2025     Priority: Medium    Elevated coronary artery calcium score 09/26/2024     Priority: Medium    Mixed hyperlipidemia 09/26/2024     Priority: Medium    Genital herpes simplex, unspecified site 09/26/2024     Priority: Medium    Recurrent major depressive disorder, in partial remission 09/26/2024     Priority: Medium    Psychophysiological insomnia 09/26/2024     Priority: Medium    Atrophic vaginitis 09/26/2024     Priority: Medium    Primary osteoarthritis involving multiple joints 09/26/2024     Priority: Medium    Age-related osteoporosis without current pathological fracture 09/25/2024     Priority: Medium     Dx on DEXA 11/2023 with decrease in BMD, lowest T score of -2.6 in L hip. Taking calcium and vitamin D. Started prolia 12/2023.      Lymphocytic colitis 08/19/2022     Priority: Medium     Dx ~2021. Follows with [facility]. Current regimen on budesonide EC 3 mg every other day.      History of colonic polyps 05/27/2021     Priority: Medium    Gastroesophageal reflux disease without esophagitis 05/12/2016     Priority: Medium    History of breast cancer 01/01/2010     Priority: Medium     S/p lumpectomy and radiation in 2015          Past Medical History:   Diagnosis Date    Abscess of middle lobe of right lung with pneumonia (H) 11/10/2021    Acute respiratory failure with hypoxia (H) 11/24/2021    Breast cancer (H) 01/01/2010    hx of right lumpectomy for breast     Depression      Depressive disorder 1996    GERD (gastroesophageal reflux disease)     Hx of radiation therapy right 2010    hx of right lumpectomy for breast cancer    Infection due to 2019 novel coronavirus 11/10/2021    Osteoarthritis     Pneumonia due to infectious organism, unspecified laterality, unspecified part of lung 11/10/2021     Past Surgical History:   Procedure Laterality Date    ABDOMEN SURGERY  Oophorectomy and total hysterectomy    1 year apart    APPENDECTOMY      ARTHROSCOPY SHOULDER ROTATOR CUFF REPAIR      BACK SURGERY  2004    microdiscectomy    BIOPSY BREAST Right 01/01/2010    hx of right lumpectomy    COLONOSCOPY      EYE SURGERY  2020    Double vision    GYN SURGERY  1980/81    total Hyseterectomy    HYSTERECTOMY  01/01/1982    IR CHEST TUBE PLACEMENT NON-TUNNELED RIGHT  11/13/2021    LUMPECTOMY BREAST Right 01/01/2010    right lumpectomy    OOPHORECTOMY      RELEASE CARPAL TUNNEL Right     THORACOSCOPY Right 11/23/2021    Procedure: THORACOSCOPY, PARIETAL PLEURECTOMY;  Surgeon: Timbo Zuluaga MD;  Location: [facility] OR     Current Outpatient Medications   Medication Sig Dispense Refill    acetaminophen (TYLENOL) 500 MG tablet Take 1,000 mg by mouth At Bedtime      ascorbic acid (VITAMIN C) 250 MG tablet Take 250 mg by mouth At Bedtime       Bacillus Coagulans-Inulin (PROBIOTIC) 1-250 BILLION-MG CAPS 2 caps orally once a day      BLACK COHOSH ORAL [BLACK COHOSH ORAL] Take 1 capsule by mouth 2 (two) times a day.       budesonide (ENTOCORT EC) 3 MG EC capsule Take 3 mg by mouth every other day.      calcium citrate (CITRACAL) 950 (200 Ca) MG tablet Take 1 tablet by mouth 2 times daily      Coenzyme Q10 (CO Q-10) 400 MG CAPS 1 cap(s) Orally once a day      estradiol (ESTRACE) 0.1 MG/GM vaginal cream INSERT 1 GRAM OF CREAM INTO VAGINA USING THE APPLICATORY NIGHTLY.      famotidine (PEPCID) 20 MG tablet TAKE ONE TAB BY MOUTH ONCE A DAY AS NEEDED FOR BREATHTHRU GERD SYMTOMS      ferrous sulfate  (FEROSUL) 325 (65 Fe) MG tablet Take 1 tablet (325 mg) by mouth daily (with breakfast). 90 tablet 1    MAGNESIUM GLYCINATE  mg 2 times daily      mometasone-formoterol (DULERA) 200-5 MCG/ACT inhaler Inhale 2 puffs into the lungs 2 times daily. OFFICE VISIT NEEDED FOR ANY FURTHER REFILLS 13 g 11    omeprazole (PRILOSEC) 20 MG DR capsule Take 1 capsule (20 mg) by mouth daily. 90 capsule 1    PSYLLIUM HUSK PO Take 500 mg by mouth daily 3 capsules daily      sertraline (ZOLOFT) 100 MG tablet Take 2 tablets (200 mg) by mouth daily. 180 tablet 1    traZODone (DESYREL) 150 MG tablet Take 1 tablet (150 mg) by mouth at bedtime. 90 tablet 2    triamcinolone (NASACORT ALLERGY 24HR) 55 MCG/ACT nasal aerosol Nasacort Allergy 24HR 55 MCG/ACT      valACYclovir (VALTREX) 500 MG tablet Take 1 tablet (500 mg) by mouth daily. 90 tablet 3    Vitamin D, Cholecalciferol, 25 MCG (1000 UT) CAPS       vitamin E 400 UNIT capsule [VITAMIN E 400 UNIT CAPSULE] Take 400 Units by mouth daily.         Allergies   Allergen Reactions    Arimidex [Anastrozole] Unknown     Hot flashes    Penicillins Hives    Statins-Hmg-Coa Reductase Inhibitors [Statins] Muscle Pain (Myalgia)    Fenofibrate Rash        Social History     Tobacco Use    Smoking status: Former     Current packs/day: 0.00     Types: Cigarettes     Quit date: 1961     Years since quittin.9     Passive exposure: Past (Dad was a smoker but quit when patient was 10-11yo)    Smokeless tobacco: Never   Substance Use Topics    Alcohol use: Not Currently     Comment: Might  have a glass of wine on occasion     Family History   Problem Relation Age of Onset    Chronic Obstructive Pulmonary Disease Father     Breast Cancer Sister 71    Cancer Sister     Atrial fibrillation Sister     Osteoporosis Sister     Cancer Brother 19    Testicular cancer Brother     Other Cancer Brother         Testicular age 19    No Known Problems Son     No Known Problems Son     Depression Mother   "    History   Drug Use No             Review of Systems  Constitutional, neuro, ENT, endocrine, pulmonary, cardiac, gastrointestinal, genitourinary, musculoskeletal, integument and psychiatric systems are negative, except as otherwise noted.    Objective    /50 (BP Location: Right arm, Patient Position: Sitting, Cuff Size: Adult Regular)   Pulse 81   Temp 98.1  F (36.7  C) (Oral)   Resp 18   Ht 1.626 m (5' 4\")   Wt 61.9 kg (136 lb 8 oz)   LMP  (LMP Unknown)   SpO2 98%   BMI 23.43 kg/m     Estimated body mass index is 23.43 kg/m  as calculated from the following:    Height as of this encounter: 1.626 m (5' 4\").    Weight as of this encounter: 61.9 kg (136 lb 8 oz).  Physical Exam  GENERAL: alert and no distress  EYES: Eyes grossly normal to inspection and conjunctivae and sclerae normal  HENT: nose and mouth without ulcers or lesions  RESP: lungs clear to auscultation - no rales, rhonchi or wheezes  CV: regular rate and rhythm, normal S1 S2, no S3 or S4, no murmur, click or rub, no peripheral edema  ABDOMEN: soft, mild TTP in epigastric region, no hepatosplenomegaly, no masses and bowel sounds normal  MS: no gross musculoskeletal defects noted, no edema  SKIN: no suspicious lesions or rashes  NEURO: Normal strength and tone, mentation intact and speech normal  PSYCH: mentation appears normal, affect normal/bright    Recent Labs   Lab Test 03/31/25  1256 09/25/24  1538   HGB 10.3* 12.3    223    138   POTASSIUM 4.3 4.3   CR 0.70 0.71        Diagnostics  Recent Results (from the past 48 hours)   CBC with platelets    Collection Time: 07/07/25  3:01 PM   Result Value Ref Range    WBC Count 6.8 4.0 - 11.0 10e3/uL    RBC Count 4.36 3.80 - 5.20 10e6/uL    Hemoglobin 13.4 11.7 - 15.7 g/dL    Hematocrit 41.5 35.0 - 47.0 %    MCV 95 78 - 100 fL    MCH 30.7 26.5 - 33.0 pg    MCHC 32.3 31.5 - 36.5 g/dL    RDW 14.3 10.0 - 15.0 %    Platelet Count 179 150 - 450 10e3/uL   Iron and iron binding capacity "    Collection Time: 07/07/25  3:01 PM   Result Value Ref Range    Iron 110 37 - 145 ug/dL    Iron Binding Capacity 274 240 - 430 ug/dL    Iron Sat Index 40 15 - 46 %   Ferritin    Collection Time: 07/07/25  3:01 PM   Result Value Ref Range    Ferritin 41 11 - 328 ng/mL   Basic metabolic panel  (Ca, Cl, CO2, Creat, Gluc, K, Na, BUN)    Collection Time: 07/07/25  3:01 PM   Result Value Ref Range    Sodium 140 135 - 145 mmol/L    Potassium 4.7 3.4 - 5.3 mmol/L    Chloride 103 98 - 107 mmol/L    Carbon Dioxide (CO2) 28 22 - 29 mmol/L    Anion Gap 9 7 - 15 mmol/L    Urea Nitrogen 14.6 8.0 - 23.0 mg/dL    Creatinine 0.77 0.51 - 0.95 mg/dL    GFR Estimate 77 >60 mL/min/1.73m2    Calcium 9.5 8.8 - 10.4 mg/dL    Glucose 95 70 - 99 mg/dL      No EKG required, no history of coronary heart disease, significant arrhythmia, peripheral arterial disease or other structural heart disease.    Revised Cardiac Risk Index (RCRI)  The patient has the following serious cardiovascular risks for perioperative complications:   - No serious cardiac risks = 0 points     RCRI Interpretation: 0 points: Class I (very low risk - 0.4% complication rate)         Signed Electronically by: Brenna Lindsey MD  A copy of this evaluation report is provided to the requesting physician.

## 2025-07-07 NOTE — ASSESSMENT & PLAN NOTE
Dx 2 years ago. Follows with [facility]. Current regimen on budesonide EC 3 mg every other day. This is working well. Tried stopping but had increase in loose stools.   - Continue budesonide 3 mg every other day  - Continue regular [facility] f/up

## 2025-07-07 NOTE — ASSESSMENT & PLAN NOTE
Here for preop prior to EUS scheduled 7/17/2025.  As tolerated EGD and colonoscopy in the last year without issue.   - BMP and CBC stable today  - Medication hold times below  - Approval given

## 2025-07-07 NOTE — ASSESSMENT & PLAN NOTE
Dx on last DEXA 11/2023 with decrease in BMD, lowest T score of -2.6 in L hip. Taking calcium and vitamin D. Started prolia 12/2023.   - Continue prolia  - Plan repeat DEXA 12/2025  - Continue great work on regular walking/weight bearing exercise

## 2025-07-07 NOTE — ASSESSMENT & PLAN NOTE
CAC 81 (10/2024) from 42 (2017). Met with cardiology and started on inclisiran. Lipids trending down on recheck in May.  - Cont inclisiran

## 2025-07-07 NOTE — ASSESSMENT & PLAN NOTE
Per patient, found on EGD in May. Biopsies non-malignant however, provider would like her to get EUS for further evaluation, this is scheduled 7/17/25.  - [facility] for [facility] today

## 2025-07-07 NOTE — PATIENT INSTRUCTIONS
How to Take Your Medication Before Surgery  Preoperative Medication Instructions   - Okay for morning meds day of surgery with small sip of water   - skip vitamins 7 days before        Patient Education   Preparing for Your Surgery  For Adults  Getting started  In most cases, a nurse will call to review your health history and instructions. They will give you an arrival time based on your scheduled surgery time. Please be ready to share:  Your doctor's clinic name and phone number  Your medical, surgical, and anesthesia history  A list of allergies and sensitivities  A list of medicines, including herbal treatments and over-the-counter drugs  Whether the patient has a legal guardian (ask how to send us the papers in advance)  Note: You may not receive a call if you were seen at our PAC (Preoperative Assessment Center).  Please tell us if you're pregnant--or if there's any chance you might be pregnant. Some surgeries may injure a fetus (unborn baby), so they require a pregnancy test. Surgeries that are safe for a fetus don't always need a test, and you can choose whether to have one.   Preparing for surgery  Within 10 to 30 days of surgery: Have a pre-op exam (sometimes called an H&P, or History and Physical). This can be done at a clinic or pre-operative center.  If you're having a , you may not need this exam. Talk to your care team.  At your pre-op exam, talk to your care team about all medicines you take. (This includes CBD oil and any drugs, such as THC, marijuana, and other forms of cannabis.) If you need to stop any medicine before surgery, ask when to start taking it again.  This is for your safety. Many medicines and drugs can make you bleed too much during surgery. Some change how well surgery (anesthesia) drugs work.  Call your insurance company to let them know you're having surgery. (If you don't have insurance, call [phone].)  Call your clinic if there's any change in your health. This  includes a scrape or scratch near the surgery site, or any signs of a cold (sore throat, runny nose, cough, rash, fever).  Eating and drinking guidelines  For your safety: Unless your surgeon tells you otherwise, follow the guidelines below.  Eat and drink as normal until 8 hours before you arrive for surgery. After that, no food or milk. You can spit out gum when you arrive.  Drink clear liquids until 2 hours before you arrive. These are liquids you can see through, like water, Gatorade, and Propel Water. They also include plain black coffee and tea (no cream or milk).  No alcohol for 24 hours before you arrive. The night before surgery, stop any drinks that contain THC.  If your care team tells you to take medicine on the morning of surgery, it's okay to take it with a sip of water. No other medicines or drugs are allowed (including CBD oil)--follow your care team's instructions.  If you have questions the day of surgery, call your hospital or surgery center.   Preventing infection  Shower or bathe the night before and the morning of surgery. Follow the instructions your clinic gave you. (If no instructions, use regular soap.)  Don't shave or clip hair near your surgery site. We'll remove the hair if needed.  Don't smoke or vape the morning of surgery. No chewing tobacco for 6 hours before you arrive. A nicotine patch is okay. You may spit out nicotine gum when you arrive.  For some surgeries, the surgeon will tell you to fully quit smoking and nicotine.  We will make every effort to keep you safe from infection. We will:  Clean our hands often with soap and water (or an alcohol-based hand rub).  Clean the skin at your surgery site with a special soap that kills germs.  Give you a special gown to keep you warm. (Cold raises the risk of infection.)  Wear hair covers, masks, gowns, and gloves during surgery.  Give antibiotic medicine, if prescribed. Not all surgeries need this medicine.  What to bring on the day of  surgery  Photo ID and insurance card  Copy of your health care directive, if you have one  Glasses and hearing aids (bring cases)  You can't wear contacts during surgery  Inhaler and eye drops, if you use them (tell us about these when you arrive)  CPAP machine or breathing device, if you use them  A few personal items, if spending the night  If you have . . .  A pacemaker, ICD (cardiac defibrillator), or other implant: Bring the ID card.  An implanted stimulator: Bring the remote control.  A legal guardian: Bring a copy of the certified (court-stamped) guardianship papers.  Please remove any jewelry, including body piercings. Leave jewelry and other valuables at home.  If you're going home the day of surgery  You must have a support person drive you home. They should stay with you overnight, and they may need to help with your self-care.  If you don't have a support person, please tells us as soon as possible. We can help.  After surgery  If it's hard to control your pain or you need more pain medicine, please call your surgeon's office.  Questions?   If you have any questions for your care team, list them here:   ____________________________________________________________________________________________________________________________________________________________________________________________________________________________________________________________  For informational purposes only. Not to replace the advice of your health care provider. Copyright   2003, 2019 [facility]. All rights reserved. Clinically reviewed by Roman Prieto MD. [employer] [id number] - REV 02/25.

## 2025-08-12 ENCOUNTER — OFFICE VISIT (OUTPATIENT)
Dept: INTERNAL MEDICINE | Facility: CLINIC | Age: 80
End: 2025-08-12
Payer: COMMERCIAL

## 2025-08-12 VITALS
DIASTOLIC BLOOD PRESSURE: 66 MMHG | HEIGHT: 64 IN | BODY MASS INDEX: 23.46 KG/M2 | RESPIRATION RATE: 16 BRPM | OXYGEN SATURATION: 98 % | WEIGHT: 137.4 LBS | TEMPERATURE: 98.1 F | SYSTOLIC BLOOD PRESSURE: 109 MMHG | HEART RATE: 80 BPM

## 2025-08-12 DIAGNOSIS — K52.832 LYMPHOCYTIC COLITIS: ICD-10-CM

## 2025-08-12 DIAGNOSIS — K31.7 POLYP OF DUODENUM: Primary | ICD-10-CM

## 2025-08-12 PROCEDURE — 3074F SYST BP LT 130 MM HG: CPT | Performed by: INTERNAL MEDICINE

## 2025-08-12 PROCEDURE — 3078F DIAST BP <80 MM HG: CPT | Performed by: INTERNAL MEDICINE

## 2025-08-12 PROCEDURE — G2211 COMPLEX E/M VISIT ADD ON: HCPCS | Performed by: INTERNAL MEDICINE

## 2025-08-12 PROCEDURE — 99214 OFFICE O/P EST MOD 30 MIN: CPT | Performed by: INTERNAL MEDICINE

## 2025-08-12 RX ORDER — PANTOPRAZOLE SODIUM 40 MG/1
40 TABLET, DELAYED RELEASE ORAL
COMMUNITY
Start: 2025-07-18

## 2025-08-12 ASSESSMENT — PATIENT HEALTH QUESTIONNAIRE - PHQ9: SUM OF ALL RESPONSES TO PHQ QUESTIONS 1-9: 13

## 2025-08-13 ENCOUNTER — TRANSFERRED RECORDS (OUTPATIENT)
Dept: HEALTH INFORMATION MANAGEMENT | Facility: CLINIC | Age: 80
End: 2025-08-13
Payer: COMMERCIAL

## 2025-08-14 ENCOUNTER — ALLIED HEALTH/NURSE VISIT (OUTPATIENT)
Dept: CARDIOLOGY | Facility: CLINIC | Age: 80
End: 2025-08-14
Payer: COMMERCIAL

## 2025-08-14 DIAGNOSIS — E78.2 MIXED HYPERLIPIDEMIA: Primary | ICD-10-CM

## 2025-08-21 ENCOUNTER — TRANSFERRED RECORDS (OUTPATIENT)
Dept: HEALTH INFORMATION MANAGEMENT | Facility: CLINIC | Age: 80
End: 2025-08-21
Payer: COMMERCIAL

## 2025-08-28 ENCOUNTER — MEDICAL CORRESPONDENCE (OUTPATIENT)
Dept: HEALTH INFORMATION MANAGEMENT | Facility: CLINIC | Age: 80
End: 2025-08-28
Payer: COMMERCIAL

## 2025-09-01 ENCOUNTER — PATIENT OUTREACH (OUTPATIENT)
Dept: CARE COORDINATION | Facility: CLINIC | Age: 80
End: 2025-09-01
Payer: COMMERCIAL

## 2025-09-02 DIAGNOSIS — D64.9 NORMOCYTIC ANEMIA: ICD-10-CM

## 2025-09-02 DIAGNOSIS — F33.41 RECURRENT MAJOR DEPRESSIVE DISORDER, IN PARTIAL REMISSION: ICD-10-CM

## 2025-09-02 DIAGNOSIS — A60.00 GENITAL HERPES SIMPLEX, UNSPECIFIED SITE: ICD-10-CM

## 2025-09-02 RX ORDER — VALACYCLOVIR HYDROCHLORIDE 500 MG/1
500 TABLET, FILM COATED ORAL DAILY
Qty: 90 TABLET | Refills: 0 | Status: SHIPPED | OUTPATIENT
Start: 2025-09-02

## 2025-09-02 RX ORDER — SERTRALINE HYDROCHLORIDE 100 MG/1
200 TABLET, FILM COATED ORAL DAILY
Qty: 180 TABLET | Refills: 1 | OUTPATIENT
Start: 2025-09-02

## 2025-09-02 RX ORDER — FERROUS SULFATE 325(65) MG
325 TABLET ORAL
Qty: 90 TABLET | Refills: 0 | Status: SHIPPED | OUTPATIENT
Start: 2025-09-02

## 2025-09-03 ENCOUNTER — PATIENT OUTREACH (OUTPATIENT)
Dept: CARE COORDINATION | Facility: CLINIC | Age: 80
End: 2025-09-03
Payer: COMMERCIAL

## 2025-09-04 DIAGNOSIS — E78.2 MIXED HYPERLIPIDEMIA: ICD-10-CM

## 2025-09-04 DIAGNOSIS — I25.10 CORONARY ARTERY CALCIFICATION: Primary | ICD-10-CM

## (undated) DEVICE — PLATE GROUNDING ADULT W/CORD 9165L

## (undated) DEVICE — PREP CHLORAPREP 26ML TINTED HI-LITE ORANGE 930815

## (undated) DEVICE — ADH SKIN CLOSURE PREMIERPRO EXOFIN 1.0ML 3470

## (undated) DEVICE — CATH THORACIC RT ANGLE CLOTSTOP 36FR

## (undated) DEVICE — MAT INST MAGNETIC 16X20

## (undated) DEVICE — CUSTOM PACK LAP CHOLE SBA5BLCHEA

## (undated) DEVICE — HLSTR JET MULTI-INST SFTY STRL FIRE-SFE LF 7212

## (undated) DEVICE — SUTURE SILK 0 PSL 580H

## (undated) DEVICE — TUBING SUCTION MEDI-VAC 1/4"X20' N620A - HE

## (undated) DEVICE — CONNECTOR Y 1/2 X 3/8 X 3/8 STRL C440

## (undated) DEVICE — GLOVE BIOGEL PI ULTRATOUCH G SZ 7.5 42175

## (undated) DEVICE — SUCTION DRY CHEST DRAIN OASIS 3600-100

## (undated) DEVICE — STPL ENDO LINEAR CUT ECHELON 60MMX28CM SHORT SC60A

## (undated) DEVICE — ESU PENCIL SMOKE EVAC W/ROCKER SWITCH 0703-047-000

## (undated) DEVICE — TAPE MICROFOAM 4" 1528-4

## (undated) DEVICE — SOL NACL 0.9% IRRIG 1000ML BOTTLE 2F7124

## (undated) DEVICE — CONNECTOR 5 TO 1 STRL 271502

## (undated) DEVICE — CATH THORACIC STRAIGHT CLOTSTOP 36FR 100036

## (undated) DEVICE — DRSG GAUZE 4X4" 3033

## (undated) DEVICE — TUBING IRRIG TUR Y TYPE 96" LF 6543-01

## (undated) DEVICE — DRAPE IOBAN INCISE 23X17" 6650EZ

## (undated) DEVICE — TUBING SUCTION DRAINAGE PLEURAL DUAL 8884714200

## (undated) DEVICE — SUTURE VICRYL+ 2-0 27IN CT-1 UND VCP259H

## (undated) DEVICE — Device

## (undated) DEVICE — SUTURE VICRYL+ 3-0 18IN PS-2 UND VCP497H

## (undated) DEVICE — TUBING SMOKE EVAC PNEUMOCLEAR HIGH FLOW 0620050250

## (undated) DEVICE — SYSTEM CLEARIFY VISUALIZATION 21-345

## (undated) DEVICE — ESU CORD MONOPOLAR 10'  E0510

## (undated) DEVICE — ENDO TROCAR SLEEVE KII Z-THREADED 11X100MM CTS12

## (undated) DEVICE — SOL RINGERS LACTATED 1000ML BAG 2B2324X

## (undated) DEVICE — SU VICRYL+ 0 27IN CT-2 UND VCP270H

## (undated) DEVICE — TUBING LAP/SUCT IRRIG DAVOL 0026880

## (undated) DEVICE — DRSG DRAIN 4X4" 7086

## (undated) DEVICE — ENDO TROCAR SHIELDED BLADED KII Z-THRD 11X100MM CTB33

## (undated) DEVICE — SUCTION TIP POOLE STERILE 35040

## (undated) DEVICE — SOL WATER IRRIG 1000ML BOTTLE 2F7114

## (undated) DEVICE — GOWN IMPERVIOUS BREATHABLE SMART LG 89015

## (undated) RX ORDER — LIDOCAINE HYDROCHLORIDE 10 MG/ML
INJECTION, SOLUTION INFILTRATION; PERINEURAL
Status: DISPENSED
Start: 2021-11-13

## (undated) RX ORDER — BUPIVACAINE HYDROCHLORIDE 5 MG/ML
INJECTION, SOLUTION EPIDURAL; INTRACAUDAL
Status: DISPENSED
Start: 2021-11-23

## (undated) RX ORDER — BACITRACIN ZINC 500 [USP'U]/G
OINTMENT TOPICAL
Status: DISPENSED
Start: 2021-11-23

## (undated) RX ORDER — FENTANYL CITRATE 50 UG/ML
INJECTION, SOLUTION INTRAMUSCULAR; INTRAVENOUS
Status: DISPENSED
Start: 2021-11-13